# Patient Record
Sex: MALE | Race: WHITE | NOT HISPANIC OR LATINO | Employment: OTHER | URBAN - METROPOLITAN AREA
[De-identification: names, ages, dates, MRNs, and addresses within clinical notes are randomized per-mention and may not be internally consistent; named-entity substitution may affect disease eponyms.]

---

## 2018-02-01 ENCOUNTER — HOSPITAL ENCOUNTER (INPATIENT)
Facility: HOSPITAL | Age: 83
LOS: 6 days | Discharge: NURSING, REHAB OR LONG-TERM CARE | DRG: 603 | End: 2018-02-07
Attending: EMERGENCY MEDICINE | Admitting: INTERNAL MEDICINE
Payer: COMMERCIAL

## 2018-02-01 DIAGNOSIS — F31.9 BIPOLAR 1 DISORDER (HCC): ICD-10-CM

## 2018-02-01 DIAGNOSIS — S89.90XA: ICD-10-CM

## 2018-02-01 DIAGNOSIS — L03.90 WOUND CELLULITIS: Primary | ICD-10-CM

## 2018-02-01 DIAGNOSIS — L03.115 BILATERAL CELLULITIS OF LOWER LEG: ICD-10-CM

## 2018-02-01 DIAGNOSIS — R63.4 WEIGHT LOSS: ICD-10-CM

## 2018-02-01 DIAGNOSIS — L03.116 BILATERAL CELLULITIS OF LOWER LEG: ICD-10-CM

## 2018-02-01 PROBLEM — S81.802A MULTIPLE OPEN WOUNDS OF LEFT LOWER EXTREMITY: Status: ACTIVE | Noted: 2018-02-01

## 2018-02-01 PROBLEM — S81.801A: Status: ACTIVE | Noted: 2018-02-01

## 2018-02-01 LAB
ALBUMIN SERPL BCP-MCNC: 3.1 G/DL (ref 3.5–5)
ALP SERPL-CCNC: 63 U/L (ref 46–116)
ALT SERPL W P-5'-P-CCNC: 24 U/L (ref 12–78)
ANION GAP SERPL CALCULATED.3IONS-SCNC: 9 MMOL/L (ref 4–13)
AST SERPL W P-5'-P-CCNC: 23 U/L (ref 5–45)
BASOPHILS # BLD AUTO: 0.07 THOUSANDS/ΜL (ref 0–0.1)
BASOPHILS NFR BLD AUTO: 1 % (ref 0–1)
BILIRUB SERPL-MCNC: 0.5 MG/DL (ref 0.2–1)
BUN SERPL-MCNC: 18 MG/DL (ref 5–25)
CALCIUM SERPL-MCNC: 9.1 MG/DL (ref 8.3–10.1)
CHLORIDE SERPL-SCNC: 105 MMOL/L (ref 100–108)
CO2 SERPL-SCNC: 28 MMOL/L (ref 21–32)
CREAT SERPL-MCNC: 1.02 MG/DL (ref 0.6–1.3)
EOSINOPHIL # BLD AUTO: 0.1 THOUSAND/ΜL (ref 0–0.61)
EOSINOPHIL NFR BLD AUTO: 2 % (ref 0–6)
ERYTHROCYTE [DISTWIDTH] IN BLOOD BY AUTOMATED COUNT: 14 % (ref 11.6–15.1)
ERYTHROCYTE [SEDIMENTATION RATE] IN BLOOD: 5 MM/HOUR (ref 0–10)
GFR SERPL CREATININE-BSD FRML MDRD: 68 ML/MIN/1.73SQ M
GLUCOSE SERPL-MCNC: 84 MG/DL (ref 65–140)
HCT VFR BLD AUTO: 42.1 % (ref 36.5–49.3)
HGB BLD-MCNC: 14.5 G/DL (ref 12–17)
LACTATE SERPL-SCNC: 0.8 MMOL/L (ref 0.5–2)
LYMPHOCYTES # BLD AUTO: 1.57 THOUSANDS/ΜL (ref 0.6–4.47)
LYMPHOCYTES NFR BLD AUTO: 23 % (ref 14–44)
MCH RBC QN AUTO: 31.6 PG (ref 26.8–34.3)
MCHC RBC AUTO-ENTMCNC: 34.4 G/DL (ref 31.4–37.4)
MCV RBC AUTO: 92 FL (ref 82–98)
MONOCYTES # BLD AUTO: 0.68 THOUSAND/ΜL (ref 0.17–1.22)
MONOCYTES NFR BLD AUTO: 10 % (ref 4–12)
NEUTROPHILS # BLD AUTO: 4.25 THOUSANDS/ΜL (ref 1.85–7.62)
NEUTS SEG NFR BLD AUTO: 64 % (ref 43–75)
NRBC BLD AUTO-RTO: 0 /100 WBCS
PLATELET # BLD AUTO: 263 THOUSANDS/UL (ref 149–390)
PMV BLD AUTO: 8.9 FL (ref 8.9–12.7)
POTASSIUM SERPL-SCNC: 3.7 MMOL/L (ref 3.5–5.3)
PROT SERPL-MCNC: 6.5 G/DL (ref 6.4–8.2)
RBC # BLD AUTO: 4.59 MILLION/UL (ref 3.88–5.62)
SODIUM SERPL-SCNC: 142 MMOL/L (ref 136–145)
WBC # BLD AUTO: 6.7 THOUSAND/UL (ref 4.31–10.16)

## 2018-02-01 PROCEDURE — 99223 1ST HOSP IP/OBS HIGH 75: CPT | Performed by: INTERNAL MEDICINE

## 2018-02-01 PROCEDURE — 80053 COMPREHEN METABOLIC PANEL: CPT | Performed by: EMERGENCY MEDICINE

## 2018-02-01 PROCEDURE — 99284 EMERGENCY DEPT VISIT MOD MDM: CPT

## 2018-02-01 PROCEDURE — 85652 RBC SED RATE AUTOMATED: CPT | Performed by: EMERGENCY MEDICINE

## 2018-02-01 PROCEDURE — 96360 HYDRATION IV INFUSION INIT: CPT

## 2018-02-01 PROCEDURE — 83605 ASSAY OF LACTIC ACID: CPT | Performed by: EMERGENCY MEDICINE

## 2018-02-01 PROCEDURE — 36415 COLL VENOUS BLD VENIPUNCTURE: CPT | Performed by: EMERGENCY MEDICINE

## 2018-02-01 PROCEDURE — 85025 COMPLETE CBC W/AUTO DIFF WBC: CPT | Performed by: EMERGENCY MEDICINE

## 2018-02-01 PROCEDURE — 87040 BLOOD CULTURE FOR BACTERIA: CPT | Performed by: EMERGENCY MEDICINE

## 2018-02-01 PROCEDURE — 87081 CULTURE SCREEN ONLY: CPT | Performed by: INTERNAL MEDICINE

## 2018-02-01 RX ORDER — HEPARIN SODIUM 5000 [USP'U]/ML
5000 INJECTION, SOLUTION INTRAVENOUS; SUBCUTANEOUS EVERY 8 HOURS SCHEDULED
Status: DISCONTINUED | OUTPATIENT
Start: 2018-02-01 | End: 2018-02-07 | Stop reason: HOSPADM

## 2018-02-01 RX ORDER — ACETAMINOPHEN 325 MG/1
650 TABLET ORAL EVERY 6 HOURS PRN
Status: DISCONTINUED | OUTPATIENT
Start: 2018-02-01 | End: 2018-02-07 | Stop reason: HOSPADM

## 2018-02-01 RX ORDER — DOXYCYCLINE HYCLATE 100 MG/1
100 CAPSULE ORAL 2 TIMES DAILY
Qty: 14 CAPSULE | Refills: 0 | Status: SHIPPED | OUTPATIENT
Start: 2018-02-01 | End: 2018-02-01

## 2018-02-01 RX ORDER — METRONIDAZOLE 500 MG/1
500 TABLET ORAL EVERY 12 HOURS SCHEDULED
Qty: 14 TABLET | Refills: 0 | Status: SHIPPED | OUTPATIENT
Start: 2018-02-01 | End: 2018-02-01

## 2018-02-01 RX ORDER — SERTRALINE HYDROCHLORIDE 100 MG/1
50 TABLET, FILM COATED ORAL DAILY
COMMUNITY
End: 2020-03-25 | Stop reason: SDUPTHER

## 2018-02-01 RX ORDER — DIVALPROEX SODIUM 500 MG/1
250 TABLET, DELAYED RELEASE ORAL DAILY
COMMUNITY
End: 2018-02-07 | Stop reason: HOSPADM

## 2018-02-01 RX ADMIN — AMPICILLIN SODIUM AND SULBACTAM SODIUM 1.5 G: 1; .5 INJECTION, POWDER, FOR SOLUTION INTRAMUSCULAR; INTRAVENOUS at 21:48

## 2018-02-01 RX ADMIN — ACETAMINOPHEN 650 MG: 325 TABLET, FILM COATED ORAL at 20:58

## 2018-02-01 RX ADMIN — CEFTRIAXONE 1000 MG: 1 INJECTION, POWDER, FOR SOLUTION INTRAMUSCULAR; INTRAVENOUS at 19:20

## 2018-02-01 RX ADMIN — SODIUM CHLORIDE 1000 ML: 0.9 INJECTION, SOLUTION INTRAVENOUS at 17:54

## 2018-02-01 NOTE — ED PROVIDER NOTES
Pt Name: Anastasia Patel  MRN: [id number]  [place] 1934  Age/Sex: 80 y o  male  Date of evaluation: 2/1/2018  PCP: No primary care provider on file  CHIEF COMPLAINT    Chief Complaint   Patient presents with    Foot Ulcer     pt reports foot ulcer, noticed today, believes due to feet rubbing on new wheelchair         HPI    Dominique Sandoval presents to the Emergency Department complaining of wounds on his feet  He believes that he has been hitting them on the foot rests of his new wheelchair  He was recently hospitalized after a fall and had a spinal fracture  He then was in rehab  Now he is using a new wheelchair that he has not had before  He noticed today that he had draining wounds on bL feet with foul smell  No pain  No fever  Is not sure how long this has been there  HPI      Past Medical and Surgical History    Past Medical History:   Diagnosis Date    [facility]     Arthritis     Vascular insufficiency        Past Surgical History:   Procedure Laterality Date    HERNIA REPAIR      TONSILLECTOMY         History reviewed  No pertinent family history  Social History   Substance Use Topics    Smoking status: Never Smoker    Smokeless tobacco: Never Used    Alcohol use Yes      Comment: every day, 1 drink           Allergies    No Known Allergies    Home Medications    Prior to Admission medications    Not on File           Review of Systems    Review of Systems   Constitutional: Negative for activity change, appetite change, chills, fatigue and fever  HENT: Negative for congestion, rhinorrhea, sinus pressure, sneezing, sore throat and trouble swallowing  Eyes: Negative for photophobia and visual disturbance  Respiratory: Negative for chest tightness, shortness of breath and wheezing  Cardiovascular: Negative for chest pain and leg swelling  Gastrointestinal: Negative for abdominal distention, abdominal pain, constipation, diarrhea, nausea and vomiting     Endocrine: Negative for polydipsia, polyphagia and polyuria  Genitourinary: Negative for decreased urine volume, difficulty urinating, dysuria, flank pain, frequency and urgency  Musculoskeletal: Negative for back pain, gait problem, joint swelling and neck pain  Skin: Negative for color change, pallor and rash  Allergic/Immunologic: Negative for immunocompromised state  Neurological: Negative for seizures, syncope, speech difficulty, weakness, light-headedness and headaches  Psychiatric/Behavioral: Negative for confusion  All other systems reviewed and are negative  Physical Exam      ED Triage Vitals [02/01/18 1600]   Temperature Pulse Respirations Blood Pressure SpO2   97 9 °F (36 6 °C) 92 18 159/79 94 %      Temp Source Heart Rate Source Patient Position - Orthostatic VS BP Location FiO2 (%)   Oral Monitor Lying Right arm --      Pain Score       No Pain               Physical Exam   Constitutional: He is oriented to person, place, and time  He appears well-developed  He has a sickly appearance  No distress  Overall poor hygiene and foul smell   HENT:   Head: Normocephalic and atraumatic  Nose: Nose normal    Mouth/Throat: Oropharynx is clear and moist    Eyes: Conjunctivae and EOM are normal  Pupils are equal, round, and reactive to light  Neck: Normal range of motion  Neck supple  Cardiovascular: Normal rate, regular rhythm and normal heart sounds  Exam reveals no gallop and no friction rub  No murmur heard  Pulmonary/Chest: Effort normal and breath sounds normal  No respiratory distress  He has no wheezes  He has no rales  Abdominal: Soft  Bowel sounds are normal  There is no tenderness  There is no rebound and no guarding  Musculoskeletal: Normal range of motion  Feet:   Right Foot:   Skin Integrity: Positive for ulcer, skin breakdown, erythema, warmth and dry skin  Left Foot:   Skin Integrity: Positive for ulcer, skin breakdown, erythema, warmth and dry skin     Neurological: He is alert and oriented to person, place, and time  Skin: Skin is warm and dry  He is not diaphoretic  Psychiatric: He has a normal mood and affect  His behavior is normal    Nursing note and vitals reviewed  Assessment and Plan    Felix Clement is a 80 y o  male who presents with foot wounds of uncertain age  Physical examination remarkable for cellulitis in the setting of chronic skin changes as well  Differential diagnosis (not completely inclusive) includes acute infection superimposed on chronic vascular changes of LE  Plan will be to perform diagnostic testing and treat symptomatically        MDM    Diagnostic Results        Labs:    Results for orders placed or performed during the hospital encounter of 02/01/18   CBC and differential   Result Value Ref Range    WBC 6 70 4 31 - 10 16 Thousand/uL    RBC 4 59 3 88 - 5 62 Million/uL    Hemoglobin 14 5 12 0 - 17 0 g/dL    Hematocrit 42 1 36 5 - 49 3 %    MCV 92 82 - 98 fL    MCH 31 6 26 8 - 34 3 pg    MCHC 34 4 31 4 - 37 4 g/dL    RDW 14 0 11 6 - 15 1 %    MPV 8 9 8 9 - 12 7 fL    Platelets 440 474 - 029 Thousands/uL    nRBC 0 /100 WBCs    Neutrophils Relative 64 43 - 75 %    Lymphocytes Relative 23 14 - 44 %    Monocytes Relative 10 4 - 12 %    Eosinophils Relative 2 0 - 6 %    Basophils Relative 1 0 - 1 %    Neutrophils Absolute 4 25 1 85 - 7 62 Thousands/µL    Lymphocytes Absolute 1 57 0 60 - 4 47 Thousands/µL    Monocytes Absolute 0 68 0 17 - 1 22 Thousand/µL    Eosinophils Absolute 0 10 0 00 - 0 61 Thousand/µL    Basophils Absolute 0 07 0 00 - 0 10 Thousands/µL   Comprehensive metabolic panel   Result Value Ref Range    Sodium 142 136 - 145 mmol/L    Potassium 3 7 3 5 - 5 3 mmol/L    Chloride 105 100 - 108 mmol/L    CO2 28 21 - 32 mmol/L    Anion Gap 9 4 - 13 mmol/L    BUN 18 5 - 25 mg/dL    Creatinine 1 02 0 60 - 1 30 mg/dL    Glucose 84 65 - 140 mg/dL    Calcium 9 1 8 3 - 10 1 mg/dL    AST 23 5 - 45 U/L    ALT 24 12 - 78 U/L    Alkaline Phosphatase 63 46 - 116 U/L    Total Protein 6 5 6 4 - 8 2 g/dL    Albumin 3 1 (L) 3 5 - 5 0 g/dL    Total Bilirubin 0 50 0 20 - 1 00 mg/dL    eGFR 68 ml/min/1 73sq m   Lactic acid, plasma   Result Value Ref Range    LACTIC ACID 0 8 0 5 - 2 0 mmol/L   Sedimentation rate, automated   Result Value Ref Range    Sed Rate 5 0 - 10 mm/hour       All labs reviewed and utilized in the medical decision making process    Radiology:    No orders to display       All radiology studies independently viewed by me and interpreted by the radiologist     Procedure    Procedures    CritCare Time      ED Course of Care and Re-Assessments          Medications   acetaminophen (TYLENOL) tablet 650 mg (650 mg Oral Given 2/1/18 2058)   ampicillin-sulbactam (UNASYN) 1 5 g in sodium chloride 0 9 % 50 mL IVPB (not administered)   sodium chloride 0 9 % bolus 1,000 mL (0 mL Intravenous Stopped 2/1/18 1854)           FINAL IMPRESSION    Final diagnoses:   Wound cellulitis   Lower extremity injury         DISPOSITION/PLAN      Time reflects when diagnosis was documented in both MDM as applicable and the Disposition within this note     Time User Action Codes Description Comment    2/1/2018  6:23 PM Bossman Zabala Add [N76 0,  B96 89] Bacterial vaginosis     2/1/2018  6:56 PM Bossman MEREDITH Remove [N76 0,  B96 89] Bacterial vaginosis     2/1/2018  7:12 PM Bossman MEREDITH Add [K57 56] Wound cellulitis     2/1/2018  7:14 PM Bossman MEREDITH Add [S89 90XA] Lower extremity injury     2/1/2018  9:06 PM Juan MEREDITH Add [R63 4] Weight loss     2/1/2018  9:06 PM Stacie Cao Add [L03 116,  L03 115] Bilateral cellulitis of lower leg       ED Disposition     ED Disposition Condition Comment    Admit  Case was discussed with JULITA and the patient's admission status was agreed to be Admission Status: inpatient status to the service of Dr Swapna Ortiz           Follow-up Information     Follow up With Specialties Details Why Contact Info Additional [address] [facility] Emergency Department Emergency Medicine Go to As needed, If symptoms worsen [address] [facility] [id number] [phone] [place] ED, [address], [place], [address] [place] [address] Alcides Mao MD Obstetrics and Gynecology Schedule an appointment as soon as possible for a visit  [facility]  [address]  [phone]               PATIENT REFERRED TO:    [address] [facility] Emergency Department  [address] [facility] [id number] [phone]  Go to  As needed, If symptoms worsen    Lakhwinder Bullock MD  [address] [place] [address] Drive [address] [place] [address]  [phone]    Schedule an appointment as soon as possible for a visit        DISCHARGE MEDICATIONS:    Patient's Medications   Discharge Prescriptions    No medications on file       No discharge procedures on file           Kin May, [facility], DO  02/01/18 7761

## 2018-02-02 ENCOUNTER — APPOINTMENT (INPATIENT)
Dept: RADIOLOGY | Facility: HOSPITAL | Age: 83
DRG: 603 | End: 2018-02-02
Payer: COMMERCIAL

## 2018-02-02 LAB
ANION GAP SERPL CALCULATED.3IONS-SCNC: 9 MMOL/L (ref 4–13)
BUN SERPL-MCNC: 13 MG/DL (ref 5–25)
CALCIUM SERPL-MCNC: 9 MG/DL (ref 8.3–10.1)
CHLORIDE SERPL-SCNC: 106 MMOL/L (ref 100–108)
CO2 SERPL-SCNC: 25 MMOL/L (ref 21–32)
CREAT SERPL-MCNC: 0.76 MG/DL (ref 0.6–1.3)
ERYTHROCYTE [DISTWIDTH] IN BLOOD BY AUTOMATED COUNT: 13.9 % (ref 11.6–15.1)
GFR SERPL CREATININE-BSD FRML MDRD: 84 ML/MIN/1.73SQ M
GLUCOSE SERPL-MCNC: 92 MG/DL (ref 65–140)
HCT VFR BLD AUTO: 40.3 % (ref 36.5–49.3)
HGB BLD-MCNC: 13.7 G/DL (ref 12–17)
MCH RBC QN AUTO: 31.1 PG (ref 26.8–34.3)
MCHC RBC AUTO-ENTMCNC: 34 G/DL (ref 31.4–37.4)
MCV RBC AUTO: 91 FL (ref 82–98)
PLATELET # BLD AUTO: 237 THOUSANDS/UL (ref 149–390)
PMV BLD AUTO: 8.8 FL (ref 8.9–12.7)
POTASSIUM SERPL-SCNC: 3.7 MMOL/L (ref 3.5–5.3)
RBC # BLD AUTO: 4.41 MILLION/UL (ref 3.88–5.62)
SODIUM SERPL-SCNC: 140 MMOL/L (ref 136–145)
WBC # BLD AUTO: 6.94 THOUSAND/UL (ref 4.31–10.16)

## 2018-02-02 PROCEDURE — 73630 X-RAY EXAM OF FOOT: CPT

## 2018-02-02 PROCEDURE — 73600 X-RAY EXAM OF ANKLE: CPT

## 2018-02-02 PROCEDURE — 99232 SBSQ HOSP IP/OBS MODERATE 35: CPT | Performed by: INTERNAL MEDICINE

## 2018-02-02 PROCEDURE — 85027 COMPLETE CBC AUTOMATED: CPT | Performed by: INTERNAL MEDICINE

## 2018-02-02 PROCEDURE — 80048 BASIC METABOLIC PNL TOTAL CA: CPT | Performed by: INTERNAL MEDICINE

## 2018-02-02 RX ADMIN — SERTRALINE HYDROCHLORIDE 50 MG: 50 TABLET ORAL at 08:58

## 2018-02-02 RX ADMIN — AMPICILLIN SODIUM AND SULBACTAM SODIUM 1.5 G: 1; .5 INJECTION, POWDER, FOR SOLUTION INTRAMUSCULAR; INTRAVENOUS at 14:05

## 2018-02-02 RX ADMIN — HEPARIN SODIUM 5000 UNITS: 5000 INJECTION, SOLUTION INTRAVENOUS; SUBCUTANEOUS at 00:46

## 2018-02-02 RX ADMIN — AMPICILLIN SODIUM AND SULBACTAM SODIUM 1.5 G: 1; .5 INJECTION, POWDER, FOR SOLUTION INTRAMUSCULAR; INTRAVENOUS at 08:57

## 2018-02-02 RX ADMIN — ACETAMINOPHEN 650 MG: 325 TABLET, FILM COATED ORAL at 12:07

## 2018-02-02 RX ADMIN — AMPICILLIN SODIUM AND SULBACTAM SODIUM 1.5 G: 1; .5 INJECTION, POWDER, FOR SOLUTION INTRAMUSCULAR; INTRAVENOUS at 03:12

## 2018-02-02 RX ADMIN — AMPICILLIN SODIUM AND SULBACTAM SODIUM 1.5 G: 1; .5 INJECTION, POWDER, FOR SOLUTION INTRAMUSCULAR; INTRAVENOUS at 20:55

## 2018-02-02 RX ADMIN — ACETAMINOPHEN 650 MG: 325 TABLET, FILM COATED ORAL at 02:36

## 2018-02-02 RX ADMIN — ACETAMINOPHEN 650 MG: 325 TABLET, FILM COATED ORAL at 19:33

## 2018-02-02 RX ADMIN — HEPARIN SODIUM 5000 UNITS: 5000 INJECTION, SOLUTION INTRAVENOUS; SUBCUTANEOUS at 05:34

## 2018-02-02 RX ADMIN — HEPARIN SODIUM 5000 UNITS: 5000 INJECTION, SOLUTION INTRAVENOUS; SUBCUTANEOUS at 21:01

## 2018-02-02 RX ADMIN — HEPARIN SODIUM 5000 UNITS: 5000 INJECTION, SOLUTION INTRAVENOUS; SUBCUTANEOUS at 14:05

## 2018-02-02 NOTE — H&P
H&P- Cynthia Marinelli 1934, 80 y o  male MRN: [id number]    Unit/Bed#: -01 Encounter: [id number]    Primary Care Provider: No primary care provider on file  Date and time admitted to hospital: 2/1/2018  3:56 PM        * Bilateral cellulitis of lower leg   Assessment & Plan    Since patient with purlulent lesions use Unasyn 1  5 g 6  · Check mrsa swab        Wounds, multiple open, lower extremity, right, initial encounter   Assessment & Plan    Lo whit  Wound care until podiatry sees patient  Obtain xray of foot and ankle due to purulent lesion over malleolus, may need mri        Multiple open wounds of left lower extremity   Assessment & Plan    · Local wound care for tonight, just cleanse with soap and water  · Nonadherent dressing until podiatry sees him            Bipolar 1 disorder [facility])   Assessment & Plan    Need full med rec called [facility] they only have his depakote there  Have sister bring other prescriptions  · depakote 500 q 12              VTE Prophylaxis: Heparin  / reason for no mechanical VTE prophylaxis peripheral vascular disease   Code Status: full  POLST: POLST form is not discussed and not completed at this time  Discussion with family: no family present    Anticipated Length of Stay:  Patient will be admitted on an Inpatient basis with an anticipated length of stay of  greater than 2 midnights  Justification for Hospital Stay: needs iv antibiotics and str eval     Total Time for Visit, including Counseling / Coordination of Care: 1 hour  Greater than 50% of this total time spent on direct patient counseling and coordination of care  Chief Complaint:   Leg wounds    History of Present Illness:    Cynthia Marinelli is a 80 y o  male who presents with multiple long standing present on admission leg wounds that he states are secondary to banging his legs on the foot rests of his wheelchair    [place] states he was forced into a wheelchair this summer which had various exposed nuts and bolmaritza  He states that he did not realize that the edges of the connection were razor sharp cutting him through his stockings  He reports that he has feeling in his legs but has poor circulation so he dosen't heel  He reports a declining pattern of amublatory dysfuction which has been present since summer  He relates he went from living alone in old converted barn to now having to live with his nephew and take care from his sister who is also ailing       Review of Systems:    Review of Systems    Past Medical and Surgical History:     Past Medical History:   Diagnosis Date    A-fib ([facility] )     Arthritis     Vascular insufficiency        Past Surgical History:   Procedure Laterality Date    HERNIA REPAIR      TONSILLECTOMY         Meds/Allergies:    Prior to Admission medications    Medication Sig Start Date End Date Taking? Authorizing Provider   divalproex sodium (DEPAKOTE) 500 mg EC tablet Take 250 mg by mouth daily   Yes Historical Provider, MD   sertraline (ZOLOFT) 100 mg tablet Take 50 mg by mouth daily   Yes Historical Provider, MD     I have reviewed home medications with patient personally  His sister called with very poor information , she was not able to read the names and could not find some of his medications   Patient states he is on Depakote and sertraline  [facility] he had only filled Depakte 500 mg bid in October, no other prescriptions have been filled  The computer shows his last marie so sertraline  He reports he was taken off of crestor and cardura  Allergies: No Known Allergies    Social History:     Marital Status:     Occupation: teacher,   Patient Pre-hospital Living Situation: [facility]  [facility] [phone]  Patient Pre-hospital Level of Mobility: wheelchair, some stand and walk  Patient Pre-hospital Diet Restrictions: none  Substance Use History:   History   Alcohol Use    Yes     Comment: every day, 1 drink     History   Smoking Status    Never Smoker   Smokeless Tobacco    Never Used     History   Drug Use No       Family History:    Family History   Problem Relation Age of Onset    Heart disease Mother     Diabetes Father        Physical Exam:     Vitals:   Blood Pressure: 114/60 (02/01/18 2300)  Pulse: 85 (02/01/18 2300)  Temperature: 97 7 °F (36 5 °C) (02/01/18 2300)  Temp Source: Oral (02/01/18 2300)  Respirations: 17 (02/01/18 2250)  Height: 5' 10" (177 8 cm) (02/01/18 2300)  Weight - Scale: 72 1 kg (158 lb 14 4 oz) (02/01/18 2300)  SpO2: 96 % (02/01/18 2300)    Physical Exam  Disheveled male poorly nourished, with multiple actinic Care to take lesions all over his upper torso head and back  Pupils equal round and reactive to light extraocular muscles appear to be intact anicteric, mucous membranes moist no oral lesions very poor dentition neck is supple no JVD covered by unruly facial hair  Chest is clear to auscultation no rhonchi rales or wheezes  Cardiovascular regular rate rhythm positive S1 and S2 no S3-S4 murmur, rub or gallop  Abdomen scaphoid soft nontender nondistended with positive bowel sounds no hepatosplenomegaly is noted  Extremities show foul-smelling multiple wounds with purulent material specifically on the right malleolus and left foot  Leg show signs of peripheral vascular disease with hyperpigmentation and dry scaly skin  Neurologically patient is awake alert oriented cranial nerves appear to be intact although he does appear to have a slight propensity for drooling to the left side of his beard  Cranial nerves otherwise appear intact speech is clear and fluent   strength is equal lower extremities strength is decreased sensation grossly appears to be intact      Additional Data:     Lab Results: I have personally reviewed pertinent reports          Results from last 7 days  Lab Units 02/01/18  1718   WBC Thousand/uL 6 70   HEMOGLOBIN g/dL 14 5   HEMATOCRIT % 42 1   PLATELETS Thousands/uL 263   NEUTROS PCT % 64   LYMPHS PCT % 23   MONOS PCT % 10   EOS PCT % 2       Results from last 7 days  Lab Units 02/01/18  1718   SODIUM mmol/L 142   POTASSIUM mmol/L 3 7   CHLORIDE mmol/L 105   CO2 mmol/L 28   BUN mg/dL 18   CREATININE mg/dL 1 02   CALCIUM mg/dL 9 1   TOTAL PROTEIN g/dL 6 5   BILIRUBIN TOTAL mg/dL 0 50   ALK PHOS U/L 63   ALT U/L 24   AST U/L 23   GLUCOSE RANDOM mg/dL 84           Imaging: No imaging was completed on admission will order xray of foot and ankle to start , may need mri    XR ankle 2 vw right    (Results Pending)   XR foot 3+ vw left    (Results Pending)       EKG, Pathology, and Other Studies Reviewed on Admission:   · EKG: none completed , old records state afib but, appears in nsr, sounds regular  Allscripts / Epic Records Reviewed: Yes, non to review, no care everywhere documents  ** Please Note: This note has been constructed using a voice recognition system   **

## 2018-02-02 NOTE — PLAN OF CARE
Problem: DISCHARGE PLANNING - CARE MANAGEMENT  Goal: Discharge to post-acute care or home with appropriate resources  INTERVENTIONS:  - Conduct assessment to determine patient/family and health care team treatment goals, and need for post-acute services based on payer coverage, community resources, and patient preferences, and barriers to discharge  - Address psychosocial, clinical, and financial barriers to discharge as identified in assessment in conjunction with the patient/family and health care team  - Arrange appropriate level of post-acute services according to patient's   needs and preference and payer coverage in collaboration with the physician and health care team  - Communicate with and update the patient/family, physician, and health care team regarding progress on the discharge plan  - Arrange appropriate transportation to post-acute venues  Outcome: Progressing  Cm met with pt at bedside  Pt lives in an apt within his sister Roseanna's home  There are 12 steps w/railing to enter the residence  Pt does have difficulty navigating steps due to vascular insuff that causes leg weakness  He uses a walker, quad cane, and a w/c prn  He has sleep apnea and uses a C-pap machine, but states that he needs a new sleep study  He has been in [facility] for rehab  He has also used HH for Pt, but he does not remember the name of the agency  His PCP is Dr Garfield Negrete through the [address]  He has a hx of bipolar and it is treated with medication by a psychiatrist through [facility]  He was a  and states that he has permission from his PCP to have 1 drink a day, but he does not measure  He uses [employer] in [place] and has no problem with his co-pays  He is working on an Advanced Directive at the present time and is in the process of making his nephew [facility]  He is retired, but still drives  CM discussed discharge plan including STR vs HH-Pt    He is hoping to be accepted at [facility]-he was very happy there  If he cannot get into that facility, he would like to do HH-Pt  CM will place referral and keep pt informed of approvals  CM depart will continue to follow through hospitalization  CM reviewed discharge planning process including the following: identifying help at home, patient preference for discharge planning needs, pharmacy preference, and availability of treatment team to discuss questions or concerns patient and/or family may have regarding understanding medications and recognizing signs and symptoms once discharged  CM also encouraged patient to follow up with all recommended appointments after discharge  Patient advised of importance for patient and family to participate in managing patients medical well being

## 2018-02-02 NOTE — CASE MANAGEMENT
Initial Clinical Review    Admission: Date/Time/Statement: 2/1/18 @ 1914    Orders Placed This Encounter   Procedures    Inpatient Admission (expected length of stay for this patient is greater than two midnights)     Standing Status:   Standing     Number of Occurrences:   1     Order Specific Question:   Admitting Physician     Answer:   Mary Jo Ch     Order Specific Question:   Level of Care     Answer:   Med Surg [16]     Order Specific Question:   Estimated length of stay     Answer:   More than 2 Midnights     Order Specific Question:   Certification     Answer:   I certify that inpatient services are medically necessary for this patient for a duration of greater than two midnights  See H&P and MD Progress Notes for additional information about the patient's course of treatment  ED: Date/Time/Mode of Arrival:   ED Arrival Information     Expected Arrival Acuity Means of Arrival Escorted By Service Admission Type    - 2/1/2018 15:54 [facility] [address] Urgent    Arrival Complaint    FOOT SORES          Chief Complaint:   Chief Complaint   Patient presents with    Foot Ulcer     pt reports foot ulcer, noticed today, believes due to feet rubbing on new wheelchair       History of Illness: Christian Mayfield is a 80 y o  male who presents with multiple long standing present on admission leg wounds that he states are secondary to banging his legs on the foot rests of his wheelchair  Star Shalini states he was forced into a wheelchair this summer which had various exposed nuts and bolts  He states that he did not realize that the edges of the connection were razor sharp cutting him through his stockings  He reports that he has feeling in his legs but has poor circulation so he dosen't heal   He reports a declining pattern of amublatory dysfuction which has been present since summer   He relates he went from living alone in old converted barn to now having to live with his nephew and take care from his sister who is also ailing       ED Vital Signs:   ED Triage Vitals [02/01/18 1600]   Temperature Pulse Respirations Blood Pressure SpO2   97 9 °F (36 6 °C) 92 18 159/79 94 %      Temp Source Heart Rate Source Patient Position - Orthostatic VS BP Location FiO2 (%)   Oral Monitor Lying Right arm --      Pain Score       No Pain        Wt Readings from Last 1 Encounters:   02/01/18 72 1 kg (158 lb 14 4 oz)       Vital Signs (abnormal): WNL    Abnormal Labs/Diagnostic Test Results:     MRSA cx, Blood cx in process    Lab Units 02/01/18  1718   WBC Thousand/uL 6 70   HEMOGLOBIN g/dL 14 5   HEMATOCRIT % 42 1   PLATELETS Thousands/uL 263     Lab Units 02/01/18  1718   SODIUM mmol/L 142   POTASSIUM mmol/L 3 7   CHLORIDE mmol/L 105   CO2 mmol/L 28   BUN mg/dL 18   CREATININE mg/dL 1 02   CALCIUM mg/dL 9 1   TOTAL PROTEIN g/dL 6 5   BILIRUBIN TOTAL mg/dL 0 50   ALK PHOS U/L 63   ALT U/L 24   AST U/L 23   GLUCOSE RANDOM mg/dL 84        ED Treatment:   Medication Administration from 02/01/2018 1554 to 02/01/2018 2305       Date/Time Order Dose Route Action Comments     02/01/2018 1754 sodium chloride 0 9 % bolus 1,000 mL 1,000 mL Intravenous New Bag      02/01/2018 1920 ceftriaxone (ROCEPHIN) 1 g/50 mL in dextrose IVPB 1,000 mg Intravenous New Bag      02/01/2018 2058 acetaminophen (TYLENOL) tablet 650 mg 650 mg Oral Given      02/01/2018 2148 ampicillin-sulbactam (UNASYN) 1 5 g in sodium chloride 0 9 % 50 mL IVPB 1 5 g Intravenous New Bag           Past Medical/Surgical History:    Active Ambulatory Problems     Diagnosis Date Noted    No Active Ambulatory Problems     Resolved Ambulatory Problems     Diagnosis Date Noted    No Resolved Ambulatory Problems     Past Medical History:   Diagnosis Date    A-fib ([address] )     Arthritis     Vascular insufficiency        Admitting Diagnosis: Foot pain [M79 673]  Weight loss [R63 4]  Wound cellulitis [L03 90]  Bilateral cellulitis of lower leg [L03 116, Q35 032]  Lower extremity injury [S89 90XA]    Age/Sex: 80 y o  male    Assessment/Plan:     * Bilateral cellulitis of lower leg   Assessment & Plan     Since patient with purlulent lesions use Unasyn 1  5 g 6  · Check mrsa swab          Wounds, multiple open, lower extremity, right, initial encounter   Assessment & Plan     Lo whit  Wound care until podiatry sees patient  Obtain xray of foot and ankle due to purulent lesion over malleolus, may need mri          Multiple open wounds of left lower extremity   Assessment & Plan     · Local wound care for tonight, just cleanse with soap and water  · Nonadherent dressing until podiatry sees him                Bipolar 1 disorder ([address] )   Assessment & Plan     Need full med rec called [facility] they only have his depakote there  Have sister bring other prescriptions  · depakote 500 q 12                 VTE Prophylaxis: Heparin  / reason for no mechanical VTE prophylaxis peripheral vascular disease   Anticipated Length of Stay:  Patient will be admitted on an Inpatient basis with an anticipated length of stay of  greater than 2 midnights  Justification for Hospital Stay: needs iv antibiotics and str eval       Admission Orders:  Scheduled Meds:   Current Facility-Administered Medications:  acetaminophen 650 mg Oral Q6H PRN Michele Liang MD    ampicillin-sulbactam 1 5 g Intravenous Q6H Michele Liang MD Last Rate: 1 5 g (02/02/18 0857)   heparin (porcine) 5,000 Units Subcutaneous Q8H [address] Michele Liang MD    sertraline 50 mg Oral Daily Michele Liang MD      Continuous Infusions:    PRN Meds:   acetaminophen    Wound care adaptic with garrett until seen by podiatry  Podiatry consult  R ankle xray assess for osteomyelitis  L foot xray assess for osteomyelitis    Thank you,  [facility] in the [facility] by Randall Bass for 2017  Network Utilization Review Department  Phone: [phone];  Fax [phone]  ATTENTION: The Network Utilization Review Department is now centralized for our 7 Facilities  Make a note that we have a new phone and fax numbers for our Department  Please call with any questions or concerns to [phone] and carefully follow the prompts so that you are directed to the right person  All voicemails are confidential  Fax any determinations, approvals, denials, and requests for initial or continue stay review clinical to [phone]  Due to HIGH CALL volume, it would be easier if you could please send faxed requests to expedite your requests and in part, help us provide discharge notifications faster

## 2018-02-02 NOTE — ASSESSMENT & PLAN NOTE
Lo whit  Wound care until podiatry sees patient     Obtain xray of foot and ankle due to purulent lesion over malleolus, may need mri

## 2018-02-02 NOTE — ASSESSMENT & PLAN NOTE
· Local wound care for tonight, just cleanse with soap and water  · Nonadherent dressing until podiatry sees him

## 2018-02-02 NOTE — ASSESSMENT & PLAN NOTE
Need full med rec called [facility] they only have his depakote there  Have sister bring other prescriptions    · depakote 500 q 12

## 2018-02-02 NOTE — PLAN OF CARE
DISCHARGE PLANNING     Discharge to home or other facility with appropriate resources Progressing        INFECTION - ADULT     Absence or prevention of progression during hospitalization Progressing        Knowledge Deficit     Patient/family/caregiver demonstrates understanding of disease process, treatment plan, medications, and discharge instructions Progressing        MUSCULOSKELETAL - ADULT     Maintain or return mobility to safest level of function Progressing        PAIN - ADULT     Verbalizes/displays adequate comfort level or baseline comfort level Progressing        SAFETY ADULT     Patient will remain free of falls Progressing     Maintain or return to baseline ADL function Progressing     Maintain or return mobility status to optimal level Progressing        SKIN/TISSUE INTEGRITY - ADULT     Skin integrity remains intact Progressing     Incision(s), wounds(s) or drain site(s) healing without S/S of infection Progressing

## 2018-02-02 NOTE — PLAN OF CARE
Problem: DISCHARGE PLANNING - CARE MANAGEMENT  Goal: Discharge to post-acute care or home with appropriate resources  INTERVENTIONS:  - Conduct assessment to determine patient/family and health care team treatment goals, and need for post-acute services based on payer coverage, community resources, and patient preferences, and barriers to discharge  - Address psychosocial, clinical, and financial barriers to discharge as identified in assessment in conjunction with the patient/family and health care team  - Arrange appropriate level of post-acute services according to patient's   needs and preference and payer coverage in collaboration with the physician and health care team  - Communicate with and update the patient/family, physician, and health care team regarding progress on the discharge plan  - Arrange appropriate transportation to post-acute venues   Outcome: Progressing  Pt expresses his wish to be placed in [facility] (he was a resident there is the past) and was very happy with the facility  Jamaal left message with Georgiana Cook from the [facility] to see if there is a valid optioning on file or does the process have to be done again? CM also left message with Carter Nazario from [facility] and will await her call back  Pt has a hx of bipolar disease requiring medication

## 2018-02-02 NOTE — H&P
Consult - Shmuel Miller Seese 80 y o  male MRN: [id number]  Unit/Bed#: -01 Encounter: [id number]    Assessment/Plan     Assessment:  1  Ulcer right foot to level of dermis    2  Ulcer left foot to level of dermis 3  PVD  4  Mycotic nails    5  Pain in toes right and left  Plan:  Discussed all treatment options for the ulcerations recommended bedside debridement today patient was greeted ulcers were gently and carefully divided to level of healthy bleeding tissue to bilateral feet  Agree with the wound care orders will continue  Reduced all nails in length and thickness to viable tissue right 1 through 5 and left 1 through 5  Patient should follow up for wound care when discharged    History of Present Illness     HPI:  Krystina Fisher is a 80 y o  male who presents with bilateral foot and ankle wounds started many weeks ago as per the patient he believes they were secondary to trauma from his wheelchair have become painful and sore  Also concerned about painful thickened nails which he can no longer care for  Consults  Review of Systems   Constitutional: Negative  HENT: Negative  Eyes: Negative  Respiratory: Negative  Cardiovascular: Negative  Gastrointestinal: Negative  Musculoskeletal:  No complaints   Skin:  Ulcers bilateral feet   Neurological: Negative  Psych: negative         Historical Information   Past Medical History:   Diagnosis Date    A[facility]fib [address] [address] )     Arthritis     Vascular insufficiency      Past Surgical History:   Procedure Laterality Date    HERNIA REPAIR      TONSILLECTOMY       Social History   History   Alcohol Use    Yes     Comment: every day, 1 drink     History   Drug Use No     History   Smoking Status    Never Smoker   Smokeless Tobacco    Never Used     Family History:   Family History   Problem Relation Age of Onset    Heart disease Mother     Diabetes Father        Meds/Allergies   Prescriptions Prior to Admission   Medication    divalproex sodium (DEPAKOTE) 500 mg EC tablet    sertraline (ZOLOFT) 100 mg tablet     No Known Allergies    Objective   First Vitals:   Blood Pressure: 159/79 (02/01/18 1600)  Pulse: 92 (02/01/18 1600)  Temperature: 97 9 °F (36 6 °C) (02/01/18 1600)  Temp Source: Oral (02/01/18 1600)  Respirations: 18 (02/01/18 1600)  Height: 5' 10" (177 8 cm) (02/01/18 2138)  Weight - Scale: 74 9 kg (165 lb 2 oz) (02/01/18 2138)  SpO2: 94 % (02/01/18 1600)    Current Vitals:   Blood Pressure: 100/56 (02/02/18 0700)  Pulse: 69 (02/02/18 0700)  Temperature: 97 9 °F (36 6 °C) (02/02/18 0700)  Temp Source: Oral (02/02/18 0700)  Respirations: 18 (02/02/18 0700)  Height: 5' 10" (177 8 cm) (02/01/18 2300)  Weight - Scale: 72 1 kg (158 lb 14 4 oz) (02/01/18 2300)  SpO2: 97 % (02/02/18 0700)        /56 (BP Location: Right arm)   Pulse 69   Temp 97 9 °F (36 6 °C) (Oral)   Resp 18   Ht 5' 10" (1 778 m)   Wt 72 1 kg (158 lb 14 4 oz)   SpO2 97%   BMI 22 80 kg/m²      General Appearance:    Alert, cooperative, no distress   Head:    Normocephalic, without obvious abnormality, atraumatic   Eyes:    PERRL, conjunctiva/corneas clear, EOM's intact        Nose:   Moist mucous membranes   Neck:   Supple, symmetrical, trachea midline   Back:     Symmetric   Lungs:     Respirations unlabored   Heart:    Regular rate and rhythm, S1 and S2 normal, no murmur, rub   or gallop   Abdomen:     Soft, non-tender   Extremities:    There is mild ankle edema bilaterally   Pulses:   Pedal pulses are 1 out of bilaterally skin a capillary fill time between 4 seconds 1 through 5 bilaterally there is no distal cyanosis or gangrene noted   Skin:   Bilateral feet dorsum there is a 5th superficial ulcerations both approximately 4 cm x 3 cm x 0 2 cm there is a thin biofilm layer which to provide down to healthy granular tissue bilateral medial ankle that again there is superficial ulcerations approximately 2 x 3 cm which also divided down to healthy granular tissue there is no deep probe no purulence no malodor no crepitus noted   Neurologic:   Gross sensation is present  Protective sensation is intact             Lab Results:   Admission on 02/01/2018   Component Date Value    WBC 02/01/2018 6 70     RBC 02/01/2018 4 59     Hemoglobin 02/01/2018 14 5     Hematocrit 02/01/2018 42 1     MCV 02/01/2018 92     MCH 02/01/2018 31 6     MCHC 02/01/2018 34 4     RDW 02/01/2018 14 0     MPV 02/01/2018 8 9     Platelets 60/22/4178 263     nRBC 02/01/2018 0     Neutrophils Relative 02/01/2018 64     Lymphocytes Relative 02/01/2018 23     Monocytes Relative 02/01/2018 10     Eosinophils Relative 02/01/2018 2     Basophils Relative 02/01/2018 1     Neutrophils Absolute 02/01/2018 4 25     Lymphocytes Absolute 02/01/2018 1 57     Monocytes Absolute 02/01/2018 0 68     Eosinophils Absolute 02/01/2018 0 10     Basophils Absolute 02/01/2018 0 07     Sodium 02/01/2018 142     Potassium 02/01/2018 3 7     Chloride 02/01/2018 105     CO2 02/01/2018 28     Anion Gap 02/01/2018 9     BUN 02/01/2018 18     Creatinine 02/01/2018 1 02     Glucose 02/01/2018 84     Calcium 02/01/2018 9 1     AST 02/01/2018 23     ALT 02/01/2018 24     Alkaline Phosphatase 02/01/2018 63     Total Protein 02/01/2018 6 5     Albumin 02/01/2018 3 1*    Total Bilirubin 02/01/2018 0 50     eGFR 02/01/2018 68     LACTIC ACID 02/01/2018 0 8     Sed Rate 02/01/2018 5     Sodium 02/02/2018 140     Potassium 02/02/2018 3 7     Chloride 02/02/2018 106     CO2 02/02/2018 25     Anion Gap 02/02/2018 9     BUN 02/02/2018 13     Creatinine 02/02/2018 0 76     Glucose 02/02/2018 92     Calcium 02/02/2018 9 0     eGFR 02/02/2018 84     WBC 02/02/2018 6 94     RBC 02/02/2018 4 41     Hemoglobin 02/02/2018 13 7     Hematocrit 02/02/2018 40 3     MCV 02/02/2018 91     MCH 02/02/2018 31 1     MCHC 02/02/2018 34 0     RDW 02/02/2018 13 9     Platelets 96/87/4075 237     MPV 02/02/2018 8 8* Invalid input(s): LABAEARO            Imaging: I have personally reviewed pertinent films in PACS  EKG, Pathology, and Other Studies: I have personally reviewed pertinent reports        Code Status: Level 1 - Full Code  Advance Directive and Living Will:      Power of :    POLST:

## 2018-02-02 NOTE — SOCIAL WORK
Cm met with pt at bedside  Pt lives in an apt within his sister Roseanna's home  There are 12 steps w/railing to enter the residence  Pt does have difficulty navigating steps due to vascular insuff that causes leg weakness  He uses a walker, quad cane, and a w/c prn  He has sleep apnea and uses a C-pap machine, but states that he needs a new sleep study  He has been in [place] for rehab  He has also used HH for Pt, but he does not remember the name of the agency  His PCP is Dr Yan Rene through the [address]  He has a hx of bipolar and it is treated with medication by a psychiatrist through [facility]  He was a  and states that he has permission from his PCP to have 1 drink a day, but he does not measure  He uses [facility] in [place] and has no problem with his co-pays  He is working on an Advanced Directive at the present time and is in the process of making his nephew Shari Faden his [place]  He is retired, but still drives  CM discussed discharge plan including STR vs HH-Pt  He is hoping to be accepted at [facility] was very happy there  If he cannot get into that facility, he would like to do HH-Pt  CM will place referral and keep pt informed of approvals  CM depart will continue to follow through hospitalization  CM reviewed discharge planning process including the following: identifying help at home, patient preference for discharge planning needs, pharmacy preference, and availability of treatment team to discuss questions or concerns patient and/or family may have regarding understanding medications and recognizing signs and symptoms once discharged  CM also encouraged patient to follow up with all recommended appointments after discharge  Patient advised of importance for patient and family to participate in managing patients medical well being

## 2018-02-02 NOTE — PLAN OF CARE
Problem: DISCHARGE PLANNING - CARE MANAGEMENT  Goal: Discharge to post-acute care or home with appropriate resources  INTERVENTIONS:  - Conduct assessment to determine patient/family and health care team treatment goals, and need for post-acute services based on payer coverage, community resources, and patient preferences, and barriers to discharge  - Address psychosocial, clinical, and financial barriers to discharge as identified in assessment in conjunction with the patient/family and health care team  - Arrange appropriate level of post-acute services according to patient's   needs and preference and payer coverage in collaboration with the physician and health care team  - Communicate with and update the patient/family, physician, and health care team regarding progress on the discharge plan  - Arrange appropriate transportation to post-acute venues   Outcome: Progressing  CM received call back from both [facility] and [facility]  Both state that option process is older than 6 months and has to be done again  Cm completed PASSR and Ma51 form and this was faxed to [facility] along with records  CM contacted Dr Sylvia Parr to order psych eval and also contacted PT/[facility] to see pt    They will be able to eval pt tomorrow am

## 2018-02-02 NOTE — CONSULTS
Progress Note - Wound   Hailey Chill 80 y o  male MRN: [id number]  Unit/Bed#: -01 Encounter: [id number]      Assessment:  Patient is 80year old male who presents with lower extremity wounds  Admitted with cellulitis  Patient has a history of - vascular insufficiency, a-fib, bi-polar disorder, and arthritis  Wound care consulted for lower extremity wounds  Consult for podiatry is pending  Nutrition consult pending (thick lanugo like dark colored hairs noted to patients b/l back)  Poor hygiene and skin care noted  Patient lives at home alone, denies VNA or history of podiatry/wound care  Assist of one with turning  Continent of bowel and bladder  Patient agreeable and cooperative with assessment  Patient with multiple ulcerations noted to the b/l feet and ankles  Patient states that when he [facility]/c [facility] a hospital stay at [facility] these wounds began from him hitting his legs and feet on his wheelchair  States that these wounds started out as blisters  Hemosiderin staining and dry scaling noted to the lower extremities - noted venous stasis  These wounds are likely mixed etiology of trauma and venous insufficiency  Patient states he wears compression stocking but recently has not due to the wounds  Patient states he does not use any creams or dressing to his wounds, as he was hoping they would eventually heal  Patient denies pain to his wounds  Ulcerations are dry appearing, at various leaving of eschar/slough noted in the wound bed  Likely full thickness in nature under devitalized tissue  Recommend silvasorb gel and adaptic to the wounds to soften the eschars until seen by podiatry for possible debridement / recommends  X-ray pending per nursing  Cleansed the b/l lower extremities and feet with soap and water, and applied hydraguard to the skin  Animal hair noted in the wounds, this was gently cleansed  Patient tolerated well  R anterior foot with irregular shaped wound  Wound bed is dry with 100% thick adhered yellow colored slough/eschar  Edge is attached intact with no un-gus noted  Maddison-wound is intact with no induration, redness, fluctuance  No purulence noted  Foul smelling odor with scant serous drainage  R medial ankle with irregular shaped wound  Wound bed is dry with 100% thick adhered black and yellow colored slough/eschar  Edge is attached intact with no un-gus noted  Maddison-wound is intact with no induration, redness, fluctuance  No purulence noted  L medial foot anterior irregular shaped wound  Wound bed is dry with 75% thick adhered black and yellow colored slough/eschar, 25% granular red tissue - appears full thickness in nature  Edge is attached intact with no un-gus noted  Maddison-wound is intact with no induration, redness, fluctuance  No purulence noted  Foul smelling odor with scant serous drainage  L medial ankle irregular shape wound  Wound bed is dry with 90% thick adhered yellow colored slough/eschar, 10% pink tissue  Edge is attached intact with no un-gus noted  Maddison-wound is intact with no induration, redness, fluctuance  No purulence noted  Foul smelling odor with scant serous drainage  L lateral posterior ankle irregular shaped wound  Wound bed is dry with 100% thick adhered yellow colored slough  Edge is attached intact with no un-gus noted  Maddison-wound is intact with no induration, redness, fluctuance  No purulence noted  Foul smelling odor with scant serous drainage  L lower buttock with area of non-blanchable redness, stage 1 versus folliculitis as the redness appears around the hair follicles of the buttocks  Will note as pressure due to mobility issues and non-blanchable status  No open wounds noted  Recommend offloading of pressure and hydraguard cream       L ear with dry cancerous lesion  Patient states, "thats cancer I need to get it taken care off too"  No open areas, okay to jai WOO      Discussed case with MARIA ELENA regarding care deficits and extensive wounds - aware patient may need SNF on d/c from hospital  Dicussed with unit clerk to change consult from Dr Abigail Davey to Dr Simms as per clinician rotation  Discussed case with [facility] attending Dr John Koch  Wound care will follow along with patient during stay weekly  Primary nurse aware of plan of care in place  Please see flow sheets for more detailed assessment findings  Patient aware of plan of care  New dressings applied  Plan:   1  Apply hydraguard to b/l heels, buttocks and sacrum BID and PRN for prevention and protection  2  Apply skin nourishing cream to the skin daily for moisture   3  Soft care cushion to chair when OOB   4  Turn and reposition patient every 2 hours   5  Elevate heels off of bed with pillows to offload pressure  6  Cleanse b/l lower extremities with soap and water, pat dry, apply hydraguard to b/l lower extremities  Apply silvasorb gel to adaptic and apply to wound beds, cover with dry dressings daily and as needed for soilage/dislodgement until seen by podiatry  7  Podiatry consult is pending  8  Wound care will follow along with patient weekly, please call with questions and concerns     Objective:    Vitals: Blood pressure 100/56, pulse 69, temperature 97 9 °F (36 6 °C), temperature source Oral, resp  rate 18, height 5' 10" (1 778 m), weight 72 1 kg (158 lb 14 4 oz), SpO2 97 %  ,Body mass index is 22 8 kg/m²  Pressure Ulcer 02/01/18 Buttocks Left; Lower stage 1  (Active)   Staging Stage I 2/2/2018  9:00 AM   Wound Description Non-blanchable erythema; Intact 2/2/2018  9:00 AM   Maddison-wound Assessment Clean;Dry; Intact 2/2/2018  9:00 AM   Shape round 2/2/2018  9:00 AM   Wound Length (cm) 0 5 cm 2/2/2018  9:00 AM   Wound Width (cm) 1 cm 2/2/2018  9:00 AM   Wound Depth (cm) 0 2/2/2018  9:00 AM   Calculated Wound Area (cm^2) 0 5 cm^2 2/2/2018  9:00 AM   Calculated Wound Volume (cm^3) 0 cm^3 2/2/2018  9:00 AM   Drainage Amount None 2/2/2018  9:00 AM Treatment Cleansed;Elevated with pillow(s); Offload;Softcare cushion;Turn & reposition 2/2/2018  9:00 AM   Dressing Moisture barrier 2/2/2018  9:00 AM   Wound packed? No 2/2/2018  9:00 AM   Packing- # removed 0 2/2/2018  9:00 AM   Packing- # inserted 0 2/2/2018  9:00 AM   Patient Tolerance Tolerated well 2/2/2018  9:00 AM   Dressing Status Open to air 2/2/2018  9:00 AM       Other Ulcers 02/02/18 Foot Right; Anterior (Active)   Wound Description Yellow;Slough;Dry 2/2/2018  9:00 AM   Maddison-wound Assessment Clean;Dry; Intact;Fragile; Yellow-brown 2/2/2018  9:00 AM   Shape irregular  2/2/2018  9:00 AM   Wound Length (cm) 5 cm 2/2/2018  9:00 AM   Wound Width (cm) 9 cm 2/2/2018  9:00 AM   Wound Depth (cm) 0 1 2/2/2018  9:00 AM   Calculated Wound Volume (cm^3) 4 5 cm^3 2/2/2018  9:00 AM   Drainage Amount Scant 2/2/2018  9:00 AM   Drainage Description Serous; Foul smelling 2/2/2018  9:00 AM   Treatment Cleansed; Offload;Elevated with pillow(s) 2/2/2018  9:00 AM   Dressings Silver dressing;Gauze 2/2/2018  9:00 AM   Wound packed? No 2/2/2018  9:00 AM   Packing- # removed 0 2/2/2018  9:00 AM   Packing- # inserted 0 2/2/2018  9:00 AM   Dressing Changed New dressing applied 2/2/2018  9:00 AM   Patient Tolerance Tolerated well 2/2/2018  9:00 AM   Dressing Status Clean;Dry; Intact 2/2/2018  9:00 AM       Other Ulcers 02/02/18 Ankle Right medial (Active)   Wound Description Dry;Black; Yellow;Slough 2/2/2018  9:00 AM   Maddison-wound Assessment Clean; Intact;Dry;Fragile; Yellow-brown 2/2/2018  9:00 AM   Shape irregular  2/2/2018  9:00 AM   Size large  2/2/2018  9:00 AM   Wound Length (cm) 9 cm 2/2/2018  9:00 AM   Wound Width (cm) 9 cm 2/2/2018  9:00 AM   Wound Depth (cm) 0 2/2/2018  9:00 AM   Calculated Wound Volume (cm^3) 0 cm^3 2/2/2018  9:00 AM   Drainage Amount None 2/2/2018  9:00 AM   Treatment Cleansed;Elevated with pillow(s); Offload 2/2/2018  9:00 AM   Dressings Silver dressing;Gauze 2/2/2018  9:00 AM   Wound packed?  No 2/2/2018  9:00 AM Packing- # removed 0 2/2/2018  9:00 AM   Packing- # inserted 0 2/2/2018  9:00 AM   Dressing Changed New dressing applied 2/2/2018  9:00 AM   Patient Tolerance Tolerated well 2/2/2018  9:00 AM   Dressing Status Clean;Dry; Intact 2/2/2018  9:00 AM       Other Ulcers 02/02/18 Foot Anterior medial (Active)   Wound Description Beefy red;Dry;Slough;Brown;Yellow 2/2/2018  9:00 AM   Maddison-wound Assessment Clean;Dry; Intact;Fragile; Yellow-brown 2/2/2018  9:00 AM   Shape irregular  2/2/2018  9:00 AM   Wound Length (cm) 4 cm 2/2/2018  9:00 AM   Wound Width (cm) 5 cm 2/2/2018  9:00 AM   Wound Depth (cm) 0 2 2/2/2018  9:00 AM   Calculated Wound Volume (cm^3) 4 cm^3 2/2/2018  9:00 AM   Drainage Amount Scant 2/2/2018  9:00 AM   Drainage Description Serous; Foul smelling 2/2/2018  9:00 AM   Treatment Cleansed;Elevated with pillow(s); Offload 2/2/2018  9:00 AM   Dressings Silver dressing;Gauze 2/2/2018  9:00 AM   Wound packed? No 2/2/2018  9:00 AM   Packing- # removed 0 2/2/2018  9:00 AM   Packing- # inserted 0 2/2/2018  9:00 AM   Dressing Changed New dressing applied 2/2/2018  9:00 AM   Patient Tolerance Tolerated well 2/2/2018  9:00 AM   Dressing Status Clean;Dry; Intact 2/2/2018  9:00 AM       Other Ulcers 02/02/18 Ankle Left medial (Active)   Wound Description Dry;Pink;Slough; Yellow 2/2/2018  9:00 AM   Maddison-wound Assessment Clean;Dry; Intact;Fragile; Yellow-brown 2/2/2018  9:00 AM   Shape irregular  2/2/2018  9:00 AM   Wound Length (cm) 5 5 cm 2/2/2018  9:00 AM   Wound Width (cm) 4 cm 2/2/2018  9:00 AM   Wound Depth (cm) 0 1 2/2/2018  9:00 AM   Calculated Wound Volume (cm^3) 2 2 cm^3 2/2/2018  9:00 AM   Drainage Amount Scant 2/2/2018  9:00 AM   Drainage Description Serous; Foul smelling 2/2/2018  9:00 AM   Treatment Cleansed;Elevated with pillow(s); Offload 2/2/2018  9:00 AM   Dressings Silver dressing;Gauze 2/2/2018  9:00 AM   Wound packed?  No 2/2/2018  9:00 AM   Packing- # removed 0 2/2/2018  9:00 AM   Packing- # inserted 0 2/2/2018 9:00 AM   Dressing Changed New dressing applied 2/2/2018  9:00 AM   Patient Tolerance Tolerated well 2/2/2018  9:00 AM   Dressing Status Clean;Dry; Intact 2/2/2018  9:00 AM       Other Ulcers 02/02/18 Ankle Left;Posterior lateral (Active)   Wound Description Yellow;Slough 2/2/2018  9:00 AM   Maddison-wound Assessment Dry; Intact;Fragile; Yellow-brown 2/2/2018  9:00 AM   Shape irregular  2/2/2018  9:00 AM   Wound Length (cm) 3 cm 2/2/2018  9:00 AM   Wound Width (cm) 2 cm 2/2/2018  9:00 AM   Wound Depth (cm) 0 1 2/2/2018  9:00 AM   Calculated Wound Volume (cm^3) 0 6 cm^3 2/2/2018  9:00 AM   Drainage Amount Scant 2/2/2018  9:00 AM   Drainage Description Serous 2/2/2018  9:00 AM   Treatment Cleansed;Elevated with pillow(s); Offload 2/2/2018  9:00 AM   Dressings Silver dressing;Gauze 2/2/2018  9:00 AM   Wound packed? No 2/2/2018  9:00 AM   Packing- # removed 0 2/2/2018  9:00 AM   Packing- # inserted 0 2/2/2018  9:00 AM   Dressing Changed New dressing applied 2/2/2018  9:00 AM   Patient Tolerance Tolerated well 2/2/2018  9:00 AM   Dressing Status Clean; Intact;Dry 2/2/2018  9:00 AM       Recommendations written as orders    Dorina Neely RN BSN

## 2018-02-02 NOTE — PLAN OF CARE
DISCHARGE PLANNING     Discharge to home or other facility with appropriate resources Progressing        DISCHARGE PLANNING - CARE MANAGEMENT     Discharge to post-acute care or home with appropriate resources Progressing        INFECTION - ADULT     Absence or prevention of progression during hospitalization Progressing        Knowledge Deficit     Patient/family/caregiver demonstrates understanding of disease process, treatment plan, medications, and discharge instructions Progressing        MUSCULOSKELETAL - ADULT     Maintain or return mobility to safest level of function Progressing        PAIN - ADULT     Verbalizes/displays adequate comfort level or baseline comfort level Progressing        Potential for Falls     Patient will remain free of falls Progressing        Prexisting or High Potential for Compromised Skin Integrity     Skin integrity is maintained or improved Progressing        SAFETY ADULT     Patient will remain free of falls Progressing     Maintain or return to baseline ADL function Progressing     Maintain or return mobility status to optimal level Progressing        SKIN/TISSUE INTEGRITY - ADULT     Skin integrity remains intact Progressing     Incision(s), wounds(s) or drain site(s) healing without S/S of infection Progressing

## 2018-02-03 PROBLEM — L89.152 SACRAL DECUBITUS ULCER, STAGE II (HCC): Status: ACTIVE | Noted: 2018-02-03

## 2018-02-03 LAB — MRSA NOSE QL CULT: NORMAL

## 2018-02-03 PROCEDURE — 99232 SBSQ HOSP IP/OBS MODERATE 35: CPT | Performed by: INTERNAL MEDICINE

## 2018-02-03 RX ADMIN — AMPICILLIN SODIUM AND SULBACTAM SODIUM 1.5 G: 1; .5 INJECTION, POWDER, FOR SOLUTION INTRAMUSCULAR; INTRAVENOUS at 02:58

## 2018-02-03 RX ADMIN — AMPICILLIN SODIUM AND SULBACTAM SODIUM 1.5 G: 1; .5 INJECTION, POWDER, FOR SOLUTION INTRAMUSCULAR; INTRAVENOUS at 09:27

## 2018-02-03 RX ADMIN — ACETAMINOPHEN 650 MG: 325 TABLET, FILM COATED ORAL at 11:49

## 2018-02-03 RX ADMIN — HEPARIN SODIUM 5000 UNITS: 5000 INJECTION, SOLUTION INTRAVENOUS; SUBCUTANEOUS at 06:05

## 2018-02-03 RX ADMIN — HEPARIN SODIUM 5000 UNITS: 5000 INJECTION, SOLUTION INTRAVENOUS; SUBCUTANEOUS at 21:17

## 2018-02-03 RX ADMIN — AMPICILLIN SODIUM AND SULBACTAM SODIUM 1.5 G: 1; .5 INJECTION, POWDER, FOR SOLUTION INTRAMUSCULAR; INTRAVENOUS at 14:46

## 2018-02-03 RX ADMIN — ACETAMINOPHEN 650 MG: 325 TABLET, FILM COATED ORAL at 02:58

## 2018-02-03 RX ADMIN — SERTRALINE HYDROCHLORIDE 50 MG: 50 TABLET ORAL at 09:26

## 2018-02-03 RX ADMIN — AMPICILLIN SODIUM AND SULBACTAM SODIUM 1.5 G: 1; .5 INJECTION, POWDER, FOR SOLUTION INTRAMUSCULAR; INTRAVENOUS at 21:16

## 2018-02-03 RX ADMIN — HEPARIN SODIUM 5000 UNITS: 5000 INJECTION, SOLUTION INTRAVENOUS; SUBCUTANEOUS at 14:12

## 2018-02-03 NOTE — PROGRESS NOTES
Progress Note Batsheva Muhammad 1934, 80 y o  male MRN: [id number]    Unit/Bed#: -01 Encounter: [id number]    Primary Care Provider: No primary care provider on file  Date and time admitted to hospital: 2/1/2018  3:56 PM        Sacral decubitus ulcer, stage II   Assessment & Plan    Present on admission  I agree with recommendations from wound care  * Bilateral cellulitis of lower leg   Assessment & Plan    Since patient with purlulent lesions use Unasyn 1  5 g 6  · MRSA swab pending, add vanc if positive  · Continue Unasyn Day 2, cellulitis improving  · Await xrays to assess for underlying osteomyelitis  · Appreciate and agree with wound care recommendations  Podiatry to see        Wounds, multiple open, lower extremity, right, initial encounter   Assessment & Plan    Lo whit  Wound care until podiatry sees patient  Obtain xray of foot and ankle due to purulent lesion over malleolus, may need mri        Multiple open wounds of left lower extremity   Assessment & Plan    · Reviewed with wound care , await podiatry consult              Bipolar 1 disorder [facility])   Assessment & Plan    Patient desires rehab post hospital , will need to be optioned due to mental health history  Patient agrees to Telepsych eval  Continue Depakote and sertraline for now  Updated patient's sister at bedside with patient permission  Consent on chart for Tele Psych            VTE Pharmacologic Prophylaxis:   Pharmacologic: Heparin  Mechanical: Mechanical VTE prophylaxis in place  Patient Centered Rounds: I have performed bedside rounds with nursing staff today  Discussions with Specialists or Other Care Team Provider: wound care  Education and Discussions with Family / Patient: updated sister and brother in law  Time Spent for Care: 45 minutes  More than 50% of total time spent on counseling and coordination of care as described above      Current Length of Stay: 2 day(s)  Current Patient Status: Inpatient Certification Statement: The patient will continue to require additional inpatient hospital stay due to wound care and STR     Discharge Plan: TBD  Code Status: Level 1 - Full Code    Subjective:   Patient states did not sleep to well but did some resting  Reports a previous history of sleep apnea but does not use cpap machine  Denies N,V,D    Objective:   Vitals:   Temp (24hrs), Av °F (36 7 °C), Min:97 8 °F (36 6 °C), Max:98 4 °F (36 9 °C)    HR:  [69-82] 82  Resp:  [18] 18  BP: (100-112)/(55-60) 103/55  SpO2:  [97 %-98 %] 98 %  Body mass index is 22 8 kg/m²  Input and Output Summary (last 24 hours): Intake/Output Summary (Last 24 hours)    Gross per 24 hour   Intake              170 ml   Output              650 ml   Net             -480 ml       Physical Exam:     Physical Exam  General : Well developed poorly nourished male in no acute distress, Nc, At, PERRL, multiple skin leasion bilateal ear lesions right greater in size than right  MMM, very poor dentition, disheveled beard,   Chest : CTA, no RRW  CVS: RRR , S1, S2 no MRG   Abd: soft, Nt, nD positive BS  Ext: no CCE,   Neuro: awake alert oriented CN intact    Additional Data:   Labs:    Results from last 7 days  Lab Units 18  0432 18  1718   WBC Thousand/uL 6 94 6 70   HEMOGLOBIN g/dL 13 7 14 5   HEMATOCRIT % 40 3 42 1   PLATELETS Thousands/uL 237 263   NEUTROS PCT %  --  64   LYMPHS PCT %  --  23   MONOS PCT %  --  10   EOS PCT %  --  2       Results from last 7 days  Lab Units 18  0432 18  1718   SODIUM mmol/L 140 142   POTASSIUM mmol/L 3 7 3 7   CHLORIDE mmol/L 106 105   CO2 mmol/L 25 28   BUN mg/dL 13 18   CREATININE mg/dL 0 76 1 02   CALCIUM mg/dL 9 0 9 1   TOTAL PROTEIN g/dL  --  6 5   BILIRUBIN TOTAL mg/dL  --  0 50   ALK PHOS U/L  --  63   ALT U/L  --  24   AST U/L  --  23   GLUCOSE RANDOM mg/dL 92 84           * I Have Reviewed All Lab Data Listed Above  * Additional Pertinent Lab Tests Reviewed:  All Labs Within Last 24 Hours Reviewed    Imaging:    Imaging Reports Reviewed Today Include: None    Cultures:   Blood Culture:   Lab Results   Component Value Date    BLOODCX No Growth at 24 hrs  02/01/2018     Urine Culture: No results found for: URINECX  Sputum Culture: No components found for: SPUTUMCX  Wound Culture: No results found for: WOUNDCULT    Last 24 Hours Medication List:     Current Facility-Administered Medications:  acetaminophen 650 mg Oral Q6H PRN Eldridge Olszewski, MD    ampicillin-sulbactam 1 5 g Intravenous Q6H Eldridge Olszewski, MD Last Rate: 1 5 g (02/03/18 0258)   heparin (porcine) 5,000 Units Subcutaneous Q8H [address] Eldridge Olszewski, MD    sertraline 50 mg Oral Daily Eldridge Olszewski, MD         Today, Patient Was Seen By: Eldridge Olszewski, MD    ** Please Note: Dragon 360 Dictation voice to text software may have been used in the creation of this document   **

## 2018-02-03 NOTE — PLAN OF CARE
DISCHARGE PLANNING     Discharge to home or other facility with appropriate resources Progressing        DISCHARGE PLANNING - CARE MANAGEMENT     Discharge to post-acute care or home with appropriate resources Progressing        INFECTION - ADULT     Absence or prevention of progression during hospitalization Progressing        Knowledge Deficit     Patient/family/caregiver demonstrates understanding of disease process, treatment plan, medications, and discharge instructions Progressing        MUSCULOSKELETAL - ADULT     Maintain or return mobility to safest level of function Progressing        Nutrition/Hydration-ADULT     Nutrient/Hydration intake appropriate for improving, restoring or maintaining nutritional needs Progressing        PAIN - ADULT     Verbalizes/displays adequate comfort level or baseline comfort level Progressing        Potential for Falls     Patient will remain free of falls Progressing        Prexisting or High Potential for Compromised Skin Integrity     Skin integrity is maintained or improved Progressing        SAFETY ADULT     Patient will remain free of falls Progressing     Maintain or return to baseline ADL function Progressing     Maintain or return mobility status to optimal level Progressing        SKIN/TISSUE INTEGRITY - ADULT     Skin integrity remains intact Progressing     Incision(s), wounds(s) or drain site(s) healing without S/S of infection Progressing

## 2018-02-03 NOTE — ASSESSMENT & PLAN NOTE
Since patient with purlulent lesions use Unasyn 1  5 g 6  · MRSA swab pending, add vanc if positive  · Continue Unasyn Day 2, cellulitis improving  · Await xrays to assess for underlying osteomyelitis  · Appreciate and agree with wound care recommendations   Podiatry to see

## 2018-02-04 PROCEDURE — 99232 SBSQ HOSP IP/OBS MODERATE 35: CPT | Performed by: INTERNAL MEDICINE

## 2018-02-04 RX ORDER — DOXAZOSIN MESYLATE 1 MG/1
1 TABLET ORAL
COMMUNITY
End: 2018-02-07 | Stop reason: HOSPADM

## 2018-02-04 RX ORDER — DIVALPROEX SODIUM 250 MG/1
500 TABLET, EXTENDED RELEASE ORAL DAILY
Status: DISCONTINUED | OUTPATIENT
Start: 2018-02-04 | End: 2018-02-07 | Stop reason: HOSPADM

## 2018-02-04 RX ORDER — TRAMADOL HYDROCHLORIDE 50 MG/1
50 TABLET ORAL EVERY 6 HOURS PRN
Status: ON HOLD | COMMUNITY
End: 2018-02-07

## 2018-02-04 RX ORDER — ASPIRIN 81 MG/1
81 TABLET, CHEWABLE ORAL 2 TIMES DAILY
COMMUNITY
End: 2020-07-07

## 2018-02-04 RX ORDER — TRIAMCINOLONE ACETONIDE 55 UG/1
55 SPRAY, METERED NASAL DAILY
COMMUNITY
End: 2018-02-07 | Stop reason: HOSPADM

## 2018-02-04 RX ADMIN — DIVALPROEX SODIUM 500 MG: 250 TABLET, FILM COATED, EXTENDED RELEASE ORAL at 10:36

## 2018-02-04 RX ADMIN — HEPARIN SODIUM 5000 UNITS: 5000 INJECTION, SOLUTION INTRAVENOUS; SUBCUTANEOUS at 06:00

## 2018-02-04 RX ADMIN — ACETAMINOPHEN 650 MG: 325 TABLET, FILM COATED ORAL at 20:48

## 2018-02-04 RX ADMIN — ACETAMINOPHEN 650 MG: 325 TABLET, FILM COATED ORAL at 00:45

## 2018-02-04 RX ADMIN — HEPARIN SODIUM 5000 UNITS: 5000 INJECTION, SOLUTION INTRAVENOUS; SUBCUTANEOUS at 15:00

## 2018-02-04 RX ADMIN — AMPICILLIN SODIUM AND SULBACTAM SODIUM 1.5 G: 1; .5 INJECTION, POWDER, FOR SOLUTION INTRAMUSCULAR; INTRAVENOUS at 08:27

## 2018-02-04 RX ADMIN — SERTRALINE HYDROCHLORIDE 50 MG: 50 TABLET ORAL at 08:26

## 2018-02-04 RX ADMIN — AMPICILLIN SODIUM AND SULBACTAM SODIUM 1.5 G: 1; .5 INJECTION, POWDER, FOR SOLUTION INTRAMUSCULAR; INTRAVENOUS at 15:23

## 2018-02-04 RX ADMIN — AMPICILLIN SODIUM AND SULBACTAM SODIUM 1.5 G: 1; .5 INJECTION, POWDER, FOR SOLUTION INTRAMUSCULAR; INTRAVENOUS at 20:48

## 2018-02-04 RX ADMIN — HEPARIN SODIUM 5000 UNITS: 5000 INJECTION, SOLUTION INTRAVENOUS; SUBCUTANEOUS at 22:00

## 2018-02-04 RX ADMIN — AMPICILLIN SODIUM AND SULBACTAM SODIUM 1.5 G: 1; .5 INJECTION, POWDER, FOR SOLUTION INTRAMUSCULAR; INTRAVENOUS at 03:29

## 2018-02-04 NOTE — ASSESSMENT & PLAN NOTE
Patient desires rehab post hospital , will need to be optioned due to mental health history  Patient agrees to Telepsych eval  Continue Depakote once confirmed and sertraline for now  Await Psychiatry evaluation to aid in rehab placement

## 2018-02-04 NOTE — ASSESSMENT & PLAN NOTE
Loni sherman  Wound care until podiatry sees patient  Podiatry stated wounds appeared more superficial when debrided    X-rays negative for osteomyelitis

## 2018-02-04 NOTE — ASSESSMENT & PLAN NOTE
Patient desires rehab post hospital , will need to be optioned due to mental health history  Patient agrees to Telepsych eval  Continue Depakote at 500 q day and sertraline for now  Await Psychiatry evaluation to aid in rehab placement

## 2018-02-04 NOTE — CONSULTS
Video Consultation - Pt  was interviewed with the assistance of on-site staff  Patient Location:   [address] [place] [address] [address]  Psychiatrist Location:  [place]    Patient Name:   oJhn Mendoza  :     1934  Date & Time:   2018 @ 12 ET    Chief Complaint:   80year-old male with apparent hx bipolar disorder admitted to the hospital three days ago for ulcerated/purulent wounds on the right ankle and bilateral lower extremity edema  For SNF placement, telepsychiatry has been consulted for assessment of capacity for medical decision-making  History of Present Illness: At this time pt  is calm, cooperative, pleasant, and fully oriented  He states he delayed seeking medical attention for his wounds because he was concerned about what they would think of me  He denies/minimizes neurovegetative symptoms of depression and anxiety  He c/o frustration over his medical challenges but he denies SI/HI/AVH, demonstrates future orientation  No dougie delusions or thought disorder  Pt  demonstrates an understanding of his medical conditions including vascular insufficiency and associated wounds  He understands the risks of refusing/non-compliance with recommended treatments  He is agreeable to SNF referral as well as ongoing outpatient wound care as prescribed  Psychiatric History: Hx bipolar disorder; hx previous psychiatric hospitalizations but not recently; has no mental health providers; meds prescribed by his PCP Dr Gayathri Harris in [place], [place]; denies hx intentional self-harm;     Psychiatric Medications:   1  Zoloft 50mg PO daily  2  Depakote ER 500mg daily    Substance Abuse History:   N/A    Active Medical Problems:   A-fib, arthritis, vascular insufficiency    Family History:   Non-contributory    Social & Legal History:   Lives in his sisters house; has been living independently with support from his sister;      Appearance & Attire: gown  Attitude & Behavior: calm and cooperative  Speech: WNL  Mood / Affect: euthymic, jovial  Thought Processes: linear  Thought Content: No SI/HI/VI  Perception: No AVH or delusions  Orientation: grossly oriented  Intellectual Functioning: unknown  Insight & Judgment: fair    Impression & Risk:   80year-old male with bipolar disorder, euthymic; pt  is currently at his psychiatric baseline  He is low-risk imminent violence/self-harm  He retains capacity for medical decision-making  I am not able to make a determination as to his ability to continue living independently  Recommendations:   1  Cont  current mgmt  2  D/C to SNF when medically cleared  3  Refer to PT/OT for determinations on self-care abilities    Thanks for inviting us to participate in the care of this patient          Tia Rush MD  [id number]

## 2018-02-04 NOTE — ASSESSMENT & PLAN NOTE
Since patient with purlulent lesions use Unasyn 1  5 g 6 hrs day 2  · MRSA swab pending, add vanc if positive  · Continue Unasyn Day 2, cellulitis improving  · Await xrays to assess for underlying osteomyelitis  · Appreciate and agree with wound care recommendations  Podiatry saw patient and did bedside debridement, x-rays completed this a m  await report

## 2018-02-04 NOTE — ASSESSMENT & PLAN NOTE
Since patient with purlulent lesions use Unasyn 1  5 g 6 hrs day 2  · MRSA negative  · Continue Unasyn Day 3, cellulitis improving  · No osteomyelitis on x-ray  · Appreciate and agree with wound care recommendations  Podiatry saw patient and did bedside debridement, no osteo

## 2018-02-04 NOTE — PROGRESS NOTES
Progress Note Kari Martínez 1934, 80 y o  male MRN: [id number]    Unit/Bed#: -01 Encounter: [id number]    Primary Care Provider: No primary care provider on file  Date and time admitted to hospital: 2/1/2018  3:56 PM        Sacral decubitus ulcer, stage II   Assessment & Plan    Present on admission  I agree with recommendations from wound care  Turn Q to        * Bilateral cellulitis of lower leg   Assessment & Plan    Since patient with purlulent lesions use Unasyn 1  5 g 6 hrs day 2  · MRSA negative  · Continue Unasyn Day 3, cellulitis improving  · No osteomyelitis on x-ray  · Appreciate and agree with wound care recommendations  Podiatry saw patient and did bedside debridement, no osteo  Wounds, multiple open, lower extremity, right, initial encounter   Assessment & Plan    Lo whit  Wound care until podiatry sees patient  Podiatry stated wounds appeared more superficial when debrided  X-rays negative for osteomyelitis        Multiple open wounds of left lower extremity   Assessment & Plan    · Reviewed with wound care  Continue local care as outlined appreciate Podiatry involvement  Bipolar 1 disorder [facility])   Assessment & Plan    Patient desires rehab post hospital , will need to be optioned due to mental health history  Patient agrees to Telepsych eval  Continue Depakote at 500 q day and sertraline for now  Await Psychiatry evaluation to aid in rehab placement  VTE Pharmacologic Prophylaxis:   Pharmacologic: Heparin  Mechanical: Mechanical VTE prophylaxis in place  Patient Centered Rounds: I have evaluated patient without nursing staff present due to Nurse at lunch discussed via phone  Discussions with Specialists or Other Care Team Provider:   None  Education and Discussions with Family / Patient:  Updated patient  Time Spent for Care: 20 minutes  More than 50% of total time spent on counseling and coordination of care as described above      Current Length of Stay: 3 day(s)  Current Patient Status: Inpatient   Certification Statement: The patient will continue to require additional inpatient hospital stay due to Antibiotics and evaluation for rehab    Discharge Plan: To be determined, awaiting option process with Psychiatry  Code Status: Level 1 - Full Code    Subjective:   Patient goods  States he did have a little sick stomach this morning but is going away  He denies any diarrhea  He states the legs or loss painful  Objective:   Vitals:   Temp (24hrs), Av 9 °F (36 6 °C), Min:97 5 °F (36 4 °C), Max:98 3 °F (36 8 °C)    HR:  [74-86] 77  Resp:  [18] 18  BP: (109-129)/(56-70) 129/70  SpO2:  [96 %-97 %] 97 %  Body mass index is 22 8 kg/m²  Input and Output Summary (last 24 hours): Intake/Output Summary (Last 24 hours) at 18 1438  Last data filed at 18 1300   Gross per 24 hour   Intake              480 ml   Output             1475 ml   Net             -995 ml       Physical Exam: Physical Exam  General Emily disheveled male no acute distress normocephalic atraumatic pupils equal round and reactive to light extraocular muscles intact mucous membranes are moist neck is supple there is no JVD no lymph nodes no carotid bruits chest is decreased but clear to auscultation is no rhonchi rales or wheezes  Cardiovascular regular rate rhythm positive S1 and S2 no S3-S4 murmur or gallop  Extremities much less erythematous, wounds are starting to dry up    Capillary refill good, pulse is 1 +bilaterally  Neurologically awake alert oriented cranial nerves 2-12 appear to be intact affect is appropriate  Additional Data:   Labs:    Results from last 7 days  Lab Units 18  0432 18  1718   WBC Thousand/uL 6 94 6 70   HEMOGLOBIN g/dL 13 7 14 5   HEMATOCRIT % 40 3 42 1   PLATELETS Thousands/uL 237 263   NEUTROS PCT %  --  64   LYMPHS PCT %  --  23   MONOS PCT %  --  10   EOS PCT %  --  2       Results from last 7 days  Lab Units 182 02/01/18  1718   SODIUM mmol/L 140 142   POTASSIUM mmol/L 3 7 3 7   CHLORIDE mmol/L 106 105   CO2 mmol/L 25 28   BUN mg/dL 13 18   CREATININE mg/dL 0 76 1 02   CALCIUM mg/dL 9 0 9 1   TOTAL PROTEIN g/dL  --  6 5   BILIRUBIN TOTAL mg/dL  --  0 50   ALK PHOS U/L  --  63   ALT U/L  --  24   AST U/L  --  23   GLUCOSE RANDOM mg/dL 92 84           * I Have Reviewed All Lab Data Listed Above  * Additional Pertinent Lab Tests Reviewed: No New Labs Available For Today    Imaging:    Imaging Reports Reviewed Today Include:  None    Cultures:   Blood Culture:   Lab Results   Component Value Date    BLOODCX No Growth at 48 hrs  02/01/2018    BLOODCX No Growth at 48 hrs  02/01/2018     Urine Culture: No results found for: URINECX  Sputum Culture: No components found for: SPUTUMCX  Wound Culture: No results found for: WOUNDCULT    Last 24 Hours Medication List:     Current Facility-Administered Medications:  acetaminophen 650 mg Oral Q6H PRN Alexis Chaney MD    ampicillin-sulbactam 1 5 g Intravenous Q6H Alexis Chaney MD Last Rate: 1 5 g (02/04/18 0827)   divalproex sodium 500 mg Oral Daily Alexis Chaney MD    heparin (porcine) 5,000 Units Subcutaneous Q8H [address] Alexis Chaney MD    sertraline 50 mg Oral Daily Alexis Chaney MD         Today, Patient Was Seen By: Alexis Chaney MD    ** Please Note: Dragon 360 Dictation voice to text software may have been used in the creation of this document   **

## 2018-02-04 NOTE — ASSESSMENT & PLAN NOTE
Lo whit  Wound care until podiatry sees patient  Obtain xray of foot and ankle due to purulent lesion over malleolus await reports on x-ray  Podiatry stated wounds appeared more superficial when debrided

## 2018-02-04 NOTE — PROGRESS NOTES
Progress Note Ellis Dixon 1934, 80 y o  male MRN: [id number]    Unit/Bed#: -01 Encounter: [id number]    Primary Care Provider: No primary care provider on file  Date and time admitted to hospital: 2/1/2018  3:56 PM        Sacral decubitus ulcer, stage II   Assessment & Plan    Present on admission  I agree with recommendations from wound care  * Bilateral cellulitis of lower leg   Assessment & Plan    Since patient with purlulent lesions use Unasyn 1  5 g 6 hrs day 2  · MRSA swab pending, add vanc if positive  · Continue Unasyn Day 2, cellulitis improving  · Await xrays to assess for underlying osteomyelitis  · Appreciate and agree with wound care recommendations  Podiatry saw patient and did bedside debridement, x-rays completed this a m  await report  Wounds, multiple open, lower extremity, right, initial encounter   Assessment & Plan    Lo whit  Wound care until podiatry sees patient  Obtain xray of foot and ankle due to purulent lesion over malleolus await reports on x-ray  Podiatry stated wounds appeared more superficial when debrided  Multiple open wounds of left lower extremity   Assessment & Plan    · Reviewed with wound care  Continue local care as outlined appreciate Podiatry involvement  Bipolar 1 disorder [facility])   Assessment & Plan    Patient desires rehab post hospital , will need to be optioned due to mental health history  Patient agrees to Telepsych eval  Continue Depakote once confirmed and sertraline for now  Await Psychiatry evaluation to aid in rehab placement  VTE Pharmacologic Prophylaxis:   Pharmacologic: Heparin  Mechanical: Mechanical VTE prophylaxis in place  Patient Centered Rounds: I have evaluated patient without nursing staff present due to Nurse was in disposed with another patient    I spoke with her via phone  Discussions with Specialists or Other Care Team Provider:  None  Education and Discussions with Family / Patient:  None  Time Spent for Care: 20 minutes  More than 50% of total time spent on counseling and coordination of care as described above  Current Length of Stay: 2 day(s)  Current Patient Status: Inpatient   Certification Statement: The patient will continue to require additional inpatient hospital stay due to Need for IV antibiotics and short-term rehabilitation    Discharge Plan: To be determined  Code Status: Level 1 - Full Code    Subjective:   Patient states feeling well  States pain in limbs not present  Denies any nausea vomiting or diarrhea  Objective:   Vitals:   Temp (24hrs), Av 9 °F (36 6 °C), Min:97 5 °F (36 4 °C), Max:98 3 °F (36 8 °C)    HR:  [74-86] 77  Resp:  [18] 18  BP: (109-129)/(56-70) 129/70  SpO2:  [96 %-97 %] 97 %  Body mass index is 22 8 kg/m²  Input and Output Summary (last 24 hours): Intake/Output Summary (Last 24 hours)   Gross per 24 hour   Intake             488 ml   Output             1825 ml   Net            -1345 ml       Physical Exam:     Physical Exam  Generally disheveled white male in no acute distress normocephalic atraumatic pupils equal round and reactive to light extraocular muscles appear to be intact mucous membranes are moist, dentition is poor  neck is supple there is no JVD no lymph nodes no carotid bruits  chest is decreased but clear to auscultation  Abdomen thin soft nontender nondistended with positive bowel sounds no hepatosplenomegaly no guarding or rebound  Extremities improvement in the cellulitis of the lower feet  Wounds are now debrided and appear to start to be granulating in    Some drainage still present on right malleolar area  Neurologically awake alert oriented affect is a bubbly and appropriate  Cranial nerves appear to be intact  Additional Data:   Labs:    Results from last 7 days  Lab Units 18  0432 18  1718   WBC Thousand/uL 6 94 6 70   HEMOGLOBIN g/dL 13 7 14 5   HEMATOCRIT % 40 3 42 1   PLATELETS Thousands/uL 237 263   NEUTROS PCT %  --  64   LYMPHS PCT %  --  23   MONOS PCT %  --  10   EOS PCT %  --  2       Results from last 7 days  Lab Units 02/02/18  0432 02/01/18  1718   SODIUM mmol/L 140 142   POTASSIUM mmol/L 3 7 3 7   CHLORIDE mmol/L 106 105   CO2 mmol/L 25 28   BUN mg/dL 13 18   CREATININE mg/dL 0 76 1 02   CALCIUM mg/dL 9 0 9 1   TOTAL PROTEIN g/dL  --  6 5   BILIRUBIN TOTAL mg/dL  --  0 50   ALK PHOS U/L  --  63   ALT U/L  --  24   AST U/L  --  23   GLUCOSE RANDOM mg/dL 92 84           * I Have Reviewed All Lab Data Listed Above  * Additional Pertinent Lab Tests Reviewed: All Labs Within Last 24 Hours Reviewed    Imaging:    Imaging Reports Reviewed Today Include:   Awaiting reading on x-rays for fee  Cultures:   Blood Culture:   Lab Results   Component Value Date    BLOODCX No Growth at 48 hrs  02/01/2018    BLOODCX No Growth at 48 hrs  02/01/2018     Urine Culture: No results found for: URINECX  Sputum Culture: No components found for: SPUTUMCX  Wound Culture: No results found for: WOUNDCULT    Last 24 Hours Medication List:     Current Facility-Administered Medications:  acetaminophen 650 mg Oral Q6H PRN Fatemeh Landa MD    ampicillin-sulbactam 1 5 g Intravenous Q6H Fatemeh Landa MD Last Rate: 1 5 g (02/04/18 0827)   divalproex sodium 500 mg Oral Daily Fatemeh Landa MD    heparin (porcine) 5,000 Units Subcutaneous Q8H [address] Fatemeh Landa MD    sertraline 50 mg Oral Daily Fatemeh Landa MD         Today, Patient Was Seen By: Fatemeh Landa MD    ** Please Note: Dragon 360 Dictation voice to text software may have been used in the creation of this document   **

## 2018-02-05 PROCEDURE — 99232 SBSQ HOSP IP/OBS MODERATE 35: CPT | Performed by: INTERNAL MEDICINE

## 2018-02-05 RX ADMIN — AMPICILLIN SODIUM AND SULBACTAM SODIUM 1.5 G: 1; .5 INJECTION, POWDER, FOR SOLUTION INTRAMUSCULAR; INTRAVENOUS at 15:00

## 2018-02-05 RX ADMIN — HEPARIN SODIUM 5000 UNITS: 5000 INJECTION, SOLUTION INTRAVENOUS; SUBCUTANEOUS at 21:55

## 2018-02-05 RX ADMIN — HEPARIN SODIUM 5000 UNITS: 5000 INJECTION, SOLUTION INTRAVENOUS; SUBCUTANEOUS at 05:10

## 2018-02-05 RX ADMIN — SERTRALINE HYDROCHLORIDE 50 MG: 50 TABLET ORAL at 08:52

## 2018-02-05 RX ADMIN — AMPICILLIN SODIUM AND SULBACTAM SODIUM 1.5 G: 1; .5 INJECTION, POWDER, FOR SOLUTION INTRAMUSCULAR; INTRAVENOUS at 08:52

## 2018-02-05 RX ADMIN — AMPICILLIN SODIUM AND SULBACTAM SODIUM 1.5 G: 1; .5 INJECTION, POWDER, FOR SOLUTION INTRAMUSCULAR; INTRAVENOUS at 03:23

## 2018-02-05 RX ADMIN — ACETAMINOPHEN 650 MG: 325 TABLET, FILM COATED ORAL at 20:10

## 2018-02-05 RX ADMIN — HEPARIN SODIUM 5000 UNITS: 5000 INJECTION, SOLUTION INTRAVENOUS; SUBCUTANEOUS at 14:00

## 2018-02-05 RX ADMIN — DIVALPROEX SODIUM 500 MG: 250 TABLET, FILM COATED, EXTENDED RELEASE ORAL at 08:52

## 2018-02-05 RX ADMIN — ACETAMINOPHEN 650 MG: 325 TABLET, FILM COATED ORAL at 03:32

## 2018-02-05 RX ADMIN — AMPICILLIN SODIUM AND SULBACTAM SODIUM 1.5 G: 1; .5 INJECTION, POWDER, FOR SOLUTION INTRAMUSCULAR; INTRAVENOUS at 21:55

## 2018-02-05 NOTE — SOCIAL WORK
CM spoke to Rojelio Nevarez from [facility]  After reviewing Option Info, it was determined that pt is not a target  PASSR corrected and sent to [facility]

## 2018-02-05 NOTE — PLAN OF CARE
Problem: DISCHARGE PLANNING - CARE MANAGEMENT  Goal: Discharge to post-acute care or home with appropriate resources  INTERVENTIONS:  - Conduct assessment to determine patient/family and health care team treatment goals, and need for post-acute services based on payer coverage, community resources, and patient preferences, and barriers to discharge  - Address psychosocial, clinical, and financial barriers to discharge as identified in assessment in conjunction with the patient/family and health care team  - Arrange appropriate level of post-acute services according to patient's   needs and preference and payer coverage in collaboration with the physician and health care team  - Communicate with and update the patient/family, physician, and health care team regarding progress on the discharge plan  - Arrange appropriate transportation to post-acute venues   Outcome: Progressing  CM spoke to Arpan Sims from [facility]  After reviewing Option Info, it was determined that pt is not a target  PASSR corrected and sent to [facility]

## 2018-02-06 LAB — BACTERIA BLD CULT: NORMAL

## 2018-02-06 PROCEDURE — 97162 PT EVAL MOD COMPLEX 30 MIN: CPT

## 2018-02-06 PROCEDURE — G8978 MOBILITY CURRENT STATUS: HCPCS

## 2018-02-06 PROCEDURE — G8979 MOBILITY GOAL STATUS: HCPCS

## 2018-02-06 PROCEDURE — 99232 SBSQ HOSP IP/OBS MODERATE 35: CPT | Performed by: FAMILY MEDICINE

## 2018-02-06 RX ADMIN — AMPICILLIN SODIUM AND SULBACTAM SODIUM 1.5 G: 1; .5 INJECTION, POWDER, FOR SOLUTION INTRAMUSCULAR; INTRAVENOUS at 21:01

## 2018-02-06 RX ADMIN — DIVALPROEX SODIUM 500 MG: 250 TABLET, FILM COATED, EXTENDED RELEASE ORAL at 08:54

## 2018-02-06 RX ADMIN — AMPICILLIN SODIUM AND SULBACTAM SODIUM 1.5 G: 1; .5 INJECTION, POWDER, FOR SOLUTION INTRAMUSCULAR; INTRAVENOUS at 08:53

## 2018-02-06 RX ADMIN — HEPARIN SODIUM 5000 UNITS: 5000 INJECTION, SOLUTION INTRAVENOUS; SUBCUTANEOUS at 21:01

## 2018-02-06 RX ADMIN — ACETAMINOPHEN 650 MG: 325 TABLET, FILM COATED ORAL at 08:56

## 2018-02-06 RX ADMIN — AMPICILLIN SODIUM AND SULBACTAM SODIUM 1.5 G: 1; .5 INJECTION, POWDER, FOR SOLUTION INTRAMUSCULAR; INTRAVENOUS at 02:03

## 2018-02-06 RX ADMIN — ACETAMINOPHEN 650 MG: 325 TABLET, FILM COATED ORAL at 18:52

## 2018-02-06 RX ADMIN — AMPICILLIN SODIUM AND SULBACTAM SODIUM 1.5 G: 1; .5 INJECTION, POWDER, FOR SOLUTION INTRAMUSCULAR; INTRAVENOUS at 14:36

## 2018-02-06 RX ADMIN — SERTRALINE HYDROCHLORIDE 50 MG: 50 TABLET ORAL at 08:55

## 2018-02-06 RX ADMIN — HEPARIN SODIUM 5000 UNITS: 5000 INJECTION, SOLUTION INTRAVENOUS; SUBCUTANEOUS at 05:07

## 2018-02-06 RX ADMIN — HEPARIN SODIUM 5000 UNITS: 5000 INJECTION, SOLUTION INTRAVENOUS; SUBCUTANEOUS at 14:14

## 2018-02-06 NOTE — ASSESSMENT & PLAN NOTE
· Reviewed with wound care  Continue local care as outlined appreciate Podiatry involvement  Improving cellulitis is nearly resolved  Open areas will need attentive wound management

## 2018-02-06 NOTE — ASSESSMENT & PLAN NOTE
Since patient with purlulent lesions use Unasyn 1  5 g 6 hrs day 2  · MRSA negative  · Continue Unasyn Day 4, cellulitis improving  · No osteomyelitis on x-ray  · Appreciate and agree with wound care recommendations  Podiatry saw patient and did bedside debridement, no osteo    · Patient pending placement can switch to oral antibiotics at discharge

## 2018-02-06 NOTE — PHYSICAL THERAPY NOTE
PHYSICAL THERAPY EVALUATION NOTE          Patient Name: Krystina Fisher  NTYPA'G Date: 2/6/2018  AGE:   80 y o  Mrn:   [id number]  ADMIT DX:  Foot pain [M79 673]  Weight loss [R63 4]  Wound cellulitis [L03 90]  Bilateral cellulitis of lower leg [L03 116, L03 115]  Lower extremity injury [S89 90XA]    Past Medical History:   Diagnosis Date    A-fib ([facility] [address] )     Arthritis     Vascular insufficiency      Length Of Stay: 5  PHYSICAL THERAPY EVALUATION :    02/06/18 1300   Note Type   Note type Eval only   Pain Assessment   Pain Assessment 0-10   Pain Score 7   Pain Type Acute pain   Pain Location Ankle   Pain Orientation Right;Left  (R more than L)   Pain Descriptors Aching; Sharp   Pain Frequency Constant/continuous   Clinical Progression Gradually improving   Patient's Stated Pain Goal No pain   Hospital Pain Intervention(s) Repositioned;Relaxation technique; Rest;Emotional support   Response to Interventions good relief with repositioning of LE in bed   Home Living   Type of [address] [place] [address] One level  (12 RUBY with rail)   Bathroom Shower/Tub Walk-in shower   Bathroom Toilet Standard   Bathroom Equipment (none)   [address] [facility]; Wheelchair-manual;Quad cane  (CPAP)   Additional Comments able to propel WC independently   Prior Function   Level of [place] Needs assistance with IADLs;Modified independent with wheelchair;Needs assistance with ADLs and functional mobility   Lives With (sister)   Jovany Toro Help From Family  (as needed)   ADL Assistance Needs assistance   IADLs Needs assistance   Falls in the last 6 months (none recent, a few in the Spring)   Comments patient reports increased difficulty bathing with decreased ability to do adequately    Dressing without assist   SBA on steps with QC and rail, RW used short distances in home, mostly WC use  (family takes to MD) Restrictions/Precautions   Weight Bearing Precautions Per Order No   Braces or Orthoses (none)   Other Precautions Bed Alarm; Fall Risk;Pain  (decreased skin integrity)   General   Additional Pertinent History Patient admitted for pain, erythema and LE edema due to open areas on feet due to hitting them on WC parts  Xray negative for osteomyelitis  DX:  LE cellulitis  (cleared for PT by RNGENESIS with BSC use, eval/treat)   Family/Caregiver Present No   Cognition   Overall Cognitive Status WFL   Arousal/Participation Alert   Orientation Level Oriented X4   Memory Within functional limits   Following Commands Follows multistep commands with increased time or repetition   Comments agreeable to PT session   RUE Assessment   RUE Assessment (See OT Eval)   LUE Assessment   LUE Assessment (See OT eval)   RLE Assessment   RLE Assessment (ROM WFL, tight HS and HC, strength 4-)   LLE Assessment   LLE Assessment (ROM WFL, tight HS and HC, strength 4-)   Coordination   Movements are Fluid and Coordinated 1   Sensation X   Light Touch   RLE Light Touch Impaired   RLE Light Touch Comments decr in foot, occasional numbness per patient, none this session   LLE Light Touch Impaired   LLE Light Touch Comments decr in foot, occasional numbness per patient, none this session   Sharp/Dull   RLE Sharp/Dull Not tested   LLE Sharp/Dull Not tested   Proprioception   RLE Proprioception Not tested   LLE Proprioception Not tested   Bed Mobility   Supine to Sit 4  Minimal assistance   Additional items Assist x 1;HOB elevated; Bedrails; Increased time required;Verbal cues;LE management   Sit to Supine 4  Minimal assistance   Additional items Assist x 1;Bedrails;HOB elevated; Increased time required;LE management;Verbal cues   Additional Comments verbal cues for sequencing and UE placement   Transfers   Sit to Stand 4  Minimal assistance  (slight mod assist)   Additional items Assist x 1;Bedrails; Increased time required;Verbal cues  (RW used, elevated bed)   Stand to Sit 4  Minimal assistance   Additional items Assist x 1;Bedrails; Increased time required;Verbal cues; Bed elevated  (RW used, eccentric control needed)   Additional Comments verbal cues for LE placement and sequencing with anterior sequencing over base of support for increased ease and TKE, tendency for tibial fixation on bed and posterior LOB  Ambulation/Elevation   Gait pattern Decreased foot clearance;Decreased R stance;Decreased L stance; Inconsistent sisi; Step to;Shuffling; Improper Weight shift  (unsteady)   Gait Assistance 4  Minimal assist  (to mod assist)   Additional items Assist x 1;Verbal cues; Tactile cues   Assistive Device Rolling walker   Distance 3 side steps toward HOB, assist to weight shift  Balance   Static Sitting Fair +   Dynamic Sitting Fair   Static Standing Fair  (to F- with RW)   Dynamic Standing Fair -  (to P+ with RW)   Ambulatory Fair -  (to P+ with RW)   Endurance Deficit   Endurance Deficit Yes   Endurance Deficit Description fatigued with mobility, low tolerance, needed to sit at EOB before transition to stand due to slight lightheadedness which did subside  Occurred in standing also, but subsided  Needed to sit after standing for 30 sec  Activity Tolerance   Activity Tolerance Patient limited by fatigue;Patient limited by pain   Nurse Made Aware RN aware of session   Assessment   Prognosis Good   Problem List Decreased strength;Decreased endurance; Impaired balance;Decreased mobility; Impaired sensation;Decreased skin integrity;Pain   Assessment Patient admitted for pain, erythema and LE edema due to open areas on feet due to hitting them on WC parts  Xray negative for osteomyelitis  DX:  LE cellulitis  He presents with the above deficits creating impaired gait quality putting him at risk for falls    He is below his baseline level of function requiring min to mod assist with mobility and will benefit from skilled PT interventions to increase his level of functional mobility and level of independence while decreasing his fall risk as well as caregiver burden  His history presents with more than 3 elements, a high level of complexity  Topics considered with medical/social history included:  Co morbid conditions impacting function and progress, impaired PLF, context of current functional limitations compared to PLF, lack of physical emotional and/or social support, cultural preferences, employment status, recent hospital admissions/readmissions, response to prior treatment approaches, medication management, personal factors impacting POC  (Number of elements:  0=low, 1-2 =moderate, 3 or more=high)  His examination presents with more than 3 elements, a high level of complexity  Examination of body systems included musculoskeletal, sensory, cardiovascular, pulmonary, neuromuscular, integumentary, and communication  Functional limitations include ROM deficits, strength deficits, irregular vital sign response to interventions, difficulty initiating task/action/activity, edema, pain, self care deficits, community and social reintegration deficits, impaired ability to make needs known, impaired level of consciousness and/or orientation, learning barrier, emotional response behavioral response, tone, balance, sensory, coordination and endurance deficits (Number of elements:  1-2 =low, 3=moderate, >3=high)  Objective testing utilizing the Barthel indicates impaired functional mobility  His clinical presentation is presently evolving  Clinicial decision making reveals a moderate level of evaluation complexity  He is not safe alone with above deficits affecting ability to enter his home  He is deconditioned due to his hospital stay with pain in feet affecting mobility  Good candidate for STR  Motivated     Barriers to Discharge Inaccessible home environment;Decreased caregiver support   Goals   Patient Goals go to STR to get home again   STG Expiration Date (n/a to be d/c to STR)   Treatment Day 0   Plan   Treatment/Interventions Spoke to case management;Spoke to nursing  (n/a, to be d/c to STR)   PT Frequency One time visit  (to be d/c today to STR)   Recommendation   Recommendation Short-term skilled PT   Equipment Recommended (TBD at STR)   PT - OK to Discharge Yes  (if to STR)   Barthel Index   Feeding 10   Bathing 0   Grooming Score 5   Dressing Score 5   Bladder Score 5   Bowels Score 10   Toilet Use Score 5   Transfers (Bed/Chair) Score 5   Mobility (Level Surface) Score 0   Stairs Score 0   Barthel Index Score 45           Shae Anthony, PT

## 2018-02-06 NOTE — SOCIAL WORK
This note is incorrect  Wrong patient   Spoke to patient because I wanted to make an appointment with a  Medical Dr for him  When I called the [facility] number I was told that since he has no insurance the closest area that they could make him an appointment was a clinic in [place]  I then asked the patient if I could make him his appointment and could he have someone to take him to [place]  He did not want me to make the appointment he would rather talk to his brother in law and find out when he is available and then make the appointment himself  I gave him the [address] and explained to him how important it was for him to make the appointment since he needed some medical attention due to his BP

## 2018-02-06 NOTE — PROGRESS NOTES
Progress Note Villa Sauer 1934, 80 y o  male MRN: [id number]    Unit/Bed#: -01 Encounter: [id number]    Primary Care Provider: No primary care provider on file  Date and time admitted to hospital: 2/1/2018  3:56 PM        Sacral decubitus ulcer, stage II   Assessment & Plan    Present on admission  I agree with recommendations from wound care  Turn Q to 2        * Bilateral cellulitis of lower leg   Assessment & Plan    Since patient with purlulent lesions use Unasyn 1  5 g 6 hrs day 2  · MRSA negative  · Continue Unasyn Day 4, cellulitis improving  · No osteomyelitis on x-ray  · Appreciate and agree with wound care recommendations  Podiatry saw patient and did bedside debridement, no osteo  · Patient pending placement can switch to oral antibiotics at discharge        Wounds, multiple open, lower extremity, right, initial encounter   Assessment & Plan    Lo whit  Wound care until podiatry sees patient  Podiatry stated wounds appeared more superficial when debrided  X-rays negative for osteomyelitis  Improving cellulitis looks resolving        Multiple open wounds of left lower extremity   Assessment & Plan    · Reviewed with wound care  Continue local care as outlined appreciate Podiatry involvement  Improving cellulitis is nearly resolved  Open areas will need attentive wound management  Bipolar 1 disorder [facility])   Assessment & Plan    Patient desires rehab post hospital , will need to be optioned due to mental health history  Patient agrees to Telepsych eval  Continue Depakote at 500 q day and sertraline for now  Appreciate psychiatric consultation  Family brought in other medications that are not psychiatric in origin the patient reports he had stopped taking those medications so will continue with his Depakote and sertraline as reported  VTE Pharmacologic Prophylaxis:   Pharmacologic: Heparin  Mechanical: Mechanical VTE prophylaxis in place      Patient Centered Rounds: I have evaluated patient without nursing staff present due to Nursing working with distressed patient, updated in person and via phone  Discussions with Specialists or Other Care Team Provider: none, updated care management  Education and Discussions with Family / Patient: None at bedside but they did bring his other medications in  Most of the meds listed he was told to stop like cardura and statin  Time Spent for Care: 20 minutes  More than 50% of total time spent on counseling and coordination of care as described above  Current Length of Stay: 5 day(s)  Current Patient Status: Inpatient   Certification Statement: The patient will continue to require additional inpatient hospital stay due to Patient needs rehab    Discharge Plan: hopeful for next 24-48 hours  Code Status: Level 1 - Full Code    Subjective:   Patient relates improving pain  But the right ankle still sensitive  Cellulitis looks much better  Objective:   Vitals:   Temp (24hrs), Av 4 °F (36 9 °C), Min:97 9 °F (36 6 °C), Max:98 8 °F (37 1 °C)    HR:  [66-69] 66  Resp:  [18] 18  BP: (111-114)/(60-69) 114/60  SpO2:  [96 %-97 %] 97 %  Body mass index is 22 8 kg/m²  Input and Output Summary (last 24 hours):        Intake/Output Summary (Last 24 hours) at 18 1352  Last data filed at 18 0853   Gross per 24 hour   Intake               10 ml   Output              650 ml   Net             -640 ml       Physical Exam:     Physical Exam  Remains disheveled elderly male no acute distress normocephalic atraumatic pupils equal round and reactive to light extraocular muscles appear to be intact mucous membranes are moist with no oral lesions he has very poor dentition neck is supple there is no JVD no lymph nodes no carotid bruits chest is decreased but clear to auscultation is no rhonchi rales or wheezes  Cardiovascular regular rate rhythm positive S1 and S2 heard appreciate an S3-S4 murmur or gallop  Abdomen soft nontender nondistended with positive bowel sounds there is no hepatosplenomegaly no guarding or rebound  Extremities show improved edema now with wrinkles, improved cellulitis skin color back to normal pale white  Multiple lesions improving on both lower extremities  Neurologically patient awake alert oriented cranial nerves 2-12 appear to be intact  Psychiatrically affect is appropriate  Additional Data:   Labs:    Results from last 7 days  Lab Units 02/02/18  0432 02/01/18  1718   WBC Thousand/uL 6 94 6 70   HEMOGLOBIN g/dL 13 7 14 5   HEMATOCRIT % 40 3 42 1   PLATELETS Thousands/uL 237 263   NEUTROS PCT %  --  64   LYMPHS PCT %  --  23   MONOS PCT %  --  10   EOS PCT %  --  2       Results from last 7 days  Lab Units 02/02/18  0432 02/01/18  1718   SODIUM mmol/L 140 142   POTASSIUM mmol/L 3 7 3 7   CHLORIDE mmol/L 106 105   CO2 mmol/L 25 28   BUN mg/dL 13 18   CREATININE mg/dL 0 76 1 02   CALCIUM mg/dL 9 0 9 1   TOTAL PROTEIN g/dL  --  6 5   BILIRUBIN TOTAL mg/dL  --  0 50   ALK PHOS U/L  --  63   ALT U/L  --  24   AST U/L  --  23   GLUCOSE RANDOM mg/dL 92 84           * I Have Reviewed All Lab Data Listed Above  * Additional Pertinent Lab Tests Reviewed: All Labs Within Last 24 Hours Reviewed    Imaging:    Imaging Reports Reviewed Today Include: none    Cultures:   Blood Culture:   Lab Results   Component Value Date    BLOODCX No Growth After 4 Days  02/01/2018    BLOODCX No Growth After 4 Days   02/01/2018     Urine Culture: No results found for: URINECX  Sputum Culture: No components found for: SPUTUMCX  Wound Culture: No results found for: WOUNDCULT    Last 24 Hours Medication List:     Current Facility-Administered Medications:  acetaminophen 650 mg Oral Q6H PRN Sukh Nuñez MD    ampicillin-sulbactam 1 5 g Intravenous Q6H Sukh Nuñez MD Last Rate: 1 5 g (02/06/18 0853)   divalproex sodium 500 mg Oral Daily Sukh Nuñez MD    heparin (porcine) 5,000 Units Subcutaneous Q8H [facility] [place] MEREDITH John Koch MD    sertraline 50 mg Oral Daily Vanessa Torres MD         Today, Patient Was Seen By: Vanessa Torres MD    ** Please Note: Dragon 360 Dictation voice to text software may have been used in the creation of this document   **

## 2018-02-06 NOTE — ASSESSMENT & PLAN NOTE
Patient desires rehab post hospital , will need to be optioned due to mental health history  Patient agrees to Telepsych eval  Continue Depakote at 500 q day and sertraline for now  Appreciate psychiatric consultation  Family brought in other medications that are not psychiatric in origin the patient reports he had stopped taking those medications so will continue with his Depakote and sertraline as reported

## 2018-02-06 NOTE — PLAN OF CARE
Problem: DISCHARGE PLANNING - CARE MANAGEMENT  Goal: Discharge to post-acute care or home with appropriate resources  INTERVENTIONS:  - Conduct assessment to determine patient/family and health care team treatment goals, and need for post-acute services based on payer coverage, community resources, and patient preferences, and barriers to discharge  - Address psychosocial, clinical, and financial barriers to discharge as identified in assessment in conjunction with the patient/family and health care team  - Arrange appropriate level of post-acute services according to patient's   needs and preference and payer coverage in collaboration with the physician and health care team  - Communicate with and update the patient/family, physician, and health care team regarding progress on the discharge plan  - Arrange appropriate transportation to post-acute venues   INTERVENTIONS:  - Conduct assessment to determine patient/family and health care team treatment goals, and need for post-acute services based on payer coverage, community resources, and patient preferences, and barriers to discharge  - Address psychosocial, clinical, and financial barriers to discharge as identified in assessment in conjunction with the patient/family and health care team  - Arrange appropriate level of post-acute services according to patients   needs and preference and payer coverage in collaboration with the physician and health care team  - Communicate with and update the patient/family, physician, and health care team regarding progress on the discharge plan  - Arrange appropriate transportation to post-acute venues  Outcome: Completed Date Met: 02/06/18  Patient is being [address] today to go to [facility] after we receive [employer]  He will be transported at 7:30 PM [facility] BLS  Facility aware  Nurse aware  Will notify with [facility] approval

## 2018-02-06 NOTE — PLAN OF CARE
Problem: PAIN - ADULT  Goal: Verbalizes/displays adequate comfort level or baseline comfort level  Interventions:  - Encourage patient to monitor pain and request assistance  - Assess pain using appropriate pain scale  - Administer analgesics based on type and severity of pain and evaluate response  - Implement non-pharmacological measures as appropriate and evaluate response  - Consider cultural and social influences on pain and pain management  - Notify physician/advanced practitioner if interventions unsuccessful or patient reports new pain   Outcome: Progressing      Problem: INFECTION - ADULT  Goal: Absence or prevention of progression during hospitalization  INTERVENTIONS:  - Assess and monitor for signs and symptoms of infection  - Monitor lab/diagnostic results  - Monitor all insertion sites, i e  indwelling lines, tubes, and drains  - [place] appropriate cooling/warming therapies per order  - Administer medications as ordered  - Instruct and encourage patient and family to use good hand hygiene technique  - Identify and instruct in appropriate isolation precautions for identified infection/condition   Outcome: Progressing      Problem: SAFETY ADULT  Goal: Patient will remain free of falls  INTERVENTIONS:  - Assess patient frequently for physical needs  -  Identify cognitive and physical deficits and behaviors that affect risk of falls    -  [place] fall precautions as indicated by assessment   - Educate patient/family on patient safety including physical limitations  - Instruct patient to call for assistance with activity based on assessment  - Modify environment to reduce risk of injury  - Consider OT/PT consult to assist with strengthening/mobility    Outcome: Progressing    Goal: Maintain or return to baseline ADL function  INTERVENTIONS:  -  Assess patient's ability to carry out ADLs; assess patient's baseline for ADL function and identify physical deficits which impact ability to perform ADLs (bathing, care of mouth/teeth, toileting, grooming, dressing, etc )  - Assess/evaluate cause of self-care deficits   - Assess range of motion  - Assess patient's mobility; develop plan if impaired  - Assess patient's need for assistive devices and provide as appropriate  - Encourage maximum independence but intervene and supervise when necessary  ¯ Involve family in performance of ADLs  ¯ Assess for home care needs following discharge   ¯ Request OT consult to assist with ADL evaluation and planning for discharge  ¯ Provide patient education as appropriate   Outcome: Progressing    Goal: Maintain or return mobility status to optimal level  INTERVENTIONS:  - Assess patient's baseline mobility status (ambulation, transfers, stairs, etc )    - Identify cognitive and physical deficits and behaviors that affect mobility  - Identify mobility aids required to assist with transfers and/or ambulation (gait belt, sit-to-stand, lift, walker, cane, etc )  - [place] fall precautions as indicated by assessment  - Record patient progress and toleration of activity level on Mobility SBAR; progress patient to next Phase/Stage  - Instruct patient to call for assistance with activity based on assessment  - Request Rehabilitation consult to assist with strengthening/weightbearing, etc    Outcome: Progressing      Problem: DISCHARGE PLANNING  Goal: Discharge to home or other facility with appropriate resources  INTERVENTIONS:  - Identify barriers to discharge w/patient and caregiver  - Arrange for needed discharge resources and transportation as appropriate  - Identify discharge learning needs (meds, wound care, etc )  - Refer to Case Management Department for coordinating discharge planning if the patient needs post-hospital services based on physician/advanced practitioner order or complex needs related to functional status, cognitive ability, or social support system   Outcome: Progressing      Problem: Knowledge Deficit  Goal: Patient/family/caregiver demonstrates understanding of disease process, treatment plan, medications, and discharge instructions  Complete learning assessment and assess knowledge base    Interventions:  - Provide teaching at level of understanding  - Provide teaching via preferred learning methods   Outcome: Progressing      Problem: SKIN/TISSUE INTEGRITY - ADULT  Goal: Skin integrity remains intact  INTERVENTIONS  - Identify patients at risk for skin breakdown  - Assess and monitor skin integrity  - Assess and monitor nutrition and hydration status  - Monitor labs (i e  albumin)  - Assess for incontinence   - Turn and reposition patient  - Assist with mobility/ambulation  - Relieve pressure over bony prominences  - Avoid friction and shearing  - Provide appropriate hygiene as needed including keeping skin clean and dry  - Evaluate need for skin moisturizer/barrier cream  - Collaborate with interdisciplinary team (i e  Nutrition, Rehabilitation, etc )   - Patient/family teaching   Outcome: Progressing    Goal: Incision(s), wounds(s) or drain site(s) healing without S/S of infection  INTERVENTIONS  - Assess and document risk factors for skin impairment   - Assess and document dressing, incision, wound bed, drain sites and surrounding tissue  - Initiate Nutrition services consult and/or wound management as needed   Outcome: Progressing      Problem: MUSCULOSKELETAL - ADULT  Goal: Maintain or return mobility to safest level of function  INTERVENTIONS:  - Assess patient's ability to carry out ADLs; assess patient's baseline for ADL function and identify physical deficits which impact ability to perform ADLs (bathing, care of mouth/teeth, toileting, grooming, dressing, etc )  - Assess/evaluate cause of self-care deficits   - Assess range of motion  - Assess patient's mobility; develop plan if impaired  - Assess patient's need for assistive devices and provide as appropriate  - Encourage maximum independence but intervene and supervise when necessary  - Involve family in performance of ADLs  - Assess for home care needs following discharge   - Request OT consult to assist with ADL evaluation and planning for discharge  - Provide patient education as appropriate   Outcome: Progressing      Problem: Prexisting or High Potential for Compromised Skin Integrity  Goal: Skin integrity is maintained or improved  INTERVENTIONS:  - Identify patients at risk for skin breakdown  - Assess and monitor skin integrity  - Assess and monitor nutrition and hydration status  - Monitor labs (i e  albumin)  - Assess for incontinence   - Turn and reposition patient  - Assist with mobility/ambulation  - Relieve pressure over bony prominences  - Avoid friction and shearing  - Provide appropriate hygiene as needed including keeping skin clean and dry  - Evaluate need for skin moisturizer/barrier cream  - Collaborate with interdisciplinary team (i e  Nutrition, Rehabilitation, etc )   - Patient/family teaching   Outcome: Progressing      Problem: Potential for Falls  Goal: Patient will remain free of falls  INTERVENTIONS:  - Assess patient frequently for physical needs  -  Identify cognitive and physical deficits and behaviors that affect risk of falls    -  [place] fall precautions as indicated by assessment   - Educate patient/family on patient safety including physical limitations  - Instruct patient to call for assistance with activity based on assessment  - Modify environment to reduce risk of injury  - Consider OT/PT consult to assist with strengthening/mobility    Outcome: Progressing      Problem: DISCHARGE PLANNING - CARE MANAGEMENT  Goal: Discharge to post-acute care or home with appropriate resources  INTERVENTIONS:  - Conduct assessment to determine patient/family and health care team treatment goals, and need for post-acute services based on payer coverage, community resources, and patient preferences, and barriers to discharge  - Address psychosocial, clinical, and financial barriers to discharge as identified in assessment in conjunction with the patient/family and health care team  - Arrange appropriate level of post-acute services according to patient's   needs and preference and payer coverage in collaboration with the physician and health care team  - Communicate with and update the patient/family, physician, and health care team regarding progress on the discharge plan  - Arrange appropriate transportation to post-acute venues   Outcome: Progressing      Problem: Nutrition/Hydration-ADULT  Goal: Nutrient/Hydration intake appropriate for improving, restoring or maintaining nutritional needs  Monitor and assess patient's nutrition/hydration status for malnutrition (ex- brittle hair, bruises, dry skin, pale skin and conjunctiva, muscle wasting, smooth red tongue, and disorientation)  Collaborate with interdisciplinary team and initiate plan and interventions as ordered  Monitor patient's weight and dietary intake as ordered or per policy  Utilize nutrition screening tool and intervene per policy  Determine patient's food preferences and provide high-protein, high-caloric foods as appropriate       INTERVENTIONS:  - Monitor oral intake, urinary output, labs, and treatment plans  - Assess nutrition and hydration status and recommend course of action  - Evaluate amount of meals eaten  - Assist patient with eating if necessary   - Allow adequate time for meals  - Recommend/ encourage appropriate diets, oral nutritional supplements, and vitamin/mineral supplements  - Order, calculate, and assess calorie counts as needed  - Assess need for intravenous fluids  - Provide specific nutrition/hydration education as appropriate  - Include patient/family/caregiver in decisions related to nutrition   Outcome: Progressing

## 2018-02-06 NOTE — CASE MANAGEMENT
Continued Stay Review    Date: 2/6    Vital Signs: /60 (BP Location: Left arm)   Pulse 66   Temp 97 9 °F (36 6 °C) (Oral)   Resp 18   Ht 5' 10" (1 778 m)   Wt 72 1 kg (158 lb 14 4 oz)   SpO2 97%   BMI 22 80 kg/m²     Medications:   Scheduled Meds:   Current Facility-Administered Medications:  acetaminophen 650 mg Oral Q6H PRN Roro Tee MD    ampicillin-sulbactam 1 5 g Intravenous Q6H Roro Tee MD Last Rate: 1 5 g (02/06/18 1436)   divalproex sodium 500 mg Oral Daily Roro Tee MD    heparin (porcine) 5,000 Units Subcutaneous Q8H [address] Roro Tee MD    sertraline 50 mg Oral Daily Roro Tee MD      Continuous Infusions:    PRN Meds:   acetaminophen    Abnormal Labs/Diagnostic Results: N/A    Age/Sex: 80 y o  male     Assessment/Plan:   Principal Problem:    Bilateral cellulitis of lower leg  Active Problems:    Multiple open wounds of left lower extremity    Wounds, multiple open, lower extremity, right, initial encounter    Bipolar 1 disorder ([facility] [address] )    Sacral decubitus ulcer, stage II        Plan:     · Bilateral cellulitis:  Patient is on IV antibiotics  Switch to Augmentin on discharge  Patient is on unasyn day 3 here  · Leg wounds:  Continue with wound care as an outpatient  · Stage II sacral decubitus ulcer: This was present on admission  Continue with wound care recommendations  · History of bipolar disorder: This is stable  Continue Depakote 500 mg daily as well as Zoloft  ·          VTE Pharmacologic Prophylaxis:   Pharmacologic: Enoxaparin (Lovenox)  Mechanical VTE Prophylaxis in Place:  Yes     Current Length of Stay: 5 day(s)     Current Patient Status: Inpatient        Discharge Plan: TBD -  STR

## 2018-02-06 NOTE — PROGRESS NOTES
[facility] Progress Note  Patient: Roman Portillo 80 y o  male   MRN: [id number]  PCP: No primary care provider on file  Unit/Bed#: [facility] Encounter: [id number]  Date Of Visit: 18    Assessment:    Principal Problem:    Bilateral cellulitis of lower leg  Active Problems:    Multiple open wounds of left lower extremity    Wounds, multiple open, lower extremity, right, initial encounter    Bipolar 1 disorder (HCC)    Sacral decubitus ulcer, stage II      Plan:    · Bilateral cellulitis:  Patient is on IV antibiotics  Switch to Augmentin on discharge  Patient is on unasyn day 3 here  · Leg wounds:  Continue with wound care as an outpatient  · Stage II sacral decubitus ulcer: This was present on admission  Continue with wound care recommendations  · History of bipolar disorder: This is stable  Continue Depakote 500 mg daily as well as Zoloft  ·       VTE Pharmacologic Prophylaxis:   Pharmacologic: Enoxaparin (Lovenox)  Mechanical VTE Prophylaxis in Place: Yes    Patient Centered Rounds: I have performed bedside rounds with nursing staff today  Time Spent for Care: 20 minutes  More than 50% of total time spent on counseling and coordination of care as described above  Current Length of Stay: 5 day(s)    Current Patient Status: Inpatient   Await placement    Discharge Plan:  Awaiting placement  Patient is medically stable for discharge  Code Status: Level 1 - Full Code      Subjective:   Patient seen examined  He has no complaints today and states he feels fine  Objective:     Vitals:   Temp (24hrs), Av 4 °F (36 9 °C), Min:97 9 °F (36 6 °C), Max:98 8 °F (37 1 °C)    HR:  [66-69] 66  Resp:  [18] 18  BP: (111-114)/(60-69) 114/60  SpO2:  [96 %-97 %] 97 %  Body mass index is 22 8 kg/m²  Input and Output Summary (last 24 hours):        Intake/Output Summary (Last 24 hours) at 18 1110  Last data filed at 18 0853   Gross per 24 hour   Intake 10 ml   Output             1050 ml   Net            -1040 ml       Physical Exam:     General Appearance:    Alert, cooperative, no distress, appears stated age                               Lungs:     Clear to auscultation bilaterally, respirations unlabored       Heart:    Regular rate and rhythm, S1 and S2 normal, no murmur, rub    or gallop   Abdomen:     Soft, non-tender, bowel sounds active all four quadrants,     no masses, no organomegaly           Extremities:   Ulcers and wounds in the legs                       Additional Data:     Labs:      Results from last 7 days  Lab Units 02/02/18  0432 02/01/18  1718   WBC Thousand/uL 6 94 6 70   HEMOGLOBIN g/dL 13 7 14 5   HEMATOCRIT % 40 3 42 1   PLATELETS Thousands/uL 237 263   NEUTROS PCT %  --  64   LYMPHS PCT %  --  23   MONOS PCT %  --  10   EOS PCT %  --  2       Results from last 7 days  Lab Units 02/02/18  0432 02/01/18  1718   SODIUM mmol/L 140 142   POTASSIUM mmol/L 3 7 3 7   CHLORIDE mmol/L 106 105   CO2 mmol/L 25 28   BUN mg/dL 13 18   CREATININE mg/dL 0 76 1 02   CALCIUM mg/dL 9 0 9 1   TOTAL PROTEIN g/dL  --  6 5   BILIRUBIN TOTAL mg/dL  --  0 50   ALK PHOS U/L  --  63   ALT U/L  --  24   AST U/L  --  23   GLUCOSE RANDOM mg/dL 92 84           * I Have Reviewed All Lab Data Listed Above  * Additional Pertinent Lab Tests Reviewed: [address] Admission Reviewed        Recent Cultures (last 7 days):       Results from last 7 days  Lab Units 02/01/18  1754 02/01/18  1718   BLOOD CULTURE  No Growth After 4 Days  No Growth After 4 Days         Last 24 Hours Medication List:     Current Facility-Administered Medications:  acetaminophen 650 mg Oral Q6H PRN Ramona Daley MD    ampicillin-sulbactam 1 5 g Intravenous Q6H Ramona Daley MD Last Rate: 1 5 g (02/06/18 0853)   divalproex sodium 500 mg Oral Daily Ramona Daley MD    heparin (porcine) 5,000 Units Subcutaneous Q8H [address] Ramona Daley MD    sertraline 50 mg Oral Daily Fatemeh Landa MD         Today, Patient Was Seen By: Carlos Leiva MD    ** Please Note: Dragon 360 Dictation voice to text software may have been used in the creation of this document   **

## 2018-02-06 NOTE — SOCIAL WORK
Dr Kristen Maier has stated that patient is medically cleared to go to [facility] at [facility] called and we need a PT note for Heladio Gurrola so Dr Kristen Maier wrote a PT order and Byrce Salter was called and will see this patient stat and write the note  An ambulance with [facility] is set up for 7:30 PM if luis gets the Auth for STR

## 2018-02-07 VITALS
HEIGHT: 70 IN | SYSTOLIC BLOOD PRESSURE: 121 MMHG | RESPIRATION RATE: 18 BRPM | OXYGEN SATURATION: 97 % | WEIGHT: 158.9 LBS | DIASTOLIC BLOOD PRESSURE: 67 MMHG | HEART RATE: 87 BPM | TEMPERATURE: 97.6 F | BODY MASS INDEX: 22.75 KG/M2

## 2018-02-07 PROBLEM — L03.116 BILATERAL CELLULITIS OF LOWER LEG: Status: RESOLVED | Noted: 2018-02-01 | Resolved: 2018-02-07

## 2018-02-07 PROBLEM — L03.115 BILATERAL CELLULITIS OF LOWER LEG: Status: RESOLVED | Noted: 2018-02-01 | Resolved: 2018-02-07

## 2018-02-07 LAB — BACTERIA BLD CULT: NORMAL

## 2018-02-07 PROCEDURE — 97167 OT EVAL HIGH COMPLEX 60 MIN: CPT

## 2018-02-07 PROCEDURE — G8988 SELF CARE GOAL STATUS: HCPCS

## 2018-02-07 PROCEDURE — G8987 SELF CARE CURRENT STATUS: HCPCS

## 2018-02-07 PROCEDURE — 99238 HOSP IP/OBS DSCHRG MGMT 30/<: CPT | Performed by: FAMILY MEDICINE

## 2018-02-07 RX ORDER — DIVALPROEX SODIUM 500 MG/1
500 TABLET, EXTENDED RELEASE ORAL DAILY
Refills: 0
Start: 2018-02-08 | End: 2020-03-25 | Stop reason: SDUPTHER

## 2018-02-07 RX ORDER — AMOXICILLIN AND CLAVULANATE POTASSIUM 875; 125 MG/1; MG/1
1 TABLET, FILM COATED ORAL EVERY 12 HOURS SCHEDULED
Refills: 0
Start: 2018-02-07 | End: 2018-02-12

## 2018-02-07 RX ORDER — TRAMADOL HYDROCHLORIDE 50 MG/1
50 TABLET ORAL EVERY 6 HOURS PRN
Qty: 10 TABLET | Refills: 0 | Status: SHIPPED | OUTPATIENT
Start: 2018-02-07 | End: 2018-02-17

## 2018-02-07 RX ADMIN — DIVALPROEX SODIUM 500 MG: 250 TABLET, FILM COATED, EXTENDED RELEASE ORAL at 08:39

## 2018-02-07 RX ADMIN — AMPICILLIN SODIUM AND SULBACTAM SODIUM 1.5 G: 1; .5 INJECTION, POWDER, FOR SOLUTION INTRAMUSCULAR; INTRAVENOUS at 08:54

## 2018-02-07 RX ADMIN — AMPICILLIN SODIUM AND SULBACTAM SODIUM 1.5 G: 1; .5 INJECTION, POWDER, FOR SOLUTION INTRAMUSCULAR; INTRAVENOUS at 02:50

## 2018-02-07 RX ADMIN — SERTRALINE HYDROCHLORIDE 50 MG: 50 TABLET ORAL at 08:39

## 2018-02-07 RX ADMIN — ACETAMINOPHEN 650 MG: 325 TABLET, FILM COATED ORAL at 05:27

## 2018-02-07 RX ADMIN — HEPARIN SODIUM 5000 UNITS: 5000 INJECTION, SOLUTION INTRAVENOUS; SUBCUTANEOUS at 05:04

## 2018-02-07 NOTE — OCCUPATIONAL THERAPY NOTE
Occupational Therapy Evaluation      Ramiro Traore    2/7/2018    Patient Active Problem List   Diagnosis    Multiple open wounds of left lower extremity    Wounds, multiple open, lower extremity, right, initial encounter    Bipolar 1 disorder ([facility] [address] )    Sacral decubitus ulcer, stage II       Past Medical History:   Diagnosis Date    A-fib ([facility] [address] )     Arthritis     Vascular insufficiency        Past Surgical History:   Procedure Laterality Date    HERNIA REPAIR      TONSILLECTOMY        02/07/18 1010   Note Type   Note type Eval/Treat   Restrictions/Precautions   Weight Bearing Precautions Per Order No   Braces or Orthoses (none)   Other Precautions Fall Risk   Pain Assessment   Pain Assessment 0-10   Pain Score 8   Pain Type Acute pain   Pain Location Ankle   Pain Orientation Right   Pain Radiating Towards knee   Pain Frequency Constant/continuous   Diversional Activities Television   Home Living   Type of [address] One level;Stairs to enter with rails; Able to live on main level with bedroom/bathroom  (12 RUBY with rail)   Bathroom Shower/Tub Walk-in shower   Bathroom Toilet Standard   Bathroom Equipment Other (Comment)  (none at baseline)   [facility]; Wheelchair-manual;Quad cane  (CPAP)   Prior Function   Level of [place] Needs assistance with IADLs;Modified independent with wheelchair;Needs assistance with ADLs and functional mobility   Lives With Family  (sister)   Receives Help From Family  (as needed)   ADL Assistance Needs assistance   IADLs Needs assistance   Falls in the last 6 months (none recent, a few in the Spring)   Vocational Retired  ( at [employer])   Comments patient reports increased difficulty bathing with decreased ability to do adequately   Dressing without assist  SBA on steps with QC and rail, RW used short distances in home, mostly WC use (family takes to MD)   Lifestyle   Autonomy Pt lives with sister on the 1st floor of her home in his own area  Pt reports PLOF was A with ADLs, A with IADLs, and pt is a   Reciprocal Relationships family   Psychosocial   Psychosocial (WDL) WDL   Patient Behaviors/Mood Calm   ADL   Eating Assistance 4  Minimal Assistance   Eating Deficit Setup;Supervision/safety; Increased time to complete   Grooming Assistance 4  Minimal Assistance   Grooming Deficit Setup;Supervision/safety; Increased time to complete   UB Bathing Assistance 4  Minimal Assistance   UB Bathing Deficit Setup;Supervision/safety; Increased time to complete   LB Bathing Assistance 3  Moderate Assistance   LB Bathing Deficit Setup;Supervision/safety; Increased time to complete   UB Dressing Assistance 4  Minimal Assistance   UB Dressing Deficit Setup;Supervision/safety; Increased time to complete   LB Dressing Assistance 3  Moderate Assistance   LB Dressing Deficit Setup;Supervision/safety; Increased time to complete   Toileting Assistance  4  Minimal Assistance   Toileting Deficit Setup;Supervison/safety; Increased time to complete   Functional Assistance 3  Moderate Assistance   Functional Deficit Setup;Supervision/safety; Increased time to complete   Bed Mobility   Rolling R 5  Supervision   Additional items Assist x 1;Bedrails; Increased time required   Rolling L 5  Supervision   Additional items Assist x 1;Bedrails; Increased time required   Supine to Sit 5  Supervision   Additional items Assist x 1;Bedrails; Increased time required   Sit to Supine 5  Supervision   Additional items Assist x 1;Bedrails; Increased time required   Transfers   Sit to Stand Unable to assess  (pt continues with bedrest orders and pt also declined)   Balance   Static Sitting Good   Dynamic Sitting Fair +   Static Standing Fair  (to P+ with RW)   Dynamic Standing Fair -  (to P+ with RW)   Ambulatory Fair -  (to P+ with RW)   Activity Tolerance   Activity Tolerance Patient limited by fatigue;Patient limited by pain   Nurse Made Aware Yes, spoke with pt's RN, Merlinda Blizzard, who stated pt was appropriate for therapy and made aware of outcomes   RUE Assessment   RUE Assessment WFL  (See OT Eval)   LUE Assessment   LUE Assessment WFL  (See OT eval)   Hand Function   Gross Motor Coordination Functional   Fine Motor Coordination Functional   Sensation   Light Touch No apparent deficits   Proprioception   Proprioception No apparent deficits   Vision-Basic Assessment   Current Vision Wears glasses all the time   Vision - Complex Assessment   Ocular Range of Motion [facility]   Perception   Inattention/Neglect Appears intact   Cognition   Overall Cognitive Status WFL   Arousal/Participation Alert; Responsive; Cooperative   Attention Within functional limits   Orientation Level Oriented X4   Memory Within functional limits   Following Commands Follows multistep commands with increased time or repetition  (requires repetition due to hearing and distractability)   Assessment   Limitation Decreased ADL status; Decreased UE strength;Decreased Safe judgement during ADL;Decreased cognition;Decreased endurance;Decreased high-level ADLs; Decreased self-care trans;Mood limitation  (pt reports bipolar dx sometimes "causes a problem")   Prognosis Fair   Assessment Pt is a 80 y o  male seen for OT evaluation s/p admit to [facility] on 2018 w/ Bilateral cellulitis of lower leg  Comorbidities affecting pt's functional performance at time of assessment include:multiple open wounds isaura LEs, bipolar D/O, sacral ulcer stage 2 and leg pain   Personal factors affecting pt at time of IE include:steps to enter environment, difficulty performing ADLS, difficulty performing IADLS , limited insight into deficits, compliance, health management  and environment  Prior to admission, pt was A with ADLs, IADLs, and pt reports he is a  but license   Upon evaluation: Pt requires min A with UB ADLs and mod A  with LB ADLs   Xfers and functional mobility NT 2* bed rest order and pt declined OT attempting to request updated orders for mobility  Deficits are due to the following deficits impacting occupational performance: weakness, decreased strength, decreased balance, decreased tolerance, impaired problem solving, impulsivity, decreased safety awareness, increased pain, decreased coping skills, decreased mobilty and environmental deficits  Pt to benefit from continued skilled OT tx while in the hospital to address deficits as defined above and maximize level of functional independence w ADL's and functional mobility  Occupational Performance areas to address include: bathing/shower, toilet hygiene, dressing, medication management, socialization, health maintenance, functional mobility, community mobility, clothing management, cleaning, meal prep, money management, household maintenance and social participation  From OT standpoint, recommendation at time of d/c would be STR  Goals   Patient Goals to go to rehab   Plan   Treatment Interventions ADL retraining;Functional transfer training;UE strengthening/ROM; Endurance training;Cognitive reorientation;Patient/family training;Equipment evaluation/education; Activityengagement; Energy conservation;Continued evaluation   Goal Expiration Date 02/20/18   OT Frequency 3-5x/wk   Recommendation   OT Discharge Recommendation Short Term Rehab   Equipment Recommended (shower with back restt (purchase himself vs faciltiy order))   OT - OK to Discharge Yes  (when medically cleared and agreeable to DC recommendations)   [place] Index   Grooming Score 5   Dressing Score 5   Toilet Use Score 5   Transfers (Bed/Chair) Score 5   Mobility (Level Surface) Score 0   Occupational Therapy goals: In 7-14 days:    1 - Patient will verbalize and demonstrate use of energy conservation/ deep breathing technique and work simplification skills during functional activity with no verbal cues      2 - Patient will verbalize and demonstrate good body mechanics and joint protection techniques during  ADLs/ IADLs with no verbal cues  3 - Patient will increase OOB/ sitting tolerance to 2-4 hours per day for increased participation in self care and leisure tasks with no s/s of exertion  4 - Patient will increase standing tolerance time to 5  minutes with unilateral UE support to complete sink level ADLs@ mod I level  5 - Patient will increase sitting tolerance at edge of bed to 20 minutes to complete UB ADLs @ set up assist level  6 - Patient will transfer bed to Chair / toilet at Set up assist level with AD as indicated  7 - Patient will complete UB ADLs with set up assist     8 - Patient will complete LB ADLs with min assist with the use of adaptive equipment  9 - Patient will complete toileting hygiene with set up assist/ supervision for thoroughness      10 - Patient/ Family  will demonstrate competency [facility]

## 2018-02-07 NOTE — NURSING NOTE
Report called to [facility] at [phone]  Papers prepared and given to transport team   Patient cleaned and sheets changed prior to transfer as patient was incontinent of stool and urine in bed  IV removed and secured with 2x2s and tape  Belongings returned to patient

## 2018-02-07 NOTE — PLAN OF CARE
Problem: PAIN - ADULT  Goal: Verbalizes/displays adequate comfort level or baseline comfort level  Interventions:  - Encourage patient to monitor pain and request assistance  - Assess pain using appropriate pain scale  - Administer analgesics based on type and severity of pain and evaluate response  - Implement non-pharmacological measures as appropriate and evaluate response  - Consider cultural and social influences on pain and pain management  - Notify physician/advanced practitioner if interventions unsuccessful or patient reports new pain   Outcome: Progressing      Problem: INFECTION - ADULT  Goal: Absence or prevention of progression during hospitalization  INTERVENTIONS:  - Assess and monitor for signs and symptoms of infection  - Monitor lab/diagnostic results  - Monitor all insertion sites, i e  indwelling lines, tubes, and drains  - [place] appropriate cooling/warming therapies per order  - Administer medications as ordered  - Instruct and encourage patient and family to use good hand hygiene technique  - Identify and instruct in appropriate isolation precautions for identified infection/condition   Outcome: Progressing      Problem: SAFETY ADULT  Goal: Patient will remain free of falls  INTERVENTIONS:  - Assess patient frequently for physical needs  -  Identify cognitive and physical deficits and behaviors that affect risk of falls    -  [place] fall precautions as indicated by assessment   - Educate patient/family on patient safety including physical limitations  - Instruct patient to call for assistance with activity based on assessment  - Modify environment to reduce risk of injury  - Consider OT/PT consult to assist with strengthening/mobility    Outcome: Progressing    Goal: Maintain or return to baseline ADL function  INTERVENTIONS:  -  Assess patient's ability to carry out ADLs; assess patient's baseline for ADL function and identify physical deficits which impact ability to perform ADLs (bathing, care of mouth/teeth, toileting, grooming, dressing, etc )  - Assess/evaluate cause of self-care deficits   - Assess range of motion  - Assess patient's mobility; develop plan if impaired  - Assess patient's need for assistive devices and provide as appropriate  - Encourage maximum independence but intervene and supervise when necessary  ¯ Involve family in performance of ADLs  ¯ Assess for home care needs following discharge   ¯ Request OT consult to assist with ADL evaluation and planning for discharge  ¯ Provide patient education as appropriate   Outcome: Progressing    Goal: Maintain or return mobility status to optimal level  INTERVENTIONS:  - Assess patient's baseline mobility status (ambulation, transfers, stairs, etc )    - Identify cognitive and physical deficits and behaviors that affect mobility  - Identify mobility aids required to assist with transfers and/or ambulation (gait belt, sit-to-stand, lift, walker, cane, etc )  - [place] fall precautions as indicated by assessment  - Record patient progress and toleration of activity level on Mobility SBAR; progress patient to next Phase/Stage  - Instruct patient to call for assistance with activity based on assessment  - Request Rehabilitation consult to assist with strengthening/weightbearing, etc    Outcome: Progressing      Problem: DISCHARGE PLANNING  Goal: Discharge to home or other facility with appropriate resources  INTERVENTIONS:  - Identify barriers to discharge w/patient and caregiver  - Arrange for needed discharge resources and transportation as appropriate  - Identify discharge learning needs (meds, wound care, etc )  - Refer to Case Management Department for coordinating discharge planning if the patient needs post-hospital services based on physician/advanced practitioner order or complex needs related to functional status, cognitive ability, or social support system   Outcome: Progressing      Problem: Knowledge Deficit  Goal: Patient/family/caregiver demonstrates understanding of disease process, treatment plan, medications, and discharge instructions  Complete learning assessment and assess knowledge base    Interventions:  - Provide teaching at level of understanding  - Provide teaching via preferred learning methods   Outcome: Progressing      Problem: SKIN/TISSUE INTEGRITY - ADULT  Goal: Skin integrity remains intact  INTERVENTIONS  - Identify patients at risk for skin breakdown  - Assess and monitor skin integrity  - Assess and monitor nutrition and hydration status  - Monitor labs (i e  albumin)  - Assess for incontinence   - Turn and reposition patient  - Assist with mobility/ambulation  - Relieve pressure over bony prominences  - Avoid friction and shearing  - Provide appropriate hygiene as needed including keeping skin clean and dry  - Evaluate need for skin moisturizer/barrier cream  - Collaborate with interdisciplinary team (i e  Nutrition, Rehabilitation, etc )   - Patient/family teaching   Outcome: Progressing    Goal: Incision(s), wounds(s) or drain site(s) healing without S/S of infection  INTERVENTIONS  - Assess and document risk factors for skin impairment   - Assess and document dressing, incision, wound bed, drain sites and surrounding tissue  - Initiate Nutrition services consult and/or wound management as needed   Outcome: Progressing      Problem: MUSCULOSKELETAL - ADULT  Goal: Maintain or return mobility to safest level of function  INTERVENTIONS:  - Assess patient's ability to carry out ADLs; assess patient's baseline for ADL function and identify physical deficits which impact ability to perform ADLs (bathing, care of mouth/teeth, toileting, grooming, dressing, etc )  - Assess/evaluate cause of self-care deficits   - Assess range of motion  - Assess patient's mobility; develop plan if impaired  - Assess patient's need for assistive devices and provide as appropriate  - Encourage maximum independence but intervene and supervise when necessary  - Involve family in performance of ADLs  - Assess for home care needs following discharge   - Request OT consult to assist with ADL evaluation and planning for discharge  - Provide patient education as appropriate   Outcome: Progressing      Problem: Prexisting or High Potential for Compromised Skin Integrity  Goal: Skin integrity is maintained or improved  INTERVENTIONS:  - Identify patients at risk for skin breakdown  - Assess and monitor skin integrity  - Assess and monitor nutrition and hydration status  - Monitor labs (i e  albumin)  - Assess for incontinence   - Turn and reposition patient  - Assist with mobility/ambulation  - Relieve pressure over bony prominences  - Avoid friction and shearing  - Provide appropriate hygiene as needed including keeping skin clean and dry  - Evaluate need for skin moisturizer/barrier cream  - Collaborate with interdisciplinary team (i e  Nutrition, Rehabilitation, etc )   - Patient/family teaching   Outcome: Progressing      Problem: Potential for Falls  Goal: Patient will remain free of falls  INTERVENTIONS:  - Assess patient frequently for physical needs  -  Identify cognitive and physical deficits and behaviors that affect risk of falls    -  [place] fall precautions as indicated by assessment   - Educate patient/family on patient safety including physical limitations  - Instruct patient to call for assistance with activity based on assessment  - Modify environment to reduce risk of injury  - Consider OT/PT consult to assist with strengthening/mobility    Outcome: Progressing      Problem: DISCHARGE PLANNING - CARE MANAGEMENT  Goal: Discharge to post-acute care or home with appropriate resources  INTERVENTIONS:  - Conduct assessment to determine patient/family and health care team treatment goals, and need for post-acute services based on payer coverage, community resources, and patient preferences, and barriers to discharge  - Address psychosocial, clinical, and financial barriers to discharge as identified in assessment in conjunction with the patient/family and health care team  - Arrange appropriate level of post-acute services according to patient's   needs and preference and payer coverage in collaboration with the physician and health care team  - Communicate with and update the patient/family, physician, and health care team regarding progress on the discharge plan  - Arrange appropriate transportation to post-acute venues   INTERVENTIONS:  - Conduct assessment to determine patient/family and health care team treatment goals, and need for post-acute services based on payer coverage, community resources, and patient preferences, and barriers to discharge  - Address psychosocial, clinical, and financial barriers to discharge as identified in assessment in conjunction with the patient/family and health care team  - Arrange appropriate level of post-acute services according to patient's   needs and preference and payer coverage in collaboration with the physician and health care team  - Communicate with and update the patient/family, physician, and health care team regarding progress on the discharge plan  - Arrange appropriate transportation to post-acute venues   Outcome: Progressing      Problem: Nutrition/Hydration-ADULT  Goal: Nutrient/Hydration intake appropriate for improving, restoring or maintaining nutritional needs  Monitor and assess patient's nutrition/hydration status for malnutrition (ex- brittle hair, bruises, dry skin, pale skin and conjunctiva, muscle wasting, smooth red tongue, and disorientation)  Collaborate with interdisciplinary team and initiate plan and interventions as ordered  Monitor patient's weight and dietary intake as ordered or per policy  Utilize nutrition screening tool and intervene per policy  Determine patient's food preferences and provide high-protein, high-caloric foods as appropriate  INTERVENTIONS:  - Monitor oral intake, urinary output, labs, and treatment plans  - Assess nutrition and hydration status and recommend course of action  - Evaluate amount of meals eaten  - Assist patient with eating if necessary   - Allow adequate time for meals  - Recommend/ encourage appropriate diets, oral nutritional supplements, and vitamin/mineral supplements  - Order, calculate, and assess calorie counts as needed  - Assess need for intravenous fluids  - Provide specific nutrition/hydration education as appropriate  - Include patient/family/caregiver in decisions related to nutrition   Outcome: Progressing

## 2018-02-07 NOTE — PLAN OF CARE
Problem: OCCUPATIONAL THERAPY ADULT  Goal: Performs self-care activities at highest level of function for planned discharge setting  See evaluation for individualized goals  Treatment Interventions: ADL retraining, Functional transfer training, UE strengthening/ROM, Endurance training, Cognitive reorientation, Patient/family training, Equipment evaluation/education, Activityengagement, Energy conservation, Continued evaluation  Equipment Recommended:  (shower with back restt (purchase himself vs faciltiy order))       See flowsheet documentation for full assessment, interventions and recommendations  Limitation: Decreased ADL status, Decreased UE strength, Decreased Safe judgement during ADL, Decreased cognition, Decreased endurance, Decreased high-level ADLs, Decreased self-care trans, Mood limitation (pt reports bipolar dx sometimes "causes a problem")  Prognosis: Fair  Assessment: Pt is a 80 y o  male seen for OT evaluation s/p admit to [facility] on 2018 w/ Bilateral cellulitis of lower leg  Comorbidities affecting pt's functional performance at time of assessment include:multiple open wounds isaura LEs, bipolar D/O, sacral ulcer stage 2 and leg pain   Personal factors affecting pt at time of IE include:steps to enter environment, difficulty performing ADLS, difficulty performing IADLS , limited insight into deficits, compliance, health management  and environment  Prior to admission, pt was A with ADLs, IADLs, and pt reports he is a  but license   Upon evaluation: Pt requires min A with UB ADLs and mod A  with LB ADLs  Xfers and functional mobility NT 2* bed rest order and pt declined OT attempting to request updated orders for mobility    Deficits are due to the following deficits impacting occupational performance: weakness, decreased strength, decreased balance, decreased tolerance, impaired problem solving, impulsivity, decreased safety awareness, increased pain, decreased coping skills, decreased mobilty and environmental deficits  Pt to benefit from continued skilled OT tx while in the hospital to address deficits as defined above and maximize level of functional independence w ADL's and functional mobility  Occupational Performance areas to address include: bathing/shower, toilet hygiene, dressing, medication management, socialization, health maintenance, functional mobility, community mobility, clothing management, cleaning, meal prep, money management, household maintenance and social participation  From OT standpoint, recommendation at time of d/c would be STR         OT Discharge Recommendation: Short Term Rehab  OT - OK to Discharge: Yes (when medically cleared and agreeable to DC recommendations)

## 2018-02-07 NOTE — OCCUPATIONAL THERAPY NOTE
Occupational Therapy Evaluation      Migel Yeung    2/7/2018    Patient Active Problem List   Diagnosis    Multiple open wounds of left lower extremity    Wounds, multiple open, lower extremity, right, initial encounter    Bipolar 1 disorder ([facility] [address] )    Sacral decubitus ulcer, stage II       Past Medical History:   Diagnosis Date    A-fib ([facility] [address] )     Arthritis     Vascular insufficiency        Past Surgical History:   Procedure Laterality Date    HERNIA REPAIR      TONSILLECTOMY        02/07/18 1010   Note Type   Note type Eval/Treat   Restrictions/Precautions   Weight Bearing Precautions Per Order No   Braces or Orthoses (none)   Other Precautions Fall Risk   Pain Assessment   Pain Assessment 0-10   Pain Score 8   Pain Type Acute pain   Pain Location Ankle   Pain Orientation Right   Pain Radiating Towards knee   Pain Frequency Constant/continuous   Diversional Activities Television   Home Living   Type of [address] One level;Stairs to enter with rails; Able to live on main level with bedroom/bathroom  (12 RU[address] with rail)   Bathroom Shower/Tub Walk-in shower   Bathroom Toilet Standard   Bathroom Equipment Other (Comment)  (none at baseline)   [address]; Wheelchair-manual;Quad cane  (CPAP)   Prior Function   Level of [place] Needs assistance with IADLs;Modified independent with wheelchair;Needs assistance with ADLs and functional mobility   Lives With Family  (sister)   Receives Help From Family  (as needed)   ADL Assistance Needs assistance   IADLs Needs assistance   Falls in the last 6 months (none recent, a few in the Spring)   Vocational Retired  ( at [employer])   Comments patient reports increased difficulty bathing with decreased ability to do adequately   Dressing without assist  SBA on steps with QC and rail, RW used short distances in home, mostly WC use (family takes to MD)   Lifestyle   Autonomy Pt lives with sister on the 1st floor of her home in his own area  Pt reports PLOF was A with ADLs, A with IADLs, and pt is a   Reciprocal Relationships family   Psychosocial   Psychosocial (WDL) WDL   Patient Behaviors/Mood Calm   ADL   Eating Assistance 4  Minimal Assistance   Eating Deficit Setup;Supervision/safety; Increased time to complete   Grooming Assistance 4  Minimal Assistance   Grooming Deficit Setup;Supervision/safety; Increased time to complete   UB Bathing Assistance 4  Minimal Assistance   UB Bathing Deficit Setup;Supervision/safety; Increased time to complete   LB Bathing Assistance 3  Moderate Assistance   LB Bathing Deficit Setup;Supervision/safety; Increased time to complete   UB Dressing Assistance 4  Minimal Assistance   UB Dressing Deficit Setup;Supervision/safety; Increased time to complete   LB Dressing Assistance 3  Moderate Assistance   LB Dressing Deficit Setup;Supervision/safety; Increased time to complete   Toileting Assistance  4  Minimal Assistance   Toileting Deficit Setup;Supervison/safety; Increased time to complete   Functional Assistance 3  Moderate Assistance   Functional Deficit Setup;Supervision/safety; Increased time to complete   Bed Mobility   Rolling R 5  Supervision   Additional items Assist x 1;Bedrails; Increased time required   Rolling L 5  Supervision   Additional items Assist x 1;Bedrails; Increased time required   Supine to Sit 5  Supervision   Additional items Assist x 1;Bedrails; Increased time required   Sit to Supine 5  Supervision   Additional items Assist x 1;Bedrails; Increased time required   Transfers   Sit to Stand Unable to assess  (pt continues with bedrest orders and pt also declined)   Balance   Static Sitting Good   Dynamic Sitting Fair +   Static Standing Fair  (to P+ with RW)   Dynamic Standing Fair -  (to P+ with RW)   Ambulatory Fair -  (to P+ with RW)   Activity Tolerance   Activity Tolerance Patient limited by fatigue;Patient limited by pain   Nurse Made Aware Yes, spoke with pt's RN, Felicita Sykes, who stated pt was appropriate for therapy and made aware of outcomes   RUE Assessment   RUE Assessment WFL  (See OT Eval)   LUE Assessment   LUE Assessment WFL  (See OT eval)   Hand Function   Gross Motor Coordination Functional   Fine Motor Coordination Functional   Sensation   Light Touch No apparent deficits   Proprioception   Proprioception No apparent deficits   Vision-Basic Assessment   Current Vision Wears glasses all the time   Vision - Complex Assessment   Ocular Range of Motion [facility]   Perception   Inattention/Neglect Appears intact   Cognition   Overall Cognitive Status WFL   Arousal/Participation Alert; Responsive; Cooperative   Attention Within functional limits   Orientation Level Oriented X4   Memory Within functional limits   Following Commands Follows multistep commands with increased time or repetition  (requires repetition due to hearing and distractability)   Assessment   Limitation Decreased ADL status; Decreased UE strength;Decreased Safe judgement during ADL;Decreased cognition;Decreased endurance;Decreased high-level ADLs; Decreased self-care trans;Mood limitation  (pt reports bipolar dx sometimes "causes a problem")   Prognosis Fair   Assessment Pt is a 80 y o  male seen for OT evaluation s/p admit to [facility] on 2018 w/ Bilateral cellulitis of lower leg  Comorbidities affecting pt's functional performance at time of assessment include:multiple open wounds isaura LEs, bipolar D/O, sacral ulcer stage 2 and leg pain   Personal factors affecting pt at time of IE include:steps to enter environment, difficulty performing ADLS, difficulty performing IADLS , limited insight into deficits, compliance, health management  and environment  Prior to admission, pt was A with ADLs, IADLs, and pt reports he is a  but license   Upon evaluation: Pt requires min A with UB ADLs and mod A  with LB ADLs   Xfers and functional mobility NT 2* bed rest order and pt declined OT attempting to request updated orders for mobility  Deficits are due to the following deficits impacting occupational performance: weakness, decreased strength, decreased balance, decreased tolerance, impaired problem solving, impulsivity, decreased safety awareness, increased pain, decreased coping skills, decreased mobilty and environmental deficits  Pt to benefit from continued skilled OT tx while in the hospital to address deficits as defined above and maximize level of functional independence w ADL's and functional mobility  Occupational Performance areas to address include: bathing/shower, toilet hygiene, dressing, medication management, socialization, health maintenance, functional mobility, community mobility, clothing management, cleaning, meal prep, money management, household maintenance and social participation  From OT standpoint, recommendation at time of d/c would be STR  Goals   Patient Goals to go to rehab   Plan   Treatment Interventions ADL retraining;Functional transfer training;UE strengthening/ROM; Endurance training;Cognitive reorientation;Patient/family training;Equipment evaluation/education; Activityengagement; Energy conservation;Continued evaluation   Goal Expiration Date 02/20/18   OT Frequency 3-5x/wk   Recommendation   OT Discharge Recommendation Short Term Rehab   Equipment Recommended (shower with back restt (purchase himself vs faciltiy order))   OT - OK to Discharge Yes  (when medically cleared and agreeable to DC recommendations)   Barthel Index   Feeding 10   Bathing 0   Grooming Score 5   Dressing Score 5   Bladder Score 5   Bowels Score 10   Toilet Use Score 5   Transfers (Bed/Chair) Score 5   Mobility (Level Surface) Score 0   Stairs Score 0   Barthel Index Score 45   Occupational Therapy goals:  In 7-14 days:    1 - Patient will verbalize and demonstrate use of energy conservation/ deep breathing technique and work simplification skills during functional activity with no verbal cues  2 - Patient will verbalize and demonstrate good body mechanics and joint protection techniques during  ADLs/ IADLs with no verbal cues  3 - Patient will increase OOB/ sitting tolerance to 2-4 hours per day for increased participation in self care and leisure tasks with no s/s of exertion  4 - Patient will increase standing tolerance time to 5  minutes with unilateral UE support to complete sink level ADLs@ mod I level  5 - Patient will increase sitting tolerance at edge of bed to 20 minutes to complete UB ADLs @ set up assist level      6 - Patient will transfer bed to Chair / toilet at Set up assist level with AD as indicated      7 - Patient will complete UB ADLs with set up assist      8 - Patient will complete LB ADLs with min assist with the use of adaptive equipment      9 - Patient will complete toileting hygiene with set up assist/ supervision for thoroughness      10 - Patient/ Family  will demonstrate competency with UE Home Exercise Program     Ruby Spatz, MS OTR/L

## 2018-02-07 NOTE — PLAN OF CARE
Problem: DISCHARGE PLANNING - CARE MANAGEMENT  Goal: Discharge to post-acute care or home with appropriate resources  INTERVENTIONS:  - Conduct assessment to determine patient/family and health care team treatment goals, and need for post-acute services based on payer coverage, community resources, and patient preferences, and barriers to discharge  - Address psychosocial, clinical, and financial barriers to discharge as identified in assessment in conjunction with the patient/family and health care team  - Arrange appropriate level of post-acute services according to patient's   needs and preference and payer coverage in collaboration with the physician and health care team  - Communicate with and update the patient/family, physician, and health care team regarding progress on the discharge plan  - Arrange appropriate transportation to post-acute venues   INTERVENTIONS:  - Conduct assessment to determine patient/family and health care team treatment goals, and need for post-acute services based on payer coverage, community resources, and patient preferences, and barriers to discharge  - Address psychosocial, clinical, and financial barriers to discharge as identified in assessment in conjunction with the patient/family and health care team  - Arrange appropriate level of post-acute services according to patient's   needs and preference and payer coverage in collaboration with the physician and health care team  - Communicate with and update the patient/family, physician, and health care team regarding progress on the discharge plan  - Arrange appropriate transportation to post-acute venues   Outcome: Completed Date Met: 02/07/18  Flora krishnan [place] has obtained auth for admission  Auth # is [id number]  [facility] BLS set up for 12:00  Nurse and doctor notified  Med Nec form completed and placed in pt's chart    IMM already completed

## 2018-02-07 NOTE — DISCHARGE INSTRUCTIONS
1  Apply hydraguard to b/l heels, buttocks and sacrum BID and PRN for prevention and protection  2  Apply skin nourishing cream to the skin daily for moisture   3  Soft care cushion to chair when OOB   4  Turn and reposition patient every 2 hours   5  Elevate heels off of bed with pillows to offload pressure  6  Cleanse b/l lower extremities with soap and water, pat dry, apply hydraguard to b/l lower extremities   Apply silvasorb gel to adaptic and apply to wound beds, cover with dry dressings daily and as needed for soilage/dislodgement until seen by podiatry
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
## 2018-02-07 NOTE — PLAN OF CARE
Problem: DISCHARGE PLANNING - CARE MANAGEMENT  Goal: Discharge to post-acute care or home with appropriate resources  INTERVENTIONS:  - Conduct assessment to determine patient/family and health care team treatment goals, and need for post-acute services based on payer coverage, community resources, and patient preferences, and barriers to discharge  - Address psychosocial, clinical, and financial barriers to discharge as identified in assessment in conjunction with the patient/family and health care team  - Arrange appropriate level of post-acute services according to patient's   needs and preference and payer coverage in collaboration with the physician and health care team  - Communicate with and update the patient/family, physician, and health care team regarding progress on the discharge plan  - Arrange appropriate transportation to post-acute venues   INTERVENTIONS:  - Conduct assessment to determine patient/family and health care team treatment goals, and need for post-acute services based on payer coverage, community resources, and patient preferences, and barriers to discharge  - Address psychosocial, clinical, and financial barriers to discharge as identified in assessment in conjunction with the patient/family and health care team  - Arrange appropriate level of post-acute services according to patient's   needs and preference and payer coverage in collaboration with the physician and health care team  - Communicate with and update the patient/family, physician, and health care team regarding progress on the discharge plan  - Arrange appropriate transportation to post-acute venues   Outcome: Completed Date Met: 02/07/18  [employer] authorization # for [employer] is [id number]

## 2018-02-07 NOTE — SOCIAL WORK
Jason from [place] has obtained auth for admission  Auth # is [id number]  [facility] BLS set up for 12:00  Nurse and doctor notified  Med Nec form completed and placed in pt's chart    IMM already completed

## 2018-03-08 ENCOUNTER — TELEPHONE (OUTPATIENT)
Dept: INTERNAL MEDICINE CLINIC | Facility: CLINIC | Age: 83
End: 2018-03-08

## 2018-03-08 NOTE — TELEPHONE ENCOUNTER
Lay Tirado from [address] called to let dr davalos know that she was unable to provide services for the pt today due to the weather  Lay Tirado will visit him tomorrow   Any questions call Lay Tirado

## 2018-03-23 ENCOUNTER — OFFICE VISIT (OUTPATIENT)
Dept: INTERNAL MEDICINE CLINIC | Facility: CLINIC | Age: 83
End: 2018-03-23
Payer: COMMERCIAL

## 2018-03-23 VITALS
SYSTOLIC BLOOD PRESSURE: 140 MMHG | OXYGEN SATURATION: 99 % | WEIGHT: 163 LBS | DIASTOLIC BLOOD PRESSURE: 88 MMHG | HEART RATE: 71 BPM | BODY MASS INDEX: 24.71 KG/M2 | HEIGHT: 68 IN

## 2018-03-23 DIAGNOSIS — I73.9 PERIPHERAL VASCULAR DISEASE (HCC): Primary | ICD-10-CM

## 2018-03-23 DIAGNOSIS — R26.9 GAIT DISTURBANCE: ICD-10-CM

## 2018-03-23 DIAGNOSIS — F31.9 BIPOLAR 1 DISORDER (HCC): ICD-10-CM

## 2018-03-23 PROBLEM — L89.152 SACRAL DECUBITUS ULCER, STAGE II (HCC): Status: RESOLVED | Noted: 2018-02-03 | Resolved: 2018-03-23

## 2018-03-23 PROBLEM — S81.802A MULTIPLE OPEN WOUNDS OF LEFT LOWER EXTREMITY: Status: RESOLVED | Noted: 2018-02-01 | Resolved: 2018-03-23

## 2018-03-23 PROBLEM — S81.801A: Status: RESOLVED | Noted: 2018-02-01 | Resolved: 2018-03-23

## 2018-03-23 PROCEDURE — 4004F PT TOBACCO SCREEN RCVD TLK: CPT | Performed by: INTERNAL MEDICINE

## 2018-03-23 PROCEDURE — 1101F PT FALLS ASSESS-DOCD LE1/YR: CPT | Performed by: INTERNAL MEDICINE

## 2018-03-23 PROCEDURE — 3725F SCREEN DEPRESSION PERFORMED: CPT | Performed by: INTERNAL MEDICINE

## 2018-03-23 PROCEDURE — 1160F RVW MEDS BY RX/DR IN RCRD: CPT | Performed by: INTERNAL MEDICINE

## 2018-03-23 PROCEDURE — 3008F BODY MASS INDEX DOCD: CPT | Performed by: INTERNAL MEDICINE

## 2018-03-23 PROCEDURE — 99204 OFFICE O/P NEW MOD 45 MIN: CPT | Performed by: INTERNAL MEDICINE

## 2018-03-23 RX ORDER — ACETAMINOPHEN 325 MG/1
650 TABLET ORAL EVERY 6 HOURS PRN
COMMUNITY
End: 2020-07-07

## 2018-03-23 RX ORDER — CEPHALEXIN 500 MG/1
500 CAPSULE ORAL EVERY 8 HOURS SCHEDULED
Qty: 30 CAPSULE | Refills: 0 | Status: SHIPPED | OUTPATIENT
Start: 2018-03-23 | End: 2018-04-02

## 2018-03-23 NOTE — PROGRESS NOTES
INTERNAL MEDICINE INITIAL OFFICE VISIT  [facility] - [facility]    NAME: Tosin Acevedo  AGE: 80 y o  SEX: male  : 1934     DATE: 3/23/2018     Assessment:     1  Peripheral vascular disease ([facility] )    Discussed elevation of legs and use of compression stockings  He needs to get established with podiatry  Consult provided  - Ambulatory referral to Podiatry; Future    2  Bipolar 1 disorder ([facility] )    Continue psych medications as prescribed  Patient's mood appears stable  3  Gait disturbance    Discussed ways to avoid falls  Continue use of cane for ambulation     - Ambulatory referral to Podiatry; Future     Chief Complaint:     Chief Complaint   Patient presents with   [employer] Establish Care     new pt  and check (both) legs and feet      History of Present Illness:     Patient presents to establish care  Patient was previously living by himself  Has a history of bipolar disorder and chronic vascular insufficiency  Was admitted to [facility] in 2018 which multiple bilateral lower extremity wounds and cellulitis  He was seen by podiatry and psychiatry  Euthymic and stable from psych's standpoint  His lower extremity wounds and cellulitis was treated by podiatry  Ultimately, he was discharged to a SNF  He is now out and living with family  He has had no follow-up with podiatry  Patient wears compression stockings  Has thick elongated nails  Skin has been getting a little more red past week  No increased pain in his legs  Does have require use of a cane for ambulation  No chest pain, shortness of breath, palpitations  He denies a history of Afib  He smokes cigars from time to time        The following portions of the patient's history were reviewed and updated as appropriate: allergies, current medications, past family history, past medical history, past social history, past surgical history and problem list      Review of Systems:     Review of Systems Constitutional: Negative for activity change, appetite change and fatigue  HENT: Negative  Eyes: Negative  Respiratory: Negative for apnea, cough, chest tightness, shortness of breath and wheezing  Cardiovascular: Negative for chest pain, palpitations and leg swelling  Gastrointestinal: Negative for abdominal distention, abdominal pain, blood in stool, constipation, diarrhea, nausea and vomiting  Endocrine: Negative  Genitourinary: Negative  Musculoskeletal: Positive for arthralgias and gait problem  Negative for back pain, joint swelling and myalgias  Skin: Positive for color change (chronic venous stasis changes bilateral lower extremities)  Negative for rash and wound  Neurological: Negative for dizziness, tremors, seizures, syncope, facial asymmetry, speech difficulty, weakness, light-headedness, numbness and headaches  Hematological: Negative  Psychiatric/Behavioral: Positive for behavioral problems  Negative for confusion, hallucinations, sleep disturbance and suicidal ideas  The patient is not nervous/anxious  Past Medical History:     Past Medical History:   Diagnosis Date    Arthritis     Chronic venous insufficiency     [facility]       Past Surgical History:     Past Surgical History:   Procedure Laterality Date    HERNIA REPAIR      TONSILLECTOMY        Social History:   He reports that he has been smoking Cigars  He has never used smokeless tobacco  He reports that he drinks alcohol  He reports that he does not use drugs       Family History:     Family History   Problem Relation Age of Onset    Heart disease Mother     Diabetes Father     Diabetes Sister       Current Medications:     Current Outpatient Prescriptions:     acetaminophen (TYLENOL) 325 mg tablet, Take 650 mg by mouth every 6 (six) hours as needed for mild pain, Disp: , Rfl:     aspirin 81 mg chewable tablet, Chew 81 mg daily, Disp: , Rfl:     divalproex sodium (DEPAKOTE ER) 500 mg 24 hr tablet, Take 1 tablet (500 mg total) by mouth daily, Disp: , Rfl: 0    magnesium oxide (MAG-OX) 400 mg, Take 400 mg by mouth daily, Disp: , Rfl:     sertraline (ZOLOFT) 100 mg tablet, Take 50 mg by mouth daily, Disp: , Rfl:      Allergies:   No Known Allergies     Physical Exam:     /88 (BP Location: Left arm, Patient Position: Sitting, Cuff Size: Standard)   Pulse 71   Ht 5' 7 75" (1 721 m)   Wt 73 9 kg (163 lb)   SpO2 99%   BMI 24 97 kg/m²     Physical Exam   Constitutional: He is oriented to person, place, and time  He appears well-developed and well-nourished  No distress  Neck: Neck supple  No JVD present  No thyromegaly present  Cardiovascular: Normal rate, regular rhythm, normal heart sounds and intact distal pulses  Exam reveals no gallop and no friction rub  No murmur heard  Pulmonary/Chest: Effort normal and breath sounds normal  No respiratory distress  He has no wheezes  He has no rales  He exhibits no tenderness  Abdominal: Soft  Bowel sounds are normal  He exhibits no distension and no mass  There is no tenderness  There is no rebound and no guarding  Musculoskeletal: He exhibits edema (Bilateral LE trace edema with chronic venous stasis changes  )  Mycotic nails   Lymphadenopathy:     He has no cervical adenopathy  Neurological: He is alert and oriented to person, place, and time  He displays normal reflexes  Skin: Skin is warm and dry  He is not diaphoretic  Seborrheic keratoses (multiple)   Psychiatric: He has a normal mood and affect  His behavior is normal    Vitals reviewed      Labs:  Basic metabolic panel   Result Value Ref Range    Sodium 140 136 - 145 mmol/L    Potassium 3 7 3 5 - 5 3 mmol/L    Chloride 106 100 - 108 mmol/L    CO2 25 21 - 32 mmol/L    Anion Gap 9 4 - 13 mmol/L    BUN 13 5 - 25 mg/dL    Creatinine 0 76 0 60 - 1 30 mg/dL    Glucose 92 65 - 140 mg/dL    Calcium 9 0 8 3 - 10 1 mg/dL    eGFR 84 ml/min/1 73sq m   CBC (With Platelets)   Result Value Ref Range    WBC 6 94 4 31 - 10 16 Thousand/uL    RBC 4 41 3 88 - 5 62 Million/uL    Hemoglobin 13 7 12 0 - 17 0 g/dL    Hematocrit 40 3 36 5 - 49 3 %    MCV 91 82 - 98 fL    MCH 31 1 26 8 - 34 3 pg    MCHC 34 0 31 4 - 37 4 g/dL    RDW 13 9 11 6 - 15 1 %    Platelets 234 959 - 403 Thousands/uL    MPV 8 8 (L) 8 9 - 12 7 fL       Fay Scheuermann, DO  [facility]

## 2018-03-23 NOTE — PATIENT INSTRUCTIONS
Peripheral Vascular Disease   AMBULATORY CARE:   Peripheral vascular disease (PVD)  is a condition that causes decreased blood flow to your limbs because of blocked blood vessels  The blockage is usually caused by atherosclerosis  This is when material, such as cholesterol, sticks to the inside of your blood vessels and makes them narrow  Common symptoms include the following:   · Painful cramps in your hip, thigh, or calf muscles, especially after you walk or climb stairs    · Burning pain in your hands, fingers, feet, or toes    · Shiny, tight, cold skin, and uneven hair growth on your skin    · A change in your skin color    · Sores on your skin that do not heal    · Weakness or numbness of your hands or feet  Call 911 for any of the following:   · You have any of the following signs of a heart attack:      ¨ Squeezing, pressure, or pain in your chest that lasts longer than 5 minutes or returns    ¨ Discomfort or pain in your back, neck, jaw, stomach, or arm     ¨ Trouble breathing    ¨ Nausea or vomiting    ¨ Lightheadedness or a sudden cold sweat, especially with chest pain or trouble breathing    · You have any of the following signs of a stroke:      ¨ Numbness or drooping on one side of your face     ¨ Weakness in an arm or leg    ¨ Confusion or difficulty speaking    ¨ Dizziness, a severe headache, or vision loss  Seek care immediately if:   · Your arm or leg feels warm, tender, and painful  It may look swollen and red  · You have pain in your legs that does not go away with rest     · You have dark areas on the skin of your legs  · You cannot see out of one or both of your eyes  Contact your healthcare provider if:   · Your signs and symptoms get worse or do not get better, even after treatment  · You have a sore or ulcer that is not healing or gets worse  · You have questions or concerns about your condition or care    Treatment for PVD  may include any of the following:  · Medicines  may be given to help decrease your cholesterol level, open blood vessels, or prevent blood clots  · Procedures  may be used to open blocked blood vessels  Metal or plastic stents (tubes) may be put in where the artery was blocked to keep it open  Surgery may be used to place a new blood vessel near the one that is blocked, or replace the area of the blood vessel  Manage PVD:   · Do not smoke  Nicotine and other chemicals in cigarettes and cigars can damage your blood vessels  Ask your healthcare provider for information if you currently smoke and need help to quit  E-cigarettes or smokeless tobacco still contain nicotine  Talk to your healthcare provider before you use these products  · Get regular exercise  Ask about the best exercise plan for you  Walking is a low-impact way to exercise and increase your blood flow  Stop and rest if you have pain in your legs  · Care for your feet  Look closely at your feet every day  Check for cracks or sores  Wash your feet daily with mild soap and dry them well  Do not walk barefoot in case you step on a hard or sharp object  · Change your sleep position  You may have pain in your legs or feet when you sleep  Raise the head of your bed 4 inches, or use pillows to prop your upper body higher than your legs  This may help more blood go to your feet, decreasing pain  · Protect and cushion your feet and hands  If you have ulcers on your feet, you may need to wear bandages with heel pads  You may also wear foam rubber booties  Hand or foot warmers may decrease pain in your hands or feet  Prevent PVD:   · Eat a variety of healthy foods  Healthy foods include fruits, vegetables, whole-grain breads, low-fat dairy products, beans, lean meats, and fish  Ask if you need to be on a special diet  · Maintain a healthy weight  Ask your healthcare provider how much you should weigh  Ask him to help you create a weight loss plan if you are overweight       · Manage diabetes  Keep your blood sugar level in the correct range  Check your blood sugar level often  Ask your healthcare provider if you should make changes to your diet, exercise, or medications  Follow up with your healthcare provider as directed:  Write down your questions so you remember to ask them during your visits  © 2017 [address] Information is for End User's use only and may not be sold, redistributed or otherwise used for commercial purposes  All illustrations and images included in CareNotes® are the copyrighted property of [employer]  or [address]  The above information is an  only  It is not intended as medical advice for individual conditions or treatments  Talk to your doctor, nurse or pharmacist before following any medical regimen to see if it is safe and effective for you

## 2018-05-07 ENCOUNTER — TELEPHONE (OUTPATIENT)
Dept: INTERNAL MEDICINE CLINIC | Facility: CLINIC | Age: 83
End: 2018-05-07

## 2018-05-21 ENCOUNTER — TELEPHONE (OUTPATIENT)
Dept: INTERNAL MEDICINE CLINIC | Facility: CLINIC | Age: 83
End: 2018-05-21

## 2018-05-21 DIAGNOSIS — I73.9 PERIPHERAL VASCULAR DISEASE (HCC): Primary | ICD-10-CM

## 2018-05-21 RX ORDER — ROSUVASTATIN CALCIUM 5 MG/1
5 TABLET, COATED ORAL DAILY
Qty: 90 TABLET | Refills: 1 | Status: SHIPPED | OUTPATIENT
Start: 2018-05-21 | End: 2020-03-25 | Stop reason: SDUPTHER

## 2018-05-21 NOTE — TELEPHONE ENCOUNTER
NEEDS  ROSUVASTATIN  DO NOT SEE THAT ON THE MEDICATION LIST SO AM NOT SURE IF PATIENT IS TAKING OR NOT    PATIENT #  [phone]  [facility]  [phone]

## 2018-07-24 ENCOUNTER — TELEPHONE (OUTPATIENT)
Dept: INTERNAL MEDICINE CLINIC | Facility: CLINIC | Age: 83
End: 2018-07-24

## 2018-07-24 NOTE — TELEPHONE ENCOUNTER
Patient said he missed a few calls from here and he is returning the call  Samantha Redding He didn't know who, or why he was being called

## 2018-07-24 NOTE — TELEPHONE ENCOUNTER
Patient called back  Hong Ramirez He missed an appointment yesterday    which he didn't realize, so maybe had to do with that    And said he will schedule an appt to see

## 2018-07-31 ENCOUNTER — TELEPHONE (OUTPATIENT)
Dept: INTERNAL MEDICINE CLINIC | Facility: CLINIC | Age: 83
End: 2018-07-31

## 2019-01-02 NOTE — SOCIAL WORK
Patient is being [address] today to go to [facility] after we receive [employer]  He will be transported at 7:30 PM [facility] BLS  Facility aware  Nurse aware  Will notify with [facility] approval  Spoke to pt regarding results, pt states understanding. No further questions/concerns at this time.

## 2019-03-05 ENCOUNTER — OFFICE VISIT (OUTPATIENT)
Dept: INTERNAL MEDICINE CLINIC | Facility: CLINIC | Age: 84
End: 2019-03-05
Payer: COMMERCIAL

## 2019-03-05 VITALS — SYSTOLIC BLOOD PRESSURE: 164 MMHG | HEART RATE: 79 BPM | DIASTOLIC BLOOD PRESSURE: 78 MMHG | OXYGEN SATURATION: 95 %

## 2019-03-05 DIAGNOSIS — F31.9 BIPOLAR 1 DISORDER (HCC): ICD-10-CM

## 2019-03-05 DIAGNOSIS — R53.1 GENERALIZED WEAKNESS: ICD-10-CM

## 2019-03-05 DIAGNOSIS — R26.9 GAIT DISTURBANCE: ICD-10-CM

## 2019-03-05 DIAGNOSIS — Z85.46 HISTORY OF PROSTATE CANCER: Chronic | ICD-10-CM

## 2019-03-05 DIAGNOSIS — I73.9 PERIPHERAL VASCULAR DISEASE (HCC): ICD-10-CM

## 2019-03-05 DIAGNOSIS — Z23 ENCOUNTER FOR IMMUNIZATION: ICD-10-CM

## 2019-03-05 DIAGNOSIS — I10 ESSENTIAL HYPERTENSION: Primary | ICD-10-CM

## 2019-03-05 DIAGNOSIS — Z00.00 MEDICARE ANNUAL WELLNESS VISIT, INITIAL: ICD-10-CM

## 2019-03-05 PROCEDURE — 1160F RVW MEDS BY RX/DR IN RCRD: CPT | Performed by: INTERNAL MEDICINE

## 2019-03-05 PROCEDURE — 1101F PT FALLS ASSESS-DOCD LE1/YR: CPT | Performed by: INTERNAL MEDICINE

## 2019-03-05 PROCEDURE — 4040F PNEUMOC VAC/ADMIN/RCVD: CPT | Performed by: INTERNAL MEDICINE

## 2019-03-05 PROCEDURE — G0439 PPPS, SUBSEQ VISIT: HCPCS | Performed by: INTERNAL MEDICINE

## 2019-03-05 PROCEDURE — 3725F SCREEN DEPRESSION PERFORMED: CPT | Performed by: INTERNAL MEDICINE

## 2019-03-05 PROCEDURE — 90670 PCV13 VACCINE IM: CPT | Performed by: INTERNAL MEDICINE

## 2019-03-05 PROCEDURE — 4004F PT TOBACCO SCREEN RCVD TLK: CPT | Performed by: INTERNAL MEDICINE

## 2019-03-05 PROCEDURE — 1170F FXNL STATUS ASSESSED: CPT | Performed by: INTERNAL MEDICINE

## 2019-03-05 PROCEDURE — 99214 OFFICE O/P EST MOD 30 MIN: CPT | Performed by: INTERNAL MEDICINE

## 2019-03-05 PROCEDURE — G0009 ADMIN PNEUMOCOCCAL VACCINE: HCPCS | Performed by: INTERNAL MEDICINE

## 2019-03-05 PROCEDURE — 1125F AMNT PAIN NOTED PAIN PRSNT: CPT | Performed by: INTERNAL MEDICINE

## 2019-03-05 RX ORDER — AMLODIPINE BESYLATE 5 MG/1
5 TABLET ORAL DAILY
Qty: 30 TABLET | Refills: 5 | Status: SHIPPED | OUTPATIENT
Start: 2019-03-05 | End: 2020-03-25

## 2019-03-05 NOTE — PROGRESS NOTES
Medicare Annual Wellness Visit  [facility] - [facility] [address] [place] [address]  NAME: Jae Dixon SEX: male : 1934      ASSESSMENT/PLAN:  1  Medicare annual wellness visit, initial    2  Encounter for immunization  - PNEUMOCOCCAL CONJUGATE VACCINE 13-VALENT GREATER THAN 6 MONTHS    HPI:  Jae Dixon is a 80 y o  male who presents to clinic today for initial AWV  PATIENT CARE TEAM:  Patient Care Team:  Rosa Narvaez DO as PCP - General (Internal Medicine)    PAST MEDICAL HISTORY:  Past Medical History:   Diagnosis Date    Arthritis     Chronic venous insufficiency     Pro[facility] cancer ([facility] [address] [facility]     [facility]        PAST SURGICAL HISTORY:  Past Surgical History:   Procedure Laterality Date    HERNIA REPAIR      TONSILLECTOMY         PROBLEM LIST:  Patient Active Problem List   Diagnosis    Bipolar 1 disorder ([facility] )    Peripheral vascular disease ([facility] )    History of prostate cancer       FAMILY HISTORY:  Family History   Problem Relation Age of Onset    Heart disease Mother     Diabetes Father     Diabetes Sister        SOCIAL HISTORY:  Jae Dixon  reports that he has been smoking cigars  He has smoked for the past 10 00 years  He has never used smokeless tobacco  He reports that he drinks about 0 6 oz of alcohol per week  He reports that he does not use drugs      ALLERGIES:  No Known Allergies    CURRENT MEDICATIONS:  Current Outpatient Medications   Medication Sig Dispense Refill    acetaminophen (TYLENOL) 325 mg tablet Take 650 mg by mouth every 6 (six) hours as needed for mild pain      aspirin 81 mg chewable tablet Chew 81 mg 2 (two) times a day       divalproex sodium (DEPAKOTE ER) 500 mg 24 hr tablet Take 1 tablet (500 mg total) by mouth daily  0    magnesium oxide (MAG-OX) 400 mg Take 400 mg by mouth daily      rosuvastatin (CRESTOR) 5 mg tablet Take 1 tablet (5 mg total) by mouth daily 90 tablet 1    sertraline (ZOLOFT) 100 mg tablet Take 50 mg by mouth daily       No current facility-administered medications for this visit  IMMUNIZATIONS:  Immunization History   Administered Date(s) Administered    INFLUENZA 12/14/2004, 12/14/2005, 12/04/2006, 10/05/2017    Td (adult), adsorbed 04/07/1998       HEALTH MAINTENANCE:  Health Maintenance   Topic Date Due    Medicare Annual Wellness Visit (AWV)  1934    DTaP,Tdap,and Td Vaccines (1 - Tdap) 04/08/1998    Pneumococcal PPSV23/PCV13 65+ Years / High and Highest Risk (1 of 2 - PCV13) 10/01/1999    INFLUENZA VACCINE  07/01/2018    Fall Risk  03/23/2019    Depression Screening PHQ  03/23/2019    BMI: Adult  03/23/2019    HEPATITIS B VACCINES  Aged Out       VITALS:  /78 (BP Location: Left arm, Patient Position: Sitting, Cuff Size: Standard)   Pulse 79   SpO2 95%     MEDICARE HEALTH RISK ASSESSMENT:  Ted Kincaid is here for his Initial Wellness visit  Health Risk Assessment:  Patient rates overall health as fair  Patient feels that their physical health rating is Slightly worse  Eyesight was rated as Slightly worse  Hearing was rated as Slightly worse  Patient feels that their emotional and mental health rating is Same  Pain experienced by patient in the last 7 days has been Some  Patient's pain rating has been 4/10  Patient states that he has experienced no weight loss or gain in last 6 months  Emotional/Mental Health:  Patient has been feeling nervous/anxious  PHQ-9 Depression Screening:    Frequency of the following problems over the past two weeks:      1  Little interest or pleasure in doing things: 0 - not at all      2  Feeling down, depressed, or hopeless: 0 - not at all  PHQ-2 Score: 0          Broken Bones/Falls: Fall Risk Assessment:    In the past year, patient has experienced: No history of falling in past year          Bladder/Bowel:  Patient has not leaked urine accidently in the last six months    Patient reports no loss of bowel control  Immunizations:  Patient has not had a flu vaccination within the last year  Patient has not received a pneumonia shot  Patient has not received a shingles shot  Patient has not received tetanus/diphtheria shot  Home Safety:  Patient has trouble with stairs inside or outside of their home  Patient currently reports that there are no safety hazards present in home, working smoke alarms, working carbon monoxide detectors  Preventative Screenings:   no prostate cancer screen performed, no colon cancer screen completed, no cholesterol screen completed, no glaucoma eye exam completed    Nutrition:  Current diet: Regular with servings of the following:    Medications:  Patient is not currently taking any over-the-counter supplements  Patient is able to manage medications  (Additional Comments: Admits that he doesn't always feel the need to take medications)Lifestyle Choices:  Patient reports current tobacco use  Patient reports alcohol use  Alcohol use per week: Daily (gin/Algerian whiskey/rum)  Patient does not drive a vehicle  Patient wears seat belt  Current level of exercise of physical activity described by patient as: No formal exercise  Activities of Daily Living:  Can get out of bed by his or her self, able to dress self, able to make own meals, unable to do own shopping, able to bathe self, unable to do laundry/housekeeping, can manage own money, pay bills and track expenses    Previous Hospitalizations:  Hospitalization or ED visit in past 12 months  Number of hospitalizations within the last year: 1-2        Advanced Directives:  Patient has not decided on power of   Patient has not completed advanced directive          Preventative Screening/Counseling:      Cardiovascular:      General: Risks and Benefits Discussed and Screening Current      Counseling: Healthy Diet and Healthy Weight          Diabetes:      General: Risks and Benefits Discussed Counseling: Healthy Diet and Healthy Weight          Colorectal Cancer:      General: Screening Not Indicated          Prostate Cancer:      General: Screening Not Indicated          Osteoporosis:      General: Screening Not Indicated          AAA:      General: Screening Not Indicated          Glaucoma:      General: Screening Not Indicated          HIV:      General: Screening Not Indicated          Hepatitis C:      General: Screening Not Indicated        Advanced Directives:   Patient has no living will for healthcare, does not have durable POA for healthcare, patient does not have an advanced directive  5 wishes given  Immunizations:      Influenza: Risks & Benefits Discussed and Patient Declines      Pneumococcal: Risks & Benefits Discussed and Pneumococcal Due Today      Shingrix: Risks & Benefits Discussed      Other Preventative Counseling (Non-Medicare):   Fall Prevention and Sunscreen use      Moisés Cassidy, DO

## 2019-03-05 NOTE — PROGRESS NOTES
INTERNAL MEDICINE FOLLOW-UP OFFICE VISIT  [facility] - [facility]    NAME: Marquez Souza  AGE: 80 y o  SEX: male  : 1934     DATE: 3/5/2019     Assessment and Plan:     Assessment  1  Essential hypertension    2  Bipolar 1 disorder ([facility] )    3  Peripheral vascular disease ([facility] )    4  Gait disturbance    5  Generalized weakness    6  History of prostate cancer      Plan    · Patient's blood pressure is not well controlled  Decrease salt in diet  Patient does eat a lot of high salt foods  Will start amlodipine 5 mg daily  · Patient should check all up-to-date blood work  · Discussed the importance of taking his medications as prescribed  · Patient would benefit from home health including PT/OT/skilled nursing  Patient meets homebound criteria  · Discussed ways to reduce risk of falls  Patient would benefit from physical therapy to improve his gait/balance/strength  Orders Placed This Encounter   Procedures    PNEUMOCOCCAL CONJUGATE VACCINE 13-VALENT GREATER THAN 6 MONTHS    PSA Total, Diagnostic    CBC    Comprehensive metabolic panel    TSH, 3rd generation    Lipid panel     New Medications Ordered This Visit   Medications    amLODIPine (NORVASC) 5 mg tablet     Sig: Take 1 tablet (5 mg total) by mouth daily     Dispense:  30 tablet     Refill:  5     Return in about 6 weeks (around 2019) for Follow-up  Chief Complaint:     Chief Complaint   Patient presents with    Follow-up     discuss hospice        History of Present Illness:     Patient presents for routine follow-up  Patient has not been seen in several months  Patient denies any recent hospitalizations  Patient denies any significant changes to his health other than continuing to struggle with his gait and becoming a little weaker  Patient is worried about falling at times, but he has not fallen  Patient admits to being noncompliant with his medications    Patient does not forget to take his medications but states that they are just some days that he does not feel he needs to take them so he does not take them  Patient states his bipolar disorder has been stable and in remission  No recent thoughts of hurting himself or hurting others  No difficulty with sleeping  He lives with his elderly sister and requires others for transportation  Blood pressure is currently elevated  Denies issues with high blood pressure in past     The following portions of the patient's history were reviewed and updated as appropriate: allergies, current medications, past family history, past medical history, past social history, past surgical history and problem list      Review of Systems:     Review of Systems   Constitutional: Negative for activity change, appetite change and fatigue  Respiratory: Negative for apnea, cough, chest tightness, shortness of breath and wheezing  Cardiovascular: Negative for chest pain, palpitations and leg swelling  Gastrointestinal: Negative for abdominal distention, abdominal pain, blood in stool, constipation, diarrhea, nausea and vomiting  Musculoskeletal: Positive for arthralgias, back pain and gait problem  Negative for joint swelling and myalgias  Skin: Negative for rash and wound  Neurological: Negative for dizziness, weakness, light-headedness, numbness and headaches  Psychiatric/Behavioral: Positive for behavioral problems  Negative for confusion, hallucinations, sleep disturbance and suicidal ideas  The patient is not nervous/anxious  Problem List:     Patient Active Problem List   Diagnosis    [facility]    [facility]    History of prostate cancer      Objective:     /78 (BP Location: Left arm, Patient Position: Sitting, Cuff Size: Standard)   Pulse 79   SpO2 95%     Physical Exam   Constitutional: He appears well-developed and well-nourished  No distress     Poor hygiene   HENT:   Mouth/Throat: Oropharynx is clear and moist  No oropharyngeal exudate  Eyes: Right eye exhibits no discharge  Left eye exhibits no discharge  Neck: Neck supple  No JVD present  Cardiovascular: Normal rate and regular rhythm  No murmur heard  Pulmonary/Chest: Effort normal and breath sounds normal  No stridor  No respiratory distress  Abdominal: Soft  Bowel sounds are normal    Musculoskeletal: He exhibits edema (chronic venous stasis)  Lymphadenopathy:     He has no cervical adenopathy  Neurological: He is alert  Skin: Skin is warm and dry  He is not diaphoretic  Psychiatric: He has a normal mood and affect  His behavior is normal    Vitals reviewed      Jazmin Gaston DO  [facility]

## 2019-03-05 NOTE — PATIENT INSTRUCTIONS
Obesity   AMBULATORY CARE:   Obesity  is when your body mass index (BMI) is greater than 30  Your healthcare provider will use your height and weight to measure your BMI  The risks of obesity include  many health problems, such as injuries or physical disability  You may need tests to check for the following:  · Diabetes     · High blood pressure or high cholesterol     · Heart disease     · Gallbladder or liver disease     · Cancer of the colon, breast, prostate, liver, or kidney     · Sleep apnea     · Arthritis or gout  Seek care immediately if:   · You have a severe headache, confusion, or difficulty speaking  · You have weakness on one side of your body  · You have chest pain, sweating, or shortness of breath  Contact your healthcare provider if:   · You have symptoms of gallbladder or liver disease, such as pain in your upper abdomen  · You have knee or hip pain and discomfort while walking  · You have symptoms of diabetes, such as intense hunger and thirst, and frequent urination  · You have symptoms of sleep apnea, such as snoring or daytime sleepiness  · You have questions or concerns about your condition or care  Treatment for obesity  focuses on helping you lose weight to improve your health  Even a small decrease in BMI can reduce the risk for many health problems  Your healthcare provider will help you set a weight-loss goal   · Lifestyle changes  are the first step in treating obesity  These include making healthy food choices and getting regular physical activity  Your healthcare provider may suggest a weight-loss program that involves coaching, education, and therapy  · Medicine  may help you lose weight when it is used with a healthy diet and physical activity  · Surgery  can help you lose weight if you are very obese and have other health problems  There are several types of weight-loss surgery  Ask your healthcare provider for more information    Be successful losing weight:   · Set small, realistic goals  An example of a small goal is to walk for 20 minutes 5 days a week  Anther goal is to lose 5% of your body weight  · Tell friends, family members, and coworkers about your goals  and ask for their support  Ask a friend to lose weight with you, or join a weight-loss support group  · Identify foods or triggers that may cause you to overeat , and find ways to avoid them  Remove tempting high-calorie foods from your home and workplace  Place a bowl of fresh fruit on your kitchen counter  If stress causes you to eat, then find other ways to cope with stress  · Keep a diary to track what you eat and drink  Also write down how many minutes of physical activity you do each day  Weigh yourself once a week and record it in your diary  Eating changes: You will need to eat 500 to 1,000 fewer calories each day than you currently eat to lose 1 to 2 pounds a week  The following changes will help you cut calories:  · Eat smaller portions  Use small plates, no larger than 9 inches in diameter  Fill your plate half full of fruits and vegetables  Measure your food using measuring cups until you know what a serving size looks like  · Eat 3 meals and 1 or 2 snacks each day  Plan your meals in advance  Bo [place] and eat at home most of the time  Eat slowly  · Eat fruits and vegetables at every meal   They are low in calories and high in fiber, which makes you feel full  Do not add butter, margarine, or cream sauce to vegetables  Use herbs to season steamed vegetables  · Eat less fat and fewer fried foods  Eat more baked or grilled chicken and fish  These protein sources are lower in calories and fat than red meat  Limit fast food  Dress your salads with olive oil and vinegar instead of bottled dressing  · Limit the amount of sugar you eat  Do not drink sugary beverages  Limit alcohol  Activity changes:  Physical activity is good for your body in many ways   It helps you burn calories and build strong muscles  It decreases stress and depression, and improves your mood  It can also help you sleep better  Talk to your healthcare provider before you begin an exercise program   · Exercise for at least 30 minutes 5 days a week  Start slowly  Set aside time each day for physical activity that you enjoy and that is convenient for you  It is best to do both weight training and an activity that increases your heart rate, such as walking, bicycling, or swimming  · Find ways to be more active  Do yard work and housecleaning  Walk up the stairs instead of using elevators  Spend your leisure time going to events that require walking, such as outdoor festivals or fairs  This extra physical activity can help you lose weight and keep it off  Follow up with your healthcare provider as directed: You may need to meet with a dietitian  Write down your questions so you remember to ask them during your visits  © 2017 [address] [employer] Information is for End User's use only and may not be sold, redistributed or otherwise used for commercial purposes  All illustrations and images included in CareNotes® are the copyrighted property of [employer]  or Randall Bass  The above information is an  only  It is not intended as medical advice for individual conditions or treatments  Talk to your doctor, nurse or pharmacist before following any medical regimen to see if it is safe and effective for you  Urinary Incontinence   WHAT YOU NEED TO KNOW:   What is urinary incontinence? Urinary incontinence (UI) is when you lose control of your bladder  What causes UI? UI occurs because your bladder cannot store or empty urine properly  The following are the most common types of UI:  · Stress incontinence  is when you leak urine due to increased bladder pressure  This may happen when you cough, sneeze, or exercise       · Urge incontinence  is when you feel the need to urinate right away and leak urine accidentally  · Mixed incontinence  is when you have both stress and urge UI  What are the signs and symptoms of UI?   · You feel like your bladder does not empty completely when you urinate  · You urinate often and need to urinate immediately  · You leak urine when you sleep, or you wake up with the urge to urinate  · You leak urine when you cough, sneeze, exercise, or laugh  How is UI diagnosed? Your healthcare provider will ask how often you leak urine and whether you have stress or urge symptoms  Tell him which medicines you take, how often you urinate, and how much liquid you drink each day  You may need any of the following tests:  · Urine tests  may show infection or kidney function  · A pelvic exam  may be done to check for blockages  A pelvic exam will also show if your bladder, uterus, or other organs have moved out of place  · An x-ray, ultrasound, or CT  may show problems with parts of your urinary system  You may be given contrast liquid to help your organs show up better in the pictures  Tell the healthcare provider if you have ever had an allergic reaction to contrast liquid  Do not enter the MRI room with anything metal  Metal can cause serious injury  Tell the healthcare provider if you have any metal in or on your body  · A bladder scan  will show how much urine is left in your bladder after you urinate  You will be asked to urinate and then healthcare providers will use a small ultrasound machine to check the urine left in your bladder  · Cystometry  is used to check the function of your urinary system  Your healthcare provider checks the pressure in your bladder while filling it with fluid  Your bladder pressure may also be tested when your bladder is full and while you urinate  How is UI treated? · Medicines  can help strengthen your bladder control      · Electrical stimulation  is used to send a small amount of electrical energy to your pelvic floor muscles  This helps control your bladder function  Electrodes may be placed outside your body or in your rectum  For women, the electrodes may be placed in the vagina  · A bulking agent  may be injected into the wall of your urethra to make it thicker  This helps keep your urethra closed and decreases urine leakage  · Devices  such as a clamp, pessary, or tampon may help stop urine leaks  Ask your healthcare provider for more information about these and other devices  · Surgery  may be needed if other treatments do not work  Several types of surgery can help improve your bladder control  Ask your healthcare provider for more information about the surgery you may need  How can I manage my symptoms? · Do pelvic muscle exercises often  Your pelvic muscles help you stop urinating  Squeeze these muscles tight for 5 seconds, then relax for 5 seconds  Gradually work up to squeezing for 10 seconds  Do 3 sets of 15 repetitions a day, or as directed  This will help strengthen your pelvic muscles and improve bladder control  · A catheter  may be used to help empty your bladder  A catheter is a tiny, plastic tube that is put into your bladder to drain your urine  Your healthcare provider may tell you to use a catheter to prevent your bladder from getting too full and leaking urine  · Keep a UI record  Write down how often you leak urine and how much you leak  Make a note of what you were doing when you leaked urine  · Train your bladder  Go to the bathroom at set times, such as every 2 hours, even if you do not feel the urge to go  You can also try to hold your urine when you feel the urge to go  For example, hold your urine for 5 minutes when you feel the urge to go  As that becomes easier, hold your urine for 10 minutes  · Drink liquids as directed  Ask your healthcare provider how much liquid to drink each day and which liquids are best for you   You may need to limit the amount of liquid you drink to help control your urine leakage  Limit or do not have drinks that contain caffeine or alcohol  Do not drink any liquid right before you go to bed  · Prevent constipation  Eat a variety of high-fiber foods  Good examples are high-fiber cereals, beans, vegetables, and whole-grain breads  Prune juice may help make your bowel movement softer  Walking is the best way to trigger your intestines to have a bowel movement  · Exercise regularly and maintain a healthy weight  Ask your healthcare provider how much you should weigh and about the best exercise plan for you  Weight loss and exercise will decrease pressure on your bladder and help you control your leakage  Ask him to help you create a weight loss plan if you are overweight  When should I seek immediate care? · You have severe pain  · You are confused or cannot think clearly  When should I contact my healthcare provider? · You have a fever  · You see blood in your urine  · You have pain when you urinate  · You have new or worse pain, even after treatment  · Your mouth feels dry or you have vision changes  · Your urine is cloudy or smells bad  · You have questions or concerns about your condition or care  CARE AGREEMENT:   You have the right to help plan your care  Learn about your health condition and how it may be treated  Discuss treatment options with your caregivers to decide what care you want to receive  You always have the right to refuse treatment  The above information is an  only  It is not intended as medical advice for individual conditions or treatments  Talk to your doctor, nurse or pharmacist before following any medical regimen to see if it is safe and effective for you  © 2017 [address] [employer] Information is for End User's use only and may not be sold, redistributed or otherwise used for commercial purposes   All illustrations and images included in CareNotes® are the copyrighted property of [employer] Inc  or Randall Bass  Cigarette Smoking and Your Health   AMBULATORY CARE:   Risks to your health if you smoke:  Nicotine and other chemicals found in tobacco damage every cell in your body  Even if you are a light smoker, you have an increased risk for cancer, heart disease, and lung disease  If you are pregnant or have diabetes, smoking increases your risk for complications  Benefits to your health if you stop smoking:   · You decrease respiratory symptoms such as coughing, wheezing, and shortness of breath  · You reduce your risk for cancers of the lung, mouth, throat, kidney, bladder, pancreas, stomach, and cervix  If you already have cancer, you increase the benefits of chemotherapy  You also reduce your risk for cancer returning or a second cancer from developing  · You reduce your risk for heart disease, blood clots, heart attack, and stroke  · You reduce your risk for lung infections, and diseases such as pneumonia, asthma, chronic bronchitis, and emphysema  · Your circulation improves  More oxygen can be delivered to your body  If you have diabetes, you lower your risk for complications, such as kidney, artery, and eye diseases  You also lower your risk for nerve damage  Nerve damage can lead to amputations, poor vision, and blindness  · You improve your body's ability to heal and to fight infections  Benefits to the health of others if you stop smoking:  Tobacco is harmful to nonsmokers who breathe in your secondhand smoke  The following are ways the health of others around you may improve when you stop smoking:  · You lower the risks for lung cancer and heart disease in nonsmoking adults  · If you are pregnant, you lower the risk for miscarriage, early delivery, low birth weight, and stillbirth  You also lower your baby's risk for SIDS, obesity, developmental delay, and neurobehavioral problems, such as ADHD  · If you have children, you lower their risk for ear infections, colds, pneumonia, bronchitis, and asthma  For more information and support to stop smoking:   · Smokefree  gov  Phone: 9- 654 - 091-7086  Web Address: www smokefrBlueTalon  Follow up with your healthcare provider as directed:  Write down your questions so you remember to ask them during your visits  © 2017 [address] [employer] Information is for End User's use only and may not be sold, redistributed or otherwise used for commercial purposes  All illustrations and images included in CareNotes® are the copyrighted property of [employer]  or Randall Bass  The above information is an  only  It is not intended as medical advice for individual conditions or treatments  Talk to your doctor, nurse or pharmacist before following any medical regimen to see if it is safe and effective for you  Fall Prevention   WHAT YOU NEED TO KNOW:   What is fall prevention? Fall prevention includes ways to make your home and other areas safer  It also includes ways you can move more carefully to prevent a fall  What increases my risk for falls? · Lack of vitamin D    · Not getting enough sleep each night    · Trouble walking or keeping your balance, or foot problems    · Health conditions that cause changes in your blood pressure, vision, or muscle strength and coordination    · Medicines that make you dizzy, weak, or sleepy    · Problems seeing clearly    · Shoes that have high heels or are not supportive    · Tripping hazards, such as items left on the floor, no handrails on the stairs, or broken steps  How can I help protect myself from falls? · Stand or sit up slowly  This may help you keep your balance and prevent falls  If you need to get up during the night, sit up first  Be sure you are fully awake before you stand  Turn on the light before you start walking  Go slowly in case you are still sleepy   Make sure you will not trip over any pets sleeping in the bedroom  · Use assistive devices as directed  Your healthcare provider may suggest that you use a cane or walker to help you keep your balance  You may need to have grab bars put in your bathroom near the toilet or in the shower  · Wear shoes that fit well and have soles that   Wear shoes both inside and outside  Use slippers with good   Do not wear shoes with high heels  · Wear a personal alarm  This is a device that allows you to call 911 if you fall and need help  Ask your healthcare provider for more information  · Stay active  Exercise can help strengthen your muscles and improve your balance  Your healthcare provider may recommend water aerobics or walking  He or she may also recommend physical therapy to improve your coordination  Never start an exercise program without talking to your healthcare provider first      · Manage medical conditions  Keep all appointments with your healthcare providers  Visit your eye doctor as directed  How can I make my home safer? · Add items to prevent falls in the bathroom  Put nonslip strips on your bath or shower floor to prevent you from slipping  Use a bath mat if you do not have carpet in the bathroom  This will prevent you from falling when you step out of the bath or shower  Use a shower seat so you do not need to stand while you shower  Sit on the toilet or a chair in your bathroom to dry yourself and put on clothing  This will prevent you from losing your balance from drying or dressing yourself while you are standing  · Keep paths clear  Remove books, shoes, and other objects from walkways and stairs  Place cords for telephones and lamps out of the way so that you do not need to walk over them  Tape them down if you cannot move them  Remove small rugs  If you cannot remove a rug, secure it with double-sided tape  This will prevent you from tripping  · Install bright lights in your home  Use night lights to help light paths to the bathroom or kitchen  Always turn on the light before you start walking  · Keep items you use often on shelves within reach  Do not use a step stool to help you reach an item  · Paint or place reflective tape on the edges of your stairs  This will help you see the stairs better  Call 911 or have someone else call if:   · You have fallen and are unconscious  · You have fallen and cannot move part of your body  Contact your healthcare provider if:   · You have fallen and have pain or a headache  · You have questions or concerns about your condition or care  CARE AGREEMENT:   You have the right to help plan your care  Learn about your health condition and how it may be treated  Discuss treatment options with your caregivers to decide what care you want to receive  You always have the right to refuse treatment  The above information is an  only  It is not intended as medical advice for individual conditions or treatments  Talk to your doctor, nurse or pharmacist before following any medical regimen to see if it is safe and effective for you  © 2017 [address] [facility] Information is for End User's use only and may not be sold, redistributed or otherwise used for commercial purposes  All illustrations and images included in CareNotes® are the copyrighted property of [employer]  or Randall Bass  Advance Directives   WHAT YOU NEED TO KNOW:   What are advance directives? Advance directives are legal documents that state your wishes and plans for medical care  These plans are made ahead of time in case you lose your ability to make decisions for yourself  Advance directives can apply to any medical decision, such as the treatments you want, and if you want to donate organs  What are the types of advance directives? There are many types of advance directives, and each state has rules about how to use them   You may choose a combination of any of the following:  · Living will: This is a written record of the treatment you want  You can also choose which treatments you do not want, which to limit, and which to stop at a certain time  This includes surgery, medicine, IV fluid, and tube feedings  · Durable power of  for healthcare [facility]): This is a written record that states who you want to make healthcare choices for you when you are unable to make them for yourself  This person, called a proxy, is usually a family member or a friend  You may choose more than 1 proxy  · Do not resuscitate (DNR) order:  A DNR order is used in case your heart stops beating or you stop breathing  It is a request not to have certain forms of treatment, such as CPR  A DNR order may be included in other types of advance directives  · Medical directive: This covers the care that you want if you are in a coma, near death, or unable to make decisions for yourself  You can list the treatments you want for each condition  Treatment may include pain medicine, surgery, blood transfusions, dialysis, IV or tube feedings, and a ventilator (breathing machine)  · Values history: This document has questions about your views, beliefs, and how you feel and think about life  This information can help others choose the care that you would choose  Why are advance directives important? An advance directive helps you control your care  Although spoken wishes may be used, it is better to have your wishes written down  Spoken wishes can be misunderstood, or not followed  Treatments may be given even if you do not want them  An advance directive may make it easier for your family to make difficult choices about your care  How do I decide what to put in my advance directives? · Make informed decisions:  Make sure you fully understand treatments or care you may receive   Think about the benefits and problems your decisions could cause for you or your family  Talk to healthcare providers if you have concerns or questions before you write down your wishes  You may also want to talk with your [employer] or , or a   Check your state laws to make sure that what you put in your advance directive is legal      · Sign all forms:  Sign and date your advance directive when you have finished  You may also need 2 witnesses to sign the forms  Witnesses cannot be your doctor or his staff, your spouse, heirs or beneficiaries, people you owe money to, or your chosen proxy  Talk to your family, proxy, and healthcare providers about your advance directive  Give each person a copy, and keep one for yourself in a place you can get to easily  Do not keep it hidden or locked away  · Review and revise your plans: You can revise your advance directive at any time, as long as you are able to make decisions  Review your plan every year, and when there are changes in your life, or your health  When you make changes, let your family, proxy, and healthcare providers know  Give each a new copy  Where can I find more information? · [employer]  [address] , [address]  Phone: 1- 288 - [phone]  Phone: 9- 821 - 220-3236  Web Address: http://www  aafp org  · [address] [facility]  [address] [facility], [address] [facility] , [address] [place] [address]  Phone: 2- [phone] - [phone]  Phone: [phone]  Web Address: Tiago wagner  CARE AGREEMENT:   You have the right to help plan your care  To help with this plan, you must learn about your health condition and treatment options  You must also learn about advance directives and how they are used  Work with your healthcare providers to decide what care will be used to treat you  You always have the right to refuse treatment  The above information is an  only   It is not intended as medical advice for individual conditions or treatments  Talk to your doctor, nurse or pharmacist before following any medical regimen to see if it is safe and effective for you  © 2017 [address] [employer] Information is for End User's use only and may not be sold, redistributed or otherwise used for commercial purposes  All illustrations and images included in CareNotes® are the copyrighted property of A [employer] A M [employer]  or Randall Bass

## 2019-03-06 ENCOUNTER — TELEPHONE (OUTPATIENT)
Dept: INTERNAL MEDICINE CLINIC | Facility: CLINIC | Age: 84
End: 2019-03-06

## 2019-03-06 NOTE — TELEPHONE ENCOUNTER
[facility]ae Dixon is now going to be under their hospice care and they are requesting office notes from 3/5/19   Please fax to  QXR#[phone]

## 2019-03-07 ENCOUNTER — TELEPHONE (OUTPATIENT)
Dept: INTERNAL MEDICINE CLINIC | Facility: CLINIC | Age: 84
End: 2019-03-07

## 2019-03-07 NOTE — TELEPHONE ENCOUNTER
Taylor Records the nurse from [facility] called, has questions on patients medications    Her call back # [phone]

## 2019-04-12 ENCOUNTER — TELEPHONE (OUTPATIENT)
Dept: INTERNAL MEDICINE CLINIC | Facility: CLINIC | Age: 84
End: 2019-04-12

## 2020-03-05 ENCOUNTER — APPOINTMENT (EMERGENCY)
Dept: ULTRASOUND IMAGING | Facility: HOSPITAL | Age: 85
End: 2020-03-05
Payer: COMMERCIAL

## 2020-03-05 ENCOUNTER — HOSPITAL ENCOUNTER (EMERGENCY)
Facility: HOSPITAL | Age: 85
Discharge: HOME | End: 2020-03-05
Attending: EMERGENCY MEDICINE | Admitting: EMERGENCY MEDICINE
Payer: COMMERCIAL

## 2020-03-05 VITALS
RESPIRATION RATE: 16 BRPM | TEMPERATURE: 97.6 F | DIASTOLIC BLOOD PRESSURE: 70 MMHG | SYSTOLIC BLOOD PRESSURE: 158 MMHG | WEIGHT: 183.64 LBS | BODY MASS INDEX: 28.13 KG/M2 | HEART RATE: 59 BPM | OXYGEN SATURATION: 99 %

## 2020-03-05 DIAGNOSIS — M79.604 BILATERAL LEG PAIN: Primary | ICD-10-CM

## 2020-03-05 DIAGNOSIS — M79.605 BILATERAL LEG PAIN: Primary | ICD-10-CM

## 2020-03-05 LAB
ALBUMIN SERPL BCP-MCNC: 3.5 G/DL (ref 3.5–5)
ALP SERPL-CCNC: 53 U/L (ref 46–116)
ALT SERPL W P-5'-P-CCNC: 15 U/L (ref 12–78)
ANION GAP SERPL CALCULATED.3IONS-SCNC: 8 MMOL/L (ref 4–13)
APTT PPP: 33 SECONDS (ref 23–37)
AST SERPL W P-5'-P-CCNC: 17 U/L (ref 5–45)
BASOPHILS # BLD AUTO: 0.07 THOUSANDS/ΜL (ref 0–0.1)
BASOPHILS NFR BLD AUTO: 1 % (ref 0–1)
BILIRUB SERPL-MCNC: 0.6 MG/DL (ref 0.2–1)
BUN SERPL-MCNC: 14 MG/DL (ref 5–25)
CALCIUM SERPL-MCNC: 9.1 MG/DL (ref 8.3–10.1)
CHLORIDE SERPL-SCNC: 104 MMOL/L (ref 100–108)
CO2 SERPL-SCNC: 28 MMOL/L (ref 21–32)
CREAT SERPL-MCNC: 0.87 MG/DL (ref 0.6–1.3)
EOSINOPHIL # BLD AUTO: 0.13 THOUSAND/ΜL (ref 0–0.61)
EOSINOPHIL NFR BLD AUTO: 2 % (ref 0–6)
ERYTHROCYTE [DISTWIDTH] IN BLOOD BY AUTOMATED COUNT: 13.8 % (ref 11.6–15.1)
GFR SERPL CREATININE-BSD FRML MDRD: 79 ML/MIN/1.73SQ M
GLUCOSE SERPL-MCNC: 89 MG/DL (ref 65–140)
HCT VFR BLD AUTO: 45.6 % (ref 36.5–49.3)
HGB BLD-MCNC: 15 G/DL (ref 12–17)
IMM GRANULOCYTES # BLD AUTO: 0.01 THOUSAND/UL (ref 0–0.2)
IMM GRANULOCYTES NFR BLD AUTO: 0 % (ref 0–2)
INR PPP: 1.01 (ref 0.84–1.19)
LACTATE SERPL-SCNC: 1 MMOL/L (ref 0.5–2)
LYMPHOCYTES # BLD AUTO: 1.81 THOUSANDS/ΜL (ref 0.6–4.47)
LYMPHOCYTES NFR BLD AUTO: 28 % (ref 14–44)
MCH RBC QN AUTO: 30.7 PG (ref 26.8–34.3)
MCHC RBC AUTO-ENTMCNC: 32.9 G/DL (ref 31.4–37.4)
MCV RBC AUTO: 93 FL (ref 82–98)
MONOCYTES # BLD AUTO: 0.88 THOUSAND/ΜL (ref 0.17–1.22)
MONOCYTES NFR BLD AUTO: 14 % (ref 4–12)
NEUTROPHILS # BLD AUTO: 3.48 THOUSANDS/ΜL (ref 1.85–7.62)
NEUTS SEG NFR BLD AUTO: 55 % (ref 43–75)
NRBC BLD AUTO-RTO: 0 /100 WBCS
NT-PROBNP SERPL-MCNC: 144 PG/ML
PLATELET # BLD AUTO: 234 THOUSANDS/UL (ref 149–390)
PMV BLD AUTO: 10.2 FL (ref 8.9–12.7)
POTASSIUM SERPL-SCNC: 4.2 MMOL/L (ref 3.5–5.3)
PROT SERPL-MCNC: 7.1 G/DL (ref 6.4–8.2)
PROTHROMBIN TIME: 13.4 SECONDS (ref 11.6–14.5)
RBC # BLD AUTO: 4.89 MILLION/UL (ref 3.88–5.62)
SODIUM SERPL-SCNC: 140 MMOL/L (ref 136–145)
TROPONIN I SERPL-MCNC: <0.02 NG/ML
WBC # BLD AUTO: 6.38 THOUSAND/UL (ref 4.31–10.16)

## 2020-03-05 PROCEDURE — 85610 PROTHROMBIN TIME: CPT | Performed by: PHYSICIAN ASSISTANT

## 2020-03-05 PROCEDURE — 83605 ASSAY OF LACTIC ACID: CPT | Performed by: PHYSICIAN ASSISTANT

## 2020-03-05 PROCEDURE — 85730 THROMBOPLASTIN TIME PARTIAL: CPT | Performed by: PHYSICIAN ASSISTANT

## 2020-03-05 PROCEDURE — 83880 ASSAY OF NATRIURETIC PEPTIDE: CPT | Performed by: PHYSICIAN ASSISTANT

## 2020-03-05 PROCEDURE — 99285 EMERGENCY DEPT VISIT HI MDM: CPT | Performed by: PHYSICIAN ASSISTANT

## 2020-03-05 PROCEDURE — 99284 EMERGENCY DEPT VISIT MOD MDM: CPT

## 2020-03-05 PROCEDURE — 36415 COLL VENOUS BLD VENIPUNCTURE: CPT | Performed by: PHYSICIAN ASSISTANT

## 2020-03-05 PROCEDURE — 85025 COMPLETE CBC W/AUTO DIFF WBC: CPT | Performed by: PHYSICIAN ASSISTANT

## 2020-03-05 PROCEDURE — 80053 COMPREHEN METABOLIC PANEL: CPT | Performed by: PHYSICIAN ASSISTANT

## 2020-03-05 PROCEDURE — 84484 ASSAY OF TROPONIN QUANT: CPT | Performed by: PHYSICIAN ASSISTANT

## 2020-03-05 PROCEDURE — 87040 BLOOD CULTURE FOR BACTERIA: CPT | Performed by: PHYSICIAN ASSISTANT

## 2020-03-05 PROCEDURE — 93971 EXTREMITY STUDY: CPT

## 2020-03-05 PROCEDURE — 93005 ELECTROCARDIOGRAM TRACING: CPT

## 2020-03-05 RX ORDER — CEPHALEXIN 500 MG/1
500 CAPSULE ORAL 4 TIMES DAILY
Qty: 28 CAPSULE | Refills: 0 | Status: SHIPPED | OUTPATIENT
Start: 2020-03-05 | End: 2020-03-12

## 2020-03-05 NOTE — ED PROVIDER NOTES
History  Chief Complaint   Patient presents with    Leg Pain     81 yo with bilateral leg pain and swelling  Left worse than right  H/o venous insufficiency  States he thinks that is the cause of his current symptoms but wanted to be safe as today upon waking up they appeared "more red than usual"  No fever, chills, n/v  No numbness, tingling, weakness  No chest pain or sob  Pain is chronic  No h/o CHF  History provided by:  Patient   used: No    Leg Pain   Location:  Leg  Time since incident:  3 weeks  Injury: no    Leg location:  L lower leg and R lower leg  Pain details:     Quality:  Aching    Radiates to:  Does not radiate    Severity:  Moderate    Onset quality:  Gradual    Duration:  3 months    Timing:  Constant    Progression:  Unchanged  Chronicity:  Chronic  Dislocation: no    Foreign body present:  No foreign bodies  Prior injury to area:  No  Relieved by:  Nothing  Worsened by:  Nothing  Ineffective treatments:  None tried  Associated symptoms: swelling    Associated symptoms: no fatigue, no fever and no neck pain        Prior to Admission Medications   Prescriptions Last Dose Informant Patient Reported?  Taking?   acetaminophen (TYLENOL) 325 mg tablet  Self Yes No   Sig: Take 650 mg by mouth every 6 (six) hours as needed for mild pain   amLODIPine (NORVASC) 5 mg tablet   No No   Sig: Take 1 tablet (5 mg total) by mouth daily   aspirin 81 mg chewable tablet  Self Yes No   Sig: Chew 81 mg 2 (two) times a day    divalproex sodium (DEPAKOTE ER) 500 mg 24 hr tablet  Self No No   Sig: Take 1 tablet (500 mg total) by mouth daily   magnesium oxide (MAG-OX) 400 mg  Self Yes No   Sig: Take 400 mg by mouth daily   rosuvastatin (CRESTOR) 5 mg tablet  Self No No   Sig: Take 1 tablet (5 mg total) by mouth daily   sertraline (ZOLOFT) 100 mg tablet  Self Yes No   Sig: Take 50 mg by mouth daily      Facility-Administered Medications: None       Past Medical History:   Diagnosis Date    Arthritis     Chronic venous insufficiency     [facility] [address] [facility]     [facility]VD [facility]peripheral vascular disease) ([facility])        Past Surgical History:   Procedure Laterality Date    HERNIA REPAIR      TONSILLECTOMY         Family History   Problem Relation Age of Onset    Heart disease Mother     Diabetes Father     Diabetes Sister      I have reviewed and agree with the history as documented  E-Cigarette/Vaping    E-Cigarette Use Never User      E-Cigarette/Vaping Substances     Social History     Tobacco Use    Smoking status: Current Some Day Smoker     Years: 10 00     Types: Cigars    Smokeless tobacco: Never Used    Tobacco comment: occasionaly cigar, rarely   Substance Use Topics    Alcohol use: Not Currently     Alcohol/week: 1 0 standard drinks     Types: 1 Standard drinks or equivalent per week     Frequency: 4 or more times a week     Drinks per session: 1 or 2     Binge frequency: Never     Comment: every day, 1 drink    Drug use: No       Review of Systems   Constitutional: Negative for activity change, appetite change, chills, diaphoresis, fatigue, fever and unexpected weight change  HENT: Negative for congestion, rhinorrhea, sinus pressure, sore throat and trouble swallowing  Eyes: Negative for photophobia and visual disturbance  Respiratory: Negative for apnea, cough, choking, chest tightness, shortness of breath, wheezing and stridor  Cardiovascular: Positive for leg swelling  Negative for chest pain and palpitations  Gastrointestinal: Negative for abdominal distention, abdominal pain, blood in stool, constipation, diarrhea, nausea and vomiting  Genitourinary: Negative for decreased urine volume, difficulty urinating, dysuria, enuresis, flank pain, frequency, hematuria and urgency  Musculoskeletal: Negative for arthralgias, myalgias, neck pain and neck stiffness  Skin: Negative for color change, pallor, rash and wound  Allergic/Immunologic: Negative      Neurological: Negative for dizziness, tremors, syncope, weakness, light-headedness, numbness and headaches  Hematological: Negative  Psychiatric/Behavioral: Negative  All other systems reviewed and are negative  Physical Exam  Physical Exam   Constitutional: He is oriented to person, place, and time  He appears well-developed and well-nourished  Non-toxic appearance  He does not have a sickly appearance  He does not appear ill  No distress  HENT:   Head: Normocephalic and atraumatic  Eyes: Pupils are equal, round, and reactive to light  EOM and lids are normal    Neck: Normal range of motion  Neck supple  Cardiovascular: Normal rate, regular rhythm, S1 normal, S2 normal, normal heart sounds, intact distal pulses and normal pulses  Exam reveals no gallop, no distant heart sounds, no friction rub and no decreased pulses  No murmur heard  Pulses:       Radial pulses are 2+ on the right side, and 2+ on the left side  Dorsalis pedis pulses are 2+ on the right side, and 2+ on the left side  Pulmonary/Chest: Effort normal and breath sounds normal  No accessory muscle usage  No apnea, no tachypnea and no bradypnea  No respiratory distress  He has no decreased breath sounds  He has no wheezes  He has no rhonchi  He has no rales  Abdominal: Soft  Normal appearance  He exhibits no distension  There is no tenderness  There is no rigidity, no rebound and no guarding  Musculoskeletal: Normal range of motion  He exhibits no edema, tenderness or deformity  Neurological: He is alert and oriented to person, place, and time  No cranial nerve deficit  GCS eye subscore is 4  GCS verbal subscore is 5  GCS motor subscore is 6  Skin: Skin is warm, dry and intact  No rash noted  He is not diaphoretic  No erythema  No pallor  Venous stasis dermatitis of bilateral legs  Area is minimally tender  No warmth  No abscess  Psychiatric: His speech is normal    Nursing note and vitals reviewed        Vital Signs  ED Triage Vitals [03/05/20 1821]   Temperature Pulse Respirations Blood Pressure SpO2   97 6 °F (36 4 °C) 59 16 158/70 99 %      Temp Source Heart Rate Source Patient Position - Orthostatic VS BP Location FiO2 (%)   Oral -- -- -- --      Pain Score       8           Vitals:    03/05/20 1821   BP: 158/70   Pulse: 59         Visual Acuity      ED Medications  Medications - No data to display    Diagnostic Studies  Results Reviewed     Procedure Component Value Units Date/Time    Blood culture #1 [[id number]] Collected:  03/1934    Lab Status:  Final result Specimen:  Blood from Arm, Right Updated:  03/11/20 0001     Blood Culture No Growth After 5 Days  Blood culture #2 [[id number]] Collected:  03/1934    Lab Status:  Final result Specimen:  Blood from Arm, Left Updated:  03/11/20 0001     Blood Culture No Growth After 5 Days  NT-BNP PRO [[id number]]  (Normal) Collected:  03/1934    Lab Status:  Final result Specimen:  Blood from Arm, Right Updated:  03/05/20 2006     NT-proBNP 144 pg/mL     Lactic acid, plasma x2 [[id number]]  (Normal) Collected:  03/1934    Lab Status:  Final result Specimen:  Blood from Arm, Right Updated:  03/05/20 2004     LACTIC ACID 1 0 mmol/L     Narrative:       Result may be elevated if tourniquet was used during collection      Comprehensive metabolic panel [[id number]] Collected:  03/1934    Lab Status:  Final result Specimen:  Blood from Arm, Right Updated:  03/05/20 2002     Sodium 140 mmol/L      Potassium 4 2 mmol/L      Chloride 104 mmol/L      CO2 28 mmol/L      ANION GAP 8 mmol/L      BUN 14 mg/dL      Creatinine 0 87 mg/dL      Glucose 89 mg/dL      Calcium 9 1 mg/dL      AST 17 U/L      ALT 15 U/L      Alkaline Phosphatase 53 U/L      Total Protein 7 1 g/dL      Albumin 3 5 g/dL      Total Bilirubin 0 60 mg/dL      eGFR 79 ml/min/1 73sq m     Narrative:       [place] guidelines for Chronic Kidney Disease (CKD):     Stage 1 with normal or high GFR (GFR > 90 mL/min/1 73 square meters)    Stage 2 Mild CKD (GFR = 60-89 mL/min/1 73 square meters)    Stage 3A Moderate CKD (GFR = 45-59 mL/min/1 73 square meters)    Stage 3B Moderate CKD (GFR = 30-44 mL/min/1 73 square meters)    Stage 4 Severe CKD (GFR = 15-29 mL/min/1 73 square meters)    Stage 5 End Stage CKD (GFR <15 mL/min/1 73 square meters)  Note: GFR calculation is accurate only with a steady state creatinine    Troponin I [[id number]]  (Normal) Collected:  03/1934    Lab Status:  Final result Specimen:  Blood from Arm, Right Updated:  03/05/20 2001     Troponin I <0 02 ng/mL     Protime-INR [[id number]]  (Normal) Collected:  03/1934    Lab Status:  Final result Specimen:  Blood from Arm, Right Updated:  03/05/20 1953     Protime 13 4 seconds      INR 1 01    APTT [[id number]]  (Normal) Collected:  03/1934    Lab Status:  Final result Specimen:  Blood from Arm, Right Updated:  03/05/20 1953     PTT 33 seconds     CBC and differential [[id number]]  (Abnormal) Collected:  03/1934    Lab Status:  Final result Specimen:  Blood from Arm, Right Updated:  03/05/20 1943     WBC 6 38 Thousand/uL      RBC 4 89 Million/uL      Hemoglobin 15 0 g/dL      Hematocrit 45 6 %      MCV 93 fL      MCH 30 7 pg      MCHC 32 9 g/dL      RDW 13 8 %      MPV 10 2 fL      Platelets 876 Thousands/uL      nRBC 0 /100 WBCs      Neutrophils Relative 55 %      Immat GRANS % 0 %      Lymphocytes Relative 28 %      Monocytes Relative 14 %      Eosinophils Relative 2 %      Basophils Relative 1 %      Neutrophils Absolute 3 48 Thousands/µL      Immature Grans Absolute 0 01 Thousand/uL      Lymphocytes Absolute 1 81 Thousands/µL      Monocytes Absolute 0 88 Thousand/µL      Eosinophils Absolute 0 13 Thousand/µL      Basophils Absolute 0 07 Thousands/µL                  VAS lower limb venous duplex study, unilateral/limited   Final Result by Reny Leigh MD (03/06 0510) Procedures  ECG 12 Lead Documentation Only  Date/Time: 3/5/2020 4:51 PM  Performed by: Amelia Vivar PA-C  Authorized by: Amelia Vivar PA-C     Indications / Diagnosis:  Leg swelling  ECG reviewed by me, the ED Provider: yes    Patient location:  ED  Previous ECG:     Previous ECG:  Unavailable  Interpretation:     Interpretation: normal    Quality:     Tracing quality:  Limited by artifact  Rate:     ECG rate:  58    ECG rate assessment: bradycardic    Rhythm:     Rhythm: sinus bradycardia    Ectopy:     Ectopy: none    QRS:     QRS axis:  Normal    QRS intervals:  Normal  Conduction:     Conduction: normal    ST segments:     ST segments:  Normal  T waves:     T waves: normal               ED Course         HEART Risk Score      Most Recent Value   Heart Score Risk Calculator   History  0 Filed at: 03/05/2020 1651   ECG  0 Filed at: 03/05/2020 1651   Age  2 Filed at: 03/05/2020 1651   Risk Factors  1 Filed at: 03/05/2020 1651   Troponin  0 Filed at: 03/05/2020 1651   HEART Score  3 Filed at: 03/05/2020 1651                            MDM  Number of Diagnoses or Management Options  Bilateral leg pain: new and requires workup  Diagnosis management comments: DDX including but not limited to: DVT, Baker's cyst, cellulitis, strain, rhabdomyolysis, metabolic abnormality, fracture; doubt arterial occlusion  Plan: duplex LLE  Cardiac workup  Dispo pending  Amount and/or Complexity of Data Reviewed  Clinical lab tests: ordered and reviewed  Tests in the radiology section of CPT®: ordered and reviewed    Risk of Complications, Morbidity, and/or Mortality  Presenting problems: moderate  Management options: low  General comments: 81 yo with acute on chronic leg pain and swelling  Labs normal  Duplex negative  Vitals reassuring  Likely his venous insufficiency  Recommended close outpatient follow up  Discussed that it could early cellulitis but low suspicion  He would prefer to start abx   Return parameters provided  Pt understands and agrees with plan  Patient Progress  Patient progress: stable        Disposition  Final diagnoses:   Bilateral leg pain     Time reflects when diagnosis was documented in both MDM as applicable and the Disposition within this note     Time User Action Codes Description Comment    3/5/2020  8:28 PM Robbin Bedoya [K48 009,  M79 605] Bilateral leg pain       ED Disposition     ED Disposition Condition Date/Time Comment    Discharge Stable u Mar 5, 2020  8:28 PM Isaak Valdovinos discharge to home/self care  Follow-up Information     Follow up With Specialties Details Why [address], DO Internal Medicine Call  As needed [address] [place] [address]  [phone]            Discharge Medication List as of 3/5/2020  8:29 PM      START taking these medications    Details   cephalexin (Keflex) 500 mg capsule Take 1 capsule (500 mg total) by mouth 4 (four) times a day for 7 days, Starting u 3/5/2020, Until u 3/12/2020, Print         CONTINUE these medications which have NOT CHANGED    Details   acetaminophen (TYLENOL) 325 mg tablet Take 650 mg by mouth every 6 (six) hours as needed for mild pain, Historical Med      amLODIPine (NORVASC) 5 mg tablet Take 1 tablet (5 mg total) by mouth daily, Starting Tue 3/5/2019, Normal      aspirin 81 mg chewable tablet Chew 81 mg 2 (two) times a day , Historical Med      divalproex sodium (DEPAKOTE ER) 500 mg 24 hr tablet Take 1 tablet (500 mg total) by mouth daily, Starting Thu 2/8/2018, No Print      magnesium oxide (MAG-OX) 400 mg Take 400 mg by mouth daily, Historical Med      rosuvastatin (CRESTOR) 5 mg tablet Take 1 tablet (5 mg total) by mouth daily, Starting Mon 5/21/2018, Normal      sertraline (ZOLOFT) 100 mg tablet Take 50 mg by mouth daily, Historical Med           No discharge procedures on file      PDMP Review     None          ED Provider  Electronically Signed by           Robert Butcher Donald Pinto PA-C  03/14/20 4763

## 2020-03-06 LAB
ATRIAL RATE: 58 BPM
P AXIS: 10 DEGREES
PR INTERVAL: 150 MS
QRS AXIS: -18 DEGREES
QRSD INTERVAL: 80 MS
QT INTERVAL: 424 MS
QTC INTERVAL: 416 MS
T WAVE AXIS: 29 DEGREES
VENTRICULAR RATE: 58 BPM

## 2020-03-06 PROCEDURE — 93010 ELECTROCARDIOGRAM REPORT: CPT | Performed by: INTERNAL MEDICINE

## 2020-03-06 PROCEDURE — 93971 EXTREMITY STUDY: CPT | Performed by: SURGERY

## 2020-03-06 NOTE — DISCHARGE INSTRUCTIONS
Begin antibiotics as prescribed for possible early cellulitis  Continue tylenol/motrin for pain  Return to the Emergency Department sooner if increased rash, fever, vomiting, difficulty breathing, lethargy, pain

## 2020-03-09 ENCOUNTER — TELEPHONE (OUTPATIENT)
Dept: INTERNAL MEDICINE CLINIC | Facility: CLINIC | Age: 85
End: 2020-03-09

## 2020-03-11 LAB
BACTERIA BLD CULT: NORMAL
BACTERIA BLD CULT: NORMAL

## 2020-03-17 ENCOUNTER — TELEPHONE (OUTPATIENT)
Dept: INTERNAL MEDICINE CLINIC | Facility: CLINIC | Age: 85
End: 2020-03-17

## 2020-03-24 ENCOUNTER — TELEPHONE (OUTPATIENT)
Dept: INTERNAL MEDICINE CLINIC | Facility: CLINIC | Age: 85
End: 2020-03-24

## 2020-03-24 NOTE — TELEPHONE ENCOUNTER
PATIENT HAS NOT BEEN SEEN HERE SINCE 3/5/2019 WITH DR Bruno Ling  PATIENT IS PRESENTLY STAYING AT [facility]    IS IN NEED OF MEDICATION REFILLS  AND AT THIS POINT UNABLE TO COME IN  PLEASE CALL FLORECITA ROBBINS THE NURSE AT [address] OUT IF HE CAN GET MEDS REFILLED  [facility] Sinks #   [phone]

## 2020-03-25 ENCOUNTER — TELEPHONE (OUTPATIENT)
Dept: INTERNAL MEDICINE CLINIC | Facility: CLINIC | Age: 85
End: 2020-03-25

## 2020-03-25 ENCOUNTER — TELEMEDICINE (OUTPATIENT)
Dept: INTERNAL MEDICINE CLINIC | Facility: CLINIC | Age: 85
End: 2020-03-25
Payer: COMMERCIAL

## 2020-03-25 DIAGNOSIS — Z85.46 HISTORY OF PROSTATE CANCER: Chronic | ICD-10-CM

## 2020-03-25 DIAGNOSIS — E78.2 MIXED HYPERLIPIDEMIA: ICD-10-CM

## 2020-03-25 DIAGNOSIS — F31.9 BIPOLAR 1 DISORDER (HCC): Primary | ICD-10-CM

## 2020-03-25 PROBLEM — I10 HTN (HYPERTENSION): Status: RESOLVED | Noted: 2019-10-26 | Resolved: 2020-03-25

## 2020-03-25 PROBLEM — I10 HTN (HYPERTENSION): Status: ACTIVE | Noted: 2019-10-26

## 2020-03-25 PROBLEM — E78.5 HYPERLIPIDEMIA: Chronic | Status: ACTIVE | Noted: 2018-03-23

## 2020-03-25 PROCEDURE — 99422 OL DIG E/M SVC 11-20 MIN: CPT | Performed by: INTERNAL MEDICINE

## 2020-03-25 RX ORDER — DIVALPROEX SODIUM 500 MG/1
500 TABLET, EXTENDED RELEASE ORAL DAILY
Qty: 90 TABLET | Refills: 3 | Status: SHIPPED | OUTPATIENT
Start: 2020-03-25 | End: 2020-04-17 | Stop reason: SDUPTHER

## 2020-03-25 RX ORDER — ROSUVASTATIN CALCIUM 5 MG/1
5 TABLET, COATED ORAL DAILY
Qty: 90 TABLET | Refills: 3 | Status: SHIPPED | OUTPATIENT
Start: 2020-03-25 | End: 2020-09-14 | Stop reason: CLARIF

## 2020-03-25 NOTE — PROGRESS NOTES
Virtual Regular Visit    Assessment/Plan     1  Bipolar 1 disorder ([facility] [address] )    Appears stable and in remission  Continue depakote and sertraline  Will check lab work  - divalproex sodium (DEPAKOTE ER) 500 mg 24 hr tablet; Take 1 tablet (500 mg total) by mouth daily  Dispense: 90 tablet; Refill: 3  - sertraline (ZOLOFT) 50 mg tablet; Take 1 tablet (50 mg total) by mouth daily  Dispense: 90 tablet; Refill: 3  - Valproic acid level, total; Future  - Comprehensive metabolic panel; Future  - CBC; Future  - TSH, 3rd generation with Free T4 reflex; Future    2  Mixed hyperlipidemia    Avoid excess saturated fats  Will check lipid panel  Continue crestor     - rosuvastatin (CRESTOR) 5 mg tablet; Take 1 tablet (5 mg total) by mouth daily  Dispense: 90 tablet; Refill: 3  - Comprehensive metabolic panel; Future  - CBC; Future  - TSH, 3rd generation with Free T4 reflex; Future  - Lipid panel; Future    3  History of prostate cancer    Check diagnostic PSA     - PSA Total, Diagnostic; Future     Reason for visit is refill meds; hasn't been seen since 2019    Encounter provider Ben Michaels DO    Provider located at [address] [place] [address] [address]      Recent Visits  Date Type Provider Dept   03/24/20 Telephone [facility] recent visits within past 7 days and meeting all other requirements     Future Appointments  No visits were found meeting these conditions  Showing future appointments within next 150 days and meeting all other requirements        After connecting through [employer], the patient was identified by name and date of birth  Sandra Garcia was informed that this is a telemedicine visit and that the visit is being conducted through [address] [place] [address] which may not be secure and therefore, might not be HIPAA-compliant  My office door was closed  No one else was in the room    He acknowledged consent and understanding of privacy and security of the video platform  The patient has agreed to participate and understands they can discontinue the visit at any time  Subjective  Mikael Al is a 80 y o  male who was last seen in March 2019  He was in [facility] for several months and just was transferred to [facility] in the beginning of March  All his medications are going to run out  Has stable bipolar disorder  No recent psych admissions  No outbursts that staff has noted  Staff notes that he is pleasant and cooperative  Prior history of prostate cancer  No recent PSA on file  Was on BP medications in the past (amlodipine), but now he is off    Past Medical History:   Diagnosis Date    Arthritis     Chronic venous insufficiency     HTN (hypertension) 10/26/2019    [facility] [address] [facility]     PVD (peripheral vascular disease) ([facility] [address] )      Past Surgical History:   Procedure Laterality Date    HERNIA REPAIR      TONSILLECTOMY       Current Outpatient Medications   Medication Sig Dispense Refill    acetaminophen (TYLENOL) 325 mg tablet Take 650 mg by mouth every 6 (six) hours as needed for mild pain      aspirin 81 mg chewable tablet Chew 81 mg 2 (two) times a day       magnesium oxide (MAG-OX) 400 mg Take 400 mg by mouth daily      divalproex sodium (DEPAKOTE ER) 500 mg 24 hr tablet Take 1 tablet (500 mg total) by mouth daily 90 tablet 3    rosuvastatin (CRESTOR) 5 mg tablet Take 1 tablet (5 mg total) by mouth daily 90 tablet 3    sertraline (ZOLOFT) 50 mg tablet Take 1 tablet (50 mg total) by mouth daily 90 tablet 3     No current facility-administered medications for this visit  No Known Allergies    Objective     Patient appears alert and awake; he is cooperative and pleasant during conversation  No respiratory distress or increased work of breathing  He has poor dentition    I spent 15 minutes with the patient during this visit

## 2020-03-30 ENCOUNTER — TELEPHONE (OUTPATIENT)
Dept: INTERNAL MEDICINE CLINIC | Facility: CLINIC | Age: 85
End: 2020-03-30

## 2020-04-02 ENCOUNTER — TELEPHONE (OUTPATIENT)
Dept: INTERNAL MEDICINE CLINIC | Facility: CLINIC | Age: 85
End: 2020-04-02

## 2020-04-03 ENCOUNTER — TELEPHONE (OUTPATIENT)
Dept: OTHER | Facility: OTHER | Age: 85
End: 2020-04-03

## 2020-04-07 ENCOUNTER — TELEPHONE (OUTPATIENT)
Dept: INTERNAL MEDICINE CLINIC | Facility: CLINIC | Age: 85
End: 2020-04-07

## 2020-04-13 ENCOUNTER — TELEPHONE (OUTPATIENT)
Dept: INTERNAL MEDICINE CLINIC | Facility: CLINIC | Age: 85
End: 2020-04-13

## 2020-04-17 DIAGNOSIS — F31.9 BIPOLAR 1 DISORDER (HCC): ICD-10-CM

## 2020-04-17 RX ORDER — SERTRALINE HYDROCHLORIDE 100 MG/1
100 TABLET, FILM COATED ORAL DAILY
Qty: 30 TABLET | Refills: 5 | Status: SHIPPED | OUTPATIENT
Start: 2020-04-17 | End: 2020-09-28

## 2020-04-17 RX ORDER — DIVALPROEX SODIUM 250 MG/1
750 TABLET, EXTENDED RELEASE ORAL DAILY
Qty: 90 TABLET | Refills: 5 | Status: SHIPPED | OUTPATIENT
Start: 2020-04-17 | End: 2020-08-31

## 2020-07-07 ENCOUNTER — APPOINTMENT (EMERGENCY)
Dept: RADIOLOGY | Facility: HOSPITAL | Age: 85
End: 2020-07-07
Payer: COMMERCIAL

## 2020-07-07 ENCOUNTER — HOSPITAL ENCOUNTER (OUTPATIENT)
Facility: HOSPITAL | Age: 85
Setting detail: OBSERVATION
Discharge: NURSING, REHAB OR LONG-TERM CARE | End: 2020-07-09
Attending: EMERGENCY MEDICINE | Admitting: STUDENT IN AN ORGANIZED HEALTH CARE EDUCATION/TRAINING PROGRAM
Payer: COMMERCIAL

## 2020-07-07 ENCOUNTER — APPOINTMENT (EMERGENCY)
Dept: CT IMAGING | Facility: HOSPITAL | Age: 85
End: 2020-07-07
Payer: COMMERCIAL
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
## 2020-07-07 DIAGNOSIS — R62.7 FAILURE TO THRIVE IN ADULT: ICD-10-CM

## 2020-07-07 DIAGNOSIS — I73.9 PVD (PERIPHERAL VASCULAR DISEASE) (HCC): ICD-10-CM

## 2020-07-07 DIAGNOSIS — S22.030A COMPRESSION FRACTURE OF T3 VERTEBRA (HCC): ICD-10-CM

## 2020-07-07 LAB
ALBUMIN SERPL BCP-MCNC: 3.3 G/DL (ref 3.5–5)
ALP SERPL-CCNC: 76 U/L (ref 46–116)
ALT SERPL W P-5'-P-CCNC: 19 U/L (ref 12–78)
ANION GAP SERPL CALCULATED.3IONS-SCNC: 6 MMOL/L (ref 4–13)
AST SERPL W P-5'-P-CCNC: 16 U/L (ref 5–45)
BACTERIA UR QL AUTO: ABNORMAL /HPF
BASOPHILS # BLD AUTO: 0.07 THOUSANDS/ΜL (ref 0–0.1)
BASOPHILS NFR BLD AUTO: 1 % (ref 0–1)
BILIRUB SERPL-MCNC: 0.8 MG/DL (ref 0.2–1)
BILIRUB UR QL STRIP: NEGATIVE
BUN SERPL-MCNC: 10 MG/DL (ref 5–25)
CALCIUM SERPL-MCNC: 9.5 MG/DL (ref 8.3–10.1)
CHLORIDE SERPL-SCNC: 105 MMOL/L (ref 100–108)
CK SERPL-CCNC: 65 U/L (ref 39–308)
CLARITY UR: CLEAR
CO2 SERPL-SCNC: 29 MMOL/L (ref 21–32)
COLOR UR: YELLOW
CREAT SERPL-MCNC: 0.87 MG/DL (ref 0.6–1.3)
EOSINOPHIL # BLD AUTO: 0.06 THOUSAND/ΜL (ref 0–0.61)
EOSINOPHIL NFR BLD AUTO: 1 % (ref 0–6)
ERYTHROCYTE [DISTWIDTH] IN BLOOD BY AUTOMATED COUNT: 13.8 % (ref 11.6–15.1)
GFR SERPL CREATININE-BSD FRML MDRD: 79 ML/MIN/1.73SQ M
GLUCOSE SERPL-MCNC: 90 MG/DL (ref 65–140)
GLUCOSE UR STRIP-MCNC: NEGATIVE MG/DL
HCT VFR BLD AUTO: 48.9 % (ref 36.5–49.3)
HGB BLD-MCNC: 16 G/DL (ref 12–17)
HGB UR QL STRIP.AUTO: ABNORMAL
IMM GRANULOCYTES # BLD AUTO: 0.04 THOUSAND/UL (ref 0–0.2)
IMM GRANULOCYTES NFR BLD AUTO: 0 % (ref 0–2)
KETONES UR STRIP-MCNC: ABNORMAL MG/DL
LEUKOCYTE ESTERASE UR QL STRIP: NEGATIVE
LYMPHOCYTES # BLD AUTO: 1.88 THOUSANDS/ΜL (ref 0.6–4.47)
LYMPHOCYTES NFR BLD AUTO: 20 % (ref 14–44)
MCH RBC QN AUTO: 29.5 PG (ref 26.8–34.3)
MCHC RBC AUTO-ENTMCNC: 32.7 G/DL (ref 31.4–37.4)
MCV RBC AUTO: 90 FL (ref 82–98)
MONOCYTES # BLD AUTO: 0.84 THOUSAND/ΜL (ref 0.17–1.22)
MONOCYTES NFR BLD AUTO: 9 % (ref 4–12)
NEUTROPHILS # BLD AUTO: 6.6 THOUSANDS/ΜL (ref 1.85–7.62)
NEUTS SEG NFR BLD AUTO: 69 % (ref 43–75)
NITRITE UR QL STRIP: NEGATIVE
NON-SQ EPI CELLS URNS QL MICRO: ABNORMAL /HPF
NRBC BLD AUTO-RTO: 0 /100 WBCS
PH UR STRIP.AUTO: 6.5 [PH]
PLATELET # BLD AUTO: 374 THOUSANDS/UL (ref 149–390)
PMV BLD AUTO: 9.9 FL (ref 8.9–12.7)
POTASSIUM SERPL-SCNC: 4.2 MMOL/L (ref 3.5–5.3)
PROT SERPL-MCNC: 7.3 G/DL (ref 6.4–8.2)
PROT UR STRIP-MCNC: NEGATIVE MG/DL
RBC # BLD AUTO: 5.43 MILLION/UL (ref 3.88–5.62)
RBC #/AREA URNS AUTO: ABNORMAL /HPF
SODIUM SERPL-SCNC: 140 MMOL/L (ref 136–145)
SP GR UR STRIP.AUTO: <=1.005 (ref 1–1.03)
TROPONIN I SERPL-MCNC: <0.02 NG/ML
UROBILINOGEN UR QL STRIP.AUTO: 1 E.U./DL
WBC # BLD AUTO: 9.49 THOUSAND/UL (ref 4.31–10.16)
WBC #/AREA URNS AUTO: ABNORMAL /HPF

## 2020-07-07 PROCEDURE — 99285 EMERGENCY DEPT VISIT HI MDM: CPT

## 2020-07-07 PROCEDURE — 93005 ELECTROCARDIOGRAM TRACING: CPT

## 2020-07-07 PROCEDURE — 72125 CT NECK SPINE W/O DYE: CPT

## 2020-07-07 PROCEDURE — 73030 X-RAY EXAM OF SHOULDER: CPT

## 2020-07-07 PROCEDURE — 71045 X-RAY EXAM CHEST 1 VIEW: CPT

## 2020-07-07 PROCEDURE — 84484 ASSAY OF TROPONIN QUANT: CPT | Performed by: EMERGENCY MEDICINE

## 2020-07-07 PROCEDURE — 80053 COMPREHEN METABOLIC PANEL: CPT | Performed by: EMERGENCY MEDICINE

## 2020-07-07 PROCEDURE — 70450 CT HEAD/BRAIN W/O DYE: CPT

## 2020-07-07 PROCEDURE — 85025 COMPLETE CBC W/AUTO DIFF WBC: CPT | Performed by: EMERGENCY MEDICINE

## 2020-07-07 PROCEDURE — 74177 CT ABD & PELVIS W/CONTRAST: CPT

## 2020-07-07 PROCEDURE — 36415 COLL VENOUS BLD VENIPUNCTURE: CPT | Performed by: EMERGENCY MEDICINE

## 2020-07-07 PROCEDURE — 71260 CT THORAX DX C+: CPT

## 2020-07-07 PROCEDURE — 81001 URINALYSIS AUTO W/SCOPE: CPT | Performed by: EMERGENCY MEDICINE

## 2020-07-07 PROCEDURE — 99285 EMERGENCY DEPT VISIT HI MDM: CPT | Performed by: EMERGENCY MEDICINE

## 2020-07-07 PROCEDURE — 82550 ASSAY OF CK (CPK): CPT | Performed by: EMERGENCY MEDICINE

## 2020-07-07 RX ORDER — SODIUM CHLORIDE 9 MG/ML
3 INJECTION INTRAVENOUS
Status: DISCONTINUED | OUTPATIENT
Start: 2020-07-07 | End: 2020-07-09 | Stop reason: HOSPADM

## 2020-07-07 RX ADMIN — IOHEXOL 100 ML: 350 INJECTION, SOLUTION INTRAVENOUS at 20:23
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
## 2020-07-08 PROBLEM — I73.9 PVD (PERIPHERAL VASCULAR DISEASE) (HCC): Status: ACTIVE | Noted: 2020-07-08

## 2020-07-08 PROBLEM — R26.2 AMBULATORY DYSFUNCTION: Status: ACTIVE | Noted: 2020-07-08

## 2020-07-08 LAB
ATRIAL RATE: 74 BPM
P AXIS: 31 DEGREES
PR INTERVAL: 144 MS
QRS AXIS: -44 DEGREES
QRSD INTERVAL: 120 MS
QT INTERVAL: 410 MS
QTC INTERVAL: 455 MS
SARS-COV-2 RNA RESP QL NAA+PROBE: NEGATIVE
T WAVE AXIS: 12 DEGREES
VENTRICULAR RATE: 74 BPM

## 2020-07-08 PROCEDURE — 87635 SARS-COV-2 COVID-19 AMP PRB: CPT | Performed by: STUDENT IN AN ORGANIZED HEALTH CARE EDUCATION/TRAINING PROGRAM

## 2020-07-08 PROCEDURE — 99220 PR INITIAL OBSERVATION CARE/DAY 70 MINUTES: CPT | Performed by: STUDENT IN AN ORGANIZED HEALTH CARE EDUCATION/TRAINING PROGRAM

## 2020-07-08 PROCEDURE — 97167 OT EVAL HIGH COMPLEX 60 MIN: CPT

## 2020-07-08 PROCEDURE — 97163 PT EVAL HIGH COMPLEX 45 MIN: CPT

## 2020-07-08 PROCEDURE — 93010 ELECTROCARDIOGRAM REPORT: CPT | Performed by: INTERNAL MEDICINE

## 2020-07-08 RX ORDER — DIVALPROEX SODIUM 250 MG/1
750 TABLET, EXTENDED RELEASE ORAL DAILY
Status: DISCONTINUED | OUTPATIENT
Start: 2020-07-08 | End: 2020-07-09 | Stop reason: HOSPADM

## 2020-07-08 RX ORDER — LIDOCAINE 50 MG/G
1 PATCH TOPICAL DAILY
Status: DISCONTINUED | OUTPATIENT
Start: 2020-07-09 | End: 2020-07-09 | Stop reason: HOSPADM

## 2020-07-08 RX ORDER — ACETAMINOPHEN 325 MG/1
650 TABLET ORAL EVERY 6 HOURS PRN
Status: DISCONTINUED | OUTPATIENT
Start: 2020-07-08 | End: 2020-07-09 | Stop reason: HOSPADM

## 2020-07-08 RX ORDER — SERTRALINE HYDROCHLORIDE 100 MG/1
100 TABLET, FILM COATED ORAL DAILY
Status: DISCONTINUED | OUTPATIENT
Start: 2020-07-08 | End: 2020-07-09 | Stop reason: HOSPADM

## 2020-07-08 RX ORDER — PRAVASTATIN SODIUM 40 MG
40 TABLET ORAL
Status: DISCONTINUED | OUTPATIENT
Start: 2020-07-08 | End: 2020-07-09 | Stop reason: HOSPADM

## 2020-07-08 RX ADMIN — SERTRALINE HYDROCHLORIDE 100 MG: 100 TABLET, FILM COATED ORAL at 11:44

## 2020-07-08 RX ADMIN — DIVALPROEX SODIUM 750 MG: 500 TABLET, FILM COATED, EXTENDED RELEASE ORAL at 11:44

## 2020-07-08 RX ADMIN — PRAVASTATIN SODIUM 40 MG: 40 TABLET ORAL at 17:16

## 2020-07-08 NOTE — PLAN OF CARE
Problem: OCCUPATIONAL THERAPY ADULT  Goal: Performs self-care activities at highest level of function for planned discharge setting  See evaluation for individualized goals  Description  Treatment Interventions: ADL retraining, Functional transfer training, UE strengthening/ROM, Endurance training, Patient/family training, Cognitive reorientation, Compensatory technique education, Energy conservation, Activityengagement, Continued evaluation, Equipment evaluation/education          See flowsheet documentation for full assessment, interventions and recommendations  Note:   Limitation: Decreased ADL status, Decreased UE strength, Decreased Safe judgement during ADL, Decreased endurance, Decreased self-care trans, Decreased high-level ADLs  Prognosis: Fair  Assessment: Patient is a 80 y o  male seen for OT evaluation s/p admit to [id number] [place] [address] on 7/7/2020 w/Fall  Commorbidities affecting patient's functional performance at time of assessment include: peripheral vascular disease, ambulatory dysfunction, hyperlipidemia, and bipolar 1 disorder  Orders placed for OT evaluation and treatment  Performed at least two patient identifiers during session including name and wristband  Prior to admission, patient was living with his sister in a one-story home with 3-4 RUBY  Patient reports that at baseline, he is independent in ADLs and IADLs  Patient is a questionable historian, please continue to refer to CM notes for accuracy and further detail  Personal factors affecting patient at time of initial evaluation include: steps to enter, limited insight into deficits, difficulty performing ADLs and difficulty performing IADLs  Upon evaluation, patient requires minimal  assist for UB ADLs, moderate and maximal assist for LB ADLs, transfers and functional ambulation in room and bathroom with minimal  assist, with the use of Rolling Walker   Occupational performance is affected by the following deficits: orientation, decreased muscle strength, degenerative arthritic joint changes, impaired gross motor coordination, dynamic sit/ stand balance deficit with poor standing tolerance time for self care and functional mobility, decreased activity tolerance, impaired memory, impaired problem solving, increased pain and postural control and postural alignment deficit, requiring external assistance to complete transitional movements  Therapist completed  extensive additional review of medical records and additional review of physical, cognitive or psychosocial history, clinical examination identifying 5 or more performance deficits, clinical decision making of a high complexity , consistent with a high complexity level evaluation  Patient to benefit from continued Occupational Therapy treatment while in the hospital to address deficits as defined above and maximize level of functional independence with ADLs and functional mobility  Occupational Performance areas to address include: grooming , bathing/ shower, dressing, toilet hygiene, transfer to all surfaces, functional mobility, community mobility, health maintenance, medication routine/ management, IADLs: safety procedures, IADLs: meal prep/ clean up, Leisure Participation and Social participation   From OT standpoint, recommendation at time of d/c would be post-acute rehabilitation se     OT Discharge Recommendation: [address]

## 2020-07-08 NOTE — H&P
H&P- Peyton Res 1934, 80 y o  male MRN: [id number]    Unit/Bed#: ED 18 Encounter: [id number]    Primary Care Provider: Izaiah Rincon DO   Date and time admitted to hospital: 7/7/2020  6:55 PM        * [address]  80year-old male past medical history of ambulate dysfunction presented with having a fall few days ago at personal care home  Labs unremarkable  Trauma series images report reviewed and negative for any acute finding except age indeterminate T3 compression fracture, cervical spondylosis, degenerative changes  Currently afebrile, hemodynamically stable  Goal is pain management with lidocaine patch, Tylenol, muscle relaxant as needed, warm compression  Fall precaution  PT OT eval   Patient is agreeable with above  PVD (peripheral vascular disease) [facility])  Assessment & Plan  Present admission with history of peripheral vascular disease  Continue aspirin, statin  Ambulatory dysfunction  Assessment & Plan  Plan as stated under # 1  HTN (hypertension)  Assessment & Plan  Present on admission with history of hypertension  Controlled on amlodipine 5 mg daily  Hyperlipidemia  Assessment & Plan  Present admission history of hyperlipidemia  Continue statin  Bipolar 1 disorder [facility])  Assessment & Plan  Present admission with history of bipolar disorder  Stable  Denies suicidal, homicidal ideation  Continue home dose of Depakote, Zoloft  VTE Prophylaxis: Enoxaparin (Lovenox)  / sequential compression device   Code Status:  Level 1 full code  POLST: POLST form is not discussed and not completed at this time  Discussion with family:  Not present at bedside    Anticipated Length of Stay:  Patient will be admitted on an Observation basis with an anticipated length of stay of   2 midnights  Justification for Hospital Stay: Fall, ambulatory dysfunction, T3 compression fracture    Total Time for Visit, including Counseling / Coordination of Care: 1 hour  Greater than 50% of this total time spent on direct patient counseling and coordination of care  Chief Complaint:   Fall, ambulatory dysfunction, back pain    History of Present Illness:    Roman Portillo is a 80 y o  male patient past medical history of prostate cancer, bipolar disorder, hyperlipidemia, hypertension, peripheral vascular disease, history of ambulate dysfunction who presents with complaining of having a fall few days ago at personal care home, presented with complaining of back pain, ambulatory dysfunction  Currently my encounter patient appears comfortable not in distress  Resting comfortably and wakes up with verbal stimuli  Oriented to person, place, time likely due to being woken up  Denies chest pain, dyspnea, fever, chills, nausea, vomiting, abdominal pain, dysuria, diarrhea, any other new complaints  No other events reported  Level 1 full code  Review of Systems:    Review of Systems   Constitutional: Negative for chills, diaphoresis and fatigue  HENT: Negative for congestion  Eyes: Negative for photophobia and visual disturbance  Respiratory: Negative for choking, chest tightness and shortness of breath  Cardiovascular: Negative for chest pain  Gastrointestinal: Negative for abdominal distention, abdominal pain, diarrhea and nausea  Endocrine: Negative for polyuria  Genitourinary: Negative for dysuria  Skin: Negative for rash  Neurological: Negative for numbness  Psychiatric/Behavioral: Negative for agitation  Past Medical and Surgical History:     Past Medical History:   Diagnosis Date    Arthritis     Chronic venous insufficiency     HTN (hypertension) 10/26/2019    Prostate cancer ([facility] )     PVD (peripheral vascular disease) ([facility] )        Past Surgical History:   Procedure Laterality Date    HERNIA REPAIR      TONSILLECTOMY         Meds/Allergies:    Prior to Admission medications    Medication Sig Start Date End Date Taking?  Authorizing Provider divalproex sodium (DEPAKOTE ER) 250 mg 24 hr tablet Take 3 tablets (750 mg total) by mouth daily 4/17/20  Yes Moisés Cassidy,    rosuvastatin (CRESTOR) 5 mg tablet Take 1 tablet (5 mg total) by mouth daily 3/25/20  Yes Moisés Cassidy,    sertraline (ZOLOFT) 100 mg tablet Take 1 tablet (100 mg total) by mouth daily 4/17/20  Yes Moisés Cassidy DO     I have reviewed home medications with a medical source (PCP, Pharmacy, other)  Allergies: No Known Allergies    Social History:     Marital Status:      Substance Use History:   Social History     Substance and Sexual Activity   Alcohol Use Not Currently    Alcohol/week: 1 0 standard drinks    Types: 1 Standard drinks or equivalent per week    Frequency: 4 or more times a week    Drinks per session: 1 or 2    Binge frequency: Never    Comment: every day, 1 drink     Social History     Tobacco Use   Smoking Status Current Some Day Smoker    Years: 10 00    Types: Cigars   Smokeless Tobacco Never Used   Tobacco Comment    occasionaly cigar, rarely     Social History     Substance and Sexual Activity   Drug Use No       Family History:    non-contributory    Physical Exam:     Vitals:   Blood Pressure: 126/58 (07/08/20 0800)  Pulse: 72 (07/08/20 0800)  Temperature: 97 8 °F (36 6 °C) (07/08/20 0800)  Temp Source: Oral (07/08/20 0800)  Respirations: 16 (07/08/20 0800)  Weight - Scale: 74 kg (163 lb 2 3 oz) (07/07/20 1900)  SpO2: 96 % (07/08/20 0800)    Physical Exam   Constitutional: No distress  HENT:   Head: Normocephalic and atraumatic  Eyes: Pupils are equal, round, and reactive to light  EOM are normal    Neck: Normal range of motion  Neck supple  No JVD present  Cardiovascular: Normal rate and regular rhythm  Pulmonary/Chest: Effort normal and breath sounds normal  No stridor  No respiratory distress  Abdominal: Soft  Bowel sounds are normal  He exhibits no distension  There is no tenderness  Musculoskeletal: Normal range of motion  Bilateral lower extremity noted with changes consistent with refer vascular disease  Neurological:   alert, oriented to self, person, place, not to time at this time  (likely because he was just woken up)   Skin: He is not diaphoretic  Psychiatric: His behavior is normal          Additional Data:     Lab Results: I have personally reviewed pertinent reports  Results from last 7 days   Lab Units 07/07/20  1932   WBC Thousand/uL 9 49   HEMOGLOBIN g/dL 16 0   HEMATOCRIT % 48 9   PLATELETS Thousands/uL 374   NEUTROS PCT % 69   LYMPHS PCT % 20   MONOS PCT % 9   EOS PCT % 1     Results from last 7 days   Lab Units 07/07/20  1932   SODIUM mmol/L 140   POTASSIUM mmol/L 4 2   CHLORIDE mmol/L 105   CO2 mmol/L 29   BUN mg/dL 10   CREATININE mg/dL 0 87   ANION GAP mmol/L 6   CALCIUM mg/dL 9 5   ALBUMIN g/dL 3 3*   TOTAL BILIRUBIN mg/dL 0 80   ALK PHOS U/L 76   ALT U/L 19   AST U/L 16   GLUCOSE RANDOM mg/dL 90                       Imaging: I have personally reviewed pertinent reports  CT head without contrast   ED Interpretation by Franchesca Zhao DO (07/07 2117)   No acute intracranial abnormality  Final Result by Michael Baugh DO (07/07 5527)      No acute intracranial abnormality  Workstation performed: [id number]         CT spine cervical without contrast   ED Interpretation by Franchesca Zhao DO (07/07 2117)   No cervical spine fracture or traumatic malalignment        Advanced multilevel cervical spondylosis  Final Result by Michael Baugh DO (07/07 2105)      No cervical spine fracture or traumatic malalignment  Advanced multilevel cervical spondylosis  Workstation performed: [id number]         CT chest abdomen pelvis w contrast   ED Interpretation by Franchesca Zhao DO (07/07 2133)   No acute traumatic injury identified within the chest, abdomen or pelvis        Final Result by Michael Baugh DO (07/07 2117)      No acute traumatic injury identified within the chest, abdomen or pelvis  Workstation performed: [id number]         CT recon only thoracolumbar   ED Interpretation by Gail Jones DO (07/07 2133)   Age-indeterminate, moderate T3 compression fracture deformity without bony retropulsion  No significant paraspinal hematoma        No other thoracolumbar fractures seen  Final Result by Christi Mcfarland DO (07/07 2127)      Age-indeterminate, moderate T3 compression fracture deformity without bony retropulsion  No significant paraspinal hematoma  No other thoracolumbar fractures seen  Degenerative changes as above  Workstation performed: [id number]         XR shoulder 2+ views RIGHT   ED Interpretation by Gail Jones DO (07/07 2138)   NAP       Final Result by Blanca Nath MD (07/08 3430)      No acute osseous abnormality  Workstation performed: [id number]         XR chest 1 view   Final Result by Blanca Nath MD (07/08 0448)      No acute cardiopulmonary disease  Workstation performed: [id number]             EKG, Pathology, and Other Studies Reviewed on Admission:   · EKG:  Not available if done    Allscripts / Epic Records Reviewed: Yes     ** Please Note: This note has been constructed using a voice recognition system   **

## 2020-07-08 NOTE — PLAN OF CARE
Problem: Potential for Falls  Goal: Patient will remain free of falls  Description  INTERVENTIONS:  - Assess patient frequently for physical needs  -  Identify cognitive and physical deficits and behaviors that affect risk of falls    -  [place] fall precautions as indicated by assessment   - Educate patient/family on patient safety including physical limitations  - Instruct patient to call for assistance with activity based on assessment  - Modify environment to reduce risk of injury  - Consider OT/PT consult to assist with strengthening/mobility  Outcome: Progressing     Problem: Prexisting or High Potential for Compromised Skin Integrity  Goal: Skin integrity is maintained or improved  Description  INTERVENTIONS:  - Identify patients at risk for skin breakdown  - Assess and monitor skin integrity  - Assess and monitor nutrition and hydration status  - Monitor labs   - Assess for incontinence   - Turn and reposition patient  - Assist with mobility/ambulation  - Relieve pressure over bony prominences  - Avoid friction and shearing  - Provide appropriate hygiene as needed including keeping skin clean and dry  - Evaluate need for skin moisturizer/barrier cream  - Collaborate with interdisciplinary team   - Patient/family teaching  - Consider wound care consult   Outcome: Progressing     Problem: PAIN - ADULT  Goal: Verbalizes/displays adequate comfort level or baseline comfort level  Description  Interventions:  - Encourage patient to monitor pain and request assistance  - Assess pain using appropriate pain scale  - Administer analgesics based on type and severity of pain and evaluate response  - Implement non-pharmacological measures as appropriate and evaluate response  - Consider cultural and social influences on pain and pain management  - Notify physician/advanced practitioner if interventions unsuccessful or patient reports new pain  Outcome: Progressing     Problem: INFECTION - ADULT  Goal: Absence or prevention of progression during hospitalization  Description  INTERVENTIONS:  - Assess and monitor for signs and symptoms of infection  - Monitor lab/diagnostic results  - Monitor all insertion sites, i e  indwelling lines, tubes, and drains  - Monitor endotracheal if appropriate and nasal secretions for changes in amount and color  - [place] appropriate cooling/warming therapies per order  - Administer medications as ordered  - Instruct and encourage patient and family to use good hand hygiene technique  - Identify and instruct in appropriate isolation precautions for identified infection/condition  Outcome: Progressing  Goal: Absence of fever/infection during neutropenic period  Description  INTERVENTIONS:  - Monitor WBC    Outcome: Progressing     Problem: SAFETY ADULT  Goal: Maintain or return to baseline ADL function  Description  INTERVENTIONS:  -  Assess patient's ability to carry out ADLs; assess patient's baseline for ADL function and identify physical deficits which impact ability to perform ADLs (bathing, care of mouth/teeth, toileting, grooming, dressing, etc )  - Assess/evaluate cause of self-care deficits   - Assess range of motion  - Assess patient's mobility; develop plan if impaired  - Assess patient's need for assistive devices and provide as appropriate  - Encourage maximum independence but intervene and supervise when necessary  - Involve family in performance of ADLs  - Assess for home care needs following discharge   - Consider OT consult to assist with ADL evaluation and planning for discharge  - Provide patient education as appropriate  Outcome: Progressing  Goal: Maintain or return mobility status to optimal level  Description  INTERVENTIONS:  - Assess patient's baseline mobility status (ambulation, transfers, stairs, etc )    - Identify cognitive and physical deficits and behaviors that affect mobility  - Identify mobility aids required to assist with transfers and/or ambulation (gait belt, sit-to-stand, lift, walker, cane, etc )  - [place] fall precautions as indicated by assessment  - Record patient progress and toleration of activity level on Mobility SBAR; progress patient to next Phase/Stage  - Instruct patient to call for assistance with activity based on assessment  - Consider rehabilitation consult to assist with strengthening/weightbearing, etc   Outcome: Progressing     Problem: DISCHARGE PLANNING  Goal: Discharge to home or other facility with appropriate resources  Description  INTERVENTIONS:  - Identify barriers to discharge w/patient and caregiver  - Arrange for needed discharge resources and transportation as appropriate  - Identify discharge learning needs (meds, wound care, etc )  - Arrange for interpretive services to assist at discharge as needed  - Refer to Case Management Department for coordinating discharge planning if the patient needs post-hospital services based on physician/advanced practitioner order or complex needs related to functional status, cognitive ability, or social support system  Outcome: Progressing     Problem: Knowledge Deficit  Goal: Patient/family/caregiver demonstrates understanding of disease process, treatment plan, medications, and discharge instructions  Description  Complete learning assessment and assess knowledge base    Interventions:  - Provide teaching at level of understanding  - Provide teaching via preferred learning methods  Outcome: Progressing

## 2020-07-08 NOTE — OCCUPATIONAL THERAPY NOTE
Occupational Therapy Evaluation Note        Patient Name: Arthur Cheng  TLWDN'P Date: 7/8/2020 07/08/20 3610   Note Type   Note type Eval only   Restrictions/Precautions   Weight Bearing Precautions Per Order No   Braces or Orthoses Other (Comment)  (none reported)   Other Precautions Cognitive; Chair Alarm; Bed Alarm; Fall Risk;Pain;Hard of hearing   Pain Assessment   Pain Assessment Tool 0-10   Pain Score 5   Pain Location/Orientation Location: Abdomen   Hospital Pain Intervention(s) Repositioned; Ambulation/increased activity   Multiple Pain Sites No   Home Living   Type of [address] One level;Stairs to enter with rails; Other (Comment); Performs ADLs on one level; Able to live on main level with bedroom/bathroom  (3-4 RUBY)   Bathroom Shower/Tub Walk-in shower   Bathroom Toilet Standard   Bathroom Equipment Grab bars in shower   [address]  (Patient ambulates with RW at baseline)   Additional Comments Patient is a questionable historian, please continue to refer to CM notes   Prior Function   Level of [place] Independent with ADLs and functional mobility   Lives With Family; Other (Comment)  (Sister on extension of house)   Receives Help From Family   ADL Assistance Independent  (per pt)   IADLs Independent  (per pt)   Falls in the last 6 months 1 to 4  (3-4 falls per pt)   Vocational Retired   Comments pt reports that he drives although he has been considering not driving recently    Lifestyle   Autonomy Patient reports that at baseline, he lives in an extension of his sister's house in a [place] home  Patient is independent at baseline in ADLs and IADLs and reports that he drives  Patient is a questionable historian, please continue to refer to CM notes to ensure accuracy     Reciprocal Relationships Supportive family per pt   Service to Others Retired    Intrinsic Gratification Patient reports that he enjoys reading, doing puzzles, and watching TV   ADL   Eating Assistance 5  Supervision/Setup   Grooming Assistance 5  Supervision/Setup   UB Bathing Assistance 4  Minimal Assistance   LB Bathing Assistance 3  Moderate Assistance   UB Dressing Assistance 4  Minimal Assistance   LB Dressing Assistance 2  Maximal Assistance   Toileting Assistance  2  Maximal Assistance   Functional Assistance 3  Moderate Assistance   Additional Comments ADL assist levels based on pt's functional performance during OT evaluation   Bed Mobility   Rolling R 4  Minimal assistance  (Log roll technique for back safety)   Additional items Assist x 1;Bedrails; Increased time required;Verbal cues   Supine to Sit 3  Moderate assistance   Additional items Assist x 2;HOB elevated; Bedrails; Increased time required;Verbal cues;LE management   Additional Comments Pt received lying supine in bed upon OT arrival; at end of session: pt seated OOB to recliner chair w/ all needs within reach and chair alarm activated   Transfers   Sit to Stand 4  Minimal assistance   Additional items Assist x 2; Increased time required;Verbal cues   Stand to Sit 4  Minimal assistance   Additional items Assist x 2;Armrests; Increased time required;Verbal cues   Stand pivot 4  Minimal assistance   Additional items Assist x 2; Increased time required;Verbal cues   Additional Comments Performed w/ RW   Functional Mobility   Functional Mobility 4  Minimal assistance   Additional items Rolling walker   Balance   Static Sitting Fair -   Dynamic Sitting Fair -   Static Standing Poor +   Dynamic Standing Poor   Ambulatory Poor -   Activity Tolerance   Activity Tolerance Patient limited by fatigue   Medical Staff Made Aware PT Micheal Terry; Dr Praveena Kerr confirmed pt appropriate for therapy via [place] Text without back support or bracing   Nurse Made Aware RN DE [facility] confirmed pt appropriate for therapy and made aware of session outcomes   RUE Assessment   RUE Assessment X  (AROM WFL; MMT deferred due to pt report of pain)   LUE Assessment   LUE Assessment X  (AROM WFL, decreased strengt)   LUE Strength   L Shoulder Flexion 4/5   L Elbow Extension 3/5   Hand Function   Gross Motor Coordination Impaired   Fine Motor Coordination Functional   Sensation   Light Touch No apparent deficits   Cognition   Overall Cognitive Status WFL   Arousal/Participation Alert; Responsive; Cooperative   Attention Within functional limits   Orientation Level Oriented to person;Disoriented to place; Disoriented to time;Oriented to situation   Memory Decreased recall of recent events;Decreased recall of precautions   Following Commands Follows one step commands with increased time or repetition   Comments Pt agreeable to OT evaluation   Assessment   Limitation Decreased ADL status; Decreased UE strength;Decreased Safe judgement during ADL;Decreased endurance;Decreased self-care trans;Decreased high-level ADLs   Prognosis Fair   Assessment Patient is a 80 y o  male seen for OT evaluation s/p admit to [id number] [place] [address] on 7/7/2020 w/Fall  Commorbidities affecting patient's functional performance at time of assessment include: peripheral vascular disease, ambulatory dysfunction, hyperlipidemia, and bipolar 1 disorder  Orders placed for OT evaluation and treatment  Performed at least two patient identifiers during session including name and wristband  Prior to admission, patient was living with his sister in a one-story home with 3-4 RUBY  Patient reports that at baseline, he is independent in ADLs and IADLs  Patient is a questionable historian, please continue to refer to CM notes for accuracy and further detail  Personal factors affecting patient at time of initial evaluation include: steps to enter, limited insight into deficits, difficulty performing ADLs and difficulty performing IADLs   Upon evaluation, patient requires minimal  assist for UB ADLs, moderate and maximal assist for LB ADLs, transfers and functional ambulation in room and bathroom with minimal  assist, with the use of Rolling Walker  Occupational performance is affected by the following deficits: orientation, decreased muscle strength, degenerative arthritic joint changes, impaired gross motor coordination, dynamic sit/ stand balance deficit with poor standing tolerance time for self care and functional mobility, decreased activity tolerance, impaired memory, impaired problem solving, increased pain and postural control and postural alignment deficit, requiring external assistance to complete transitional movements  Therapist completed  extensive additional review of medical records and additional review of physical, cognitive or psychosocial history, clinical examination identifying 5 or more performance deficits, clinical decision making of a high complexity , consistent with a high complexity level evaluation  Patient to benefit from continued Occupational Therapy treatment while in the hospital to address deficits as defined above and maximize level of functional independence with ADLs and functional mobility  Occupational Performance areas to address include: grooming , bathing/ shower, dressing, toilet hygiene, transfer to all surfaces, functional mobility, community mobility, health maintenance, medication routine/ management, IADLs: safety procedures, IADLs: meal prep/ clean up, Leisure Participation and Social participation  From OT standpoint, recommendation at time of d/c would be post-acute rehabilitation services  Goals   Patient Goals to return home   Plan   Treatment Interventions ADL retraining;Functional transfer training;UE strengthening/ROM; Endurance training;Patient/family training;Cognitive reorientation; Compensatory technique education; Energy conservation; Activityengagement;Continued evaluation;Equipment evaluation/education   Goal Expiration Date 07/22/20   OT Frequency 3-5x/wk   Recommendation   OT Discharge Recommendation Post-Acute Rehabilitation Services   Barthel Index   Feeding 10   Bathing 0   Grooming Score 5   Dressing Score 5   Bladder Score 10   Bowels Score 10   Toilet Use Score 5   Transfers (Bed/Chair) Score 5   Mobility (Level Surface) Score 0   Stairs Score 0   Barthel Index Score 50   Modified Hart Scale   Modified Ward Scale 4     Occupational Therapy Goals to be completed in 7-14 Days:    1- Patient will increase OOB/ sitting tolerance to 2-4 hours per day for increased participation in self care and leisure tasks with no s/s of exertion  2- Patient will increase standing tolerance time to 5 minutes with unilateral UE support to complete sink level ADLs@ min assist level  3- Patient will follow 100% simple one step directives without verbal cues  4- Patient will increase sitting tolerance at edge of bed to 20 minutes to complete UB ADLs @ min assist level  5- Patient will transfer bed to Chair / toilet with supervision assist with AD as indicated  6- Patient will complete UB ADLs with set up assist     7- Patient will complete LB ADLs with min assist with the use of adaptive equipment  8- Patient will complete toileting hygiene with min assist assist/ supervision for thoroughness  5- Patient/ Family will demonstrate competency with UE Home Exercise Program      10- Pt will attend to continued cognitive assessment 100% of the time in order to provide most appropriate recommendations for d/c plans  11- Patient will verbalize and demonstrate good body mechanics and joint protection techniques during ADLs/ IADLs with no verbal cues  12-  Pt will improve functional activity tolerance while seated EOB to 20+ minutes in order to increase safety and independence during functional transfers and ADL tasks      Dmitriy Lowery OTR/L

## 2020-07-08 NOTE — UTILIZATION REVIEW
Initial Clinical Review    Admission: Date/Time/Statement: Admission Orders (From admission, onward)     Ordered        07/07/20 2206  Place in Observation  Once                   Orders Placed This Encounter   Procedures    Place in Observation     Standing Status:   Standing     Number of Occurrences:   1     Order Specific Question:   Admitting Physician     Answer:   Samantha Norton [[id number]]     Order Specific Question:   Level of Care     Answer:   Med Surg [16]     ED Arrival Information     Expected Arrival Acuity Means of Arrival Escorted By Service Admission Type    - 7/7/2020 18:55 Urgent Ambulance SLETS [facility]) General Medicine Urgent    Arrival Complaint    body pain         Chief Complaint   Patient presents with    Failure To Thrive     Assisted living states patient has not been out of bed in 3 days  Pt states that he fell 3-4 days ago "and probably should have come"  Assessment/Plan: [de-identified] yo male to ED from assisted living  Via EMS for failure to thrive  Justine Harman a few days ago , unable to recall events of fall   Has been lying in bed for the last 3 days   Admitted OBS status w/ plan for pain control w/ lidocaine patch , tylenol , muscle relaxant and warm compression , OT PT eval , fall precautions  Cont meds for bipolar , HTN , hyperlipidemia andPVD      ED Triage Vitals [07/07/20 1900]   Temperature Pulse Respirations Blood Pressure SpO2   98 °F (36 7 °C) 71 15 143/65 97 %      Temp Source Heart Rate Source Patient Position - Orthostatic VS BP Location FiO2 (%)   Oral Monitor Lying Right arm --      Pain Score       4        Wt Readings from Last 1 Encounters:   07/07/20 74 kg (163 lb 2 3 oz)     Additional Vital Signs:   07/08/20 0800  97 8 °F (36 6 °C)  72  16  126/58    96 %  None (Room air)  Lying   07/08/20 0700    80  20  107/59  78  96 %       07/08/20 0547    82  18  97/55    95 %  None (Room air)  Lying   07/08/20 0230    102  17  118/59  77  97 %  None (Room air)  Lying 07/08/20 0030    103  18  110/58  76  96 %  None (Room air)  Lying   07/07/20 2300    108Abnormal   20  123/60  84  95 %  None (Room air)  Lying   07/07/20 2200    111Abnormal   20  115/70    97 %    Lying   07/07/20 2100    93  21  118/76    97 %    Lying   07/07/20 2000    88  16  125/82    95 %           Pertinent Labs/Diagnostic Test Results:   7/7 CT head - wnl   7/7 CT cervical spine -wnl  7/7 CT chest -wnl  7/7 Thoracolumbar spine -, moderate T3 compression fracture deformity without bony retropulsion    No significant paraspinal hematoma  7/7 EKG - NSR  Results from last 7 days   Lab Units 07/07/20  1932   WBC Thousand/uL 9 49   HEMOGLOBIN g/dL 16 0   HEMATOCRIT % 48 9   PLATELETS Thousands/uL 374   NEUTROS ABS Thousands/µL 6 60     Results from last 7 days   Lab Units 07/07/20  1932   SODIUM mmol/L 140   POTASSIUM mmol/L 4 2   CHLORIDE mmol/L 105   CO2 mmol/L 29   ANION GAP mmol/L 6   BUN mg/dL 10   CREATININE mg/dL 0 87   EGFR ml/min/1 73sq m 79   CALCIUM mg/dL 9 5     Results from last 7 days   Lab Units 07/07/20  1932   AST U/L 16   ALT U/L 19   ALK PHOS U/L 76   TOTAL PROTEIN g/dL 7 3   ALBUMIN g/dL 3 3*   TOTAL BILIRUBIN mg/dL 0 80     Results from last 7 days   Lab Units 07/07/20  1932   GLUCOSE RANDOM mg/dL 90     Results from last 7 days   Lab Units 07/07/20  1932   CK TOTAL U/L 65     Results from last 7 days   Lab Units 07/07/20  1932   TROPONIN I ng/mL <0 02     Results from last 7 days   Lab Units 07/07/20  2127   CLARITY UA  Clear   COLOR UA  Yellow   SPEC GRAV UA  <=1 005   PH UA  6 5   GLUCOSE UA mg/dl Negative   KETONES UA mg/dl 15 (1+)*   BLOOD UA  Trace-lysed*   PROTEIN UA mg/dl Negative   NITRITE UA  Negative   BILIRUBIN UA  Negative   UROBILINOGEN UA E U /dl 1 0   LEUKOCYTES UA  Negative   WBC UA /hpf None Seen   RBC UA /hpf 0-1*   BACTERIA UA /hpf None Seen   EPITHELIAL CELLS WET PREP /hpf Occasional       ED Treatment:   Medication Administration from 07/07/2020 1855 to 07/08/2020 9330       Date/Time Order Dose Route Action Action by Comments        Past Medical History:   Diagnosis Date    Arthritis     Chronic venous insufficiency     HTN (hypertension) 10/26/2019    Prostate cancer ([facility] )     PVD (peripheral vascular disease) ([facility] )      Present on Admission:  **None**      Admitting Diagnosis: Complaints of total body pain [R52]  Age/Sex: 80 y o  male  Admission Orders:  Scheduled Medications:   po depakote qd  Po pravachol qd  Po zoloft qd    Continuous IV Infusions:     PRN Meds:    sodium chloride (PF) 3 mL Intravenous Q1H PRN     Tele   Fall precautions   Reg diet     Network Utilization Review Department  [email or web address]  ATTENTION: Please call with any questions or concerns to [phone] and carefully listen to the prompts so that you are directed to the right person  All voicemails are confidential   [facility] all requests for admission clinical reviews, approved or denied determinations and any other requests to dedicated fax number below belonging to the campus where the patient is receiving treatment   List of dedicated fax numbers for the Facilities:  [address] DENIALS (Administrative/Medical Necessity) [phone]   [address] (Maternity/NICU/Pediatrics) [phone]   Scotty Hall [phone]   Particia Notice [phone]   Sharan Rain [phone]   Brooke Roberto [phone]   [address] [phone]   [facility]  [phone]   [address] [facility]  [address] [facility] [address] [facility] [phone]

## 2020-07-08 NOTE — PHYSICIAN ADVISOR
Current patient class: Observation  The patient is currently on Hospital Day: 2 at [address]        The patient was admitted to the hospital  on N/A at N/A for the following diagnosis:  Fall [W19  XXXA]  Failure to thrive in adult [R62 7]  Complaints of total body pain [R52]  Compression fracture of T3 vertebra ([facility] ) [S22 030A]     After review of the relevant documentation, labs, vital signs and test results, the patient is most appropriate for OBSERVATION STATUS  Rationale is as follows: The patient is a 80 yrs   Male who presented to the ED at 7/7/2020  6:55 PM with a chief complaint of Failure To Thrive (Assisted living states patient has not been out of bed in 3 days  Pt states that he fell 3-4 days ago "and probably should have come"  )  Patient came in with ambulatory dysfunction  On further imaging patient was not found to have an age-indeterminate moderate T3 compression fracture  Patient was admitted for pain management but is currently not receiving any IV medications  Given that this is more of an ambulatory dysfunction and possible placement issue I would recommend observation status at this time  The patient needs continued hospitalization tomorrow we can review again      The patients vitals on arrival were ED Triage Vitals [07/07/20 1900]   Temperature Pulse Respirations Blood Pressure SpO2   98 °F (36 7 °C) 71 15 143/65 97 %      Temp Source Heart Rate Source Patient Position - Orthostatic VS BP Location FiO2 (%)   Oral Monitor Lying Right arm --      Pain Score       4           Past Medical History:   Diagnosis Date    Arthritis     Chronic venous insufficiency     HTN (hypertension) 10/26/2019    Prostate cancer ([facility] )     [facility]      Past Surgical History:   Procedure Laterality Date    HERNIA REPAIR      TONSILLECTOMY             Consults have been placed to:   None    Vitals:    07/08/20 1030 07/08/20 1200 07/08/20 1230 07/08/20 1234   BP: 115/55 132/71  116/66   BP Location:       Pulse: 60 95  85   Resp: 19 20  16   Temp:    98 1 °F (36 7 °C)   TempSrc:       SpO2: 95%   96%   Weight:   74 kg (163 lb 2 3 oz)    Height:   5' 7 75" (1 721 m)        Most recent labs:    Recent Labs     07/07/20  1932   WBC 9 49   HGB 16 0   HCT 48 9      K 4 2   CALCIUM 9 5   BUN 10   CREATININE 0 87   TROPONINI <0 02   CKTOTAL 65   AST 16   ALT 19   ALKPHOS 76       Scheduled Meds:  Current Facility-Administered Medications:  divalproex sodium 750 mg Oral Daily Neftaly RIOS MD   pravastatin 40 mg Oral Daily With [employer] MD BLANCA   sertraline 100 mg Oral Daily Neftaly RIOS MD   sodium chloride (PF) 3 mL Intravenous Q1H PRN Samantha Washington DO     Continuous Infusions:   PRN Meds:  Insert peripheral IV **AND** sodium chloride (PF)

## 2020-07-08 NOTE — ASSESSMENT & PLAN NOTE
Present admission with history of bipolar disorder  Stable  Denies suicidal, homicidal ideation  Continue home dose of Depakote, Zoloft

## 2020-07-08 NOTE — SOCIAL WORK
CM met with pt at bedside  OBS was already reviewed  Pt lives in the wing of his sister's home ([address])  There are 6 RUBY w/railing  Pt is able to navigate steps with the use of a walker  He is assisted with ADL's by a private aide/friend-Fuentes  He has been in [facility] and [facility] at [place] in the past for [address]  He has also used [facility] services, but does not remember the name of the agency  Denies substance abuse, tobacco abuse, or mental health issues  He does admit to drinking one drink daily-he was a  in the past and always treats himself to one drink  Dr Kelvin Peacock is his PCP  He uses [employer] in [facility] and has no problem with co-pays  His POA is sister Wander  He does not work or drive  He states that he gets to appointments and shopping by a car service provided by [facility] on [address]  They will provide transport home  CM discussed d/c needs including PT's recommendation of STR  Pt is hoping he can be discharged home with [facility] services, but will allow a referral to both STR and HH  Understands that he has freedom of choice  CM will continue to follow and will assess needs as hospitalization progresses  CM reviewed discharge planning process including the following: identifying help at home, patient preference for discharge planning needs, pharmacy preference, and availability of treatment team to discuss questions or concerns patient and/or family may have regarding understanding medications and recognizing signs and symptoms once discharged  CM also encouraged patient to follow up with all recommended appointments after discharge  Patient advised of importance for patient and family to participate in managing patients medical well being

## 2020-07-08 NOTE — PHYSICAL THERAPY NOTE
PT Initial Evaluation     Physical Therapy Evaluation     Patient's Name: Cynthia Marinelli    Admitting Diagnosis  Fall [W19  XXXA]  Failure to thrive in adult [R62 7]  Complaints of total body pain [R52]  Compression fracture of T3 vertebra (HCC) [S22 030A]    Problem List  Patient Active Problem List   Diagnosis    Bipolar 1 disorder ([facility] )    Hyperlipidemia    History of prostate cancer    HTN (hypertension)    Fall    Ambulatory dysfunction    PVD (peripheral vascular disease) ([facility] )       Past Medical History  Past Medical History:   Diagnosis Date    Arthritis     Chronic venous insufficiency     HTN (hypertension) 10/26/2019    Prostate cancer ([place] [address] )     PVD (peripheral vascular disease) ([place] [address] )        Past Surgical History  Past Surgical History:   Procedure Laterality Date    HERNIA REPAIR      TONSILLECTOMY          07/08/20 1335   Note Type   Note type Eval only   Pain Assessment   Pain Assessment Tool Pain Assessment not indicated - pt denies pain   Pain Score 5   Pain Location/Orientation Location: Abdomen   Home Living   Type of [address] [place] [address] One level;Stairs to enter with rails  (3-4 URBY)   Bathroom Shower/Tub Walk-in shower   Bathroom Toilet Standard   Bathroom Equipment Grab bars in shower   [address] [facility]   Additional Comments questionable historian due to potential discrepency w home set up   Prior Function   Level of [place] Independent with ADLs and functional mobility  (reports independence)   Lives With   (Sister on extension of  house)   Receives Help From Family   ADL Assistance Independent  (per pt)   IADLs Independent  (per pt)   Falls in the last 6 months 1 to 4  (3-4 steps)   Vocational Retired   Comments retired , enjoys TV reading puzzles   Restrictions/Precautions   [place] Bearing Precautions Per Order No   Braces or Orthoses   (none reported)   Other Precautions Cognitive; Chair Alarm; Bed Alarm; Fall Risk;Pain;Hard of hearing   General   Family/Caregiver Present No   Cognition   Overall Cognitive Status WFL   Attention Within functional limits   Orientation Level Oriented to person;Oriented to situation;Disoriented to place; Disoriented to time   Memory Decreased recall of recent events   Following Commands Follows one step commands with increased time or repetition   Comments Pt  agreeable to PT evaluation   RLE Assessment   RLE Assessment   (4-/5 hip flexion, knee flexion 4/5 DF)   LLE Assessment   LLE Assessment   (4-/5 hip flexion, knee flexion 4/5 DF)   Coordination   Sensation   (light touch grossly intact B/L LE)   Bed Mobility   Rolling R 4  Minimal assistance   Additional items Assist x 1; Increased time required;Verbal cues   Supine to Sit 3  Moderate assistance   Additional items Assist x 2; Increased time required;Verbal cues;LE management   Additional Comments Pt  recieved lying supine in bed upon PT arrival  At end of session pt  in recliner with alarm intact, all needs in reach  Transfers   Sit to Stand 4  Minimal assistance   Additional items Assist x 2; Increased time required;Verbal cues   Stand to Sit 4  Minimal assistance   Additional items Assist x 2; Increased time required;Verbal cues   Stand pivot 4  Minimal assistance   Additional items Assist x 2; Increased time required;Verbal cues   Additional Comments Performed with RW   Ambulation/Elevation   Gait pattern Decreased foot clearance;Narrow SYLWIA;Shuffling; Short stride; Step to;Excessively slow; Inconsistent sisi   Gait Assistance 4  Minimal assist   Additional items Assist x 2   Assistive Device Rolling walker   Distance 3'   Balance   Static Sitting Fair -   Dynamic Sitting Fair -   Static Standing Poor +   Dynamic Standing Poor   Ambulatory Poor -   Endurance Deficit   Endurance Deficit Yes   Endurance Deficit Description fatigue/SOB   Activity Tolerance   Activity Tolerance Patient limited by fatigue   Medical Staff Made Aware OT Gunnar Luis; Dr Huang January confirmed ok to see, no bracing recommended per OT Armin 37 NR ok to see, recommend A x2 for ambulation   Assessment   Prognosis Good   Problem List Decreased range of motion;Decreased endurance; Impaired balance;Decreased mobility; Decreased strength;Decreased skin integrity  (? cognition)   Assessment Pt is 80 y o  male seen for PT evaluation s/p admit to [facility] on 7/7/2020 w/ Fall  PT consulted to assess pt's functional mobility and d/c needs  Order placed for PT eval and tx, w/ activity order  Comorbidities affecting pt's physical performance at time of assessment include: multiple falls recently, HTN, PVD, Ambulatory dysfunction, chronic compression fx T3    PTA, pt was independent w/ all functional mobility w/ RW  Personal factors affecting pt at time of IE include: ambulating w/ assistive device, stairs to enter home, inability to ambulate household distances, positive fall history, inability to perform IADLs, inability to perform ADLs and inability to live alone  Please find objective findings from PT assessment regarding body systems outlined above with impairments and limitations including weakness, decreased ROM, impaired balance, decreased endurance, gait deviations, pain, decreased activity tolerance, decreased functional mobility tolerance, fall risk and decreased skin integrity  The following objective measures performed on IE also reveal limitations: Barthel Index: 50/100  Pt's clinical presentation is currently unstable/unpredictable seen in pt's presentation  Pt to benefit from continued PT tx to address deficits as defined above and maximize level of functional independent mobility and consistency  From PT/mobility standpoint, recommendation at time of d/c would be post acute rehabilitation pending progress in order to facilitate return to PLOF     Barriers to Discharge Inaccessible home environment;Decreased caregiver support   Goals   Patient Goals to get better   STG Expiration Date 07/22/20   Short Term Goal #1 1  Pt will complete bed mobility with Mod I to increase functional mobility  2  Pt will complete sit to stand transfers with Mod I to increase functional mobility  3  Pt will ambulate 100 ft with RW with Mod I without LOB 4  Pt will increase B/L LE strength by 1 grade to facilitate improved functional mobility with decreased risk of falls  5  Pt will increase standing balance to fair in order to decrease risk of falls  6  PT to see for elevation assessment  PT Treatment Day 0   Plan   Treatment/Interventions Functional transfer training;LE strengthening/ROM; Therapeutic exercise; Endurance training;Bed mobility;Gait training;Patient/family training;Equipment eval/education   PT Frequency   (3-5x/week)   Recommendation   PT Discharge Recommendation Post-Acute Rehabilitation Services   Equipment Recommended Walker   PT - OK to Discharge Yes   Additional Comments to post acute rehab   Barthel Index   Feeding 10   Bathing 0   Grooming Score 5   Dressing Score 5   Bladder Score 10   Bowels Score 10   Toilet Use Score 5   Transfers (Bed/Chair) Score 5   Mobility (Level Surface) Score 0   Stairs Score 0   Barthel Index Score 50       Torsten Brianar, PT

## 2020-07-08 NOTE — ED NOTES
1  CC failure to thrive  2  Medications/drip  3  Abnormal labs/vitals/assessment A+OX3  4  Medications/drips  5  Last time narcotics given  6  IV/drains/ etc  20G RAC  7  Isolation status   8  Skin   9   Ambulation status ASSIST1 CANE  10  Phone number [phone]  11 trauma y/n Karen Thomas, RN  07/08/20 3338

## 2020-07-08 NOTE — ED PROVIDER NOTES
History  Chief Complaint   Patient presents with    Failure To Thrive     Assisted living states patient has not been out of bed in 3 days  Pt states that he fell 3-4 days ago "and probably should have come"  25-year-old male, history of hypertension, presents to the ED for failure to thrive  Patient comes from a personal care home  States that a few days ago he fell  He states that he is unable to recall the events of the fall  He reports that over the last few days he has been laying in bed all day  He c/o neck and back pain  Patient states that he is concerned that he needs a higher level of care and cannot take care of himself  He denies any fever, cp, sob, n/t/w of his extremities, or abd pain  Denies any blood thinner use  No other complaints  History provided by:  Patient and relative      Prior to Admission Medications   Prescriptions Last Dose Informant Patient Reported? Taking?   divalproex sodium (DEPAKOTE ER) 250 mg 24 hr tablet  Care Giver No Yes   Sig: Take 3 tablets (750 mg total) by mouth daily   rosuvastatin (CRESTOR) 5 mg tablet  Care Giver No Yes   Sig: Take 1 tablet (5 mg total) by mouth daily   sertraline (ZOLOFT) 100 mg tablet  Care Giver No Yes   Sig: Take 1 tablet (100 mg total) by mouth daily      Facility-Administered Medications: None       Past Medical History:   Diagnosis Date    Arthritis     Chronic venous insufficiency     HTN (hypertension) 10/26/2019    Prostate cancer ([facility] )     PVD (peripheral vascular disease) ([facility] )        Past Surgical History:   Procedure Laterality Date    HERNIA REPAIR      TONSILLECTOMY         Family History   Problem Relation Age of Onset    Heart disease Mother     Diabetes Father     Diabetes Sister      I have reviewed and agree with the history as documented      E-Cigarette/Vaping    E-Cigarette Use Never User      E-Cigarette/Vaping Substances     Social History     Tobacco Use    Smoking status: Current Some Day Smoker Years: 10 00     Types: Cigars    Smokeless tobacco: Never Used    Tobacco comment: occasionaly cigar, rarely   Substance Use Topics    Alcohol use: Not Currently     Alcohol/week: 1 0 standard drinks     Types: 1 Standard drinks or equivalent per week     Frequency: 4 or more times a week     Drinks per session: 1 or 2     Binge frequency: Never     Comment: every day, 1 drink    Drug use: No       Review of Systems   Constitutional: Negative for chills and fever  HENT: Negative for congestion, ear pain and sore throat  Eyes: Negative for pain and visual disturbance  Respiratory: Negative for cough, shortness of breath and wheezing  Cardiovascular: Negative for chest pain and leg swelling  Gastrointestinal: Negative for abdominal pain, diarrhea, nausea and vomiting  Genitourinary: Negative for dysuria, frequency, hematuria and urgency  Musculoskeletal: Negative for neck pain and neck stiffness  Skin: Negative for rash and wound  Neurological: Negative for weakness, numbness and headaches  Psychiatric/Behavioral: Negative for agitation and confusion  All other systems reviewed and are negative  Physical Exam  Physical Exam   Constitutional: He is oriented to person, place, and time  He appears well-developed and well-nourished  HENT:   Head: Normocephalic and atraumatic  Eyes: Pupils are equal, round, and reactive to light  EOM are normal    Neck:   Midline C spine tenderness  No midline T or L spine tenderness  No bony stepoffs  C-collar placed on exam   Cardiovascular: Normal rate and regular rhythm  Pulmonary/Chest: Effort normal and breath sounds normal  No stridor  No respiratory distress  He has no wheezes  He has no rales  He exhibits no tenderness  Abdominal: Soft  Bowel sounds are normal  He exhibits no distension  There is no tenderness  Musculoskeletal: Normal range of motion  Neurological: He is alert and oriented to person, place, and time     No focal deficits   Skin: Skin is warm and dry  Nursing note and vitals reviewed        Vital Signs  ED Triage Vitals [07/07/20 1900]   Temperature Pulse Respirations Blood Pressure SpO2   98 °F (36 7 °C) 71 15 143/65 97 %      Temp Source Heart Rate Source Patient Position - Orthostatic VS BP Location FiO2 (%)   Oral Monitor Lying Right arm --      Pain Score       4           Vitals:    07/07/20 1900 07/07/20 2000 07/07/20 2100 07/07/20 2200   BP: 143/65 125/82 118/76 115/70   Pulse: 71 88 93 (!) 111   Patient Position - Orthostatic VS: Lying Lying Lying Lying         Visual Acuity  Visual Acuity      Most Recent Value   L Pupil Size (mm)  3   R Pupil Size (mm)  3          ED Medications  Medications   sodium chloride (PF) 0 9 % injection 3 mL (has no administration in time range)   iohexol (OMNIPAQUE) 350 MG/ML injection (MULTI-DOSE) 100 mL (100 mL Intravenous Given 7/7/20 2023)       Diagnostic Studies  Results Reviewed     Procedure Component Value Units Date/Time    Urine Microscopic [[id number]]  (Abnormal) Collected:  07/07/20 2127    Lab Status:  Final result Specimen:  Urine, Other Updated:  07/07/20 2207     RBC, UA 0-1 /hpf      WBC, UA None Seen /hpf      Epithelial Cells Occasional /hpf      Bacteria, UA None Seen /hpf     UA w Reflex to Microscopic w Reflex to Culture [[id number]]  (Abnormal) Collected:  07/07/20 2127    Lab Status:  Final result Specimen:  Urine, Other Updated:  07/07/20 2151     Color, UA Yellow     Clarity, UA Clear     Specific Gravity, UA <=1 005     pH, UA 6 5     Leukocytes, UA Negative     Nitrite, UA Negative     Protein, UA Negative mg/dl      Glucose, UA Negative mg/dl      Ketones, UA 15 (1+) mg/dl      Urobilinogen, UA 1 0 E U /dl      Bilirubin, UA Negative     Blood, UA Trace-lysed    CK (with reflex to MB) [[id number]]  (Normal) Collected:  07/07/20 1932    Lab Status:  Final result Specimen:  Blood from Arm, Right Updated:  07/07/20 2012     Total CK 65 U/L     Troponin I [[id number]]  (Normal) Collected:  07/07/20 1932    Lab Status:  Final result Specimen:  Blood from Arm, Right Updated:  07/07/20 2009     Troponin I <0 02 ng/mL     Comprehensive metabolic panel [[id number]]  (Abnormal) Collected:  07/07/20 1932    Lab Status:  Final result Specimen:  Blood from Arm, Right Updated:  07/07/20 2008     Sodium 140 mmol/L      Potassium 4 2 mmol/L      Chloride 105 mmol/L      CO2 29 mmol/L      ANION GAP 6 mmol/L      BUN 10 mg/dL      Creatinine 0 87 mg/dL      Glucose 90 mg/dL      Calcium 9 5 mg/dL      AST 16 U/L      ALT 19 U/L      Alkaline Phosphatase 76 U/L      Total Protein 7 3 g/dL      Albumin 3 3 g/dL      Total Bilirubin 0 80 mg/dL      eGFR 79 ml/min/1 73sq m     Narrative:       [facility] guidelines for Chronic Kidney Disease (CKD):     Stage 1 with normal or high GFR (GFR > 90 mL/min/1 73 square meters)    Stage 2 Mild CKD (GFR = 60-89 mL/min/1 73 square meters)    Stage 3A Moderate CKD (GFR = 45-59 mL/min/1 73 square meters)    Stage 3B Moderate CKD (GFR = 30-44 mL/min/1 73 square meters)    Stage 4 Severe CKD (GFR = 15-29 mL/min/1 73 square meters)    Stage 5 End Stage CKD (GFR <15 mL/min/1 73 square meters)  Note: GFR calculation is accurate only with a steady state creatinine    CBC and differential [[id number]] Collected:  07/07/20 1932    Lab Status:  Final result Specimen:  Blood from Arm, Right Updated:  07/07/20 1951     WBC 9 49 Thousand/uL      RBC 5 43 Million/uL      Hemoglobin 16 0 g/dL      Hematocrit 48 9 %      MCV 90 fL      MCH 29 5 pg      MCHC 32 7 g/dL      RDW 13 8 %      MPV 9 9 fL      Platelets 751 Thousands/uL      nRBC 0 /100 WBCs      Neutrophils Relative 69 %      Immat GRANS % 0 %      Lymphocytes Relative 20 %      Monocytes Relative 9 %      Eosinophils Relative 1 %      Basophils Relative 1 %      Neutrophils Absolute 6 60 Thousands/µL      Immature Grans Absolute 0 04 Thousand/uL      Lymphocytes Absolute 1 88 Thousands/µL      Monocytes Absolute 0 84 Thousand/µL      Eosinophils Absolute 0 06 Thousand/µL      Basophils Absolute 0 07 Thousands/µL                  CT head without contrast   ED Interpretation by Radha Mcfarland DO (07/07 2117)   No acute intracranial abnormality  Final Result by Mark Underwood DO (07/07 3743)      No acute intracranial abnormality  Workstation performed: [id number]         CT spine cervical without contrast   ED Interpretation by Radha Mcfarland DO (07/07 2117)   No cervical spine fracture or traumatic malalignment        Advanced multilevel cervical spondylosis  Final Result by Mark Underwood DO (07/07 2105)      No cervical spine fracture or traumatic malalignment  Advanced multilevel cervical spondylosis  Workstation performed: [id number]         CT chest abdomen pelvis w contrast   ED Interpretation by Radha Mcfarland DO (07/07 2133)   No acute traumatic injury identified within the chest, abdomen or pelvis  Final Result by Mark Underwood DO (07/07 2117)      No acute traumatic injury identified within the chest, abdomen or pelvis  Workstation performed: [id number]         CT recon only thoracolumbar   ED Interpretation by Radha Mcfarland DO (07/07 2133)   Age-indeterminate, moderate T3 compression fracture deformity without bony retropulsion  No significant paraspinal hematoma        No other thoracolumbar fractures seen  Final Result by Mark Underwood DO (07/07 2127)      Age-indeterminate, moderate T3 compression fracture deformity without bony retropulsion  No significant paraspinal hematoma  No other thoracolumbar fractures seen  Degenerative changes as above                 Workstation performed: [id number]         XR shoulder 2+ views RIGHT   ED Interpretation by Radha Mcfarland DO (07/07 2138)   NAP       XR chest 1 view    (Results Pending)              Procedures  ECG 12 Lead Documentation Only  Date/Time: 7/7/2020 8:00 PM  Performed by: Elton Miller DO  Authorized by: Elton Miller DO     Indications / Diagnosis:  Failure to thrive   Patient location:  ED  Previous ECG:     Previous ECG:  Compared to current    Comparison ECG info:  3/5/20    Similarity:  Changes noted  Rate:     ECG rate:  74    ECG rate assessment: normal    Rhythm:     Rhythm: sinus rhythm    Ectopy:     Ectopy: none    QRS:     QRS axis:  Left    QRS intervals: Wide  Conduction:     Conduction: abnormal      Abnormal conduction: complete RBBB    ST segments:     ST segments:  Normal  T waves:     T waves: normal               ED Course  ED Course as of Jul 07 2238   Tue Jul 07, 2020 2210 Patient states that he had a prior fracture to his T3 vertebrae- this is likely chronic   CT recon only thoracolumbar                                             MDM  Number of Diagnoses or Management Options  Compression fracture of T3 vertebra ([facility] [address] ): new and requires workup  Failure to thrive in adult: new and requires workup  Fall: new and requires workup  Diagnosis management comments: Patient with failure to thrive and fall- will get labs, UA, and ct scans to r/o injury  Patient reevaluated and feels improved  Patient updated on results of tests and plan of care including admission to hospital for further evaluation of presenting complaint  Patient demonstrates verbal understanding and agrees with plan  Report to [facility] with [facility] for continuation of patient care          Amount and/or Complexity of Data Reviewed  Clinical lab tests: ordered and reviewed  Tests in the radiology section of CPT®: ordered and reviewed  Tests in the medicine section of CPT®: ordered and reviewed  Discussion of test results with the performing providers: yes  Decide to obtain previous medical records or to obtain history from someone other than the patient: yes  Obtain history from someone other than the patient: yes  Review and summarize past medical records: yes  Discuss the patient with other providers: yes  Independent visualization of images, tracings, or specimens: yes    Patient Progress  Patient progress: improved        Disposition  Final diagnoses:   Fall   Failure to thrive in adult   Compression fracture of T3 vertebra ([address] )     Time reflects when diagnosis was documented in both MDM as applicable and the Disposition within this note     Time User Action Codes Description Comment    7/7/2020 10:09 PM Mile Medina Add [R62 51] Failure to thrive (0-17)     7/7/2020 10:09 PM Mile Medina Add Magui Johnathon  XXXA] Fall     7/7/2020 10:09 PM Viry Washington Modify [L12  XXXA] Fall     7/7/2020 10:09 PM Mile Medina Remove [R62 51] Failure to thrive (0-17)     7/7/2020 10:09 PM Macarena Washington A Add [R62 7] Failure to thrive in adult     7/7/2020 10:09 PM Macarena Washington Add [S22 030A] Compression fracture of T3 vertebra [facility])       ED Disposition     ED Disposition Condition Date/Time Comment    Admit Stable Tue Jul 7, 2020 10:09 PM Case was discussed with JULITA and the patient's admission status was agreed to be Admission Status: observation status to the service of Dr Wang Colon   Follow-up Information    None         Patient's Medications   Discharge Prescriptions    No medications on file     No discharge procedures on file      PDMP Review     None          ED Provider  Electronically Signed by           Radha Mcfarland DO  07/07/20 3421

## 2020-07-08 NOTE — PLAN OF CARE
Problem: PHYSICAL THERAPY ADULT  Goal: Performs mobility at highest level of function for planned discharge setting  See evaluation for individualized goals  Description  Treatment/Interventions: Functional transfer training, LE strengthening/ROM, Therapeutic exercise, Endurance training, Bed mobility, Gait training, Patient/family training, Equipment eval/education  Equipment Recommended: Yamilet Huang       See flowsheet documentation for full assessment, interventions and recommendations  Note:   Prognosis: Good  Problem List: Decreased range of motion, Decreased endurance, Impaired balance, Decreased mobility, Decreased strength, Decreased skin integrity(? cognition)  Assessment: Pt is 80 y o  male seen for PT evaluation s/p admit to [facility] on 7/7/2020 w/ Fall  PT consulted to assess pt's functional mobility and d/c needs  Order placed for PT eval and tx, w/ activity order  Comorbidities affecting pt's physical performance at time of assessment include: multiple falls recently, HTN, PVD, Ambulatory dysfunction, chronic compression fx T3    PTA, pt was independent w/ all functional mobility w/ RW  Personal factors affecting pt at time of IE include: ambulating w/ assistive device, stairs to enter home, inability to ambulate household distances, positive fall history, inability to perform IADLs, inability to perform ADLs and inability to live alone  Please find objective findings from PT assessment regarding body systems outlined above with impairments and limitations including weakness, decreased ROM, impaired balance, decreased endurance, gait deviations, pain, decreased activity tolerance, decreased functional mobility tolerance, fall risk and decreased skin integrity  The following objective measures performed on IE also reveal limitations: Barthel Index: 50/100  Pt's clinical presentation is currently unstable/unpredictable seen in pt's presentation   Pt to benefit from continued PT tx to address deficits as defined above and maximize level of functional independent mobility and consistency  From PT/mobility standpoint, recommendation at time of d/c would be post acute rehabilitation pending progress in order to facilitate return to PLOF  Barriers to Discharge: Inaccessible home environment, Decreased caregiver support     PT Discharge Recommendation: [address]     PT - OK to Discharge: Yes    See flowsheet documentation for full assessment

## 2020-07-09 VITALS
RESPIRATION RATE: 18 BRPM | WEIGHT: 163.14 LBS | DIASTOLIC BLOOD PRESSURE: 63 MMHG | TEMPERATURE: 98.4 F | OXYGEN SATURATION: 97 % | HEIGHT: 68 IN | HEART RATE: 98 BPM | SYSTOLIC BLOOD PRESSURE: 122 MMHG | BODY MASS INDEX: 24.73 KG/M2

## 2020-07-09 LAB
ANION GAP SERPL CALCULATED.3IONS-SCNC: 9 MMOL/L (ref 4–13)
BASOPHILS # BLD AUTO: 0.05 THOUSANDS/ΜL (ref 0–0.1)
BASOPHILS NFR BLD AUTO: 1 % (ref 0–1)
BUN SERPL-MCNC: 19 MG/DL (ref 5–25)
CALCIUM SERPL-MCNC: 8.9 MG/DL (ref 8.3–10.1)
CHLORIDE SERPL-SCNC: 105 MMOL/L (ref 100–108)
CO2 SERPL-SCNC: 27 MMOL/L (ref 21–32)
CREAT SERPL-MCNC: 0.86 MG/DL (ref 0.6–1.3)
EOSINOPHIL # BLD AUTO: 0.09 THOUSAND/ΜL (ref 0–0.61)
EOSINOPHIL NFR BLD AUTO: 1 % (ref 0–6)
ERYTHROCYTE [DISTWIDTH] IN BLOOD BY AUTOMATED COUNT: 14 % (ref 11.6–15.1)
GFR SERPL CREATININE-BSD FRML MDRD: 79 ML/MIN/1.73SQ M
GLUCOSE P FAST SERPL-MCNC: 82 MG/DL (ref 65–99)
GLUCOSE SERPL-MCNC: 82 MG/DL (ref 65–140)
HCT VFR BLD AUTO: 44.3 % (ref 36.5–49.3)
HGB BLD-MCNC: 14.8 G/DL (ref 12–17)
IMM GRANULOCYTES # BLD AUTO: 0.02 THOUSAND/UL (ref 0–0.2)
IMM GRANULOCYTES NFR BLD AUTO: 0 % (ref 0–2)
LYMPHOCYTES # BLD AUTO: 1.64 THOUSANDS/ΜL (ref 0.6–4.47)
LYMPHOCYTES NFR BLD AUTO: 26 % (ref 14–44)
MCH RBC QN AUTO: 30.1 PG (ref 26.8–34.3)
MCHC RBC AUTO-ENTMCNC: 33.4 G/DL (ref 31.4–37.4)
MCV RBC AUTO: 90 FL (ref 82–98)
MONOCYTES # BLD AUTO: 0.63 THOUSAND/ΜL (ref 0.17–1.22)
MONOCYTES NFR BLD AUTO: 10 % (ref 4–12)
NEUTROPHILS # BLD AUTO: 4.01 THOUSANDS/ΜL (ref 1.85–7.62)
NEUTS SEG NFR BLD AUTO: 62 % (ref 43–75)
NRBC BLD AUTO-RTO: 0 /100 WBCS
PLATELET # BLD AUTO: 354 THOUSANDS/UL (ref 149–390)
PMV BLD AUTO: 9.8 FL (ref 8.9–12.7)
POTASSIUM SERPL-SCNC: 4 MMOL/L (ref 3.5–5.3)
RBC # BLD AUTO: 4.91 MILLION/UL (ref 3.88–5.62)
SODIUM SERPL-SCNC: 141 MMOL/L (ref 136–145)
WBC # BLD AUTO: 6.44 THOUSAND/UL (ref 4.31–10.16)

## 2020-07-09 PROCEDURE — 85025 COMPLETE CBC W/AUTO DIFF WBC: CPT | Performed by: INTERNAL MEDICINE

## 2020-07-09 PROCEDURE — 80048 BASIC METABOLIC PNL TOTAL CA: CPT | Performed by: INTERNAL MEDICINE

## 2020-07-09 PROCEDURE — 99217 PR OBSERVATION CARE DISCHARGE MANAGEMENT: CPT | Performed by: INTERNAL MEDICINE

## 2020-07-09 RX ORDER — ACETAMINOPHEN 325 MG/1
650 TABLET ORAL EVERY 6 HOURS PRN
Qty: 30 TABLET | Refills: 0
Start: 2020-07-09

## 2020-07-09 RX ORDER — LIDOCAINE 50 MG/G
1 PATCH TOPICAL DAILY
Refills: 0
Start: 2020-07-10 | End: 2021-08-06

## 2020-07-09 RX ADMIN — DIVALPROEX SODIUM 750 MG: 500 TABLET, FILM COATED, EXTENDED RELEASE ORAL at 09:45

## 2020-07-09 RX ADMIN — ENOXAPARIN SODIUM 40 MG: 40 INJECTION SUBCUTANEOUS at 09:45

## 2020-07-09 RX ADMIN — LIDOCAINE 1 PATCH: 50 PATCH TOPICAL at 09:46

## 2020-07-09 RX ADMIN — SERTRALINE HYDROCHLORIDE 100 MG: 100 TABLET, FILM COATED ORAL at 09:48

## 2020-07-09 NOTE — SOCIAL WORK
Cm reviewed all scripts and the pt has been accepted at the [facility] but they are questioning the reason for him using depakote  CM will f/u with the pt

## 2020-07-09 NOTE — SOCIAL WORK
Maria Elena called the pt sister at [phone] to inform her that the pt will be transferring to [facility] [place]  She was not available     MARIA ELENA called the pt nephew, Radu Gibbons [phone] and left a VM informing him that the pt is transferring to the [facility] [place]

## 2020-07-09 NOTE — DISCHARGE SUMMARY
Discharge- Lucille Hoff 1934, 80 y o  male MRN: [id number]    Unit/Bed#: -01 Encounter: [id number]    Primary Care Provider: Elkin Burdick DO   Date and time admitted to hospital: 7/7/2020  6:55 PM        PVD (peripheral vascular disease) [facility])  Assessment & Plan  Present admission with history of peripheral vascular disease  Continue aspirin, statin  Ambulatory dysfunction  Assessment & Plan  Plan as stated under # 1  Physical therapy and occupational therapy    HTN (hypertension)  Assessment & Plan  Present on admission with history of hypertension  Controlled on amlodipine 5 mg daily  Hyperlipidemia  Assessment & Plan  Present admission history of hyperlipidemia  Continue statin  Bipolar 1 disorder [facility])  Assessment & Plan  Present admission with history of bipolar disorder  Stable  Denies suicidal, homicidal ideation  Continue home dose of Depakote, Zoloft  * Fall  Assessment & Plan  Evaluated by PT/OT  Recommended short-term rehab  Evaluated by case management  Patient will go to [facility] [address]        Discharging Physician / Practitioner: Antony Benitez MD  PCP: Elkin Burdick DO  Admission Date:   Admission Orders (From admission, onward)     Ordered        07/07/20 2206  Place in Observation  Once                   Discharge Date: 07/09/20    Resolved Problems  Date Reviewed: 7/9/2020    None          Consultations During Hospital Stay:  ·     Procedures Performed:   ·     Significant Findings / Test Results:   · Ct lumbar  IMPRESSION:     Age-indeterminate, moderate T3 compression fracture deformity without bony retropulsion  No significant paraspinal hematoma      No other thoracolumbar fractures seen      Degenerative changes as above      CT chest abdomen pelvis  IMPRESSION:     No acute traumatic injury identified within the chest, abdomen or pelvis        CT cervical spine, CT head- no acute abnormality    Incidental Findings:   ·      Test Results Pending at Discharge (will require follow up):   ·      Outpatient Tests Requested:  ·     Complications:      Reason for Admission:  [facility] Course:     HPI on admission  Ramiro Traore is a 80 y o  male patient past medical history of prostate cancer, bipolar disorder, hyperlipidemia, hypertension, peripheral vascular disease, history of ambulate dysfunction who presents with complaining of having a fall few days ago at personal care home, presented with complaining of back pain, ambulatory dysfunction  Currently my encounter patient appears comfortable not in distress  Resting comfortably and wakes up with verbal stimuli  Oriented to person, place, time likely due to being woken up  Denies chest pain, dyspnea, fever, chills, nausea, vomiting, abdominal pain, dysuria, diarrhea, any other new complaints  No other events reported  Level 1 full code  Hospital course  Patient was evaluated by physical therapy and occupational therapy  Recommended rehab placement  His pain is under control  No other complaints  Clinically stable to be discharged to SNF  Please see above list of diagnoses and related plan for additional information       Condition at Discharge: good     Discharge Day Visit / Exam:     Subjective:    Vitals: Blood Pressure: 122/63 (07/09/20 0737)  Pulse: 98 (07/09/20 1100)  Temperature: 98 4 °F (36 9 °C) (07/09/20 0737)  Temp Source: Oral (07/08/20 2300)  Respirations: 18 (07/09/20 0737)  Height: 5' 7 75" (172 1 cm) (07/08/20 1230)  Weight - Scale: 74 kg (163 lb 2 3 oz) (07/08/20 1230)  SpO2: 97 % (07/09/20 0900)  Exam:   Physical Exam  General- Awake, alert and oriented x 3, looks comfortable  HEENT- Normocephalic, atraumatic  Neck- Supple  CVS- Normal S1/ S2, Regular rate and rhythm  Respiratory system- B/L clear breath sounds, no wheezing  Abdomen- Soft, Non distended, no tenderness, Bowel sound- present  CNS- No acute focal neurologic deficit noted  Discussion with Family: Discharge instructions/Information to patient and family:   See after visit summary for information provided to patient and family  Provisions for Follow-Up Care:  See after visit summary for information related to follow-up care and any pertinent home health orders  Disposition:     Other Matthew Roby at [employer] at [address] to [facility] SNF:   · Not Applicable to this Patient - Not Applicable to this Patient    Planned Readmission:      Discharge Statement:  I spent 25 minutes discharging the patient  This time was spent on the day of discharge  I had direct contact with the patient on the day of discharge  Greater than 50% of the total time was spent examining patient, answering all patient questions, arranging and discussing plan of care with patient as well as directly providing post-discharge instructions  Additional time then spent on discharge activities  Discharge Medications:  See after visit summary for reconciled discharge medications provided to patient and family        ** Please Note: This note has been constructed using a voice recognition system **

## 2020-07-09 NOTE — UTILIZATION REVIEW
NOTIFICATION OF  OBS  ADMISSION       Continued Stay Review    Date:  7/9/2020                         Current Patient Class: OBS Current Level of Care: [facility]     HPI:85 y o  male initially admitted on 7/7  @  2206    Assessment/Plan:  Ambulatory dysfunction w/mod T3 compression fx and pain (rx w/lidoderm patch)   PT/OT seeing pt  PT recommending post acute rehabilitation        Pertinent Labs/Diagnostic Results:   Results from last 7 days   Lab Units 07/08/20  1013   SARS-COV-2  Negative     Results from last 7 days   Lab Units 07/07/20  1932   WBC Thousand/uL 9 49   HEMOGLOBIN g/dL 16 0   HEMATOCRIT % 48 9   PLATELETS Thousands/uL 374   NEUTROS ABS Thousands/µL 6 60         Results from last 7 days   Lab Units 07/07/20  1932   SODIUM mmol/L 140   POTASSIUM mmol/L 4 2   CHLORIDE mmol/L 105   CO2 mmol/L 29   ANION GAP mmol/L 6   BUN mg/dL 10   CREATININE mg/dL 0 87   EGFR ml/min/1 73sq m 79   CALCIUM mg/dL 9 5     Results from last 7 days   Lab Units 07/07/20  1932   AST U/L 16   ALT U/L 19   ALK PHOS U/L 76   TOTAL PROTEIN g/dL 7 3   ALBUMIN g/dL 3 3*   TOTAL BILIRUBIN mg/dL 0 80         Results from last 7 days   Lab Units 07/07/20  1932   GLUCOSE RANDOM mg/dL 90     Results from last 7 days   Lab Units 07/07/20  1932   CK TOTAL U/L 65     Results from last 7 days   Lab Units 07/07/20  1932   TROPONIN I ng/mL <0 02     Results from last 7 days   Lab Units 07/07/20  2127   CLARITY UA  Clear   COLOR UA  Yellow   SPEC GRAV UA  <=1 005   PH UA  6 5   GLUCOSE UA mg/dl Negative   KETONES UA mg/dl 15 (1+)*   BLOOD UA  Trace-lysed*   PROTEIN UA mg/dl Negative   NITRITE UA  Negative   BILIRUBIN UA  Negative   UROBILINOGEN UA E U /dl 1 0   LEUKOCYTES UA  Negative   WBC UA /hpf None Seen   RBC UA /hpf 0-1*   BACTERIA UA /hpf None Seen   EPITHELIAL CELLS WET PREP /hpf Occasional     Vital Signs:   07/09/20 07:37:39  98 4 °F (36 9 °C)  59  18  122/63  83  98 %       07/08/20 23:00:21  98 4 °F (36 9 °C)  85  18  109/61  77 96 %           Medications:   Scheduled Medications:    Medications:  divalproex sodium 750 mg Oral Daily   enoxaparin 40 mg Subcutaneous Daily   lidocaine 1 patch Topical Daily   pravastatin 40 mg Oral Daily With Dinner   sertraline 100 mg Oral Daily     Continuous IV Infusions:     PRN Meds:    acetaminophen 650 mg Oral Q6H PRN   sodium chloride (PF) 3 mL Intravenous Q1H PRN       Discharge Plan: tbd    Network Utilization Review Department  [email or web address]  ATTENTION: Please call with any questions or concerns to [phone] and carefully listen to the prompts so that you are directed to the right person  All voicemails are confidential   Odilon Burrows all requests for admission clinical reviews, approved or denied determinations and any other requests to dedicated fax number below belonging to the campus where the patient is receiving treatment   List of dedicated fax numbers for the Facilities:  [address] DENIALS (Administrative/Medical Necessity) [phone]   1000  [facility] (Maternity/NICU/Pediatrics) [phone] 5400 [facility] [phone]   Hannah Jimenes [phone]   Collin Thornton [phone]   [facility] [phone]   [address] [phone]   [facility]  [phone]   2205 [facility]  2401 [facility] 1000 [address] [facility] [phone]

## 2020-07-09 NOTE — SOCIAL WORK
9:40am CM spoke with the pt and he states that he is on the Depakote to address his Dx of Bipolar  The pt states that he was hospitalized 3 years ago at [facility] because of his depression  He states that the Depakote has been successful in treating his depression and he has not had any additional hospitalizations  CM informed the pt that he was accepted at the [facility] and pt is agreeable to [employer]  9:50am MARIA ELENA called and spoke with Flora at the [facility] and she states that pending the auth the pt can be accepted today  Kamlesh Cabral has to call the CM back because she was on another line

## 2020-07-09 NOTE — ASSESSMENT & PLAN NOTE
Evaluated by PT/OT  Recommended short-term rehab  Evaluated by case management    Patient will go to [facility]

## 2020-07-09 NOTE — TRANSPORTATION MEDICAL NECESSITY
Section I - General Information    Name of Patient: Migel Yeung                 : 1934    Medicare #: [id number]  Transport Date: 20 (PCS is valid for round trips on this date and for all repetitive trips in the 60-day range as noted below )  Origin: Susan Diaz [facility]: [facility]  Is the pt's stay covered under Medicare Part A (PPS/DRG)   []     Closest appropriate facility? If no, why is transport to more distant facility required? Yes  If hospice pt, is this transport related to pt's terminal illness? No       Section II - Medical Necessity Questionnaire  Ambulance transportation is medically necessary only if other means of transport are contraindicated or would be potentially harmful to the patient  To meet this requirement, the patient must either be "bed confined" or suffer from a condition such that transport by means other than ambulance is contraindicated by the patient's condition  The following questions must be answered by the medical professional signing below for this form to be valid:    1)  Describe the MEDICAL CONDITION (physical and/or mental) of this patient AT [address] [place] [address] that requires the patient to be transported in an ambulance and why transport by other means is contraindicated by the patient's condition: Decreased Endurance, impaired balance  Decreased mobility, Decreased strength, Decreased skin integrity    2) Is the patient "bed confined" as defined below? Yes  To be "be confined" the patient must satisfy all three of the following conditions: (1) unable to get up from bed without Assistance; AND (2) unable to ambulate; AND (3) unable to sit in a chair or wheelchair  3) Can this patient safely be transported by car or wheelchair van (i e , seated during transport without a medical attendant or monitoring)?    No    4) In addition to completing questions 1-3 above, please check any of the following conditions that apply*:   *Note: supporting documentation for any boxes checked must be maintained in the patient's medical records  If hosp-hosp transfer, describe services needed at 2nd facility not available at 1st facility? Patient is confused  Moderate/severe pain on movement   Medical attendant required   Unable to tolerate seated position for time needed to transport       Section III - Signature of Physician or Healthcare Professional  I certify that the above information is true and correct based on my evaluation of this patient, and represent that the patient requires transport by ambulance and that other forms of transport are contraindicated  I understand that this information will be used by the Centers for Medicare and Medicaid Services (CMS) to support the determination of medical necessity for ambulance services, and I represent that I have personal knowledge of the patient's condition at time of transport  []  If this box is checked, I also certify that the patient is physically or mentally incapable of signing the ambulance service's claim and that the institution with which I am affiliated has furnished care, services, or assistance to the patient  My signature below is made on behalf of the patient pursuant to 42 CFR §424 36(b)(4)   In accordance with 42 CFR §424 37, the specific reason(s) that the patient is physically or mentally incapable of signing the claim form is as follows:N/A       Signature of Physician* or Healthcare Professional______________________________________________________________  Signature Date 07/09/20 (For scheduled repetitive transports, this form is not valid for transports performed more than 60 days after this date)    Printed Name & Credentials of Physician or Healthcare Professional (MD, DO, RN, etc )_Marisol Velarde  *Form must be signed by patient's attending physician for scheduled, repetitive transports   For non-repetitive, unscheduled ambulance transports, if unable to obtain the signature of the attending physician, any of the following may sign (choose appropriate option below)  [] Physician Assistant []  Clinical Nurse Specialist []  Registered Nurse  []  Nurse Practitioner  [x] Discharge Planner

## 2020-07-09 NOTE — SOCIAL WORK
Jamaal spoke with Oleta Apgar at [facility] [phone] and informed him that the pt was discharged  to the [facility]

## 2020-07-09 NOTE — SOCIAL WORK
MARIA ELENA spoke with Savita at [facility] at [address]  She states that they have submitted for auth and she anticipates auth within the next couple hours  MARIA ELENA discussed an afternoon admission and will set up BLS transport    MARIA ELENA contacted Meme Ventura and transport Rachel Escobar 906 transport has been requested for 3pm     The transport was confirmed for 3pm today

## 2020-07-09 NOTE — DISCHARGE INSTRUCTIONS
Fall Prevention   WHAT YOU NEED TO KNOW:   Fall prevention includes ways to make your home and other areas safer  It also includes ways you can move more carefully to prevent a fall  Health conditions that cause changes in your blood pressure, vision, or muscle strength and coordination may increase your risk for falls  Medicines may also increase your risk for falls if they make you dizzy, weak, or sleepy  DISCHARGE INSTRUCTIONS:   Call 911 or have someone else call if:   · You have fallen and are unconscious  · You have fallen and cannot move part of your body  Contact your healthcare provider if:   · You have fallen and have pain or a headache  · You have questions or concerns about your condition or care  Fall prevention tips:   · Stand or sit up slowly  This may help you keep your balance and prevent falls  · Use assistive devices as directed  Your healthcare provider may suggest that you use a cane or walker to help you keep your balance  You may need to have grab bars put in your bathroom near the toilet or in the shower  · Wear shoes that fit well and have soles that   Wear shoes both inside and outside  Use slippers with good   Do not wear shoes with high heels  · Wear a personal alarm  This is a device that allows you to call 911 if you fall and need help  Ask your healthcare provider for more information  · Stay active  Exercise can help strengthen your muscles and improve your balance  Your healthcare provider may recommend water aerobics or walking  He or she may also recommend physical therapy to improve your coordination  Never start an exercise program without talking to your healthcare provider first      · Manage your medical conditions  Keep all appointments with your healthcare providers  Visit your eye doctor as directed  Home safety tips:   · Add items to prevent falls in the bathroom    Put nonslip strips on your bath or shower floor to prevent you from slipping  Use a bath mat if you do not have carpet in the bathroom  This will prevent you from falling when you step out of the bath or shower  Use a shower seat so you do not need to stand while you shower  Sit on the toilet or a chair in your bathroom to dry yourself and put on clothing  This will prevent you from losing your balance from drying or dressing yourself while you are standing  · Keep paths clear  Remove books, shoes, and other objects from walkways and stairs  Place cords for telephones and lamps out of the way so that you do not need to walk over them  Tape them down if you cannot move them  Remove small rugs  If you cannot remove a rug, secure it with double-sided tape  This will prevent you from tripping  · Install bright lights in your home  Use night lights to help light paths to the bathroom or kitchen  Always turn on the light before you start walking  · Keep items you use often on shelves within reach  Do not use a step stool to help you reach an item  · Paint or place reflective tape on the edges of your stairs  This will help you see the stairs better  Follow up with your healthcare provider as directed:  Write down your questions so you remember to ask them during your visits  © 2017 [address] Information is for End User's use only and may not be sold, redistributed or otherwise used for commercial purposes  All illustrations and images included in CareNotes® are the copyrighted property of A D A M , Inc  or Randall Bass  The above information is an  only  It is not intended as medical advice for individual conditions or treatments  Talk to your doctor, nurse or pharmacist before following any medical regimen to see if it is safe and effective for you

## 2020-07-10 ENCOUNTER — TRANSITIONAL CARE MANAGEMENT (OUTPATIENT)
Dept: INTERNAL MEDICINE CLINIC | Facility: CLINIC | Age: 85
End: 2020-07-10

## 2020-07-24 ENCOUNTER — TELEPHONE (OUTPATIENT)
Dept: INTERNAL MEDICINE CLINIC | Facility: CLINIC | Age: 85
End: 2020-07-24

## 2020-07-24 DIAGNOSIS — F31.9 BIPOLAR 1 DISORDER (HCC): Primary | ICD-10-CM

## 2020-07-24 DIAGNOSIS — I73.9 PERIPHERAL VASCULAR DISEASE (HCC): ICD-10-CM

## 2020-07-24 DIAGNOSIS — R53.1 GENERALIZED WEAKNESS: ICD-10-CM

## 2020-07-24 DIAGNOSIS — Z85.46 HISTORY OF PROSTATE CANCER: ICD-10-CM

## 2020-07-24 DIAGNOSIS — R26.9 GAIT DISTURBANCE: ICD-10-CM

## 2020-07-24 NOTE — TELEPHONE ENCOUNTER
Patient is back at [facility]  They need orders for PT/OT Evaluate and Treat  Also will need an order for [address]   Please fax these orders to [phone]

## 2020-07-27 NOTE — TELEPHONE ENCOUNTER
Susan Husain called from [facility] to let Dr Guera Ovalle know that the patient justin's name is Georgina from [address] [place] [address] she will be seeing him next Wednesday  The patient will be getting PT, OT and visiting nurse and psych nurse  She also wanted to see if Dr Guera Ovalle can sign a plan of care for this patient?     For any questions call Susan Husain [phone]

## 2020-08-26 DIAGNOSIS — E78.2 MIXED HYPERLIPIDEMIA: Primary | Chronic | ICD-10-CM

## 2020-08-26 RX ORDER — ATORVASTATIN CALCIUM 10 MG/1
TABLET, FILM COATED ORAL
Qty: 30 TABLET | Refills: 5 | Status: SHIPPED | OUTPATIENT
Start: 2020-08-26

## 2020-08-31 ENCOUNTER — TELEPHONE (OUTPATIENT)
Dept: INTERNAL MEDICINE CLINIC | Facility: CLINIC | Age: 85
End: 2020-08-31

## 2020-08-31 DIAGNOSIS — F31.9 BIPOLAR 1 DISORDER (HCC): ICD-10-CM

## 2020-08-31 DIAGNOSIS — F31.9 BIPOLAR 1 DISORDER (HCC): Primary | ICD-10-CM

## 2020-08-31 RX ORDER — DIVALPROEX SODIUM 250 MG/1
TABLET, EXTENDED RELEASE ORAL
Qty: 90 TABLET | Refills: 5 | Status: SHIPPED | OUTPATIENT
Start: 2020-08-31 | End: 2020-09-14 | Stop reason: SDUPTHER

## 2020-08-31 NOTE — TELEPHONE ENCOUNTER
Carlos Harris from [facility] scheduled pt's 6 month follow up for 9/14/20  Wants to know if he needs labs prior to his appt       Fax orders to [phone]

## 2020-09-03 ENCOUNTER — TELEPHONE (OUTPATIENT)
Dept: INTERNAL MEDICINE CLINIC | Facility: CLINIC | Age: 85
End: 2020-09-03

## 2020-09-11 ENCOUNTER — TELEPHONE (OUTPATIENT)
Dept: INTERNAL MEDICINE CLINIC | Facility: CLINIC | Age: 85
End: 2020-09-11

## 2020-09-14 ENCOUNTER — OFFICE VISIT (OUTPATIENT)
Dept: INTERNAL MEDICINE CLINIC | Facility: CLINIC | Age: 85
End: 2020-09-14
Payer: COMMERCIAL

## 2020-09-14 ENCOUNTER — TELEPHONE (OUTPATIENT)
Dept: INTERNAL MEDICINE CLINIC | Facility: CLINIC | Age: 85
End: 2020-09-14

## 2020-09-14 VITALS
DIASTOLIC BLOOD PRESSURE: 78 MMHG | SYSTOLIC BLOOD PRESSURE: 126 MMHG | HEART RATE: 84 BPM | BODY MASS INDEX: 23.62 KG/M2 | OXYGEN SATURATION: 95 % | WEIGHT: 154.2 LBS | TEMPERATURE: 97.3 F

## 2020-09-14 DIAGNOSIS — Z00.00 MEDICARE ANNUAL WELLNESS VISIT, SUBSEQUENT: ICD-10-CM

## 2020-09-14 DIAGNOSIS — Z23 ENCOUNTER FOR IMMUNIZATION: ICD-10-CM

## 2020-09-14 DIAGNOSIS — I10 ESSENTIAL HYPERTENSION: ICD-10-CM

## 2020-09-14 DIAGNOSIS — Z85.46 HISTORY OF PROSTATE CANCER: Chronic | ICD-10-CM

## 2020-09-14 DIAGNOSIS — F31.9 BIPOLAR 1 DISORDER (HCC): Primary | ICD-10-CM

## 2020-09-14 DIAGNOSIS — E78.2 MIXED HYPERLIPIDEMIA: Chronic | ICD-10-CM

## 2020-09-14 PROCEDURE — G0439 PPPS, SUBSEQ VISIT: HCPCS | Performed by: INTERNAL MEDICINE

## 2020-09-14 PROCEDURE — 1170F FXNL STATUS ASSESSED: CPT | Performed by: INTERNAL MEDICINE

## 2020-09-14 PROCEDURE — 99214 OFFICE O/P EST MOD 30 MIN: CPT | Performed by: INTERNAL MEDICINE

## 2020-09-14 PROCEDURE — 1160F RVW MEDS BY RX/DR IN RCRD: CPT | Performed by: INTERNAL MEDICINE

## 2020-09-14 PROCEDURE — 4004F PT TOBACCO SCREEN RCVD TLK: CPT | Performed by: INTERNAL MEDICINE

## 2020-09-14 PROCEDURE — 1125F AMNT PAIN NOTED PAIN PRSNT: CPT | Performed by: INTERNAL MEDICINE

## 2020-09-14 PROCEDURE — 4040F PNEUMOC VAC/ADMIN/RCVD: CPT | Performed by: INTERNAL MEDICINE

## 2020-09-14 PROCEDURE — G0009 ADMIN PNEUMOCOCCAL VACCINE: HCPCS | Performed by: INTERNAL MEDICINE

## 2020-09-14 PROCEDURE — 3078F DIAST BP <80 MM HG: CPT | Performed by: INTERNAL MEDICINE

## 2020-09-14 PROCEDURE — 90732 PPSV23 VACC 2 YRS+ SUBQ/IM: CPT | Performed by: INTERNAL MEDICINE

## 2020-09-14 RX ORDER — DIVALPROEX SODIUM 500 MG/1
1000 TABLET, EXTENDED RELEASE ORAL DAILY
Qty: 60 TABLET | Refills: 5 | Status: SHIPPED | OUTPATIENT
Start: 2020-09-14

## 2020-09-14 NOTE — PATIENT INSTRUCTIONS
Medicare Preventive Visit Patient Instructions  Thank you for completing your Welcome to Medicare Visit or Medicare Annual Wellness Visit today  Your next wellness visit will be due in one year (9/14/2021)  The screening/preventive services that you may require over the next 5-10 years are detailed below  Some tests may not apply to you based off risk factors and/or age  Screening tests ordered at today's visit but not completed yet may show as past due  Also, please note that scanned in results may not display below  Preventive Screenings:  Service Recommendations Previous Testing/Comments   Colorectal Cancer Screening  · Colonoscopy    · Fecal Occult Blood Test (FOBT)/Fecal Immunochemical Test (FIT)  · Fecal DNA/Cologuard Test  · Flexible Sigmoidoscopy Age: 54-65 years old   Colonoscopy: every 10 years (May be performed more frequently if at higher risk)  OR  FOBT/FIT: every 1 year  OR  Cologuard: every 3 years  OR  Sigmoidoscopy: every 5 years  Screening may be recommended earlier than age 48 if at higher risk for colorectal cancer  Also, an individualized decision between you and your healthcare provider will decide whether screening between the ages of 74-80 would be appropriate   Colonoscopy: Not on file  FOBT/FIT: Not on file  Cologuard: Not on file  Sigmoidoscopy: Not on file    Screening Not Indicated     Prostate Cancer Screening Individualized decision between patient and health care provider in men between ages of 53-78   Medicare will cover every 12 months beginning on the day after your 50th birthday PSA: No results in last 5 years     History Prostate Cancer  Screening Not Indicated     Hepatitis C Screening Once for adults born between [birth year] and 1965  More frequently in patients at high risk for Hepatitis C Hep C Antibody: Not on file    Screening Not Indicated   Diabetes Screening 1-2 times per year if you're at risk for diabetes or have pre-diabetes Fasting glucose: 82 mg/dL   A1C: No results in last 5 years    Screening Current   Cholesterol Screening Once every 5 years if you don't have a lipid disorder  May order more often based on risk factors  Lipid panel: 04/01/2020    Screening Not Indicated  History Lipid Disorder      Other Preventive Screenings Covered by Medicare:  1  Abdominal Aortic Aneurysm (AAA) Screening: covered once if your at risk  You're considered to be at risk if you have a family history of AAA or a male between the age of 73-68 who smoking at least 100 cigarettes in your lifetime  2  Lung Cancer Screening: covers low dose CT scan once per year if you meet all of the following conditions: (1) Age 50-69; (2) No signs or symptoms of lung cancer; (3) Current smoker or have quit smoking within the last 15 years; (4) You have a tobacco smoking history of at least 30 pack years (packs per day x number of years you smoked); (5) You get a written order from a healthcare provider  3  Glaucoma Screening: covered annually if you're considered high risk: (1) You have diabetes OR (2) Family history of glaucoma OR (3)  aged 48 and older OR (3)  American aged 72 and older  3  Osteoporosis Screening: covered every 2 years if you meet one of the following conditions: (1) Have a vertebral abnormality; (2) On glucocorticoid therapy for more than 3 months; (3) Have primary hyperparathyroidism; (4) On osteoporosis medications and need to assess response to drug therapy  5  HIV Screening: covered annually if you're between the age of 12-76  Also covered annually if you are younger than 13 and older than 72 with risk factors for HIV infection  For pregnant patients, it is covered up to 3 times per pregnancy      Immunizations:  Immunization Recommendations   Influenza Vaccine Annual influenza vaccination during flu season is recommended for all persons aged >= 6 months who do not have contraindications   Pneumococcal Vaccine (Prevnar and Pneumovax)  * Prevnar = PCV13  * Pneumovax = PPSV23 Adults 25-60 years old: 1-3 doses may be recommended based on certain risk factors  Adults 72 years old: Prevnar (PCV13) vaccine recommended followed by Pneumovax (PPSV23) vaccine  If already received PPSV23 since turning 65, then PCV13 recommended at least one year after PPSV23 dose  Hepatitis B Vaccine 3 dose series if at intermediate or high risk (ex: diabetes, end stage renal disease, liver disease)   Tetanus (Td) Vaccine - COST NOT COVERED BY MEDICARE PART B Following completion of primary series, a booster dose should be given every 10 years to maintain immunity against tetanus  Td may also be given as tetanus wound prophylaxis  Tdap Vaccine - COST NOT COVERED BY MEDICARE PART B Recommended at least once for all adults  For pregnant patients, recommended with each pregnancy  Shingles Vaccine (Shingrix) - COST NOT COVERED BY MEDICARE PART B  2 shot series recommended in those aged 48 and above     Health Maintenance Due:  There are no preventive care reminders to display for this patient  Immunizations Due:      Topic Date Due    DTaP,Tdap,and Td Vaccines (1 - Tdap) 10/01/1955    Pneumococcal Vaccine: 65+ Years (2 of 2 - PPSV23) 03/05/2020    Influenza Vaccine  07/01/2020     Advance Directives   What are advance directives? Advance directives are legal documents that state your wishes and plans for medical care  These plans are made ahead of time in case you lose your ability to make decisions for yourself  Advance directives can apply to any medical decision, such as the treatments you want, and if you want to donate organs  What are the types of advance directives? There are many types of advance directives, and each state has rules about how to use them  You may choose a combination of any of the following:  · Living will: This is a written record of the treatment you want  You can also choose which treatments you do not want, which to limit, and which to stop at a certain time  This includes surgery, medicine, IV fluid, and tube feedings  · Durable power of  for healthcare [facility]): This is a written record that states who you want to make healthcare choices for you when you are unable to make them for yourself  This person, called a proxy, is usually a family member or a friend  You may choose more than 1 proxy  · Do not resuscitate (DNR) order:  A DNR order is used in case your heart stops beating or you stop breathing  It is a request not to have certain forms of treatment, such as CPR  A DNR order may be included in other types of advance directives  · Medical directive: This covers the care that you want if you are in a coma, near death, or unable to make decisions for yourself  You can list the treatments you want for each condition  Treatment may include pain medicine, surgery, blood transfusions, dialysis, IV or tube feedings, and a ventilator (breathing machine)  · Values history: This document has questions about your views, beliefs, and how you feel and think about life  This information can help others choose the care that you would choose  Why are advance directives important? An advance directive helps you control your care  Although spoken wishes may be used, it is better to have your wishes written down  Spoken wishes can be misunderstood, or not followed  Treatments may be given even if you do not want them  An advance directive may make it easier for your family to make difficult choices about your care  Cigarette Smoking and Your Health   Risks to your health if you smoke:  Nicotine and other chemicals found in tobacco damage every cell in your body  Even if you are a light smoker, you have an increased risk for cancer, heart disease, and lung disease  If you are pregnant or have diabetes, smoking increases your risk for complications     Benefits to your health if you stop smoking:   · You decrease respiratory symptoms such as coughing, wheezing, and shortness of breath  · You reduce your risk for cancers of the lung, mouth, throat, kidney, bladder, pancreas, stomach, and cervix  If you already have cancer, you increase the benefits of chemotherapy  You also reduce your risk for cancer returning or a second cancer from developing  · You reduce your risk for heart disease, blood clots, heart attack, and stroke  · You reduce your risk for lung infections, and diseases such as pneumonia, asthma, chronic bronchitis, and emphysema  · Your circulation improves  More oxygen can be delivered to your body  If you have diabetes, you lower your risk for complications, such as kidney, artery, and eye diseases  You also lower your risk for nerve damage  Nerve damage can lead to amputations, poor vision, and blindness  · You improve your body's ability to heal and to fight infections  For more information and support to stop smoking:   · Smokefree  gov  Phone: 3- 950 - 827-0546  Web Address: www "[employer]"  [facility] 2018 Information is for End User's use only and may not be sold, redistributed or otherwise used for commercial purposes   All illustrations and images included in CareNotes® are the copyrighted property of A D A M , Inc  or [address] [place] [address]

## 2020-09-14 NOTE — PROGRESS NOTES
Assessment and Plan:     1  Medicare annual wellness visit, subsequent    2  Encounter for immunization  - PNEUMOCOCCAL POLYSACCHARIDE VACCINE 23-VALENT =>1YO SQ IM        Preventive health issues were discussed with patient, and age appropriate screening tests were ordered as noted in patient's After Visit Summary  Personalized health advice and appropriate referrals for health education or preventive services given if needed, as noted in patient's After Visit Summary  History of Present Illness:     Patient presents for Medicare Annual Wellness visit    Patient Care Team:  Robinson Hsu DO as PCP - General (Internal Medicine)  Robinson Hsu DO as PCP - [address] [place] [address] (RTE)     Problem List:     Patient Active Problem List   Diagnosis    Bipolar 1 disorder ([facility] )    Hyperlipidemia    History of prostate cancer    HTN (hypertension)    Fall    Ambulatory dysfunction    [facility]      Past Medical and Surgical History:     Past Medical History:   Diagnosis Date    Arthritis     Chronic venous insufficiency     HTN (hypertension) 10/26/2019    Prostate cancer ([facility] )     PVD (peripheral vascular disease) ([facility] )      Past Surgical History:   Procedure Laterality Date    HERNIA REPAIR      TONSILLECTOMY        Family History:     Family History   Problem Relation Age of Onset    Heart disease Mother     Diabetes Father     Diabetes Sister       Social History:     E-Cigarette/Vaping    E-Cigarette Use Never User      E-Cigarette/Vaping Substances    Nicotine No     THC No     CBD No     Flavoring No     Other No     Unknown No      Social History     Socioeconomic History    Marital status:       Spouse name: None    Number of children: None    Years of education: None    Highest education level: None   Occupational History    None   Social Needs    Financial resource strain: None    Food insecurity     Worry: None     Inability: None  Transportation needs     Medical: None     Non-medical: None   Tobacco Use    Smoking status: Current Some Day Smoker     Years: 10 00     Types: Cigars    Smokeless tobacco: Never Used    Tobacco comment: occasionaly cigar, rarely   Substance and Sexual Activity    Alcohol use: Not Currently     Alcohol/week: 1 0 standard drinks     Types: 1 Standard drinks or equivalent per week     Frequency: 4 or more times a week     Drinks per session: 1 or 2     Binge frequency: Never     Comment: every day, 1 drink    Drug use: No    Sexual activity: Not Currently   Lifestyle    Physical activity     Days per week: 0 days     Minutes per session: 0 min    Stress:  Only a little   Relationships    Social connections     Talks on phone: None     Gets together: None     Attends Sikhism service: None     Active member of club or organization: None     Attends meetings of clubs or organizations: None     Relationship status: None    Intimate partner violence     Fear of current or ex partner: None     Emotionally abused: None     Physically abused: None     Forced sexual activity: None   Other Topics Concern    None   Social History Narrative    None      Medications and Allergies:     Current Outpatient Medications   Medication Sig Dispense Refill    acetaminophen (TYLENOL) 325 mg tablet Take 2 tablets (650 mg total) by mouth every 6 (six) hours as needed for mild pain 30 tablet 0    atorvastatin (LIPITOR) 10 mg tablet 1 TABLET BY MOUTH DAILY IN THE EVENING FOR HYPERLIPIDEMIA *PLEASE REORDER* 30 tablet 5    divalproex sodium (DEPAKOTE ER) 500 mg 24 hr tablet Take 2 tablets (1,000 mg total) by mouth daily 60 tablet 5    lidocaine (LIDODERM) 5 % Apply 1 patch topically daily Remove & Discard patch within 12 hours or as directed by MD  0    rosuvastatin (CRESTOR) 5 mg tablet Take 1 tablet (5 mg total) by mouth daily 90 tablet 3    sertraline (ZOLOFT) 100 mg tablet Take 1 tablet (100 mg total) by mouth daily 30 tablet 5     No current facility-administered medications for this visit  No Known Allergies   Immunizations:     Immunization History   Administered Date(s) Administered    INFLUENZA 12/14/2004, 12/14/2005, 12/04/2006, 10/05/2017    Pneumococcal Conjugate 13-Valent 03/05/2019    Td (adult), adsorbed 04/07/1998      Health Maintenance: There are no preventive care reminders to display for this patient  Topic Date Due    DTaP,Tdap,and Td Vaccines (1 - Tdap) 10/01/1955    Pneumococcal Vaccine: 65+ Years (2 of 2 - PPSV23) 03/05/2020    Influenza Vaccine  07/01/2020      Medicare Health Risk Assessment:     /78 (BP Location: Left arm, Patient Position: Sitting, Cuff Size: Standard)   Pulse 84   Temp (!) 97 3 °F (36 3 °C) (Temporal) Comment: w/ tylenol  Wt 69 9 kg (154 lb 3 2 oz) Comment: w/ shoes denied off  SpO2 95%   BMI 23 62 kg/m²      Carole Arguelles is here for his Subsequent Wellness visit  Last Medicare Wellness visit information reviewed, patient interviewed and updates made to the record today  Health Risk Assessment:   Patient rates overall health as good  Patient feels that their physical health rating is same  Eyesight was rated as slightly worse  Hearing was rated as slightly worse  Patient feels that their emotional and mental health rating is same  Pain experienced in the last 7 days has been none  Patient states that he has experienced no weight loss or gain in last 6 months  Fall Risk Screening: In the past year, patient has experienced: no history of falling in past year      Home Safety:  Patient has trouble with stairs inside or outside of their home  Patient has working smoke alarms and has working carbon monoxide detector  Home safety hazards include: none  Nutrition:   Current diet is Regular  Medications:   Patient is not currently taking any over-the-counter supplements  Patient is not able to manage medications       Activities of Daily Living (ADLs)/Instrumental Activities of Daily Living (IADLs):   Walk and transfer into and out of bed and chair?: Yes  Dress and groom yourself?: Yes    Bathe or shower yourself?: Yes    Feed yourself? Yes  Do your laundry/housekeeping?: No  Manage your money, pay your bills and track your expenses?: No  Make your own meals?: No    Do your own shopping?: No    Durable Medical Equipment Suppliers  None    Previous Hospitalizations:   Any hospitalizations or ED visits within the last 12 months?: Yes    How many hospitalizations have you had in the last year?: 3-4    Advance Care Planning:   Living will: Yes    Durable POA for healthcare: Yes    Advanced directive: Yes    Five wishes given: No      Cognitive Screening:   Provider or family/friend/caregiver concerned regarding cognition?: Yes  Mini-Cog Score: 4  Interpretation: Mini-Cog Score 3-5: Negative screen for dementia     PREVENTIVE SCREENINGS      Cardiovascular Screening:    General: Screening Not Indicated and History Lipid Disorder      Diabetes Screening:     General: Screening Current      Colorectal Cancer Screening:     General: Screening Not Indicated      Prostate Cancer Screening:    General: History Prostate Cancer and Screening Not Indicated      Osteoporosis Screening:    General: Screening Not Indicated      Abdominal Aortic Aneurysm (AAA) Screening:    Risk factors include: tobacco use        Lung Cancer Screening:     General: Screening Not Indicated      Hepatitis C Screening:    General: Screening Not Indicated    Other Counseling Topics:   Car/seat belt/driving safety, skin self-exam, sunscreen and regular weightbearing exercise         Anu Farias DO

## 2020-09-14 NOTE — PROGRESS NOTES
[facility]    NAME: Hailey Cabrera  AGE: 80 y o  SEX: male  : 1934     DATE: 2020     Assessment and Plan:     1  Bipolar 1 disorder (HCC)    Increase depakote to 1000mg daily  Will repeat level before next visit  - divalproex sodium (DEPAKOTE ER) 500 mg 24 hr tablet; Take 2 tablets (1,000 mg total) by mouth daily  Dispense: 60 tablet; Refill: 5  - Valproic acid level, total; Future    2  Essential hypertension    Well controlled in the office today  Will continue to monitor off antihypertensives  3  Mixed hyperlipidemia    Continue statin  Check lipid panel before next appointment  - Lipid panel; Future  - Comprehensive metabolic panel; Future    4  History of prostate cancer    Check diagnostic PSA before next visit  - PSA Total, Diagnostic; Future    Return in about 4 months (around 2021) for Follow-up  Chief Complaint:     Chief Complaint   Patient presents with    Follow-up     6 month        History of Present Illness:     PATIENT PRESENTS FOR ROUTINE FOLLOW-UP  CONTINUES LIVING AT [facility]  CHRONIC ISSUES WITH AMBULATORY DYSFUNCTION AND BIPOLAR DISORDER  HAS BEEN STABLE ON PSYCHIATRIC MEDICATIONS  RECENT VALPROIC ACID LEVEL WAS ON THE LOW SIDE  HE HAS BEEN TAKING 750 MG OF DEPAKOTE  NO WORSENING OF COGNITION  TAKING ATORVASTATIN FOR CHOLESTEROL  DENIES CHEST PAIN, SHORTNESS OF BREATH, PALPITATIONS  Review of Systems:     Review of Systems   Constitutional: Negative for activity change, appetite change and fatigue  HENT: Positive for postnasal drip  Respiratory: Negative for apnea, cough, chest tightness, shortness of breath and wheezing  Cardiovascular: Negative for chest pain, palpitations and leg swelling  Gastrointestinal: Negative for abdominal distention, abdominal pain, blood in stool, constipation, diarrhea, nausea and vomiting     Neurological: Negative for dizziness, weakness, light-headedness, numbness and headaches  Psychiatric/Behavioral: Positive for behavioral problems  Negative for confusion, hallucinations, sleep disturbance and suicidal ideas  The patient is not nervous/anxious  Objective:     /78 (BP Location: Left arm, Patient Position: Sitting, Cuff Size: Standard)   Pulse 84   Temp (!) 97 3 °F (36 3 °C) (Temporal) Comment: w/ tylenol  Wt 69 9 kg (154 lb 3 2 oz) Comment: w/ shoes denied off  SpO2 95%   BMI 23 62 kg/m²     Physical Exam  Vitals signs reviewed  Constitutional:       General: He is not in acute distress  Appearance: He is well-developed  He is not diaphoretic  Eyes:      General: No scleral icterus  Right eye: No discharge  Left eye: No discharge  Conjunctiva/sclera: Conjunctivae normal    Neck:      Musculoskeletal: Neck supple  Thyroid: No thyromegaly  Vascular: No JVD  Cardiovascular:      Rate and Rhythm: Normal rate and regular rhythm  Heart sounds: Normal heart sounds  No murmur  Pulmonary:      Effort: Pulmonary effort is normal  No respiratory distress  Breath sounds: Normal breath sounds  No wheezing or rales  Abdominal:      General: Bowel sounds are normal  There is no distension  Palpations: Abdomen is soft  Tenderness: There is no abdominal tenderness  Musculoskeletal:      Right lower leg: No edema  Left lower leg: No edema  Lymphadenopathy:      Cervical: No cervical adenopathy  Skin:     General: Skin is warm and dry  Neurological:      Mental Status: He is alert     Psychiatric:         Mood and Affect: Mood normal          Behavior: Behavior normal        Ana Maria Ching DO  MEDICAL [id number]

## 2020-09-15 ENCOUNTER — TELEPHONE (OUTPATIENT)
Dept: INTERNAL MEDICINE CLINIC | Facility: CLINIC | Age: 85
End: 2020-09-15

## 2020-09-15 DIAGNOSIS — F31.9 BIPOLAR 1 DISORDER (HCC): Primary | ICD-10-CM

## 2020-09-15 NOTE — TELEPHONE ENCOUNTER
I would agree  Dr Anita Mcneill is with [facility]  I would contact their office [phone]  I did increase his depakote dose yesterday as his level was low      Ok to give verbal order to check x-ray

## 2020-09-18 ENCOUNTER — TELEPHONE (OUTPATIENT)
Dept: INTERNAL MEDICINE CLINIC | Facility: CLINIC | Age: 85
End: 2020-09-18

## 2020-09-22 ENCOUNTER — TELEPHONE (OUTPATIENT)
Dept: INTERNAL MEDICINE CLINIC | Facility: CLINIC | Age: 85
End: 2020-09-22

## 2020-09-22 NOTE — TELEPHONE ENCOUNTER
[facility] will be Faxing over a note to Dr Amaris Saini that Dominique Sandoval has been discharged from their home care services

## 2020-09-24 ENCOUNTER — TELEPHONE (OUTPATIENT)
Dept: INTERNAL MEDICINE CLINIC | Facility: CLINIC | Age: 85
End: 2020-09-24

## 2020-09-28 DIAGNOSIS — F31.9 BIPOLAR 1 DISORDER (HCC): ICD-10-CM

## 2020-09-28 RX ORDER — SERTRALINE HYDROCHLORIDE 100 MG/1
TABLET, FILM COATED ORAL
Qty: 30 TABLET | Refills: 5 | Status: SHIPPED | OUTPATIENT
Start: 2020-09-28

## 2021-01-25 ENCOUNTER — OFFICE VISIT (OUTPATIENT)
Dept: INTERNAL MEDICINE CLINIC | Facility: CLINIC | Age: 86
End: 2021-01-25
Payer: COMMERCIAL

## 2021-01-25 VITALS
SYSTOLIC BLOOD PRESSURE: 122 MMHG | HEART RATE: 64 BPM | TEMPERATURE: 97.3 F | OXYGEN SATURATION: 95 % | DIASTOLIC BLOOD PRESSURE: 76 MMHG | BODY MASS INDEX: 23.71 KG/M2 | WEIGHT: 154.8 LBS

## 2021-01-25 DIAGNOSIS — I10 ESSENTIAL HYPERTENSION: ICD-10-CM

## 2021-01-25 DIAGNOSIS — R26.2 AMBULATORY DYSFUNCTION: ICD-10-CM

## 2021-01-25 DIAGNOSIS — E78.2 MIXED HYPERLIPIDEMIA: Chronic | ICD-10-CM

## 2021-01-25 DIAGNOSIS — I73.9 PVD (PERIPHERAL VASCULAR DISEASE) (HCC): ICD-10-CM

## 2021-01-25 DIAGNOSIS — F31.9 BIPOLAR 1 DISORDER (HCC): Primary | ICD-10-CM

## 2021-01-25 PROCEDURE — 3725F SCREEN DEPRESSION PERFORMED: CPT | Performed by: INTERNAL MEDICINE

## 2021-01-25 PROCEDURE — 99214 OFFICE O/P EST MOD 30 MIN: CPT | Performed by: INTERNAL MEDICINE

## 2021-01-25 PROCEDURE — 1160F RVW MEDS BY RX/DR IN RCRD: CPT | Performed by: INTERNAL MEDICINE

## 2021-01-25 PROCEDURE — 4004F PT TOBACCO SCREEN RCVD TLK: CPT | Performed by: INTERNAL MEDICINE

## 2021-01-25 NOTE — PATIENT INSTRUCTIONS

## 2021-01-25 NOTE — PROGRESS NOTES
[facility]    NAME: Lashon Friday  AGE: 80 y o  SEX: male  : 1934     DATE: 2021     Assessment and Plan:     1  Bipolar 1 disorder ([facility] )  2  Essential hypertension  3  Mixed hyperlipidemia  4  Ambulatory dysfunction  5  [facility]per[facility]) ([facility] )    Health is stable at this time  Continue medications as prescribed   Lab work was reviewed  Asked Anita Clayton to let us know when patient received vaccine  Elevate legs with peripheral vascular disease  Bipolar is stable on current psych meds    Orders Placed This Encounter   Procedures    Basic metabolic panel    CBC           Return in about 4 months (around 2021) for Follow-up  Chief Complaint:     Chief Complaint   Patient presents with    Follow-up     labs- 2021      History of Present Illness:     Patient presents for follow-up and to go over labs  Lab work is stable  Has been feeling generally well  Got the Moderna COVID-19 vaccine  He isn't sure whether he got it today or yesterday  Has problems with ambulatory dysfunction and uses a cane or wheelchair  Has PVD  Review of Systems:     Review of Systems   Constitutional: Negative for activity change, appetite change and fatigue  Respiratory: Negative for apnea, cough, chest tightness, shortness of breath and wheezing  Cardiovascular: Negative for chest pain, palpitations and leg swelling  Gastrointestinal: Negative for abdominal distention, abdominal pain, blood in stool, constipation, diarrhea, nausea and vomiting  Musculoskeletal: Positive for arthralgias and gait problem  Negative for back pain  Neurological: Negative for dizziness, weakness, light-headedness, numbness and headaches  Psychiatric/Behavioral: Negative for behavioral problems, confusion, hallucinations, sleep disturbance and suicidal ideas  The patient is not nervous/anxious         Problem List:     Patient Active Problem List Diagnosis    Bipolar 1 disorder ([facility] )    Hyperlipidemia    History of prostate cancer    HTN (hypertension)    Fall    Ambulatory dysfunction    PVD [facility]peripheral vascular disease) ([facility])      Objective:     /76 (BP Location: Left arm, Patient Position: Sitting, Cuff Size: Standard)   Pulse 64   Temp (!) 97 3 °F (36 3 °C) (Temporal) Comment: NO NSAIDS  Wt 70 2 kg (154 lb 12 8 oz) Comment: w/ shoes denied off  SpO2 95%   BMI 23 71 kg/m²     Physical Exam  Vitals signs reviewed  Constitutional:       General: He is not in acute distress  Appearance: He is well-developed  He is not diaphoretic  Neck:      Musculoskeletal: Neck supple  Thyroid: No thyromegaly  Vascular: No JVD  Cardiovascular:      Rate and Rhythm: Normal rate and regular rhythm  Heart sounds: Normal heart sounds  No murmur  Pulmonary:      Effort: Pulmonary effort is normal  No respiratory distress  Breath sounds: Normal breath sounds  No wheezing or rales  Abdominal:      General: Bowel sounds are normal  There is no distension  Palpations: Abdomen is soft  Tenderness: There is no abdominal tenderness  Musculoskeletal:         General: Tenderness (lower legs; has vascular disease) present  Right lower leg: No edema  Left lower leg: No edema  Lymphadenopathy:      Cervical: No cervical adenopathy  Skin:     General: Skin is warm and dry  Neurological:      Mental Status: He is alert  Mental status is at baseline  Gait: Gait abnormal (not tested; in wheelchair)     Psychiatric:         Mood and Affect: Mood normal          Behavior: Behavior normal        Conor Horner DO  MEDICAL [id number]

## 2021-03-18 ENCOUNTER — TELEPHONE (OUTPATIENT)
Dept: INTERNAL MEDICINE CLINIC | Facility: CLINIC | Age: 86
End: 2021-03-18

## 2021-05-03 ENCOUNTER — TELEPHONE (OUTPATIENT)
Dept: INTERNAL MEDICINE CLINIC | Facility: CLINIC | Age: 86
End: 2021-05-03

## 2021-05-03 NOTE — TELEPHONE ENCOUNTER
Lorenza Isaac     Pt has appt coming up and [facility] is asking for any testing orders, labs etc,  to be faxed to [facility]        Fax

## 2021-05-21 ENCOUNTER — OFFICE VISIT (OUTPATIENT)
Dept: INTERNAL MEDICINE CLINIC | Facility: CLINIC | Age: 86
End: 2021-05-21
Payer: COMMERCIAL

## 2021-05-21 VITALS
SYSTOLIC BLOOD PRESSURE: 122 MMHG | HEART RATE: 72 BPM | OXYGEN SATURATION: 97 % | DIASTOLIC BLOOD PRESSURE: 84 MMHG | WEIGHT: 152.2 LBS | BODY MASS INDEX: 23.31 KG/M2 | TEMPERATURE: 97.4 F

## 2021-05-21 DIAGNOSIS — I10 ESSENTIAL HYPERTENSION: ICD-10-CM

## 2021-05-21 DIAGNOSIS — F31.9 BIPOLAR 1 DISORDER (HCC): Primary | ICD-10-CM

## 2021-05-21 DIAGNOSIS — E78.2 MIXED HYPERLIPIDEMIA: Chronic | ICD-10-CM

## 2021-05-21 PROCEDURE — 1160F RVW MEDS BY RX/DR IN RCRD: CPT | Performed by: INTERNAL MEDICINE

## 2021-05-21 PROCEDURE — 99214 OFFICE O/P EST MOD 30 MIN: CPT | Performed by: INTERNAL MEDICINE

## 2021-05-21 PROCEDURE — 4004F PT TOBACCO SCREEN RCVD TLK: CPT | Performed by: INTERNAL MEDICINE

## 2021-05-21 NOTE — PATIENT INSTRUCTIONS

## 2021-05-21 NOTE — PROGRESS NOTES
[facility]    NAME: Francisco Rodriguez  AGE: 80 y o  SEX: male  : 1934     DATE: 2021     Assessment and Plan:     1  Bipolar 1 disorder (HCC)    Stable on sertraline and depakote ER  Will monitor     - Valproic acid level, total; Future  - CBC; Future  - Comprehensive metabolic panel; Future    2  Essential hypertension    Stable off anti-hypertension medications  Will monitor  3  Mixed hyperlipidemia    Cholesterol controlled on statin  Will continue and repeat lipids before next visit  - Lipid panel; Future        Return in about 4 months (around 2021) for Subsequent AWV  Chief Complaint:     Chief Complaint   Patient presents with    Follow-up     4 month and labs- 2021        History of Present Illness:     Patient presents for follow-up  No changes with his health  Got vaccinated months ago  Taking medications  CBC/BMP are normal      Review of Systems:     Review of Systems   Constitutional: Negative  Respiratory: Negative  Cardiovascular: Negative  Gastrointestinal: Negative  Objective:     /84 (BP Location: Left arm, Patient Position: Sitting, Cuff Size: Standard)   Pulse 72   Temp (!) 97 4 °F (36 3 °C) (Temporal) Comment: NO NSAIDS  Wt 69 kg (152 lb 3 2 oz) Comment: w/ shoes denied off  SpO2 97%   BMI 23 31 kg/m²     Physical Exam  Constitutional:       General: He is not in acute distress  Appearance: He is not ill-appearing  Cardiovascular:      Rate and Rhythm: Normal rate and regular rhythm  Heart sounds: No murmur  Pulmonary:      Effort: Pulmonary effort is normal  No respiratory distress  Breath sounds: No wheezing  Abdominal:      General: Bowel sounds are normal  There is no distension  Tenderness: There is no abdominal tenderness  Neurological:      Mental Status: He is alert  Mental status is at baseline     Psychiatric:         Mood and Affect: Mood normal  Behavior: Behavior normal        [facility] [address]

## 2021-07-07 ENCOUNTER — APPOINTMENT (EMERGENCY)
Dept: RADIOLOGY | Facility: HOSPITAL | Age: 86
DRG: 884 | End: 2021-07-07
Payer: COMMERCIAL

## 2021-07-07 ENCOUNTER — HOSPITAL ENCOUNTER (INPATIENT)
Facility: HOSPITAL | Age: 86
LOS: 10 days | Discharge: HOME HEALTH | DRG: 884 | End: 2021-07-20
Attending: EMERGENCY MEDICINE | Admitting: FAMILY MEDICINE
Payer: COMMERCIAL

## 2021-07-07 ENCOUNTER — APPOINTMENT (EMERGENCY)
Dept: CT IMAGING | Facility: HOSPITAL | Age: 86
DRG: 884 | End: 2021-07-07
Payer: COMMERCIAL

## 2021-07-07 ENCOUNTER — TELEPHONE (OUTPATIENT)
Dept: INTERNAL MEDICINE CLINIC | Facility: CLINIC | Age: 86
End: 2021-07-07

## 2021-07-07 DIAGNOSIS — F31.9 BIPOLAR 1 DISORDER (HCC): ICD-10-CM

## 2021-07-07 DIAGNOSIS — S22.080A T12 COMPRESSION FRACTURE (HCC): ICD-10-CM

## 2021-07-07 DIAGNOSIS — R45.1 AGITATION: Primary | ICD-10-CM

## 2021-07-07 DIAGNOSIS — R41.82 ALTERED MENTAL STATUS: ICD-10-CM

## 2021-07-07 PROBLEM — R41.0 DISORIENTATION: Status: ACTIVE | Noted: 2021-07-07

## 2021-07-07 PROBLEM — F39 MOOD DISORDER (HCC): Status: ACTIVE | Noted: 2021-07-07

## 2021-07-07 LAB
ALBUMIN SERPL BCP-MCNC: 3 G/DL (ref 3.5–5)
ALP SERPL-CCNC: 82 U/L (ref 46–116)
ALT SERPL W P-5'-P-CCNC: 13 U/L (ref 12–78)
AMMONIA PLAS-SCNC: <10 UMOL/L (ref 11–35)
ANION GAP SERPL CALCULATED.3IONS-SCNC: 8 MMOL/L (ref 4–13)
AST SERPL W P-5'-P-CCNC: 15 U/L (ref 5–45)
BACTERIA UR QL AUTO: ABNORMAL /HPF
BASOPHILS # BLD AUTO: 0.04 THOUSANDS/ΜL (ref 0–0.1)
BASOPHILS NFR BLD AUTO: 1 % (ref 0–1)
BILIRUB SERPL-MCNC: 0.52 MG/DL (ref 0.2–1)
BILIRUB UR QL STRIP: NEGATIVE
BUN SERPL-MCNC: 11 MG/DL (ref 5–25)
CALCIUM ALBUM COR SERPL-MCNC: 9.8 MG/DL (ref 8.3–10.1)
CALCIUM SERPL-MCNC: 9 MG/DL (ref 8.3–10.1)
CHLORIDE SERPL-SCNC: 106 MMOL/L (ref 100–108)
CLARITY UR: ABNORMAL
CO2 SERPL-SCNC: 29 MMOL/L (ref 21–32)
COLOR UR: YELLOW
CREAT SERPL-MCNC: 0.85 MG/DL (ref 0.6–1.3)
EOSINOPHIL # BLD AUTO: 0.05 THOUSAND/ΜL (ref 0–0.61)
EOSINOPHIL NFR BLD AUTO: 1 % (ref 0–6)
ERYTHROCYTE [DISTWIDTH] IN BLOOD BY AUTOMATED COUNT: 13.4 % (ref 11.6–15.1)
GFR SERPL CREATININE-BSD FRML MDRD: 79 ML/MIN/1.73SQ M
GLUCOSE SERPL-MCNC: 84 MG/DL (ref 65–140)
GLUCOSE SERPL-MCNC: 89 MG/DL (ref 65–140)
GLUCOSE UR STRIP-MCNC: NEGATIVE MG/DL
HCT VFR BLD AUTO: 41.8 % (ref 36.5–49.3)
HGB BLD-MCNC: 13.8 G/DL (ref 12–17)
HGB UR QL STRIP.AUTO: ABNORMAL
IMM GRANULOCYTES # BLD AUTO: 0.02 THOUSAND/UL (ref 0–0.2)
IMM GRANULOCYTES NFR BLD AUTO: 0 % (ref 0–2)
KETONES UR STRIP-MCNC: ABNORMAL MG/DL
LACTATE SERPL-SCNC: 0.8 MMOL/L (ref 0.5–2)
LEUKOCYTE ESTERASE UR QL STRIP: NEGATIVE
LIPASE SERPL-CCNC: 75 U/L (ref 73–393)
LYMPHOCYTES # BLD AUTO: 1.94 THOUSANDS/ΜL (ref 0.6–4.47)
LYMPHOCYTES NFR BLD AUTO: 28 % (ref 14–44)
MAGNESIUM SERPL-MCNC: 1.6 MG/DL (ref 1.6–2.6)
MCH RBC QN AUTO: 31 PG (ref 26.8–34.3)
MCHC RBC AUTO-ENTMCNC: 33 G/DL (ref 31.4–37.4)
MCV RBC AUTO: 94 FL (ref 82–98)
MONOCYTES # BLD AUTO: 0.6 THOUSAND/ΜL (ref 0.17–1.22)
MONOCYTES NFR BLD AUTO: 9 % (ref 4–12)
NEUTROPHILS # BLD AUTO: 4.2 THOUSANDS/ΜL (ref 1.85–7.62)
NEUTS SEG NFR BLD AUTO: 61 % (ref 43–75)
NITRITE UR QL STRIP: NEGATIVE
NON-SQ EPI CELLS URNS QL MICRO: ABNORMAL /HPF
NRBC BLD AUTO-RTO: 0 /100 WBCS
PH UR STRIP.AUTO: 7 [PH]
PLATELET # BLD AUTO: 163 THOUSANDS/UL (ref 149–390)
PMV BLD AUTO: 10.3 FL (ref 8.9–12.7)
POTASSIUM SERPL-SCNC: 4.2 MMOL/L (ref 3.5–5.3)
PROT SERPL-MCNC: 6.1 G/DL (ref 6.4–8.2)
PROT UR STRIP-MCNC: NEGATIVE MG/DL
RBC # BLD AUTO: 4.45 MILLION/UL (ref 3.88–5.62)
RBC #/AREA URNS AUTO: ABNORMAL /HPF
SODIUM SERPL-SCNC: 143 MMOL/L (ref 136–145)
SP GR UR STRIP.AUTO: 1.01 (ref 1–1.03)
TROPONIN I SERPL-MCNC: <0.02 NG/ML
TSH SERPL DL<=0.05 MIU/L-ACNC: 3.57 UIU/ML (ref 0.36–3.74)
UROBILINOGEN UR QL STRIP.AUTO: 1 E.U./DL
WBC # BLD AUTO: 6.85 THOUSAND/UL (ref 4.31–10.16)
WBC #/AREA URNS AUTO: ABNORMAL /HPF

## 2021-07-07 PROCEDURE — 84443 ASSAY THYROID STIM HORMONE: CPT | Performed by: INTERNAL MEDICINE

## 2021-07-07 PROCEDURE — 99285 EMERGENCY DEPT VISIT HI MDM: CPT | Performed by: EMERGENCY MEDICINE

## 2021-07-07 PROCEDURE — 83690 ASSAY OF LIPASE: CPT | Performed by: EMERGENCY MEDICINE

## 2021-07-07 PROCEDURE — 83735 ASSAY OF MAGNESIUM: CPT | Performed by: EMERGENCY MEDICINE

## 2021-07-07 PROCEDURE — 82948 REAGENT STRIP/BLOOD GLUCOSE: CPT

## 2021-07-07 PROCEDURE — 82140 ASSAY OF AMMONIA: CPT | Performed by: INTERNAL MEDICINE

## 2021-07-07 PROCEDURE — 71046 X-RAY EXAM CHEST 2 VIEWS: CPT

## 2021-07-07 PROCEDURE — 85025 COMPLETE CBC W/AUTO DIFF WBC: CPT | Performed by: EMERGENCY MEDICINE

## 2021-07-07 PROCEDURE — 93005 ELECTROCARDIOGRAM TRACING: CPT

## 2021-07-07 PROCEDURE — 99285 EMERGENCY DEPT VISIT HI MDM: CPT

## 2021-07-07 PROCEDURE — 81001 URINALYSIS AUTO W/SCOPE: CPT | Performed by: EMERGENCY MEDICINE

## 2021-07-07 PROCEDURE — 80053 COMPREHEN METABOLIC PANEL: CPT | Performed by: EMERGENCY MEDICINE

## 2021-07-07 PROCEDURE — 36415 COLL VENOUS BLD VENIPUNCTURE: CPT | Performed by: EMERGENCY MEDICINE

## 2021-07-07 PROCEDURE — 83605 ASSAY OF LACTIC ACID: CPT | Performed by: EMERGENCY MEDICINE

## 2021-07-07 PROCEDURE — 84484 ASSAY OF TROPONIN QUANT: CPT | Performed by: EMERGENCY MEDICINE

## 2021-07-07 PROCEDURE — 96360 HYDRATION IV INFUSION INIT: CPT

## 2021-07-07 PROCEDURE — 70450 CT HEAD/BRAIN W/O DYE: CPT

## 2021-07-07 PROCEDURE — 96361 HYDRATE IV INFUSION ADD-ON: CPT

## 2021-07-07 PROCEDURE — 99220 PR INITIAL OBSERVATION CARE/DAY 70 MINUTES: CPT | Performed by: INTERNAL MEDICINE

## 2021-07-07 PROCEDURE — 74177 CT ABD & PELVIS W/CONTRAST: CPT

## 2021-07-07 RX ORDER — DIVALPROEX SODIUM 500 MG/1
1000 TABLET, EXTENDED RELEASE ORAL DAILY
Status: DISCONTINUED | OUTPATIENT
Start: 2021-07-07 | End: 2021-07-20 | Stop reason: HOSPADM

## 2021-07-07 RX ORDER — ASPIRIN 81 MG/1
81 TABLET ORAL DAILY
Status: DISCONTINUED | OUTPATIENT
Start: 2021-07-07 | End: 2021-07-20 | Stop reason: HOSPADM

## 2021-07-07 RX ORDER — ATORVASTATIN CALCIUM 10 MG/1
10 TABLET, FILM COATED ORAL
Status: DISCONTINUED | OUTPATIENT
Start: 2021-07-07 | End: 2021-07-20 | Stop reason: HOSPADM

## 2021-07-07 RX ORDER — OXYCODONE HYDROCHLORIDE 5 MG/1
5 TABLET ORAL EVERY 4 HOURS PRN
COMMUNITY
End: 2022-01-07 | Stop reason: CLARIF

## 2021-07-07 RX ORDER — SENNA AND DOCUSATE SODIUM 50; 8.6 MG/1; MG/1
1 TABLET, FILM COATED ORAL 2 TIMES DAILY
COMMUNITY

## 2021-07-07 RX ORDER — ASPIRIN 81 MG/1
81 TABLET, CHEWABLE ORAL DAILY
COMMUNITY

## 2021-07-07 RX ORDER — AMLODIPINE BESYLATE 5 MG/1
5 TABLET ORAL DAILY
Status: DISCONTINUED | OUTPATIENT
Start: 2021-07-07 | End: 2021-07-20 | Stop reason: HOSPADM

## 2021-07-07 RX ORDER — LIDOCAINE 50 MG/G
1 PATCH TOPICAL DAILY
Status: DISCONTINUED | OUTPATIENT
Start: 2021-07-07 | End: 2021-07-20 | Stop reason: HOSPADM

## 2021-07-07 RX ORDER — AMOXICILLIN 250 MG
1 CAPSULE ORAL
Status: DISCONTINUED | OUTPATIENT
Start: 2021-07-07 | End: 2021-07-20 | Stop reason: HOSPADM

## 2021-07-07 RX ORDER — AMLODIPINE BESYLATE 5 MG/1
5 TABLET ORAL DAILY
COMMUNITY

## 2021-07-07 RX ORDER — ACETAMINOPHEN 325 MG/1
650 TABLET ORAL EVERY 6 HOURS PRN
Status: DISCONTINUED | OUTPATIENT
Start: 2021-07-07 | End: 2021-07-20 | Stop reason: HOSPADM

## 2021-07-07 RX ADMIN — ENOXAPARIN SODIUM 40 MG: 40 INJECTION SUBCUTANEOUS at 17:25

## 2021-07-07 RX ADMIN — ATORVASTATIN CALCIUM 10 MG: 10 TABLET, FILM COATED ORAL at 17:24

## 2021-07-07 RX ADMIN — LIDOCAINE 5% 1 PATCH: 700 PATCH TOPICAL at 17:25

## 2021-07-07 RX ADMIN — ASPIRIN 81 MG: 81 TABLET, DELAYED RELEASE ORAL at 17:24

## 2021-07-07 RX ADMIN — AMLODIPINE BESYLATE 5 MG: 5 TABLET ORAL at 17:24

## 2021-07-07 RX ADMIN — IOHEXOL 100 ML: 350 INJECTION, SOLUTION INTRAVENOUS at 13:07

## 2021-07-07 RX ADMIN — DOCUSATE SODIUM AND SENNOSIDES 1 TABLET: 8.6; 5 TABLET, FILM COATED ORAL at 21:19

## 2021-07-07 RX ADMIN — SODIUM CHLORIDE 1000 ML: 0.9 INJECTION, SOLUTION INTRAVENOUS at 12:16

## 2021-07-07 RX ADMIN — SERTRALINE HYDROCHLORIDE 100 MG: 50 TABLET ORAL at 16:30

## 2021-07-07 RX ADMIN — DIVALPROEX SODIUM 1000 MG: 250 TABLET, FILM COATED, EXTENDED RELEASE ORAL at 16:30

## 2021-07-07 NOTE — H&P
[address] [facility]  H&P- Karlo Muñoz 1934, 80 y o  male MRN: [id number]  Unit/Bed#: FT 05 Encounter: [id number]  Primary Care Provider: Kendra Esteban DO   Date and time admitted to hospital: 7/7/2021 11:29 AM    * Altered mental status  Assessment & Plan  · Suspected delirium versus sundowning versus underlying dementia  · Presenting with complaint of AMS, consisting of agitation, confusion and uncooperative behavior  · With no previous history of dementia on chart review  · Baseline status: Alert, oriented, cooperative   Blood glucose 89 on arrival   Currently no concern for UTI or pneumonia or infectious etiology   Trauma workup with CT head, CT cervical spine, CT lumbar and thoracic consistent with old T12 fracture seen on previous imaging on 06/03    · Follow-up TSH  · Frequent orientation  · Supportive care  · Fall precaution  · PT OT eval      T12 compression fracture ([facility] [address] )  Assessment & Plan  · From prior fall  · During previous admission evaluated by Spine surgery and recommendation of TLSO brace was made  · No changes in imaging during this admission    · Supportive care  · Pain management with lidocaine patch  · PT OT eval    Mood disorder (HCC)  Assessment & Plan  · With anxiety and depression  · Continue home medication of Zoloft 100 mg    Ambulatory dysfunction  Assessment & Plan  · PT OT eval    HTN (hypertension)  Assessment & Plan  · Continue Norvasc 5 mg OD      Hyperlipidemia  Assessment & Plan  · Continue Lipitor 10 mg HS    Bipolar 1 disorder ([facility] [address] )  Assessment & Plan  · Continuing home medication  · Continue Depakote      VTE Prophylaxis: Enoxaparin (Lovenox)  / sequential compression device   Code Status:  Full Code  POLST: POLST form is not discussed and not completed at this time  Anticipated Length of Stay:  Patient will be admitted on an Observation basis with an anticipated length of stay of  less than 2 midnights     Justification for Hospital Stay: Altered mental state    Total Time for Visit, including Counseling / Coordination of Care: 45 minutes  Greater than 50% of this total time spent on direct patient counseling and coordination of care  Chief Complaint:   Altered mental status    History of Present Illness:    Lauri Pizano is a 80 y o  male with PMH-hypertension, hyperlipidemia, bipolar disorder, peripheral vascular disease, with recent T12 compression fracture post fall; now presenting with complaint of altered mental status  Patient is normally a resident of [facility] ([facility]) Per report from ED provider, patient was agitated and aggressive overnight, refusing oral intake, refusing care by nursing staff at [facility]  Normally at patient's baseline he is alert, oriented, cooperative  In the ED, UA and chest Imaging have returned unimpressive  Lab work with no electrolyte abnormalities  No hyperammonemia  Imaging of CT head with no acute intracranial changes  Imaging of spine returning with T12 compression fracture which is not new  Review of Systems:    Review of Systems   Constitutional: Negative for appetite change, chills and fever  HENT: Positive for ear pain  Negative for congestion, rhinorrhea, sinus pain and sore throat  Eyes: Negative for photophobia, pain and visual disturbance  Respiratory: Negative for cough, chest tightness and shortness of breath  Cardiovascular: Negative for chest pain and palpitations  Gastrointestinal: Negative for abdominal pain, constipation, diarrhea, nausea and vomiting  Genitourinary: Negative for difficulty urinating, dysuria, flank pain and urgency  Musculoskeletal: Positive for back pain  Negative for arthralgias and myalgias  Skin: Negative for rash and wound  Neurological: Negative for dizziness, numbness and headaches  Psychiatric/Behavioral: Positive for agitation, behavioral problems and confusion         Past Medical and Surgical History:     Past Medical History:   Diagnosis Date    Arthritis     Chronic venous insufficiency     HTN (hypertension) 10/26/2019    Prostate cancer ([facility] [address] )     PVD (peripheral vascular disease) ([facility] )        Past Surgical History:   Procedure Laterality Date    HERNIA REPAIR      TONSILLECTOMY         Meds/Allergies:    Prior to Admission medications    Medication Sig Start Date End Date Taking? Authorizing Provider   acetaminophen (TYLENOL) 325 mg tablet Take 2 tablets (650 mg total) by mouth every 6 (six) hours as needed for mild pain 7/9/20   Scott Ramon MD   atorvastatin (LIPITOR) 10 mg tablet 1 TABLET BY MOUTH DAILY IN THE EVENING FOR HYPERLIPIDEMIA *PLEASE REORDER* 8/26/20   Moisés Cassidy DO   divalproex sodium (DEPAKOTE ER) 500 mg 24 hr tablet Take 2 tablets (1,000 mg total) by mouth daily 9/14/20   Moisés Cassidy DO   lidocaine (LIDODERM) 5 % Apply 1 patch topically daily Remove & Discard patch within 12 hours or as directed by MD 7/10/20   Scott Ramon MD   sertraline (ZOLOFT) 100 mg tablet 1 TABLET BY MOUTH DAILY FOR BIPOLAR DISORDER *PLEASE REORDER* 9/28/20   Moisés Cassidy DO     I have reviewed home medications using allscripts  Allergies: No Known Allergies    Social History:     Marital Status:     Patient Pre-hospital Living Situation:  Assisted living facility  Substance Use History:   Social History     Substance and Sexual Activity   Alcohol Use Yes    Alcohol/week: 1 0 standard drinks    Types: 1 Standard drinks or equivalent per week    Comment: rarely     Social History     Tobacco Use   Smoking Status Current Some Day Smoker    Years: 10 00    Types: Cigars   Smokeless Tobacco Never Used   Tobacco Comment    occasionaly cigar, rarely     Social History     Substance and Sexual Activity   Drug Use No       Family History:    non-contributory    Physical Exam:     Vitals:   Blood Pressure: 120/60 (07/07/21 1315)  Pulse: 58 (07/07/21 1315)  Temperature: 97 9 °F (36 6 °C) (07/07/21 1134)  Temp Source: Oral (07/07/21 1134)  Respirations: 12 (07/07/21 1315)  SpO2: 99 % (07/07/21 1315)    Physical Exam  Vitals reviewed  Constitutional:       Comments: Weak, frail   HENT:      Head: Normocephalic and atraumatic  Nose: Nose normal    Eyes:      General: No scleral icterus  Right eye: No discharge  Left eye: No discharge  Cardiovascular:      Rate and Rhythm: Normal rate and regular rhythm  Pulses: Normal pulses  Pulmonary:      Effort: Pulmonary effort is normal  No respiratory distress  Breath sounds: Normal breath sounds  Abdominal:      General: Bowel sounds are normal  There is no distension  Palpations: Abdomen is soft  Tenderness: There is no abdominal tenderness  Musculoskeletal:         General: Normal range of motion  Cervical back: Normal range of motion  Skin:     General: Skin is warm and dry  Comments: Dry skin in bilateral lower extremities   Neurological:      Mental Status: He is alert and oriented to person, place, and time  Mental status is at baseline  Comments: Alert, oriented to himself, location and year   Psychiatric:         Mood and Affect: Mood normal          Behavior: Behavior normal            Additional Data:     Lab Results: I have personally reviewed pertinent reports        Results from last 7 days   Lab Units 07/07/21  1215   WBC Thousand/uL 6 85   HEMOGLOBIN g/dL 13 8   HEMATOCRIT % 41 8   PLATELETS Thousands/uL 163   NEUTROS PCT % 61   LYMPHS PCT % 28   MONOS PCT % 9   EOS PCT % 1     Results from last 7 days   Lab Units 07/07/21  1215   SODIUM mmol/L 143   POTASSIUM mmol/L 4 2   CHLORIDE mmol/L 106   CO2 mmol/L 29   BUN mg/dL 11   CREATININE mg/dL 0 85   ANION GAP mmol/L 8   CALCIUM mg/dL 9 0   ALBUMIN g/dL 3 0*   TOTAL BILIRUBIN mg/dL 0 52   ALK PHOS U/L 82   ALT U/L 13   AST U/L 15   GLUCOSE RANDOM mg/dL 84         Results from last 7 days   Lab Units 07/07/21  1201   POC GLUCOSE mg/dl 89         Results from last 7 days   Lab Units 07/07/21  1215   LACTIC ACID mmol/L 0 8       Imaging: I have personally reviewed pertinent reports  CT abdomen pelvis with contrast   Final Result by Adal Arrington MD (07/07 0696)         1  Acute appearing approximately 60% T12 compression fracture with mild osseous retropulsion involving the posterior superior aspect of the vertebral body with resultant mild-moderate ventral central canal narrowing  2   Additional findings as noted  The study was marked in [facility] for immediate notification  Workstation performed: [id number]         CT recon only lumbar spine   Final Result by Adal Arrington MD (07/07 1428)         1  Recent appearing moderate T12 compression fracture with mild osseous retropulsion resulting in mild ventral central canal narrowing  There is a new finding when compared to the study of 7/7/2020    2   Multilevel degenerative change as noted resulting in mild-moderate central canal narrowing in the lumbar spine  See above comments  The study was marked in [facility] for immediate notification  Workstation performed: [id number]         CT head without contrast   Final Result by Adal Arrington MD (07/07 1406)      No acute intracranial abnormality  Microangiopathic changes  Workstation performed: [id number]         XR chest 2 views   ED Interpretation by Yeny Loomis DO (07/07 1304)   NAD          EKG, Pathology, and Other Studies Reviewed on Admission:   · EKG:  No acute ST-T-wave change    Allscripts / Epic Records Reviewed: Yes     ** Please Note: This note has been constructed using a voice recognition system   **

## 2021-07-07 NOTE — ASSESSMENT & PLAN NOTE
· Suspected delirium versus sundowning versus underlying dementia  · Presenting with complaint of AMS, consisting of agitation, confusion and uncooperative behavior  · With no previous history of dementia on chart review  · Baseline status: Alert, oriented, cooperative   Blood glucose 89 on arrival   Currently no concern for UTI or pneumonia or infectious etiology   Trauma workup with CT head, CT cervical spine, CT lumbar and thoracic consistent with old T12 fracture seen on previous imaging on 06/03    · Follow-up TSH  · Frequent orientation  · Supportive care  · Fall precaution  · PT OT eval

## 2021-07-07 NOTE — PLAN OF CARE
Problem: MOBILITY - ADULT  Goal: Maintain or return to baseline ADL function  Description: INTERVENTIONS:  -  Assess patient's ability to carry out ADLs; assess patient's baseline for ADL function and identify physical deficits which impact ability to perform ADLs (bathing, care of mouth/teeth, toileting, grooming, dressing, etc )  - Assess/evaluate cause of self-care deficits   - Assess range of motion  - Assess patient's mobility; develop plan if impaired  - Assess patient's need for assistive devices and provide as appropriate  - Encourage maximum independence but intervene and supervise when necessary  - Involve family in performance of ADLs  - Assess for home care needs following discharge   - Consider OT consult to assist with ADL evaluation and planning for discharge  - Provide patient education as appropriate  Outcome: Progressing  Goal: Maintains/Returns to pre admission functional level  Description: INTERVENTIONS:  - Perform BMAT or MOVE assessment daily    - Set and communicate daily mobility goal to care team and patient/family/caregiver  - Collaborate with rehabilitation services on mobility goals if consulted  - Perform Range of Motion 3 times a day  - Reposition patient every 2 hours    - Dangle patient 2 times a day  - Stand patient 2 times a day  - Ambulate patient 2 times a day  - Out of bed to chair 2 times a day   - Out of bed for meals 2 times a day  - Out of bed for toileting  - Record patient progress and toleration of activity level   Outcome: Progressing     Problem: PAIN - ADULT  Goal: Verbalizes/displays adequate comfort level or baseline comfort level  Description: Interventions:  - Encourage patient to monitor pain and request assistance  - Assess pain using appropriate pain scale  - Administer analgesics based on type and severity of pain and evaluate response  - Implement non-pharmacological measures as appropriate and evaluate response  - Consider cultural and social influences on pain and pain management  - Notify physician/advanced practitioner if interventions unsuccessful or patient reports new pain  Outcome: Progressing     Problem: INFECTION - ADULT  Goal: Absence or prevention of progression during hospitalization  Description: INTERVENTIONS:  - Assess and monitor for signs and symptoms of infection  - Monitor lab/diagnostic results  - Monitor all insertion sites, i e  indwelling lines, tubes, and drains  - Monitor endotracheal if appropriate and nasal secretions for changes in amount and color  - [place] appropriate cooling/warming therapies per order  - Administer medications as ordered  - Instruct and encourage patient and family to use good hand hygiene technique  - Identify and instruct in appropriate isolation precautions for identified infection/condition  Outcome: Progressing  Goal: Absence of fever/infection during neutropenic period  Description: INTERVENTIONS:  - Monitor WBC    Outcome: Progressing     Problem: SAFETY ADULT  Goal: Maintain or return to baseline ADL function  Description: INTERVENTIONS:  -  Assess patient's ability to carry out ADLs; assess patient's baseline for ADL function and identify physical deficits which impact ability to perform ADLs (bathing, care of mouth/teeth, toileting, grooming, dressing, etc )  - Assess/evaluate cause of self-care deficits   - Assess range of motion  - Assess patient's mobility; develop plan if impaired  - Assess patient's need for assistive devices and provide as appropriate  - Encourage maximum independence but intervene and supervise when necessary  - Involve family in performance of ADLs  - Assess for home care needs following discharge   - Consider OT consult to assist with ADL evaluation and planning for discharge  - Provide patient education as appropriate  Outcome: Progressing  Goal: Maintains/Returns to pre admission functional level  Description: INTERVENTIONS:  - Perform BMAT or MOVE assessment daily    - Set and communicate daily mobility goal to care team and patient/family/caregiver  - Collaborate with rehabilitation services on mobility goals if consulted  - Out of bed for toileting  - Record patient progress and toleration of activity level   Outcome: Progressing  Goal: Patient will remain free of falls  Description: INTERVENTIONS:  - Educate patient/family on patient safety including physical limitations  - Instruct patient to call for assistance with activity   - Consult OT/PT to assist with strengthening/mobility   - Keep Call bell within reach  - Keep bed low and locked with side rails adjusted as appropriate  - Keep care items and personal belongings within reach  - Initiate and maintain comfort rounds  - Apply yellow socks and bracelet for high fall risk patients  - Consider moving patient to room near nurses station  Outcome: Progressing     Problem: DISCHARGE PLANNING  Goal: Discharge to home or other facility with appropriate resources  Description: INTERVENTIONS:  - Identify barriers to discharge w/patient and caregiver  - Arrange for needed discharge resources and transportation as appropriate  - Identify discharge learning needs (meds, wound care, etc )  - Arrange for interpretive services to assist at discharge as needed  - Refer to Case Management Department for coordinating discharge planning if the patient needs post-hospital services based on physician/advanced practitioner order or complex needs related to functional status, cognitive ability, or social support system  Outcome: Progressing     Problem: Knowledge Deficit  Goal: Patient/family/caregiver demonstrates understanding of disease process, treatment plan, medications, and discharge instructions  Description: Complete learning assessment and assess knowledge base    Interventions:  - Provide teaching at level of understanding  - Provide teaching via preferred learning methods  Outcome: Progressing

## 2021-07-07 NOTE — ED PROVIDER NOTES
History  Chief Complaint   Patient presents with    Medical Problem     Pt presents from [facility] where he was reported to be aggressive towards staff, refusing care and to eat or drink  Pt reports back pain  Patient is an 59-year-old male past medical history of hypertension, hyperlipidemia, peripheral vascular disease, bipolar disease, prostate cancer presenting for altered mental status  Patient presents from [facility] with complaint from staff that he was aggressive toward staff last night, refusing food, water, care, not allowing them to change in  Patient states that he does not remember these events  He states that he came in today for generalized abdominal pain as well as back pain both chronic x2 week and denies any recent falls, vomiting or diarrhea  Denies any fevers, dysuria, shortness of breath, vision changes, rashes, chest pain  Notes chronic dizziness  Prior to Admission Medications   Prescriptions Last Dose Informant Patient Reported?  Taking?   acetaminophen (TYLENOL) 325 mg tablet  Self No No   Sig: Take 2 tablets (650 mg total) by mouth every 6 (six) hours as needed for mild pain   amLODIPine (NORVASC) 5 mg tablet  Care Giver Yes Yes   Sig: Take 5 mg by mouth daily hold sbp <90   aspirin 81 mg chewable tablet  Care Giver Yes Yes   Sig: Chew 81 mg daily   atorvastatin (LIPITOR) 10 mg tablet  Self No No   Si TABLET BY MOUTH DAILY IN THE EVENING FOR HYPERLIPIDEMIA *PLEASE REORDER*   divalproex sodium (DEPAKOTE ER) 500 mg 24 hr tablet  Self No No   Sig: Take 2 tablets (1,000 mg total) by mouth daily   Patient taking differently: Take 500 mg by mouth 2 (two) times a day    lidocaine (LIDODERM) 5 %  Self No No   Sig: Apply 1 patch topically daily Remove & Discard patch within 12 hours or as directed by MD   oxyCODONE (ROXICODONE) 5 mg immediate release tablet  Care Giver Yes Yes   Sig: Take 5 mg by mouth every 4 (four) hours as needed for moderate pain   senna-docusate sodium (SENOKOT-S) 8 6-50 mg per tablet  Care Giver Yes Yes   Sig: Take 1 tablet by mouth 2 (two) times a day   sertraline (ZOLOFT) 100 mg tablet  Self No No   Si TABLET BY MOUTH DAILY FOR BIPOLAR DISORDER *PLEASE REORDER*      Facility-Administered Medications: None       Past Medical History:   Diagnosis Date    Arthritis     Chronic venous insufficiency     HTN (hypertension) 10/26/2019    Prostate cancer ([facility] )     PVD (peripheral vascular disease) ([place] [address] )        Past Surgical History:   Procedure Laterality Date    HERNIA REPAIR      TONSILLECTOMY         Family History   Problem Relation Age of Onset    Heart disease Mother     Diabetes Father     Diabetes Sister      I have reviewed and agree with the history as documented  E-Cigarette/Vaping    E-Cigarette Use Never User      E-Cigarette/Vaping Substances    Nicotine No     THC No     CBD No     Flavoring No     Other No     Unknown No      Social History     Tobacco Use    Smoking status: Current Some Day Smoker     Years: 10 00     Types: Cigars    Smokeless tobacco: Never Used    Tobacco comment: occasionaly cigar, rarely   Vaping Use    Vaping Use: Never used   Substance Use Topics    Alcohol use: Yes     Alcohol/week: 1 0 standard drinks     Types: 1 Standard drinks or equivalent per week     Comment: rarely    Drug use: No       Review of Systems   All other systems reviewed and are negative  Physical Exam  Physical Exam  Vitals reviewed  Constitutional:       General: He is not in acute distress  Appearance: Normal appearance  He is not ill-appearing  HENT:      Head: Normocephalic and atraumatic  Mouth/Throat:      Mouth: Mucous membranes are moist    Eyes:      Extraocular Movements: Extraocular movements intact  Conjunctiva/sclera: Conjunctivae normal    Cardiovascular:      Rate and Rhythm: Normal rate and regular rhythm  Heart sounds: Normal heart sounds     Pulmonary:      Effort: Pulmonary effort is normal       Breath sounds: Normal breath sounds  Abdominal:      General: Abdomen is flat  Palpations: Abdomen is soft  Tenderness: There is no abdominal tenderness  Musculoskeletal:         General: No swelling  Normal range of motion  Cervical back: Neck supple  Skin:     General: Skin is warm and dry  Neurological:      General: No focal deficit present  Mental Status: He is alert        Coordination: Coordination normal       Comments: AAO x2, not oriented to year   Psychiatric:         Mood and Affect: Mood normal          Vital Signs  ED Triage Vitals   Temperature Pulse Respirations Blood Pressure SpO2   07/07/21 1134 07/07/21 1134 07/07/21 1134 07/07/21 1134 07/07/21 1134   97 9 °F (36 6 °C) 71 20 139/65 97 %      Temp Source Heart Rate Source Patient Position - Orthostatic VS BP Location FiO2 (%)   07/07/21 1134 07/07/21 1315 07/07/21 1134 07/07/21 1134 --   Oral Monitor Sitting Right arm       Pain Score       07/07/21 1615       No Pain           Vitals:    07/07/21 1134 07/07/21 1315 07/07/21 1615   BP: 139/65 120/60 132/60   Pulse: 71 58 57   Patient Position - Orthostatic VS: Sitting Lying          Visual Acuity      ED Medications  Medications   enoxaparin (LOVENOX) subcutaneous injection 40 mg (40 mg Subcutaneous Given 7/7/21 1725)   atorvastatin (LIPITOR) tablet 10 mg (10 mg Oral Given 7/7/21 1724)   divalproex sodium (DEPAKOTE ER) 24 hr tablet 1,000 mg (1,000 mg Oral Given 7/7/21 1630)   sertraline (ZOLOFT) tablet 100 mg (100 mg Oral Given 7/7/21 1630)   amLODIPine (NORVASC) tablet 5 mg (5 mg Oral Given 7/7/21 1724)   aspirin (ECOTRIN LOW STRENGTH) EC tablet 81 mg (81 mg Oral Given 7/7/21 1724)   lidocaine (LIDODERM) 5 % patch 1 patch (1 patch Topical Medication Applied 7/7/21 1725)   senna-docusate sodium (SENOKOT S) 8 6-50 mg per tablet 1 tablet (has no administration in time range)   acetaminophen (TYLENOL) tablet 650 mg (has no administration in time range) sodium chloride 0 9 % bolus 1,000 mL (1,000 mL Intravenous New Bag 7/7/21 1216)   iohexol (OMNIPAQUE) 350 MG/ML injection (SINGLE-DOSE) 100 mL (100 mL Intravenous Given 7/7/21 1307)       Diagnostic Studies  Results Reviewed     Procedure Component Value Units Date/Time    TSH, 3rd generation [[id number]]  (Normal) Collected: 07/07/21 1215    Lab Status: Final result Specimen: Blood from Arm, Left Updated: 07/07/21 1757     TSH 3RD GENERATON 3 572 uIU/mL     Narrative:      Patients undergoing fluorescein dye angiography may retain small amounts of fluorescein in the body for 48-72 hours post procedure  Samples containing fluorescein can produce falsely depressed TSH values  If the patient had this procedure,a specimen should be resubmitted post fluorescein clearance        Ammonia [[id number]]  (Abnormal) Collected: 07/07/21 1724    Lab Status: Final result Specimen: Blood from Arm, Right Updated: 07/07/21 1755     Ammonia <10 umol/L     Urine Microscopic [[id number]]  (Abnormal) Collected: 07/07/21 1244    Lab Status: Final result Specimen: Urine, Straight Cath Updated: 07/07/21 1320     RBC, UA 30-50 /hpf      WBC, UA None Seen /hpf      Epithelial Cells None Seen /hpf      Bacteria, UA None Seen /hpf     Troponin I [[id number]]  (Normal) Collected: 07/07/21 1215    Lab Status: Final result Specimen: Blood from Arm, Left Updated: 07/07/21 1255     Troponin I <0 02 ng/mL     UA w Reflex to Microscopic w Reflex to Culture [[id number]]  (Abnormal) Collected: 07/07/21 1244    Lab Status: Final result Specimen: Urine, Straight Cath Updated: 07/07/21 1255     Color, UA Yellow     Clarity, UA Slightly Cloudy     Specific Gravity, UA 1 015     pH, UA 7 0     Leukocytes, UA Negative     Nitrite, UA Negative     Protein, UA Negative mg/dl      Glucose, UA Negative mg/dl      Ketones, UA Trace mg/dl      Urobilinogen, UA 1 0 E U /dl      Bilirubin, UA Negative     Blood, UA Large    Lactic acid [[id number]]  (Normal) Collected: 07/07/21 1215    Lab Status: Final result Specimen: Blood from Arm, Left Updated: 07/07/21 1250     LACTIC ACID 0 8 mmol/L     Narrative:      Result may be elevated if tourniquet was used during collection      Comprehensive metabolic panel [[id number]]  (Abnormal) Collected: 07/07/21 1215    Lab Status: Final result Specimen: Blood from Arm, Left Updated: 07/07/21 1245     Sodium 143 mmol/L      Potassium 4 2 mmol/L      Chloride 106 mmol/L      CO2 29 mmol/L      ANION GAP 8 mmol/L      BUN 11 mg/dL      Creatinine 0 85 mg/dL      Glucose 84 mg/dL      Calcium 9 0 mg/dL      Corrected Calcium 9 8 mg/dL      AST 15 U/L      ALT 13 U/L      Alkaline Phosphatase 82 U/L      Total Protein 6 1 g/dL      Albumin 3 0 g/dL      Total Bilirubin 0 52 mg/dL      eGFR 79 ml/min/1 73sq m     Narrative:      [place] guidelines for Chronic Kidney Disease (CKD):     Stage 1 with normal or high GFR (GFR > 90 mL/min/1 73 square meters)    Stage 2 Mild CKD (GFR = 60-89 mL/min/1 73 square meters)    Stage 3A Moderate CKD (GFR = 45-59 mL/min/1 73 square meters)    Stage 3B Moderate CKD (GFR = 30-44 mL/min/1 73 square meters)    Stage 4 Severe CKD (GFR = 15-29 mL/min/1 73 square meters)    Stage 5 End Stage CKD (GFR <15 mL/min/1 73 square meters)  Note: GFR calculation is accurate only with a steady state creatinine    Magnesium [[id number]]  (Normal) Collected: 07/07/21 1215    Lab Status: Final result Specimen: Blood from Arm, Left Updated: 07/07/21 1245     Magnesium 1 6 mg/dL     Lipase [[id number]]  (Normal) Collected: 07/07/21 1215    Lab Status: Final result Specimen: Blood from Arm, Left Updated: 07/07/21 1245     Lipase 75 u/L     CBC and differential [[id number]] Collected: 07/07/21 1215    Lab Status: Final result Specimen: Blood from Arm, Left Updated: 07/07/21 1230     WBC 6 85 Thousand/uL      RBC 4 45 Million/uL      Hemoglobin 13 8 g/dL      Hematocrit 41 8 %      MCV 94 fL      MCH 31 0 pg MCHC 33 0 g/dL      RDW 13 4 %      MPV 10 3 fL      Platelets 029 Thousands/uL      nRBC 0 /100 WBCs      Neutrophils Relative 61 %      Immat GRANS % 0 %      Lymphocytes Relative 28 %      Monocytes Relative 9 %      Eosinophils Relative 1 %      Basophils Relative 1 %      Neutrophils Absolute 4 20 Thousands/µL      Immature Grans Absolute 0 02 Thousand/uL      Lymphocytes Absolute 1 94 Thousands/µL      Monocytes Absolute 0 60 Thousand/µL      Eosinophils Absolute 0 05 Thousand/µL      Basophils Absolute 0 04 Thousands/µL     Fingerstick Glucose (POCT) [[id number]]  (Normal) Collected: 07/07/21 1201    Lab Status: Final result Updated: 07/07/21 1202     POC Glucose 89 mg/dl                  CT abdomen pelvis with contrast   Final Result by Pina Gaytan MD (07/07 6106)         1  Acute appearing approximately 60% T12 compression fracture with mild osseous retropulsion involving the posterior superior aspect of the vertebral body with resultant mild-moderate ventral central canal narrowing  2   Additional findings as noted  The study was marked in [facility] for immediate notification  Workstation performed: [id number]         CT recon only lumbar spine   Final Result by Pina Gaytan MD (07/07 1428)         1  Recent appearing moderate T12 compression fracture with mild osseous retropulsion resulting in mild ventral central canal narrowing  There is a new finding when compared to the study of 7/7/2020    2   Multilevel degenerative change as noted resulting in mild-moderate central canal narrowing in the lumbar spine  See above comments  The study was marked in [facility] for immediate notification  Workstation performed: [id number]         CT head without contrast   Final Result by Pina Gaytan MD (07/07 4259)      No acute intracranial abnormality  Microangiopathic changes                    Workstation performed: [id number]         XR chest 2 views   ED Interpretation by [facility] DO Stacie (07/07 1305)   NAD                 Procedures  ECG 12 Lead Documentation Only    Date/Time: 7/7/2021 12:30 PM  Performed by: Nima Billy DO  Authorized by: Nima Billy DO     ECG reviewed by me, the ED Provider: yes    Patient location:  ED  Previous ECG:     Previous ECG:  Compared to current    Similarity:  No change  Interpretation:     Interpretation: normal    Rate:     ECG rate assessment: normal    Rhythm:     Rhythm: sinus rhythm    Ectopy:     Ectopy: none    QRS:     QRS axis:  Left    QRS intervals: Wide  Conduction:     Conduction: abnormal      Abnormal conduction: complete RBBB    ST segments:     ST segments:  Normal  T waves:     T waves: normal               ED Course  ED Course as of Jul 07 2007 Wed Jul 07, 2021   1434 The patient with acute appearing T12 compression fracture with retropulsion causing mild to moderate central canal stenosis  Will discuss with ori Mansfield recent falls, patient's baseline ambulatory status, ability to care for him facility to decide disposition  1441 Have discussed with glue coli to states that patient recently returned from rehab for fracture to his back and has been wheelchair status since  In the setting of known fracture, confined to wheelchair do not feel that he requires further workup for evaluation from this perspective  As patient has no acute changes, does have blood in urinalysis however required straight catheterization to obtain and no white blood cells or bacteria seen do not suspect UTI, have discussed with jenny stevens whether patient may be sent back and they will discussed with the RN call back  1445 Have compared reports of CT lumbar spine today  to CT thoracolumbar spine when patient 1st found to have injury and do not appreciate any changes  Still with mild retropulsion into canal       1524 Jimmy cannot accept patient back due to combativeness, will admit for observation  MDM  Number of Diagnoses or Management Options  Diagnosis management comments: Patient is an 15-year-old male past medical history of hypertension, hyperlipidemia, peripheral vascular disease, bipolar disease, prostate cancer presenting with altered mental status, back pain, abdominal pain  Patient is well-appearing bedside with stable vitals and in no acute distress  He is following commands but states that he does not remember the events for which he was sent in, aggression toward staff, refusing care  He is AAO x2 with no documented history of dementia however EMS states that glue collage stated he does have history of dementia  He has no abdominal tenderness on exam but does have back pain which she reports with flexion extension of his back  Will obtain CT abdomen pelvis, CT lumbar spine, CT head, chest x-ray labs, urinalysis, EKG for further evaluation of patient's altered mental status  Disposition  Final diagnoses:   Agitation     Time reflects when diagnosis was documented in both MDM as applicable and the Disposition within this note     Time User Action Codes Description Comment    7/7/2021  3:25 PM Blaze Talavera Add [R45 1] Agitation       ED Disposition     ED Disposition Condition Date/Time Comment    Admit Stable Wed Jul 7, 2021  3:25 PM Case was discussed with Dr Lionel Halsted and the patient's admission status was agreed to be Admission Status: observation status to the service of Dr Lionel Halsted           Follow-up Information    None         Current Discharge Medication List      CONTINUE these medications which have NOT CHANGED    Details   amLODIPine (NORVASC) 5 mg tablet Take 5 mg by mouth daily hold sbp <90      aspirin 81 mg chewable tablet Chew 81 mg daily      oxyCODONE (ROXICODONE) 5 mg immediate release tablet Take 5 mg by mouth every 4 (four) hours as needed for moderate pain      senna-docusate sodium (SENOKOT-S) 8 6-50 mg per tablet Take 1 tablet by mouth 2 (two) times a day      acetaminophen (TYLENOL) 325 mg tablet Take 2 tablets (650 mg total) by mouth every 6 (six) hours as needed for mild pain  Qty: 30 tablet, Refills: 0    Associated Diagnoses: Fall      atorvastatin (LIPITOR) 10 mg tablet 1 TABLET BY MOUTH DAILY IN THE EVENING FOR HYPERLIPIDEMIA *PLEASE REORDER*  Qty: 30 tablet, Refills: 5    Associated Diagnoses: Mixed hyperlipidemia      divalproex sodium (DEPAKOTE ER) 500 mg 24 hr tablet Take 2 tablets (1,000 mg total) by mouth daily  Qty: 60 tablet, Refills: 5    Associated Diagnoses: Bipolar 1 disorder ([facility])      lidocaine (LIDODERM) 5 % Apply 1 patch topically daily Remove & Discard patch within 12 hours or as directed by MD  Refills: 0    Associated Diagnoses: Fall      sertraline (ZOLOFT) 100 mg tablet 1 TABLET BY MOUTH DAILY FOR BIPOLAR DISORDER *PLEASE REORDER*  Qty: 30 tablet, Refills: 5    Associated Diagnoses: Bipolar 1 disorder ([facility] [address] )           No discharge procedures on file      PDMP Review     None          ED Provider  Electronically Signed by           Saint Snipe, DO  07/07/21 2007

## 2021-07-07 NOTE — ASSESSMENT & PLAN NOTE
· From prior fall  · During previous admission evaluated by Spine surgery and recommendation of TLSO brace was made  · No changes in imaging during this admission    · Supportive care  · Pain management with lidocaine patch  · PT OT marjorie

## 2021-07-07 NOTE — TELEPHONE ENCOUNTER
Pt had fracture on spine; T11 and T12  Is taking meds, won't eat, refusin care; kicking and punching staff  Was at [facility] for rehab; then sent to [facility]  Won't let them take off back Makenna Maurice has set up an ambulance to take him to [facility]

## 2021-07-07 NOTE — TELEPHONE ENCOUNTER
Currently admitted    Nurse from [facility] called and hopes while patient is in they will order physche consult as patient has been very eratic

## 2021-07-08 ENCOUNTER — TELEPHONE (OUTPATIENT)
Dept: INTERNAL MEDICINE CLINIC | Facility: CLINIC | Age: 86
End: 2021-07-08

## 2021-07-08 LAB
ANION GAP SERPL CALCULATED.3IONS-SCNC: 5 MMOL/L (ref 4–13)
BASOPHILS # BLD AUTO: 0.03 THOUSANDS/ΜL (ref 0–0.1)
BASOPHILS NFR BLD AUTO: 0 % (ref 0–1)
BUN SERPL-MCNC: 10 MG/DL (ref 5–25)
CALCIUM SERPL-MCNC: 8.7 MG/DL (ref 8.3–10.1)
CHLORIDE SERPL-SCNC: 106 MMOL/L (ref 100–108)
CO2 SERPL-SCNC: 28 MMOL/L (ref 21–32)
CREAT SERPL-MCNC: 0.73 MG/DL (ref 0.6–1.3)
EOSINOPHIL # BLD AUTO: 0.07 THOUSAND/ΜL (ref 0–0.61)
EOSINOPHIL NFR BLD AUTO: 1 % (ref 0–6)
ERYTHROCYTE [DISTWIDTH] IN BLOOD BY AUTOMATED COUNT: 13.4 % (ref 11.6–15.1)
GFR SERPL CREATININE-BSD FRML MDRD: 84 ML/MIN/1.73SQ M
GLUCOSE SERPL-MCNC: 83 MG/DL (ref 65–140)
HCT VFR BLD AUTO: 38.5 % (ref 36.5–49.3)
HGB BLD-MCNC: 12.8 G/DL (ref 12–17)
IMM GRANULOCYTES # BLD AUTO: 0.02 THOUSAND/UL (ref 0–0.2)
IMM GRANULOCYTES NFR BLD AUTO: 0 % (ref 0–2)
LYMPHOCYTES # BLD AUTO: 2.02 THOUSANDS/ΜL (ref 0.6–4.47)
LYMPHOCYTES NFR BLD AUTO: 27 % (ref 14–44)
MAGNESIUM SERPL-MCNC: 1.5 MG/DL (ref 1.6–2.6)
MCH RBC QN AUTO: 31 PG (ref 26.8–34.3)
MCHC RBC AUTO-ENTMCNC: 33.2 G/DL (ref 31.4–37.4)
MCV RBC AUTO: 93 FL (ref 82–98)
MONOCYTES # BLD AUTO: 0.86 THOUSAND/ΜL (ref 0.17–1.22)
MONOCYTES NFR BLD AUTO: 11 % (ref 4–12)
NEUTROPHILS # BLD AUTO: 4.59 THOUSANDS/ΜL (ref 1.85–7.62)
NEUTS SEG NFR BLD AUTO: 61 % (ref 43–75)
NRBC BLD AUTO-RTO: 0 /100 WBCS
PLATELET # BLD AUTO: 151 THOUSANDS/UL (ref 149–390)
PMV BLD AUTO: 10.3 FL (ref 8.9–12.7)
POTASSIUM SERPL-SCNC: 3.9 MMOL/L (ref 3.5–5.3)
RBC # BLD AUTO: 4.13 MILLION/UL (ref 3.88–5.62)
SODIUM SERPL-SCNC: 139 MMOL/L (ref 136–145)
WBC # BLD AUTO: 7.59 THOUSAND/UL (ref 4.31–10.16)

## 2021-07-08 PROCEDURE — 80048 BASIC METABOLIC PNL TOTAL CA: CPT | Performed by: INTERNAL MEDICINE

## 2021-07-08 PROCEDURE — 97167 OT EVAL HIGH COMPLEX 60 MIN: CPT

## 2021-07-08 PROCEDURE — 83735 ASSAY OF MAGNESIUM: CPT | Performed by: INTERNAL MEDICINE

## 2021-07-08 PROCEDURE — 85025 COMPLETE CBC W/AUTO DIFF WBC: CPT | Performed by: INTERNAL MEDICINE

## 2021-07-08 PROCEDURE — 99221 1ST HOSP IP/OBS SF/LOW 40: CPT | Performed by: PSYCHIATRY & NEUROLOGY

## 2021-07-08 PROCEDURE — 97530 THERAPEUTIC ACTIVITIES: CPT

## 2021-07-08 PROCEDURE — 97163 PT EVAL HIGH COMPLEX 45 MIN: CPT

## 2021-07-08 PROCEDURE — 99225 PR SBSQ OBSERVATION CARE/DAY 25 MINUTES: CPT | Performed by: INTERNAL MEDICINE

## 2021-07-08 RX ORDER — MAGNESIUM SULFATE 1 G/100ML
1 INJECTION INTRAVENOUS ONCE
Status: COMPLETED | OUTPATIENT
Start: 2021-07-08 | End: 2021-07-08

## 2021-07-08 RX ADMIN — ASPIRIN 81 MG: 81 TABLET, DELAYED RELEASE ORAL at 08:41

## 2021-07-08 RX ADMIN — LIDOCAINE 5% 1 PATCH: 700 PATCH TOPICAL at 08:42

## 2021-07-08 RX ADMIN — ATORVASTATIN CALCIUM 10 MG: 10 TABLET, FILM COATED ORAL at 17:17

## 2021-07-08 RX ADMIN — SERTRALINE HYDROCHLORIDE 100 MG: 50 TABLET ORAL at 08:46

## 2021-07-08 RX ADMIN — ENOXAPARIN SODIUM 40 MG: 40 INJECTION SUBCUTANEOUS at 08:41

## 2021-07-08 RX ADMIN — DOCUSATE SODIUM AND SENNOSIDES 1 TABLET: 8.6; 5 TABLET, FILM COATED ORAL at 21:40

## 2021-07-08 RX ADMIN — DIVALPROEX SODIUM 1000 MG: 250 TABLET, FILM COATED, EXTENDED RELEASE ORAL at 08:46

## 2021-07-08 RX ADMIN — MAGNESIUM SULFATE HEPTAHYDRATE 1 G: 1 INJECTION, SOLUTION INTRAVENOUS at 14:32

## 2021-07-08 NOTE — TELEPHONE ENCOUNTER
Faxed office note from May with Dr Zuhair Sampson to [facility] - per request by Bowen VASQUEZ#: [phone]

## 2021-07-08 NOTE — UTILIZATION REVIEW
Initial Clinical Review    Admission: Date/Time/Statement:   Admission Orders (From admission, onward)     Ordered        07/07/21 1525  Place in Observation  Once                   Orders Placed This Encounter   Procedures    Place in Observation     Standing Status:   Standing     Number of Occurrences:   1     Order Specific Question:   Level of Care     Answer:   Med Surg [16]     ED Arrival Information     Expected Arrival Acuity    - 7/7/2021 11:28 Urgent         Means of arrival Escorted by Service Admission type    Ambulance [address]  [facility] General Medicine Urgent         Arrival complaint    Failure To Thrive        Chief Complaint   Patient presents with    Medical Problem     Pt presents from [facility] where he was reported to be aggressive towards staff, refusing care and to eat or drink  Pt reports back pain  Initial Presentation: 79 yo male to ED from NH via EMS w/ change in mental status   patient was agitated and aggressive overnight, refusing oral intake, refusing care by nursing staff at [facility]  Normally at patient's baseline he is alert, oriented, cooperative  Hx of recent fall resulting in T12 compression fx   Suspected delirium versus sundowning versus underlying dementia, glucose 89 , no concern for UTI or pneumonia or infectious etiology   Admitted OBS status plan f/u tsh , freq orientation , supportive care , PT OT eval , pain mngt   PE : oriented to person , location and year     7/8 IM Note  acute encephalopathy, likely believes superimposed on dementia, all workup so far negative for reversible causes  Oriented to self and place only   Cognitive deficit on conversation   7/8 Behavioral Health   Continue Depakote and Zoloft at current dosing for treatment of bipolar disorder  After cleared of any acute encephalopathy/delirium, it if he continues to manifest cognitive impairment then consider starting Aricept 5 mg every day   Will nee OP psych f/u     7/8 PT eval   STR upon DC     7/9 IM Note   evaluated by psychiatrist no changes in medications at this point  Patient's mental status appears to be at baseline at this point  He is essentially clear for discharge   CM working on placement   ED Triage Vitals   Temperature Pulse Respirations Blood Pressure SpO2   07/07/21 1134 07/07/21 1134 07/07/21 1134 07/07/21 1134 07/07/21 1134   97 9 °F (36 6 °C) 71 20 139/65 97 %      Temp Source Heart Rate Source Patient Position - Orthostatic VS BP Location FiO2 (%)   07/07/21 1134 07/07/21 1315 07/07/21 1134 07/07/21 1134 --   Oral Monitor Sitting Right arm       Pain Score       07/07/21 1615       No Pain          Wt Readings from Last 1 Encounters:   07/08/21 71 4 kg (157 lb 6 5 oz)     Additional Vital Signs:   07/09/21 07:45:49  97 1 °F (36 2 °C)Abnormal   56  19  139/67  91  97 %  --  --   07/08/21 22:41:20  --  --  19  105/63  77  --  --  --   07/08/21 1939  --  --  --  --  --  96 %  None (Room air)  --   07/08/21 15:52:44  99 2 °F (37 3 °C)  101  --  119/63  82  95 %  --  --   07/08/21 08:46:03  --  63  --  107/59  75  96 %           07/07/21 22:59:31  97 5 °F (36 4 °C)  69  18  102/47Abnormal   65  95 %  --  --   07/07/21 1712  --  --  --  --  --  --  None (Room air)  --   07/07/21 16:15:33  98 °F (36 7 °C)  57  --  132/60  84  98 %  --  --   07/07/21 1315  --  58  12  120/60  86  99 %  None (Room air)  Lying   07/07/21 1300  --  --  --  --  --  --  None (Room          Pertinent Labs/Diagnostic Test Results:     7/7 EKG  Rhythm: sinus rhythm     Ectopy:     Ectopy: none     QRS:     QRS axis:  Left     QRS intervals:  Wide   Conduction:     Conduction: abnormal       Abnormal conduction: complete RBBB     ST segments:     ST segments:  Normal   T waves:     T waves: normal      Results from last 7 days   Lab Units 07/08/21  0520 07/07/21  1215   WBC Thousand/uL 7 59 6 85   HEMOGLOBIN g/dL 12 8 13 8   HEMATOCRIT % 38 5 41 8   PLATELETS Thousands/uL 151 163   NEUTROS ABS Thousands/µL 4  59 4 20     Results from last 7 days   Lab Units 07/08/21  0520 07/07/21  1215   SODIUM mmol/L 139 143   POTASSIUM mmol/L 3 9 4 2   CHLORIDE mmol/L 106 106   CO2 mmol/L 28 29   ANION GAP mmol/L 5 8   BUN mg/dL 10 11   CREATININE mg/dL 0 73 0 85   EGFR ml/min/1 73sq m 84 79   CALCIUM mg/dL 8 7 9 0   MAGNESIUM mg/dL 1 5* 1 6     Results from last 7 days   Lab Units 07/07/21  1724 07/07/21  1215   AST U/L  --  15   ALT U/L  --  13   ALK PHOS U/L  --  82   TOTAL PROTEIN g/dL  --  6 1*   ALBUMIN g/dL  --  3 0*   TOTAL BILIRUBIN mg/dL  --  0 52   AMMONIA umol/L <10*  --      Results from last 7 days   Lab Units 07/07/21  1201   POC GLUCOSE mg/dl 89     Results from last 7 days   Lab Units 07/08/21  0520 07/07/21  1215   GLUCOSE RANDOM mg/dL 83 84       Results from last 7 days   Lab Units 07/07/21  1215   TROPONIN I ng/mL <0 02     Results from last 7 days   Lab Units 07/07/21  1215   TSH 3RD GENERATON uIU/mL 3 572     Results from last 7 days   Lab Units 07/07/21  1215   LACTIC ACID mmol/L 0 8     Results from last 7 days   Lab Units 07/07/21  1215   LIPASE u/L 75     Results from last 7 days   Lab Units 07/07/21  1244   CLARITY UA  Slightly Cloudy   COLOR UA  Yellow   SPEC GRAV UA  1 015   PH UA  7 0   GLUCOSE UA mg/dl Negative   KETONES UA mg/dl Trace*   BLOOD UA  Large*   PROTEIN UA mg/dl Negative   NITRITE UA  Negative   BILIRUBIN UA  Negative   UROBILINOGEN UA E U /dl 1 0   LEUKOCYTES UA  Negative   WBC UA /hpf None Seen   RBC UA /hpf 30-50*   BACTERIA UA /hpf None Seen   EPITHELIAL CELLS WET PREP /hpf None Seen       ED Treatment:   Medication Administration from 07/07/2021 1128 to 07/07/2021 1558       Date/Time Order Dose Route Action     07/07/2021 1216 sodium chloride 0 9 % bolus 1,000 mL 1,000 mL Intravenous New Bag        Past Medical History:   Diagnosis Date    Arthritis     Chronic venous insufficiency     HTN (hypertension) 10/26/2019    Pro[facility] ([facility] [address] )     PVD (peripheral vascular disease) [facility])      Present on Admission:   Ambulatory dysfunction   HTN (hypertension)   Hyperlipidemia   Bipolar 1 disorder (HCC)      Admitting Diagnosis: Agitation [R45 1]  Failure to thrive in adult [R62 7]  Age/Sex: 80 y o  male  Admission Orders:  Scheduled Medications:  amLODIPine, 5 mg, Oral, Daily  aspirin, 81 mg, Oral, Daily  atorvastatin, 10 mg, Oral, Daily With Dinner  divalproex sodium, 1,000 mg, Oral, Daily  enoxaparin, 40 mg, Subcutaneous, Daily  lidocaine, 1 patch, Topical, Daily  senna-docusate sodium, 1 tablet, Oral, HS  sertraline, 100 mg, Oral, Daily      Continuous IV Infusions:     PRN Meds:  acetaminophen, 650 mg, Oral, Q6H PRN    tele    IP CONSULT TO CASE MANAGEMENT  IP CONSULT TO PSYCHIATRY    Network Utilization Review Department  ATTENTION: Please call with any questions or concerns to [phone] and carefully listen to the prompts so that you are directed to the right person  All voicemails are confidential   Avelina Cowden all requests for admission clinical reviews, approved or denied determinations and any other requests to dedicated fax number below belonging to the campus where the patient is receiving treatment   List of dedicated fax numbers for the Facilities:  [address] DENIALS (Administrative/Medical Necessity) [phone]   [address] (Maternity/NICU/Pediatrics) [phone]   [address] Shira Ellis [address]156 [id number] [place] [address] Rachel Geiger [address] [facility] [place] [address] [phone]

## 2021-07-08 NOTE — TELEMEDICINE
TeleConsultation - [address] [facility] Seese 80 y o  male MRN: [id number]  Unit/Bed#: -01 Encounter: [id number]        REQUIRED DOCUMENTATION:     1  This service was provided via Telemedicine  2  Provider located at [place]  3  TeleMed provider: Wing Duffy  4  Identify all parties in room with patient during tele consult:  Patient and staff assisting with impaired  5  Patient was then informed that this was a Telemedicine visit and that the exam was being conducted confidentially over secure lines  My office door was closed  No one else was in the room  Patient acknowledged consent and understanding of privacy and security of the Telemedicine visit, and gave us permission to have the assistant stay in the room in order to assist with the history and to conduct the exam   I informed the patient that I have reviewed their record in Epic and presented the opportunity for them to ask any questions regarding the visit today  The patient agreed to participate  Assessment/Plan     Assessment:  Acute encephalopathy/delirium likely superimposed on dementia; history of bipolar 1 disorder    Plan:   Continue Depakote and Zoloft at current dosing for treatment of bipolar disorder  After cleared of any acute encephalopathy/delirium, it if he continues to manifest cognitive impairment then consider starting Aricept 5 mg every day  Currently the patient does not be criteria for inpatient psychiatric treatment  He will need outpatient psychiatric follow-up  The patient does not have a level of awareness to understand current health concerns or the ability currently to evaluate treatment options to make his decisions in his best interest regarding his medical care  He is in need of a power of  or guardian to handle his healthcare matters at this time      Chief Complaint:  The patient has no complaint at this time  History of Present Illness     Reason for Consult / Marcus Boas Problem:  Psychiatric consultation for delirium possibly superimposed on dementia with recent behavioral disturbance prior to presentation  Patient is a 80 y o  male who presented to the emergency department after becoming aggressive with staff and after refusing food, water, care and not allow staff to change in  The patient does not recall these events  Since admission the patient has been calm and cooperative but mental status has been consistent with acute encephalopathy/delirium possibly superimposed on dementia  The patient does not recall why he presented here  Past psychiatric history:  Patient admits to see his psychiatrist previously for Mcintosh Coma  He denies he has felt that way recently  He also states that Psychiatry had question his ability to care for himself  Social history:  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  He was a very poor historian  He claims that he has been living with his sister taking care of himself but says his sister is there to help with what every knees  He admits to drinking alcohol about every other day approximately 1 glass of was the period he denies smoking or use of other drugs  Family history:  The patient denies any psychiatric family history     Mental status examination:  The patient is alert  When asked for the date he said it was August 2022  When asked where he was he said he was at and [facility] all male in [place]  He was able to give me his full name and his date of birth  At times he appeared to have difficulty understanding questions  Questions frequently had to be repeated  At times answers given to questions seemed unrelated  It is difficult to discern how much of this may be secondary to hearing impairment and how much is due to current cognitive impairment  When given 3 words to memorize he had difficulty to immediately be able recall 3 words    After 5 attempts he was able recall 3 words   After 5 minutes he was able recall 2 out of the 3 words  Thought process was tangential at times  Insight and judgment are impaired by his current cognitive impairment  The patient does not have a level of awareness to understand current health concerns or the ability currently to evaluate treatment options to make his decisions in his best interest regarding his medical care  He is in need of a power of  or guardian to handle his healthcare matters at this time  Inpatient consult to Psychiatry  Consult performed by: Dayton Presley  Consult ordered by: Adonis Delcid MD        Past Medical History:   Diagnosis Date    Arthritis     Chronic venous insufficiency     HTN (hypertension) 10/26/2019    Prostate cancer ([facility] )     PVD (peripheral vascular disease) ([facility] )        Medical Review Of Systems:  Review of Systems    Meds/Allergies   all current active meds have been reviewed  No Known Allergies    Objective   Vital signs in last 24 hours:  Temp:  [97 5 °F (36 4 °C)-98 4 °F (36 9 °C)] 98 4 °F (36 9 °C)  HR:  [57-69] 63  Resp:  [15-18] 15  BP: (102-132)/(47-60) 107/59      Intake/Output Summary (Last 24 hours) at 7/8/2021 1336  Last data filed at 7/8/2021 5576  Gross per 24 hour   Intake 180 ml   Output 450 ml   Net -270 ml         Lab Results:  Reviewed  Imaging Studies:  Reviewed  EKG, Pathology, and Other Studies:  Reviewed    Code Status: Level 1 - Full Code  Advance Directive and Living Will:      Power of :    POLST:      Counseling / Coordination of Care  Total floor / unit time spent today 30 minutes  Greater than 50% of total time was spent with the patient and / or family counseling and / or coordination of care   A description of the counseling / coordination of care:  Chart review, patient evaluation, coordination with staff, he medication with provider

## 2021-07-08 NOTE — ASSESSMENT & PLAN NOTE
· Suspected sundowning versus underlying dementia  · Presenting with complaint of AMS, consisting of agitation, confusion and uncooperative behavior- patient is oriented to person, place and time on today's encounter  Pleasant and cooperative  · Baseline status: Alert, oriented, cooperative   Currently no concern for UTI or pneumonia or infectious etiology   Trauma workup with CT head, CT cervical spine, CT lumbar and thoracic consistent with old T12 fracture seen on previous imaging on 06/03   TSH WNL   Ammonia WNL    Mild hypomagnesemia on blood work; repleted  [facility] Psychiatry consulted:  Appreciate recommendations, no significant changes recommended at this time, can consider beginning Aricept 5 mg OD if patient continues to have behavioral disturbance    · Frequent orientation  · Supportive care  · Fall precautions  · PT OT eval- recommendations for PARS/ STR on discharge  Follow-up with case management for appropriate post-discharge placement

## 2021-07-08 NOTE — PROGRESS NOTES
[facility] [facility]  Progress Note Nemesio Heredia 1934, 80 y o  male MRN: [id number]  Unit/Bed#: -Melvin Encounter: [id number]  Primary Care Provider: Ellen Richmond DO   Date and time admitted to hospital: 7/7/2021 11:29 AM    * Altered mental status  Assessment & Plan  · Suspected delirium versus sundowning versus underlying dementia  · Presenting with complaint of AMS, consisting of agitation, confusion and uncooperative behavior  · With no previous history of dementia on chart review  · Baseline status: Alert, oriented, cooperative   Blood glucose 89 on arrival   Currently no concern for UTI or pneumonia or infectious etiology   Trauma workup with CT head, CT cervical spine, CT lumbar and thoracic consistent with old T12 fracture seen on previous imaging on 06/03   TSH WNL   Ammonia WNL    Mild hypomagnesemia on blood work; repleted    · Frequent orientation  · Supportive care  · Fall precautions  · Psych consult for titration of psych medications; (as per request of staff at [facility])  · PT OT eval      T12 compression fracture ([address] )  Assessment & Plan  · From prior fall  · During previous admission evaluated by Spine surgery and recommendation of TLSO brace was made  · No changes in imaging during this admission    · Supportive care  · Pain management with lidocaine patch  · PT OT eval    Mood disorder ([address] )  Assessment & Plan  · With anxiety and depression  · Continue home medication of Zoloft 100 mg    Ambulatory dysfunction  Assessment & Plan  · PT OT eval    HTN (hypertension)  Assessment & Plan  · Continue Norvasc 5 mg OD      Hyperlipidemia  Assessment & Plan  · Continue Lipitor 10 mg HS    Bipolar 1 disorder ([address] )  Assessment & Plan  · Continuing home medication  · Continue Depakote      VTE Pharmacologic Prophylaxis:   Pharmacologic: Enoxaparin (Lovenox)  Mechanical VTE Prophylaxis in Place: Yes    Discussions with Specialists or Other Care Team Provider: Nurse, case management    Education and Discussions with Family / Patient: spoke to patients nephew regarding patients current clinical condition and general management plan going forward  Patient's nephew Vivien Vasquez is POA  He reports that he would ideally like his uncle to return to [facility] when medically cleared for discharge  Current Length of Stay: 0 day(s)    Current Patient Status: Observation     Discharge Plan / Estimated Discharge Date: pending    Code Status: Level 1 - Full Code      Subjective:   Patient was seen and examined by me at bedside  Communicated clearly  No particular overnight event reported  No report of agitation or aggressive behavior overnight  Hemodynamically stable and afebrile  Pt is alert, does appear to have some cognitive decline consistent with dementia  Currently alert and oriented as to himself and his location  Objective:     Vitals:   Temp (24hrs), Av °F (36 7 °C), Min:97 5 °F (36 4 °C), Max:98 4 °F (36 9 °C)    Temp:  [97 5 °F (36 4 °C)-98 4 °F (36 9 °C)] 98 4 °F (36 9 °C)  HR:  [57-69] 63  Resp:  [15-18] 15  BP: (102-132)/(47-60) 107/59  SpO2:  [95 %-98 %] 96 %  Body mass index is 21 35 kg/m²  Input and Output Summary (last 24 hours): Intake/Output Summary (Last 24 hours) at 2021 1315  Last data filed at 2021 3770  Gross per 24 hour   Intake 180 ml   Output 450 ml   Net -270 ml       Physical Exam:     Physical Exam  Vitals reviewed  Constitutional:       Appearance: Normal appearance  Comments: Weak, frail   HENT:      Head: Normocephalic and atraumatic  Nose: Nose normal    Eyes:      General: No scleral icterus  Right eye: No discharge  Left eye: No discharge  Cardiovascular:      Rate and Rhythm: Normal rate and regular rhythm  Pulses: Normal pulses  Pulmonary:      Effort: Pulmonary effort is normal  No respiratory distress  Breath sounds: Normal breath sounds     Abdominal:      General: Bowel sounds are normal  There is no distension  Palpations: Abdomen is soft  Tenderness: There is no abdominal tenderness  Musculoskeletal:         General: Normal range of motion  Cervical back: Normal range of motion  Skin:     General: Skin is warm and dry  Neurological:      Mental Status: He is alert  Comments: Pleasant, alert, oriented as to himself and location   Psychiatric:         Mood and Affect: Mood normal          Behavior: Behavior normal              Additional Data:     Labs:    Results from last 7 days   Lab Units 07/08/21  0520   WBC Thousand/uL 7 59   HEMOGLOBIN g/dL 12 8   HEMATOCRIT % 38 5   PLATELETS Thousands/uL 151   NEUTROS PCT % 61   LYMPHS PCT % 27   MONOS PCT % 11   EOS PCT % 1     Results from last 7 days   Lab Units 07/08/21  0520 07/07/21  1215   POTASSIUM mmol/L 3 9 4 2   CHLORIDE mmol/L 106 106   CO2 mmol/L 28 29   BUN mg/dL 10 11   CREATININE mg/dL 0 73 0 85   CALCIUM mg/dL 8 7 9 0   ALK PHOS U/L  --  82   ALT U/L  --  13   AST U/L  --  15           * I Have Reviewed All Lab Data Listed Above  * Additional Pertinent Lab Tests Reviewed:  All Labs Within Last 24 Hours Reviewed    Imaging:    Imaging Reports Reviewed Today Include:  None  Imaging Personally Reviewed by Myself Includes:  None    Recent Cultures (last 7 days):           Last 24 Hours Medication List:   Current Facility-Administered Medications   Medication Dose Route Frequency Provider Last Rate    acetaminophen  650 mg Oral Q6H PRN Ge Whitney MD      amLODIPine  5 mg Oral Daily Sharmila Mendez MD      aspirin  81 mg Oral Daily Sharmila Mendez MD      atorvastatin  10 mg Oral Daily With Phuong Hayward MD      divalproex sodium  1,000 mg Oral Daily Sharmila Mendez MD      enoxaparin  40 mg Subcutaneous Daily Sharmila Mendez MD      lidocaine  1 patch Topical Daily Sharmila Mendez MD      magnesium sulfate  1 g Intravenous Once Ge Whitney MD      senna-docusate sodium  1 tablet Oral HS Rosalind Gurrola MD      sertraline  100 mg Oral Daily Rosalind Gurrola MD          Today, Patient Was Seen By: Josh Weldon MD    ** Please Note: This note has been constructed using a voice recognition system   **

## 2021-07-08 NOTE — PLAN OF CARE
Problem: OCCUPATIONAL THERAPY ADULT  Goal: Performs self-care activities at highest level of function for planned discharge setting  See evaluation for individualized goals  Description: Treatment Interventions: ADL retraining, Functional transfer training, UE strengthening/ROM, Endurance training, Cognitive reorientation, Patient/family training, Continued evaluation, Energy conservation, Activityengagement, Equipment evaluation/education          See flowsheet documentation for full assessment, interventions and recommendations  Note: Limitation: Decreased ADL status, Decreased UE ROM, Decreased UE strength, Decreased Safe judgement during ADL, Decreased cognition, Decreased endurance, Decreased self-care trans, Decreased high-level ADLs  Prognosis: Good, Fair  Assessment: Patient is a 80 y o  male seen for OT evaluation s/p admit to [address] [facility] on 7/7/2021 w/Altered mental status  Commorbidities affecting patient's functional performance at time of assessment include: T12 compression fracture, mood disorder, ambulatory dysfunction, HTN, hyperlipidemia, and bipolar 1 disorder  Patient  has a past medical history of Arthritis, Chronic venous insufficiency, HTN (hypertension) (10/26/2019), Prostate cancer ([facility] ), and PVD (peripheral vascular disease) ([facility] )  Orders placed for OT evaluation and treatment, TLSO donned throughout evaluation  Performed at least two patient identifiers during session including name and wristband  Prior to admission, patient was living at [facility] where he was receiving assistance from facility staff for ADLs and IADLs  Personal factors affecting patient at time of initial evaluation include: limited insight into deficits, difficulty performing ADLs and difficulty performing IADLs   Upon evaluation, patient requires minimal  and moderate assist for UB ADLs, maximal assist for LB ADLs, transfers and functional ambulation in room and bathroom with moderate assist x1-2, with the use of Rolling Walker  Patient is oriented to name,, oriented to situation,, disoriented to time, and disoriented to place,, presents with limited ability to recall information after a brief period of time (short term memory) / working memory   Occupational performance is affected by the following deficits: orientation, limited active ROM, decreased muscle strength, degenerative arthritic joint changes, dynamic sit/ stand balance deficit with poor standing tolerance time for self care and functional mobility, decreased activity tolerance, impaired memory, impaired problem solving, decreased safety awareness, increased pain and postural control and postural alignment deficit, requiring external assistance to complete transitional movements, and decreased trunk control  Therapist completed  extensive additional review of medical records and additional review of physical, cognitive or psychosocial history, clinical examination identifying 5 or more performance deficits, clinical decision making of a high complexity , consistent with a high complexity level evaluation  Patient to benefit from continued Occupational Therapy treatment while in the hospital to address deficits as defined above and maximize level of functional independence with ADLs and functional mobility  Occupational Performance areas to address include: eating, grooming , bathing/ shower, dressing, toilet hygiene, transfer to all surfaces, functional mobility, Leisure Participation and Social participation  From OT standpoint, recommendation at time of d/c would be post-acute rehabilitation services         OT Discharge Recommendation: Post acute rehabilitation services

## 2021-07-08 NOTE — PHYSICAL THERAPY NOTE
Physical Therapy Evaluation     Patient's Name: Diogenes Hudson    Admitting Diagnosis  Agitation [R45 1]  Failure to thrive in adult [R62 7]    Problem List  Patient Active Problem List   Diagnosis    Bipolar 1 disorder ([facility] )    Hyperlipidemia    History of prostate cancer    HTN (hypertension)    Fall    Ambulatory dysfunction    [facility]    Altered mental status    Mood disorder ([facility] )    T12 compression fracture ([facility] )       Past Medical History  Past Medical History:   Diagnosis Date    Arthritis     Chronic venous insufficiency     HTN (hypertension) 10/26/2019    Prostate cancer ([facility] )     PVD (peripheral vascular disease) ([facility] )        Past Surgical History  Past Surgical History:   Procedure Laterality Date    HERNIA REPAIR      TONSILLECTOMY            07/08/21 1345   PT Last Visit   PT Visit Date 07/08/21   Note Type   Note type Evaluation   Pain Assessment   Pain Assessment Tool 0-10   Pain Score 5   Pain Location/Orientation Orientation: Left; Location: Back  ("side")   Home Living   Type of Home Assisted living  [facility])   Home Layout One level   [address] [place] [address],[address]; Wheelchair-manual   Additional Comments [facility] [place] [facility]; however chart review from previous hospitalization reports pt lives at [address] [place] [address]  "Zulma Hernandez MD - 06/03/2021 12:18 PM EDT Patient currently residing at [facility]  This writer spoke with nurse who is familiar with patient  He states that patient has been there for approximately 1 year, transferred out of [place] at [facility] after doing well with PT there  Patient is unsteady on his feet, utilizes wheelchair when out of his room, able to transfer to bed and into wheelchair or utilize bathroom independently, utilizes rolling walker when in his room  Does have some behavioral issues at times, will refuse to eat  No history of violence or agitation  Medications were reconciled, Zoloft and Depakote were changed appropriately "   Prior Function   Level of [place] Needs assistance with ADLs and functional mobility; Needs assistance with IADLs   Lives With Facility staff   Receives Help From Family; Other (Comment); Home health  (staff at [facility]  home PT)   ADL Assistance Needs assistance   IADLs Needs assistance   Falls in the last 6 months   ("8")   Comments Pt questionable historian, MARIA ELENA to follow up  Prior to PT evaluation, OT Gildardo Coley telephoned Elizabeth Whitfield from [facility] to inquire about pt's existing TLSO brace he was fitted for at [address] [place] [address] in June 2021  Elizabeth Whitfield reports TLSO is still at [facility] reports he will deliver brace this date in order for PT/OT to perform functional mobility assessments  Elizabeth Whitfield reports he gets home PT & OT services at [facility]  Per CR "Plan of Care - Oraliazeeshan Socrates - 06/04/2021 9:52 AM EDT "CM called and spoke to [address] [place] [address] at [facility] to confirm all info  He explained that Alysha Arce is mostly independent in his room but should be utilizing a RW  Outside of his room, he scoots himself around in a WC  They provide his medications and assist with bathing and dressing  Elizabeth Whitfield suggested an inpatient rehab stay upon discharge  I then called and spoke to his nephew/POA Jorge  He confirmed all info  I made him aware that it is anticipated that Alysha Arce will require inpatient rehab upon discharge  He was at the [facility] [place] in the past and was unhappy "   Restrictions/Precautions   Weight Bearing Precautions Per Order No   Braces or Orthoses TLSO  Donald Garcia delivered TLSO brace prior to PT/OT evals, therapy donned TLSO prior to OOB mobility, remained donned when pt seated OOB in recliner)   Other Precautions Cognitive; Chair Alarm; Bed Alarm; Fall Risk;Pain;Hard of hearing;Spinal precautions;Multiple lines   General   Additional Pertinent History Per chart review;  Bam Luevano PA-C - 06/03/2021 4:09 PM EDT Imaging and case reviewed with attending  No acute neurosurgical intervention  Patient seen at bedside and plan reviewed  Recommend [place] TLSO when upright  Follow-up with Neurosurgery APC as outpatient  Family/Caregiver Present No   Cognition   Overall Cognitive Status Impaired   Arousal/Participation Alert   Orientation Level Oriented to person;Disoriented to place; Disoriented to time;Oriented to situation  [facility]" "August 2022")   Memory Decreased recall of biographical information;Decreased short term memory;Decreased recall of recent events;Decreased recall of precautions   Following Commands Follows one step commands with increased time or repetition   Comments pt agreeable to PT evaluation   RUE Assessment   RUE Assessment   (defer to OT eval for comments)   LUE Assessment   LUE Assessment   (defer to OT eval for comments)   RLE Assessment   RLE Assessment   (assessed c functional mobility: 3+/5 grossly)   LLE Assessment   LLE Assessment   (assessed c functional mobility: 3+/5 grossly)   Coordination   Movements are Fluid and Coordinated 1   Sensation WFL   Bed Mobility   Rolling L 3  Moderate assistance  (LOG ROLL TECHNIQUE)   Additional items Assist x 2;HOB elevated; Bedrails; Increased time required;Verbal cues;LE management   Supine to Sit 2  Maximal assistance   Additional items Assist x 2;HOB elevated; Bedrails; Increased time required;LE management;Verbal cues  (LOG ROLL TECHNIQUE)   Transfers   Sit to Stand 3  Moderate assistance   Additional items Assist x 2;Armrests; Increased time required;Verbal cues   Stand to Sit 3  Moderate assistance   Additional items Assist x 2;Armrests; Increased time required;Verbal cues   Ambulation/Elevation   Gait pattern Decreased foot clearance; Short stride; Step to;Excessively slow; Shuffling   Gait Assistance 3  Moderate assist   Additional items Assist x 1;Verbal cues; Tactile cues  (SBA of 2nd for safety)   Assistive Device Rolling walker   Distance 5' from EOB to recliner   Balance   Static Sitting Fair -  (retropulsion, posterior LOB requiring increased A)   Dynamic Sitting Poor +   Static Standing Poor +   Dynamic Standing Poor   Ambulatory Poor   Endurance Deficit   Endurance Deficit Yes   Activity Tolerance   Activity Tolerance Patient limited by fatigue;Patient limited by pain   Medical Staff Made Aware CATRACHITO Rodriguez Yvette   Nurse Made Aware RN [id number] Perla Nadeem verbalized pt appropriate to see, made aware of session outcome/recs   Assessment   Prognosis Fair   Problem List Decreased strength;Decreased endurance; Impaired balance;Decreased mobility; Decreased cognition; Impaired hearing;Pain;Orthopedic restrictions   Assessment Pt is 80 y o  male seen for PT evaluation on 7/8/2021 s/p admit to [facility] on 7/7/2021 w/ Altered mental status  PT consulted to assess pt's functional mobility and d/c needs  Order placed for PT eval and tx, w/ up w/ A order  Performed at least 2 patient identifiers during session: Name and wristband  Comorbidities affecting pt's physical performance at time of assessment include:  has a past medical history of Arthritis, Chronic venous insufficiency, HTN (hypertension) (10/26/2019), Pro[facility] ([facility] ), and PVD (peripheral vascular disease) ([facility] )  Pt poor historian, CM to follow up  See information above pertaining to PLOF and home environment per chart review from previous hospitalization at [facility]  Personal factors affecting pt at time of IE include: inaccessible home environment, ambulating w/ assistive device, inability to ambulate household distances, inability to navigate level surfaces w/o external assistance, decreased cognition, positive fall history, hearing impairments and decreased initiation and engagement   Please find objective findings from PT assessment regarding body systems outlined above with impairments and limitations including weakness, impaired balance, decreased endurance, gait deviations, pain, decreased activity tolerance, fall risk, orthopedic restrictions and decreased cognition, as well as mobility assessment (need for cueing for mobility technique)  The following objective measures performed on IE also reveal limitations: Barthel Index: 20/100 and Modified Ward: 4 (moderate/severe disability)  Pt's clinical presentation is currently unstable/unpredictable seen in pt's presentation of abnormal lab value(s), need for input for task focus and mobility technique and ongoing medical assessment  Pt to benefit from continued PT tx to address deficits as defined above and maximize level of functional independent mobility and consistency  From PT/mobility standpoint, recommendation at time of d/c would be post acute rehabilitation services pending progress in order to facilitate return to PLOF  Barriers to Discharge Inaccessible home environment;Decreased caregiver support   Goals   Patient Goals none expressed   STG Expiration Date 07/18/21   Short Term Goal #1 In 7-10 days: Increase bilateral LE strength 1/2 grade to facilitate independent mobility, Perform all bed mobility tasks with min A of 1 to decrease caregiver burden, Perform all transfers with min A of 1 to improve independence, Ambulate > 50 ft  with least restrictive assistive device with min A of 1 w/o LOB and w/ normalized gait pattern 100% of the time, Increase all balance 1/2 grade to decrease risk for falls, Improve Barthel Index score to 35 or greater to facilitate independence and PT provider will perform functional balance assessment to determine fall risk   PT Treatment Day 1   Plan   Treatment/Interventions LE strengthening/ROM; Functional transfer training; Therapeutic exercise; Endurance training;Patient/family training;Equipment eval/education; Bed mobility;Gait training;Spoke to nursing;OT;Continued evaluation   PT Frequency   (3-5x/wk)   Recommendation   PT Discharge Recommendation Post acute rehabilitation services   Equipment Recommended   (TBD)   PT - OK to Discharge Yes  (when medically cleared if to STR)   AM-PAC Basic Mobility Inpatient   Turning in Bed Without Bedrails 1   Lying on Back to Sitting on Edge of Flat Bed 1   Moving Bed to Chair 2   Standing Up From Chair 2   Walk in Room 1   Climb 3-5 Stairs 1   Basic Mobility Inpatient Raw Score 8   Turning Head Towards Sound 3   Follow Simple Instructions 3   Low Function Basic Mobility Raw Score 14   Low Function Basic Mobility Standardized Score 22 01   Modified Ward Scale   Modified [place] Scale 4   Barthel Index   Feeding 5   Bathing 0   Grooming Score 0   Dressing Score 5   Bladder Score 0   Bowels Score 0   Toilet Use Score 5   Transfers (Bed/Chair) Score 5   Mobility (Level Surface) Score 0   Stairs Score 0   Barthel Index Score 20         Edgar Flanagan, PT, DPT    Physical Therapy Treatment Note       S:  Pt agreeable to PT treatment session s/p PT eval, in no apparent distress and responsive      O:  Pt seen for PT treatment session this date with interventions consisting of therapeutic activity consisting of training: sit<>stand transfers and log roll technique; education pertaining to TLSO brace  VC required for technique      A:  Pt requiring maximal vc for appropriate hand/foot placement during sit to stand transfers from various surfaces/heights  Pt able to tolerate static standing x1min duration with use of RW and min A during hygiene care  Pt educated on TLSO brace components, wearing schedule per MD recommendation, maintenance of brace, donning/doffing, skin inspection  Educated that brace may need to be repositioned throughout the day  Handout provided and reviewed with pt at this time  Plan to continue training next session  Pt able to verbalize understanding w/ all components  Education to [facility] staff on same, including don/doffing, wearing schedule, skin inspection of NSG q shift as well as assessing circulation for appropriate fit of brace   Post session: pt returned back to recliner, chair alarm engaged, all needs in reach and RN notified of session findings/recommendations     P:  Continue to recommend STR at time of d/c in order to maximize pt's functional independence and safety w/ mobility  Pt continues to be functioning below baseline level, and remains limited 2* factors listed above  PT will continue to see pt while here in order to address the deficits listed above and provide interventions consistent w/ POC in effort to achieve STGs      Amaury Finley, PT, DPT

## 2021-07-08 NOTE — PLAN OF CARE
Problem: PHYSICAL THERAPY ADULT  Goal: Performs mobility at highest level of function for planned discharge setting  See evaluation for individualized goals  Description: Treatment/Interventions: LE strengthening/ROM, Functional transfer training, Therapeutic exercise, Endurance training, Patient/family training, Equipment eval/education, Bed mobility, Gait training, Spoke to nursing, OT, Continued evaluation  Equipment Recommended:  (TBD)       See flowsheet documentation for full assessment, interventions and recommendations  7/8/2021 1436 by Yuly Aguilar, PT  Note: Prognosis: Fair  Problem List: Decreased strength, Decreased endurance, Impaired balance, Decreased mobility, Decreased cognition, Impaired hearing, Pain, Orthopedic restrictions  Assessment: Pt is 80 y o  male seen for PT evaluation on 7/8/2021 s/p admit to [facility] on 7/7/2021 w/ Altered mental status  PT consulted to assess pt's functional mobility and d/c needs  Order placed for PT eval and tx, w/ up w/ A order  Performed at least 2 patient identifiers during session: Name and wristband  Comorbidities affecting pt's physical performance at time of assessment include:  has a past medical history of Arthritis, Chronic venous insufficiency, HTN (hypertension) (10/26/2019), Prostate cancer ([facility] ), and PVD (peripheral vascular disease) ([facility] )  Pt poor historian, CM to follow up  See information above pertaining to PLOF and home environment per chart review from previous hospitalization at [facility]  Personal factors affecting pt at time of IE include: inaccessible home environment, ambulating w/ assistive device, inability to ambulate household distances, inability to navigate level surfaces w/o external assistance, decreased cognition, positive fall history, hearing impairments and decreased initiation and engagement   Please find objective findings from PT assessment regarding body systems outlined above with impairments and limitations including weakness, impaired balance, decreased endurance, gait deviations, pain, decreased activity tolerance, fall risk, orthopedic restrictions and decreased cognition, as well as mobility assessment (need for cueing for mobility technique)  The following objective measures performed on IE also reveal limitations: Barthel Index: 20/100 and Modified Ward: 4 (moderate/severe disability)  Pt's clinical presentation is currently unstable/unpredictable seen in pt's presentation of abnormal lab value(s), need for input for task focus and mobility technique and ongoing medical assessment  Pt to benefit from continued PT tx to address deficits as defined above and maximize level of functional independent mobility and consistency  From PT/mobility standpoint, recommendation at time of d/c would be post acute rehabilitation services pending progress in order to facilitate return to PLOF  Barriers to Discharge: Inaccessible home environment, Decreased caregiver support        PT Discharge Recommendation: Post acute rehabilitation services     PT - OK to Discharge: Yes (when medically cleared if to STR)    See flowsheet documentation for full assessment  7/8/2021 1436 by Jyoti Girard PT  Note: Prognosis: Fair  Problem List: Decreased strength, Decreased endurance, Impaired balance, Decreased mobility, Decreased cognition, Impaired hearing, Pain, Orthopedic restrictions  Assessment: Pt is 80 y o  male seen for PT evaluation on 7/8/2021 s/p admit to [facility] on 7/7/2021 w/ Altered mental status  PT consulted to assess pt's functional mobility and d/c needs  Order placed for PT eval and tx, w/ up w/ A order  Performed at least 2 patient identifiers during session: Name and wristband   Comorbidities affecting pt's physical performance at time of assessment include:  has a past medical history of Arthritis, Chronic venous insufficiency, HTN (hypertension) (10/26/2019), Prostate cancer ([facility] ), and PVD (peripheral vascular disease) ([facility] )  Pt poor historian, CM to follow up  See information above pertaining to PLOF and home environment per chart review from previous hospitalization at [facility]  Personal factors affecting pt at time of IE include: inaccessible home environment, ambulating w/ assistive device, inability to ambulate household distances, inability to navigate level surfaces w/o external assistance, decreased cognition, positive fall history, hearing impairments and decreased initiation and engagement  Please find objective findings from PT assessment regarding body systems outlined above with impairments and limitations including weakness, impaired balance, decreased endurance, gait deviations, pain, decreased activity tolerance, fall risk, orthopedic restrictions and decreased cognition, as well as mobility assessment (need for cueing for mobility technique)  The following objective measures performed on IE also reveal limitations: Barthel Index: 20/100 and Modified [place]: 4 (moderate/severe disability)  Pt's clinical presentation is currently unstable/unpredictable seen in pt's presentation of abnormal lab value(s), need for input for task focus and mobility technique and ongoing medical assessment  Pt to benefit from continued PT tx to address deficits as defined above and maximize level of functional independent mobility and consistency  From PT/mobility standpoint, recommendation at time of d/c would be post acute rehabilitation services pending progress in order to facilitate return to PLOF  Barriers to Discharge: Inaccessible home environment, Decreased caregiver support        PT Discharge Recommendation: Post acute rehabilitation services     PT - OK to Discharge: Yes (when medically cleared if to STR)    See flowsheet documentation for full assessment

## 2021-07-08 NOTE — CASE MANAGEMENT
CM met with pt at bedside and also spoke to Amaris Brannon from [place] where pt resides  Pt is under OBS status and this was reviewed and signed by pt  Copy to pt and copy to MR for scanning  Pt lives at [facility] in a 2nd floor private room with an elevator present to reach  Pt is assisted with ADL's by the staff and uses a cane or walker to ambulate  He is receiving PT/OT through [facility] (OUTPATIENT [facility])  He has used [facility] in the past   No STR hx   Denies substance abuse or tobacco abuse  He has a hx of anxiety, depression and bipolar disorder and is taking Depakote for this  Amaris Brannon has noticed a change in his behavior over the last week  He has become incontinent and is not eating  He has also become aggressive with the staff  He has also been almost continuously masturbating  Amaris Brannon is requesting a psych eval to adjust medications and perhaps starting something new like Seroquel  Pt has not been seen by a psychiatrist in 2 years  His PCP is Dr Karissa Bloom  He uses [facility] and has no problem with co-pays  Pt states that he has an Advanced Directive and his POA is his nephew Yasmine Stokes  He is transported to appointments by the [employer]  Will need transportation home when medically cleared  Amaris Brannon will accept back as long as pt is eval by psych and is under better control  SLIM (Dr Jae Navarro informed)  [address] [place] [address]  CM reviewed discharge planning process including the following: identifying help at home, patient preference for discharge planning needs, pharmacy preference, and availability of treatment team to discuss questions or concerns patient and/or family may have regarding understanding medications and recognizing signs and symptoms once discharged  CM also encouraged patient to follow up with all recommended appointments after discharge  Patient advised of importance for patient and family to participate in managing patients medical well being

## 2021-07-08 NOTE — OCCUPATIONAL THERAPY NOTE
Occupational Therapy Evaluation Note        Patient Name: Dayton Segura  ZIBLC'V Date: 7/8/2021 07/08/21 1408   OT Last Visit   OT Visit Date 07/08/21   Note Type   Note type Evaluation   Restrictions/Precautions   Weight Bearing Precautions Per Order No   Braces or Orthoses TLSO   Other Precautions Cognitive; Chair Alarm; Bed Alarm;Hard of hearing; Fall Risk;Pain;Spinal precautions;Multiple lines   Pain Assessment   Pain Assessment Tool 0-10   Pain Score 5   Pain Location/Orientation Orientation: Left; Location: Back  ("side")   Hospital Pain Intervention(s) Repositioned; Ambulation/increased activity   Multiple Pain Sites No   Home Living   Type of Home Assisted living  [facility])   Home Layout One level;Performs ADLs on one level   [address]; Wheelchair-manual   Additional Comments pt reports he lives in [place] at his [facility]; however chart review from previous hospitalization reports pt lives at [address] [place] [address]  "Akilah Aguilar MD - 06/03/2021 12:18 PM EDT Patient currently residing at [facility] in [facility]  This writer spoke with nurse who is familiar with patient  He states that patient has been there for approximately 1 year, transferred out of [facility] after doing well with PT there  Patient is unsteady on his feet, utilizes wheelchair when out of his room, able to transfer to bed and into wheelchair or utilize bathroom independently, utilizes rolling walker when in his room  Does have some behavioral issues at times, will refuse to eat  No history of violence or agitation  Medications were reconciled, Zoloft and Depakote were changed appropriately "   Prior Function   Level of [place] Needs assistance with IADLs; Needs assistance with ADLs and functional mobility   Lives With Facility staff   Receives Help From Family;Home health; Other (Comment)  (Facility staff, home OT/PT)   ADL Assistance Needs assistance   IADLs Needs assistance [place] in the last 6 months   ("8")   Comments Patient is a questionable historian at baseline, CM to follow up  Prior to OT evaluation, this therapist telephoned Trevon Lucy from [place] to inquire about pt's existing TLSO brace he was fitted for at [facility] in June 2021  Trevon Ayala reports TLSO is still at [facility] Abbie reports that he will deliver brace this date in order for OT/PT to perform evaluations/functional mobility  Per CR "Plan of Care - Esperanza Guevara - 06/04/2021 9:52 AM EDT "CM called and spoke to [address] [place] [address] at [facility] to confirm all info  He explained that eJlly Morales is mostly independent in his room but should be utilizing a RW  Outside of his room, he scoots himself around in a WC  They provide his medications and assist with bathing and dressing  Trevon Ayala suggested an inpatient rehab stay upon discharge  I then called and spoke to his nephew/POA Jorge  He confirmed all info  I made him aware that it is anticipated that Jelly Morales will require inpatient rehab upon discharge  He was at the [facility] in the past and was unhappy "   Lifestyle   Autonomy Per chart review, patient lives at [place] where he receives assistance from facility staff for ADLs and IADLs  Patient reports that he uses both a walker and a manual wheelchair for functional mobility     Reciprocal Relationships Supportive facility staff and family   Intrinsic Gratification Reading (nonfiction) and watching TV   Psychosocial   Psychosocial (WDL) WDL   ADL   Eating Assistance 5  Supervision/Setup   Grooming Assistance 4  Minimal Assistance   UB Bathing Assistance 4  Minimal Assistance   LB Bathing Assistance 2  Maximal Assistance   UB Dressing Assistance 3  Moderate Assistance   LB Dressing Assistance 2  Maximal Assistance   Toileting Assistance  2  Maximal Assistance   Functional Assistance 2  Maximal Assistance   Additional Comments ADL assist levels based on pt's functional performance during OT evaluation   Bed Mobility   Rolling L 3  Moderate assistance  (Log roll technique for back safety)   Additional items Assist x 2;HOB elevated; Bedrails; Increased time required;Verbal cues;LE management   Supine to Sit 2  Maximal assistance   Additional items Assist x 2;HOB elevated; Bedrails; Increased time required;Verbal cues;LE management   Additional Comments Patient received lying supine in bed upon OT arrival; at end of session: pt seated OOB to recliner chair w/ all needs within reach and chair alarm activated, left in care of PT  TLSO brace donned upon pt sitting EOB  Transfers   Sit to Stand 3  Moderate assistance   Additional items Assist x 2; Increased time required;Verbal cues   Stand to Sit 3  Moderate assistance   Additional items Assist x 2; Increased time required;Verbal cues   Additional Comments Performed functional transfers using RW  Patient with noted initial posterior lean upon static standing, requiring verbal and tactile cueing for shifting weight anteriorly  Functional Mobility   Functional Mobility 3  Moderate assistance   Additional Comments x1; 3-4 steps to recliner chair w/ no overt LOB or SOB   Patient with noted decreased safety awareness with transitional movements   Additional items Rolling walker   Balance   Static Sitting Fair -   Dynamic Sitting Poor +   Static Standing Poor +   Dynamic Standing Poor   Ambulatory Poor   Activity Tolerance   Activity Tolerance Patient limited by fatigue;Patient limited by pain   Medical Staff Made Aware PT Patricia Salomon   Nurse Made Aware HENRI Hartmann confirmed pt appropriate for therapy and made aware of session outcomes   RUE Assessment   RUE Assessment X  (Decreased overhead movements and strength based formally)   RUE Overall AROM   R Shoulder Flexion ~90 degrees shoulder flexion   RUE Strength   R Elbow Extension 4-/5   LUE Assessment   LUE Assessment X  (Decreased overhead movements and strength based formally)   LUE Overall AROM   L Shoulder Flexion ~90 degrees shoulder flexion   LUE Strength L Elbow Extension 4-/5   Hand Function   Gross Motor Coordination   (Further assessment required)   Fine Motor Coordination   (Further assessment required)   Sensation   Light Touch No apparent deficits  (BUEs)   Vision-Basic Assessment   Current Vision Wears glasses only for reading  (per pt; "I need new glasses")   Cognition   Overall Cognitive Status Impaired   Arousal/Participation Responsive; Cooperative   Attention Attends with cues to redirect   Orientation Level Oriented to person;Oriented to situation;Disoriented to time;Disoriented to place  [facility]" "August 2022")   Memory Decreased short term memory;Decreased recall of recent events;Decreased recall of biographical information;Decreased recall of precautions   Following Commands Follows one step commands with increased time or repetition   Comments Patient agreeable to OT evaluation   Assessment   Limitation Decreased ADL status; Decreased UE ROM; Decreased UE strength;Decreased Safe judgement during ADL;Decreased cognition;Decreased endurance;Decreased self-care trans;Decreased high-level ADLs   Prognosis Good;Fair   Assessment Patient is a 80 y o  male seen for OT evaluation s/p admit to [address] [facility] on 7/7/2021 w/Altered mental status  Commorbidities affecting patient's functional performance at time of assessment include: T12 compression fracture, mood disorder, ambulatory dysfunction, HTN, hyperlipidemia, and bipolar 1 disorder  Patient  has a past medical history of Arthritis, Chronic venous insufficiency, HTN (hypertension) (10/26/2019), Prostate cancer ([facility] [address] ), and PVD (peripheral vascular disease) ([facility] [address] )  Orders placed for OT evaluation and treatment, TLSO donned throughout evaluation  Performed at least two patient identifiers during session including name and wristband  Prior to admission, patient was living at [facility] where he was receiving assistance from facility staff for ADLs and IADLs   Personal factors affecting patient at time of initial evaluation include: limited insight into deficits, difficulty performing ADLs and difficulty performing IADLs  Upon evaluation, patient requires minimal  and moderate assist for UB ADLs, maximal assist for LB ADLs, transfers and functional ambulation in room and bathroom with moderate assist x1-2, with the use of Rolling Walker  Patient is oriented to name,, oriented to situation,, disoriented to time, and disoriented to place,, presents with limited ability to recall information after a brief period of time (short term memory) / working memory   Occupational performance is affected by the following deficits: orientation, limited active ROM, decreased muscle strength, degenerative arthritic joint changes, dynamic sit/ stand balance deficit with poor standing tolerance time for self care and functional mobility, decreased activity tolerance, impaired memory, impaired problem solving, decreased safety awareness, increased pain and postural control and postural alignment deficit, requiring external assistance to complete transitional movements, and decreased trunk control  Therapist completed  extensive additional review of medical records and additional review of physical, cognitive or psychosocial history, clinical examination identifying 5 or more performance deficits, clinical decision making of a high complexity , consistent with a high complexity level evaluation  Patient to benefit from continued Occupational Therapy treatment while in the hospital to address deficits as defined above and maximize level of functional independence with ADLs and functional mobility  Occupational Performance areas to address include: eating, grooming , bathing/ shower, dressing, toilet hygiene, transfer to all surfaces, functional mobility, Leisure Participation and Social participation  From OT standpoint, recommendation at time of d/c would be post-acute rehabilitation services  Goals   Patient Goals none expressed at time of IE   Plan   Treatment Interventions ADL retraining;Functional transfer training;UE strengthening/ROM; Endurance training;Cognitive reorientation;Patient/family training;Continued evaluation; Energy conservation; Activityengagement;Equipment evaluation/education   Goal Expiration Date 07/22/21   OT Treatment Day 0   OT Frequency 3-5x/wk   Recommendation   OT Discharge Recommendation Post acute rehabilitation services   Additional Comments  The patient's raw score on the AM-PAC Daily Activity inpatient short form is 14, standardized score is 33 39, less than 39 4  Patients at this level are likely to benefit from discharge to post-acute rehabilitation services  Please refer to the recommendation of the Occupational Therapist for safe discharge planning  AM-PAC Daily Activity Inpatient   Lower Body Dressing 2   Bathing 2   Toileting 2   Upper Body Dressing 2   Grooming 3   Eating 3   Daily Activity Raw Score 14   Daily Activity Standardized Score (Calc for Raw Score >=11) 33 39   AM-PAC Applied Cognition Inpatient   Following a Speech/Presentation 2   Understanding Ordinary Conversation 3   Taking Medications 1   Remembering Where Things Are Placed or Put Away 1   Remembering List of 4-5 Errands 1   Taking Care of Complicated Tasks 1   Applied Cognition Raw Score 9   Applied Cognition Standardized Score 22 48   Barthel Index   Feeding 5   Bathing 0   Grooming Score 0   Dressing Score 5   Bladder Score 0   Bowels Score 0   Toilet Use Score 5   Transfers (Bed/Chair) Score 5   Mobility (Level Surface) Score 0   Stairs Score 0   Barthel Index Score 20   Modified Wood Scale   Modified Ward Scale 4     Occupational Therapy Goals to be completed in 7-14 Days:    1- Patient will increase OOB/ sitting tolerance to 2-4 hours per day for increased participation in self care and leisure tasks with no s/s of exertion      2- Patient will increase standing tolerance time to 5 minutes with unilateral UE support to complete sink level ADLs@ min assist level  3- Patient will follow 100% simple one step directives without verbal cues  4- Patient will increase sitting tolerance at edge of bed to 20 minutes to complete UB ADLs @ min assist level  5- Patient will transfer bed to Chair / toilet with min assist with AD as indicated  6- Patient will complete UB ADLs with set up assist     7- Patient will complete LB ADLs with min assist with the use of adaptive equipment as indicated  8- Patient will complete toileting hygiene with mod assist/ supervision for thoroughness  9- Patient/ Family will demonstrate competency with UE Home Exercise Program      10- Pt will improve static and dynamic sitting balance to fair to increase safety and independence during functional transfers and ADL and decrease risk for falls  11- Patient will complete rolling to R/L with use of side rail and min assist x1       Jenny Hector OTR/L

## 2021-07-08 NOTE — CASE MANAGEMENT
Karen Hector called and states pt active w/ their agency for home care  Referral via allscripts  Await intake determination  A post acute care recommendation was made by your care team for [facility]  Discussed [place] [facility] with patient  Choice is to return/continue services

## 2021-07-08 NOTE — ASSESSMENT & PLAN NOTE
· Suspected delirium versus sundowning versus underlying dementia  · Presenting with complaint of AMS, consisting of agitation, confusion and uncooperative behavior  · With no previous history of dementia on chart review  · Baseline status: Alert, oriented, cooperative   Blood glucose 89 on arrival   Currently no concern for UTI or pneumonia or infectious etiology   Trauma workup with CT head, CT cervical spine, CT lumbar and thoracic consistent with old T12 fracture seen on previous imaging on 06/03   TSH WNL   Ammonia WNL    Mild hypomagnesemia on blood work; repleted    · Frequent orientation  · Supportive care  · Fall precautions  · Psych consult for titration of psych medications; (as per request of staff at [facility])  · PT OT eval

## 2021-07-08 NOTE — PLAN OF CARE
Problem: MOBILITY - ADULT  Goal: Maintain or return to baseline ADL function  Description: INTERVENTIONS:  -  Assess patient's ability to carry out ADLs; assess patient's baseline for ADL function and identify physical deficits which impact ability to perform ADLs (bathing, care of mouth/teeth, toileting, grooming, dressing, etc )  - Assess/evaluate cause of self-care deficits   - Assess range of motion  - Assess patient's mobility; develop plan if impaired  - Assess patient's need for assistive devices and provide as appropriate  - Encourage maximum independence but intervene and supervise when necessary  - Involve family in performance of ADLs  - Assess for home care needs following discharge   - Consider OT consult to assist with ADL evaluation and planning for discharge  - Provide patient education as appropriate  Outcome: Progressing  Goal: Maintains/Returns to pre admission functional level  Description: INTERVENTIONS:  - Perform BMAT or MOVE assessment daily    - Set and communicate daily mobility goal to care team and patient/family/caregiver  - Collaborate with rehabilitation services on mobility goals if consulted  - Reposition patient every 2 hours      - Record patient progress and toleration of activity level   Outcome: Progressing

## 2021-07-09 PROCEDURE — 99232 SBSQ HOSP IP/OBS MODERATE 35: CPT | Performed by: INTERNAL MEDICINE

## 2021-07-09 RX ADMIN — SERTRALINE HYDROCHLORIDE 100 MG: 50 TABLET ORAL at 10:00

## 2021-07-09 RX ADMIN — DIVALPROEX SODIUM 1000 MG: 250 TABLET, FILM COATED, EXTENDED RELEASE ORAL at 10:00

## 2021-07-09 RX ADMIN — ENOXAPARIN SODIUM 40 MG: 40 INJECTION SUBCUTANEOUS at 10:00

## 2021-07-09 RX ADMIN — ATORVASTATIN CALCIUM 10 MG: 10 TABLET, FILM COATED ORAL at 17:30

## 2021-07-09 RX ADMIN — ASPIRIN 81 MG: 81 TABLET, DELAYED RELEASE ORAL at 10:00

## 2021-07-09 RX ADMIN — LIDOCAINE 5% 1 PATCH: 700 PATCH TOPICAL at 10:00

## 2021-07-09 RX ADMIN — AMLODIPINE BESYLATE 5 MG: 5 TABLET ORAL at 10:00

## 2021-07-09 NOTE — CASE MANAGEMENT
CM telephoned Malachi Res (Sister) [phone] (H) person answering telephone was male and states wrong number  CM left voicemail message for callback for [facility] )   [phone] Spencer Pena

## 2021-07-09 NOTE — PLAN OF CARE
Problem: MOBILITY - ADULT  Goal: Maintain or return to baseline ADL function  Description: INTERVENTIONS:  -  Assess patient's ability to carry out ADLs; assess patient's baseline for ADL function and identify physical deficits which impact ability to perform ADLs (bathing, care of mouth/teeth, toileting, grooming, dressing, etc )  - Assess/evaluate cause of self-care deficits   - Assess range of motion  - Assess patient's mobility; develop plan if impaired  - Assess patient's need for assistive devices and provide as appropriate  - Encourage maximum independence but intervene and supervise when necessary  - Involve family in performance of ADLs  - Assess for home care needs following discharge   - Consider OT consult to assist with ADL evaluation and planning for discharge  - Provide patient education as appropriate  Outcome: Progressing  Goal: Maintains/Returns to pre admission functional level  Description: INTERVENTIONS:  - Perform BMAT or MOVE assessment daily    - Set and communicate daily mobility goal to care team and patient/family/caregiver  - Collaborate with rehabilitation services on mobility goals if consulted  - Perform Range of Motion 2 times a day  - Reposition patient every 2 hours    - Dangle patient   - Stand patient   - Ambulate patient   - Out of bed to chair   - Out of bed for meals   - Out of bed for toileting  - Record patient progress and toleration of activity level   Outcome: Progressing     Problem: PAIN - ADULT  Goal: Verbalizes/displays adequate comfort level or baseline comfort level  Description: Interventions:  - Encourage patient to monitor pain and request assistance  - Assess pain using appropriate pain scale  - Administer analgesics based on type and severity of pain and evaluate response  - Implement non-pharmacological measures as appropriate and evaluate response  - Consider cultural and social influences on pain and pain management  - Notify physician/advanced practitioner if interventions unsuccessful or patient reports new pain  Outcome: Progressing     Problem: INFECTION - ADULT  Goal: Absence or prevention of progression during hospitalization  Description: INTERVENTIONS:  - Assess and monitor for signs and symptoms of infection  - Monitor lab/diagnostic results  - Monitor all insertion sites, i e  indwelling lines, tubes, and drains  - Monitor endotracheal if appropriate and nasal secretions for changes in amount and color  - [place] appropriate cooling/warming therapies per order  - Administer medications as ordered  - Instruct and encourage patient and family to use good hand hygiene technique  - Identify and instruct in appropriate isolation precautions for identified infection/condition  Outcome: Progressing  Goal: Absence of fever/infection during neutropenic period  Description: INTERVENTIONS:  - Monitor WBC  Outcome: Progressing     Problem: SAFETY ADULT  Goal: Maintain or return to baseline ADL function  Description: INTERVENTIONS:  -  Assess patient's ability to carry out ADLs; assess patient's baseline for ADL function and identify physical deficits which impact ability to perform ADLs (bathing, care of mouth/teeth, toileting, grooming, dressing, etc )  - Assess/evaluate cause of self-care deficits   - Assess range of motion  - Assess patient's mobility; develop plan if impaired  - Assess patient's need for assistive devices and provide as appropriate  - Encourage maximum independence but intervene and supervise when necessary  - Involve family in performance of ADLs  - Assess for home care needs following discharge   - Consider OT consult to assist with ADL evaluation and planning for discharge  - Provide patient education as appropriate  Outcome: Progressing  Goal: Maintains/Returns to pre admission functional level  Description: INTERVENTIONS:  - Perform BMAT or MOVE assessment daily    - Set and communicate daily mobility goal to care team and patient/family/caregiver  - Collaborate with rehabilitation services on mobility goals if consulted  - Perform Range of Motion 2 times a day  - Reposition patient every 2 hours    - Dangle patient   - Stand patient   - Ambulate patient   - Out of bed to chair  - Out of bed for meals   - Out of bed for toileting  - Record patient progress and toleration of activity level   Outcome: Progressing  Goal: Patient will remain free of falls  Description: INTERVENTIONS:  - Educate patient/family on patient safety including physical limitations  - Instruct patient to call for assistance with activity   - Consult OT/PT to assist with strengthening/mobility   - Keep Call bell within reach  - Keep bed low and locked with side rails adjusted as appropriate  - Keep care items and personal belongings within reach  - Initiate and maintain comfort rounds  - Make Fall Risk Sign visible to staff  - Offer Toileting every 2 Hours, in advance of need  - Initiate/Maintain alarm  - Obtain necessary fall risk management equipment  - Apply yellow socks and bracelet for high fall risk patients  - Consider moving patient to room near nurses station  Outcome: Progressing     Problem: DISCHARGE PLANNING  Goal: Discharge to home or other facility with appropriate resources  Description: INTERVENTIONS:  - Identify barriers to discharge w/patient and caregiver  - Arrange for needed discharge resources and transportation as appropriate  - Identify discharge learning needs (meds, wound care, etc )  - Arrange for interpretive services to assist at discharge as needed  - Refer to Case Management Department for coordinating discharge planning if the patient needs post-hospital services based on physician/advanced practitioner order or complex needs related to functional status, cognitive ability, or social support system  Outcome: Progressing     Problem: Knowledge Deficit  Goal: Patient/family/caregiver demonstrates understanding of disease process, treatment plan, medications, and discharge instructions  Description: Complete learning assessment and assess knowledge base    Interventions:  - Provide teaching at level of understanding  - Provide teaching via preferred learning methods  Outcome: Progressing     Problem: Potential for Falls  Goal: Patient will remain free of falls  Description: INTERVENTIONS:  - Educate patient/family on patient safety including physical limitations  - Instruct patient to call for assistance with activity   - Consult OT/PT to assist with strengthening/mobility   - Keep Call bell within reach  - Keep bed low and locked with side rails adjusted as appropriate  - Keep care items and personal belongings within reach  - Initiate and maintain comfort rounds  - Make Fall Risk Sign visible to staff  - Offer Toileting every 2 Hours, in advance of need  - Initiate/Maintain alarm  - Obtain necessary fall risk management equipment  - Apply yellow socks and bracelet for high fall risk patients  - Consider moving patient to room near nurses station  Outcome: Progressing     Problem: Prexisting or High Potential for Compromised Skin Integrity  Goal: Skin integrity is maintained or improved  Description: INTERVENTIONS:  - Identify patients at risk for skin breakdown  - Assess and monitor skin integrity  - Assess and monitor nutrition and hydration status  - Monitor labs   - Assess for incontinence   - Turn and reposition patient  - Assist with mobility/ambulation  - Relieve pressure over bony prominences  - Avoid friction and shearing  - Provide appropriate hygiene as needed including keeping skin clean and dry  - Evaluate need for skin moisturizer/barrier cream  - Collaborate with interdisciplinary team   - Patient/family teaching  - Consider wound care consult   Outcome: Progressing

## 2021-07-09 NOTE — PROGRESS NOTES
[facility]  Progress Note Gary Barrett 1934, 80 y o  male MRN: [id number]  Unit/Bed#: -01 Encounter: [id number]  Primary Care Provider: Bob Landon DO   Date and time admitted to hospital: 7/7/2021 11:29 AM    * Altered mental status  Assessment & Plan  · Suspected sundowning versus underlying dementia  · Presenting with complaint of AMS, consisting of agitation, confusion and uncooperative behavior  · With no previous history of dementia on chart review  · Baseline status: Alert, oriented, cooperative   Blood glucose 89 on arrival   Currently no concern for UTI or pneumonia or infectious etiology   Trauma workup with CT head, CT cervical spine, CT lumbar and thoracic consistent with old T12 fracture seen on previous imaging on 06/03   TSH WNL   Ammonia WNL    Mild hypomagnesemia on blood work; repleted   Psychiatry consulted:  Appreciate recommendations, no significant changes recommended at this time, can consider beginning Aricept 5 mg OD if patient continues to have behavioral disturbance    · Frequent orientation  · Supportive care  · Fall precautions  · PT OT eval      T12 compression fracture ([address] )  Assessment & Plan  · From prior fall  · During previous admission evaluated by Spine surgery and recommendation of TLSO brace was made  · No changes in imaging during this admission    · Supportive care  · Pain management with lidocaine patch  · PT OT eval    Mood disorder ([address] )  Assessment & Plan  · With anxiety and depression  · Continue home medication of Zoloft 100 mg    Ambulatory dysfunction  Assessment & Plan  · PT OT    HTN (hypertension)  Assessment & Plan  · Continue Norvasc 5 mg OD      Hyperlipidemia  Assessment & Plan  · Continue Lipitor 10 mg HS    Bipolar 1 disorder ([address] )  Assessment & Plan  · Continuing home medication  · Continue Depakote      VTE Pharmacologic Prophylaxis:   Pharmacologic: Enoxaparin (Lovenox)  Mechanical VTE Prophylaxis in Place: Yes    Discussions with Specialists or Other Care Team Provider:  Nurse,     Education and Discussions with Family / Patient:  Was unable to reach patient's POA (nephew Jhonathan Anderson) via phone  I did leave a voicemail  Will re-attempt to contact him at a later time    Current Length of Stay: 0 day(s)    Current Patient Status: Observation     Discharge Plan / Estimated Discharge Date: pending placement    Code Status: Level 1 - Full Code      Subjective:   Patient was seen and examined by me at bedside  Communicated clearly  No particular overnight event reported  No report of agitation or aggressive behavior overnight  Hemodynamically stable and afebrile  Patient is alert, remains pleasant and cooperative  He is alert and oriented as to his self , he is aware he is in a hospital, does not recall name of town, does not recall month or year  Objective:     Vitals:   Temp (24hrs), Av 2 °F (36 8 °C), Min:97 1 °F (36 2 °C), Max:99 2 °F (37 3 °C)    Temp:  [97 1 °F (36 2 °C)-99 2 °F (37 3 °C)] 97 1 °F (36 2 °C)  HR:  [] 56  Resp:  [19] 19  BP: (105-139)/(63-67) 139/67  SpO2:  [95 %-97 %] 97 %  Body mass index is 21 35 kg/m²  Input and Output Summary (last 24 hours): Intake/Output Summary (Last 24 hours) at 2021 1157  Last data filed at 2021 1801  Gross per 24 hour   Intake 240 ml   Output 200 ml   Net 40 ml       Physical Exam:     Physical Exam  Vitals reviewed  Constitutional:       Appearance: Normal appearance  Comments: Weak, frail   HENT:      Head: Normocephalic and atraumatic  Nose: Nose normal    Eyes:      General: No scleral icterus  Right eye: No discharge  Left eye: No discharge  Cardiovascular:      Rate and Rhythm: Normal rate and regular rhythm  Pulses: Normal pulses  Pulmonary:      Effort: Pulmonary effort is normal  No respiratory distress  Breath sounds: Normal breath sounds     Abdominal:      General: Bowel sounds are normal  There is no distension  Palpations: Abdomen is soft  Tenderness: There is no abdominal tenderness  Musculoskeletal:         General: Normal range of motion  Cervical back: Normal range of motion  Skin:     General: Skin is warm and dry  Neurological:      Mental Status: He is alert  Comments: Pleasant, alert, oriented as to himself  Remains disoriented as to name of town, month or year   Psychiatric:         Mood and Affect: Mood normal          Behavior: Behavior normal              Additional Data:     Labs:    Results from last 7 days   Lab Units 07/08/21  0520   WBC Thousand/uL 7 59   HEMOGLOBIN g/dL 12 8   HEMATOCRIT % 38 5   PLATELETS Thousands/uL 151   NEUTROS PCT % 61   LYMPHS PCT % 27   MONOS PCT % 11   EOS PCT % 1     Results from last 7 days   Lab Units 07/08/21  0520 07/07/21  1215   POTASSIUM mmol/L 3 9 4 2   CHLORIDE mmol/L 106 106   CO2 mmol/L 28 29   BUN mg/dL 10 11   CREATININE mg/dL 0 73 0 85   CALCIUM mg/dL 8 7 9 0   ALK PHOS U/L  --  82   ALT U/L  --  13   AST U/L  --  15           * I Have Reviewed All Lab Data Listed Above  * Additional Pertinent Lab Tests Reviewed:  All Labs Within Last 24 Hours Reviewed    Imaging:    Imaging Reports Reviewed Today Include:  none  Imaging Personally Reviewed by Myself Includes:  none    Recent Cultures (last 7 days):           Last 24 Hours Medication List:   Current Facility-Administered Medications   Medication Dose Route Frequency Provider Last Rate    acetaminophen  650 mg Oral Q6H PRN Eric Douglass MD      amLODIPine  5 mg Oral Daily Zina Pill, MD      aspirin  81 mg Oral Daily Zina Pill, MD      atorvastatin  10 mg Oral Daily With Irena Mast MD      divalproex sodium  1,000 mg Oral Daily Zina Pill, MD      enoxaparin  40 mg Subcutaneous Daily Zina Pill, MD      lidocaine  1 patch Topical Daily Zina Pill, MD      senna-docusate sodium  1 tablet Oral HS Zina Pill, MD      sertraline 100 mg Oral Daily Shireen Rosario MD          Today, Patient Was Seen By: Latanya Sevilla MD    ** Please Note: This note has been constructed using a voice recognition system   **

## 2021-07-10 LAB
ATRIAL RATE: 63 BPM
P AXIS: 15 DEGREES
PR INTERVAL: 140 MS
QRS AXIS: -38 DEGREES
QRSD INTERVAL: 120 MS
QT INTERVAL: 420 MS
QTC INTERVAL: 429 MS
T WAVE AXIS: 19 DEGREES
VENTRICULAR RATE: 63 BPM

## 2021-07-10 PROCEDURE — 99232 SBSQ HOSP IP/OBS MODERATE 35: CPT | Performed by: INTERNAL MEDICINE

## 2021-07-10 PROCEDURE — 93010 ELECTROCARDIOGRAM REPORT: CPT | Performed by: INTERNAL MEDICINE

## 2021-07-10 RX ADMIN — DIVALPROEX SODIUM 1000 MG: 250 TABLET, FILM COATED, EXTENDED RELEASE ORAL at 09:35

## 2021-07-10 RX ADMIN — ENOXAPARIN SODIUM 40 MG: 40 INJECTION SUBCUTANEOUS at 09:36

## 2021-07-10 RX ADMIN — ATORVASTATIN CALCIUM 10 MG: 10 TABLET, FILM COATED ORAL at 17:07

## 2021-07-10 RX ADMIN — ASPIRIN 81 MG: 81 TABLET, DELAYED RELEASE ORAL at 09:36

## 2021-07-10 RX ADMIN — AMLODIPINE BESYLATE 5 MG: 5 TABLET ORAL at 09:36

## 2021-07-10 RX ADMIN — DOCUSATE SODIUM AND SENNOSIDES 1 TABLET: 8.6; 5 TABLET, FILM COATED ORAL at 21:59

## 2021-07-10 RX ADMIN — SERTRALINE HYDROCHLORIDE 100 MG: 50 TABLET ORAL at 09:35

## 2021-07-10 RX ADMIN — LIDOCAINE 5% 1 PATCH: 700 PATCH TOPICAL at 09:43

## 2021-07-10 NOTE — PROGRESS NOTES
[facility]  Progress Note Wandy Ha 1934, 80 y o  male MRN: [id number]  Unit/Bed#: MS Cinthya-Melvin Encounter: [id number]  Primary Care Provider: Albertina Johnson DO   Date and time admitted to hospital: 7/7/2021 11:29 AM    * Altered mental status  Assessment & Plan  · Suspected sundowning versus underlying dementia  · Presenting with complaint of AMS, consisting of agitation, confusion and uncooperative behavior  · Baseline status: Alert, oriented, cooperative   Currently no concern for UTI or pneumonia or infectious etiology   Trauma workup with CT head, CT cervical spine, CT lumbar and thoracic consistent with old T12 fracture seen on previous imaging on 06/03   TSH WNL   Ammonia WNL    Mild hypomagnesemia on blood work; repleted   Psychiatry consulted:  Appreciate recommendations, no significant changes recommended at this time, can consider beginning Aricept 5 mg OD if patient continues to have behavioral disturbance    · Frequent orientation  · Supportive care  · Fall precautions  · PT OT eval      T12 compression fracture ([facility] [address] )  Assessment & Plan  · From prior fall  · During previous admission evaluated by Spine surgery and recommendation of TLSO brace was made  · No changes in imaging during this admission    · Supportive care  · Pain management with lidocaine patch  · PT OT eval    Mood disorder ([address] )  Assessment & Plan  · With anxiety and depression  · Continue home medication of Zoloft 100 mg    Ambulatory dysfunction  Assessment & Plan  · PT OT    HTN (hypertension)  Assessment & Plan  · Continue Norvasc 5 mg OD      Hyperlipidemia  Assessment & Plan  · Continue Lipitor 10 mg HS    Bipolar 1 disorder ([address] )  Assessment & Plan  · Continuing home medication  · Continue Depakote      VTE Pharmacologic Prophylaxis:   Pharmacologic: Enoxaparin (Lovenox)  Mechanical VTE Prophylaxis in Place: Yes    Discussions with Specialists or Other Care Team Provider:  Nurse    Education and Discussions with Family / Patient: was able to reach out to pts nephew over phone  He reports that his mother and patient's sister has passed away on the date of 21  He is aware that patient may not be able to return to [facility]  He reports that he is open to options other than [facility]  Current Length of Stay: 1 day(s)    Current Patient Status: Observation     Discharge Plan / Estimated Discharge Date: pending placement    Code Status: Level 1 - Full Code      Subjective:   Patient was seen and examined by me at bedside  Communicated clearly  No particular overnight event reported  Hemodynamically stable and afebrile  Patient remains pleasant, alert, oriented as to himself  He has no new complaints or concerns  Reports he is doing good  Objective:     Vitals:   Temp (24hrs), Av °F (36 1 °C), Min:96 4 °F (35 8 °C), Max:97 4 °F (36 3 °C)    Temp:  [96 4 °F (35 8 °C)-97 4 °F (36 3 °C)] 97 4 °F (36 3 °C)  HR:  [56-66] 56  Resp:  [18-20] 20  BP: (103-116)/(52-61) 116/61  SpO2:  [96 %-97 %] 97 %  Body mass index is 21 65 kg/m²  Input and Output Summary (last 24 hours): Intake/Output Summary (Last 24 hours) at 7/10/2021 1257  Last data filed at 2021 1915  Gross per 24 hour   Intake 360 ml   Output --   Net 360 ml       Physical Exam:     Physical Exam  Vitals reviewed  Constitutional:       Appearance: Normal appearance  Comments: Weak, frail   HENT:      Head: Normocephalic and atraumatic  Nose: Nose normal    Eyes:      General: No scleral icterus  Right eye: No discharge  Left eye: No discharge  Cardiovascular:      Rate and Rhythm: Normal rate and regular rhythm  Pulses: Normal pulses  Pulmonary:      Effort: Pulmonary effort is normal  No respiratory distress  Breath sounds: Normal breath sounds  Abdominal:      General: Bowel sounds are normal  There is no distension  Palpations: Abdomen is soft  Tenderness:  There is no abdominal tenderness  Musculoskeletal:         General: Normal range of motion  Cervical back: Normal range of motion  Skin:     General: Skin is warm and dry  Neurological:      Mental Status: He is alert  Comments: alert, pleasant, cooperative, oriented as to himself   Psychiatric:         Mood and Affect: Mood normal          Behavior: Behavior normal              Additional Data:     Labs:    Results from last 7 days   Lab Units 07/08/21  0520   WBC Thousand/uL 7 59   HEMOGLOBIN g/dL 12 8   HEMATOCRIT % 38 5   PLATELETS Thousands/uL 151   NEUTROS PCT % 61   LYMPHS PCT % 27   MONOS PCT % 11   EOS PCT % 1     Results from last 7 days   Lab Units 07/08/21  0520 07/07/21  1215   POTASSIUM mmol/L 3 9 4 2   CHLORIDE mmol/L 106 106   CO2 mmol/L 28 29   BUN mg/dL 10 11   CREATININE mg/dL 0 73 0 85   CALCIUM mg/dL 8 7 9 0   ALK PHOS U/L  --  82   ALT U/L  --  13   AST U/L  --  15           * I Have Reviewed All Lab Data Listed Above    * Additional Pertinent Lab Tests Reviewed: No New Labs Available For Today    Imaging:    Imaging Reports Reviewed Today Include:  None  Imaging Personally Reviewed by Myself Includes:  None    Recent Cultures (last 7 days):           Last 24 Hours Medication List:   Current Facility-Administered Medications   Medication Dose Route Frequency Provider Last Rate    acetaminophen  650 mg Oral Q6H PRN Koko Le MD      amLODIPine  5 mg Oral Daily Chico Fay MD      aspirin  81 mg Oral Daily Chico Fay MD      atorvastatin  10 mg Oral Daily With Cm Davey MD      divalproex sodium  1,000 mg Oral Daily Chico Fay MD      enoxaparin  40 mg Subcutaneous Daily Chico Fay MD      lidocaine  1 patch Topical Daily Chico Fay MD      senna-docusate sodium  1 tablet Oral HS Chico Fay MD      sertraline  100 mg Oral Daily Chico Fay MD          Today, Patient Was Seen By: Koko Le MD    ** Please Note: This note has been constructed using a voice recognition system   **

## 2021-07-11 PROCEDURE — 99232 SBSQ HOSP IP/OBS MODERATE 35: CPT | Performed by: INTERNAL MEDICINE

## 2021-07-11 RX ADMIN — ENOXAPARIN SODIUM 40 MG: 40 INJECTION SUBCUTANEOUS at 09:33

## 2021-07-11 RX ADMIN — LIDOCAINE 5% 1 PATCH: 700 PATCH TOPICAL at 09:33

## 2021-07-11 RX ADMIN — DIVALPROEX SODIUM 1000 MG: 250 TABLET, FILM COATED, EXTENDED RELEASE ORAL at 10:09

## 2021-07-11 RX ADMIN — ASPIRIN 81 MG: 81 TABLET, DELAYED RELEASE ORAL at 09:33

## 2021-07-11 RX ADMIN — DOCUSATE SODIUM AND SENNOSIDES 1 TABLET: 8.6; 5 TABLET, FILM COATED ORAL at 21:37

## 2021-07-11 RX ADMIN — SERTRALINE HYDROCHLORIDE 100 MG: 50 TABLET ORAL at 10:08

## 2021-07-11 RX ADMIN — ATORVASTATIN CALCIUM 10 MG: 10 TABLET, FILM COATED ORAL at 17:08

## 2021-07-11 RX ADMIN — AMLODIPINE BESYLATE 5 MG: 5 TABLET ORAL at 09:33

## 2021-07-11 NOTE — PROGRESS NOTES
[address] [facility]  Progress Note Thania Silvestre 1934, 80 y o  male MRN: [id number]  Unit/Bed#: MS Cinthya-Melvin Encounter: [id number]  Primary Care Provider: Stiven Tomas DO   Date and time admitted to hospital: 7/7/2021 11:29 AM    T12 compression fracture ([address] )  Assessment & Plan  · From prior fall  · During previous admission evaluated by Spine surgery and recommendation of TLSO brace was made  · No changes in imaging during this admission    · Supportive care  · Pain management with lidocaine patch  · PT OT eval    Mood disorder (HCC)  Assessment & Plan  · With anxiety and depression  · Continue home medication of Zoloft 100 mg    Ambulatory dysfunction  Assessment & Plan  · PT OT    HTN (hypertension)  Assessment & Plan  · Continue Norvasc 5 mg OD      Hyperlipidemia  Assessment & Plan  · Continue Lipitor 10 mg HS    Bipolar 1 disorder (HCC)  Assessment & Plan  · Continuing home medication  · Continue Depakote    * Altered mental status  Assessment & Plan  · Suspected sundowning versus underlying dementia  · Presenting with complaint of AMS, consisting of agitation, confusion and uncooperative behavior- patient is oriented to person, place and time on today's encounter  Pleasant and cooperative  · Baseline status: Alert, oriented, cooperative   Currently no concern for UTI or pneumonia or infectious etiology   Trauma workup with CT head, CT cervical spine, CT lumbar and thoracic consistent with old T12 fracture seen on previous imaging on 06/03   TSH WNL   Ammonia WNL    Mild hypomagnesemia on blood work; repleted  Dina Shrestha Psychiatry consulted:  Appreciate recommendations, no significant changes recommended at this time, can consider beginning Aricept 5 mg OD if patient continues to have behavioral disturbance    · Frequent orientation  · Supportive care  · Fall precautions  · PT OT eval- recommendations for PARS/ STR on discharge   Follow-up with case management for appropriate post-discharge placement  VTE Pharmacologic Prophylaxis:   Pharmacologic: Enoxaparin (Lovenox)  Mechanical VTE Prophylaxis in Place: Yes    Patient Centered Rounds: I have performed bedside rounds with nursing staff today  Discussions with Specialists or Other Care Team Provider: None  Education and Discussions with Family / Patient:     Time Spent for Care: 45 minutes  More than 50% of total time spent on counseling and coordination of care as described above  Current Length of Stay: 2 day(s)    Current Patient Status: Inpatient   Certification Statement: The patient will continue to require additional inpatient hospital stay due to Appropriate post-discharge placement  Discharge Plan / Estimated Discharge Date: As above / 2021  Code Status: Level 1 - Full Code      Subjective:   Patient interviewed today at the bedside  Patient alert and oriented to person, place and time  No acute issues reported  Objective:     Vitals:   Temp (24hrs), Av °F (36 7 °C), Min:97 5 °F (36 4 °C), Max:98 3 °F (36 8 °C)    Temp:  [97 5 °F (36 4 °C)-98 3 °F (36 8 °C)] 98 1 °F (36 7 °C)  HR:  [58-76] 60  Resp:  [17-18] 18  BP: ()/(55-63) 99/63  SpO2:  [97 %-100 %] 100 %  Body mass index is 21 71 kg/m²  Input and Output Summary (last 24 hours): Intake/Output Summary (Last 24 hours) at 2021 1513  Last data filed at 2021 0900  Gross per 24 hour   Intake 480 ml   Output 1500 ml   Net -1020 ml       Physical Exam:     Physical Exam  Vitals reviewed  Constitutional:       Appearance: Normal appearance  HENT:      Head: Normocephalic and atraumatic  Mouth/Throat:      Mouth: Mucous membranes are moist    Eyes:      Extraocular Movements: Extraocular movements intact  Cardiovascular:      Rate and Rhythm: Normal rate and regular rhythm  Heart sounds: S1 normal and S2 normal    Pulmonary:      Effort: Pulmonary effort is normal       Breath sounds: Normal breath sounds  Abdominal:      Palpations: Abdomen is soft  Musculoskeletal:         General: Normal range of motion  Cervical back: Neck supple  Right lower leg: No edema  Left lower leg: No edema  Skin:     General: Skin is warm and dry  Neurological:      Mental Status: He is alert and oriented to person, place, and time  Psychiatric:         Mood and Affect: Mood normal          Behavior: Behavior normal          Additional Data:     Labs:    Results from last 7 days   Lab Units 07/08/21  0520   WBC Thousand/uL 7 59   HEMOGLOBIN g/dL 12 8   HEMATOCRIT % 38 5   PLATELETS Thousands/uL 151   NEUTROS PCT % 61   LYMPHS PCT % 27   MONOS PCT % 11   EOS PCT % 1     Results from last 7 days   Lab Units 07/08/21  0520 07/07/21  1215   POTASSIUM mmol/L 3 9 4 2   CHLORIDE mmol/L 106 106   CO2 mmol/L 28 29   BUN mg/dL 10 11   CREATININE mg/dL 0 73 0 85   CALCIUM mg/dL 8 7 9 0   ALK PHOS U/L  --  82   ALT U/L  --  13   AST U/L  --  15           * I Have Reviewed All Lab Data Listed Above  * Additional Pertinent Lab Tests Reviewed: All Labs Within Last 24 Hours Reviewed    Imaging:    Imaging Reports Reviewed Today Include: None  Imaging Personally Reviewed by Myself Includes:  None        Recent Cultures (last 7 days):           Last 24 Hours Medication List:   Current Facility-Administered Medications   Medication Dose Route Frequency Provider Last Rate    acetaminophen  650 mg Oral Q6H PRN Tye Badillo MD      amLODIPine  5 mg Oral Daily Airam Hughes MD      aspirin  81 mg Oral Daily Airam Hughes MD      atorvastatin  10 mg Oral Daily With Emily Webber MD      divalproex sodium  1,000 mg Oral Daily Airam Hughes MD      enoxaparin  40 mg Subcutaneous Daily Airam Hughes MD      lidocaine  1 patch Topical Daily Airam Hughes MD      senna-docusate sodium  1 tablet Oral HS Airam Hughes MD      sertraline  100 mg Oral Daily Airam Hughes MD          Today, Patient Was Seen By: Prince ragland Diaz Romero MD    ** Please Note: Dragon 360 Dictation voice to text software may have been used in the creation of this document   **

## 2021-07-11 NOTE — QUICK NOTE
Attempts made to contact both patient's sister and nephew to provide an updated hospitalization course were unsuccessful; voice message left with the latter

## 2021-07-11 NOTE — UTILIZATION REVIEW
Initial Clinical Review    Admission: Date/Time/Statement:   Admission Orders: Observation 7/7 @ 1525 and changed to Inpatient on 7/10 @ 1436  Pt requiring continued stay for unsafe d/c  Ordered        07/10/21 1436  Inpatient Admission  Once         07/07/21 1525  Place in Observation  Once                   Orders Placed This Encounter   Procedures    Inpatient Admission     Standing Status:   Standing     Number of Occurrences:   1     Order Specific Question:   Level of Care     Answer:   Med Surg [16]     Order Specific Question:   Estimated length of stay     Answer:   More than 2 Midnights     Order Specific Question:   Certification     Answer:   I certify that inpatient services are medically necessary for this patient for a duration of greater than two midnights  See H&P and MD Progress Notes for additional information about the patient's course of treatment  ED Arrival Information     Expected Arrival Acuity    - 7/7/2021 11:28 Urgent         Means of arrival Escorted by Service Admission type    Ambulance [address]  [facility] General Medicine Urgent         Arrival complaint    Failure To Thrive        Chief Complaint   Patient presents with    Medical Problem     Pt presents from [facility] where he was reported to be aggressive towards staff, refusing care and to eat or drink  Pt reports back pain  Initial Presentation: 79 yo male to ED from NH via EMS w/ change in mental status   patient was agitated and aggressive overnight, refusing oral intake, refusing care by nursing staff at [facility]  Normally at patient's baseline he is alert, oriented, cooperative  Hx of recent fall resulting in T12 compression fx   Suspected delirium versus sundowning versus underlying dementia, glucose 89 , no concern for UTI or pneumonia or infectious etiology   Admitted OBS status plan f/u tsh , freq orientation , supportive care , PT OT eval , pain mngt      PE : oriented to person , location and year 7/8 IM Note  acute encephalopathy, likely believes superimposed on dementia, all workup so far negative for reversible causes  Oriented to self and place only   Cognitive deficit on conversation   7/8 Behavioral Health   Continue Depakote and Zoloft at current dosing for treatment of bipolar disorder  After cleared of any acute encephalopathy/delirium, it if he continues to manifest cognitive impairment then consider starting Aricept 5 mg every day  Will nee OP psych f/u     7/8 PT eval   STR upon DC     7/9 IM Note   evaluated by psychiatrist no changes in medications at this point  Patient's mental status appears to be at baseline at this point  He is essentially clear for discharge  CM working on placement  Call placed to pt;s nephew and awaiting call back  7/10 Progress notes; Pain control  Continue home meds  Awaiting placement    Date: 7/11  Day 2:   Progress notes;  T12 compression fracture which is chronic, appears to be at this point in time at baseline cognition  Still sometimes disoriented although cooperative  Pending placement  Case management working on it  Continue to monitor closely  Patient remains pleasant, alert, oriented as to himself        ED Triage Vitals   Temperature Pulse Respirations Blood Pressure SpO2   07/07/21 1134 07/07/21 1134 07/07/21 1134 07/07/21 1134 07/07/21 1134   97 9 °F (36 6 °C) 71 20 139/65 97 %      Temp Source Heart Rate Source Patient Position - Orthostatic VS BP Location FiO2 (%)   07/07/21 1134 07/07/21 1315 07/07/21 1134 07/07/21 1134 --   Oral Monitor Sitting Right arm       Pain Score       07/07/21 1615       No Pain          Wt Readings from Last 1 Encounters:   07/11/21 72 6 kg (160 lb 0 9 oz)     Additional Vital Signs:   07/11/21 07:47:39  98 2 °F (36 8 °C)  60  18  124/59  81  98 %  --  --   07/10/21 21:59:48  98 3 °F (36 8 °C)  76  18  100/55  70  97 %  --  --   07/10/21 2135  --  --  --  --  --  --  None (Room air)  --   07/10/21 15:20:42 97 5 °F (36 4 °C)  58  17  119/59  79  98 %  --  --   07/10/21 0940  --  --  --  --  --  --  None (Room air)  --   07/10/21 08:31:03  97 4 °F (36 3 °C)  Abnormal   56  20  116/61  79  97 %  None (Room air)  Lying   07/09/21 21:58:33  97 1 °F (36 2 °C)  Abnormal   --  --  103/52  69  --  --  --   07/09/21 21:57:33  97 1 °F (36 2 °C)  Abnormal   66  19  103/52  69  96 %           07/09/21 07:45:49  97 1 °F (36 2 °C)Abnormal   56  19  139/67  91  97 %  --  --   07/08/21 22:41:20  --  --  19  105/63  77  --  --  --   07/08/21 1939  --  --  --  --  --  96 %  None (Room air)  --   07/08/21 15:52:44  99 2 °F (37 3 °C)  101  --  119/63  82  95 %  --  --   07/08/21 08:46:03  --  63  --  107/59  75  96 %           07/07/21 22:59:31  97 5 °F (36 4 °C)  69  18  102/47Abnormal   65  95 %  --  --   07/07/21 1712  --  --  --  --  --  --  None (Room air)  --   07/07/21 16:15:33  98 °F (36 7 °C)  57  --  132/60  84  98 %  --  --   07/07/21 1315  --  58  12  120/60  86  99 %  None (Room air)  Lying   07/07/21 1300  --  --  --  --  --  --  None (Room          Pertinent Labs/Diagnostic Test Results:     7/7 EKG  Rhythm: sinus rhythm     Ectopy:     Ectopy: none     QRS:     QRS axis:  Left     QRS intervals:  Wide   Conduction:     Conduction: abnormal       Abnormal conduction: complete RBBB     ST segments:     ST segments:  Normal   T waves:     T waves: normal      Results from last 7 days   Lab Units 07/08/21  0520 07/07/21  1215   WBC Thousand/uL 7 59 6 85   HEMOGLOBIN g/dL 12 8 13 8   HEMATOCRIT % 38 5 41 8   PLATELETS Thousands/uL 151 163   NEUTROS ABS Thousands/µL 4 59 4 20     Results from last 7 days   Lab Units 07/08/21  0520 07/07/21  1215   SODIUM mmol/L 139 143   POTASSIUM mmol/L 3 9 4 2   CHLORIDE mmol/L 106 106   CO2 mmol/L 28 29   ANION GAP mmol/L 5 8   BUN mg/dL 10 11   CREATININE mg/dL 0 73 0 85   EGFR ml/min/1 73sq m 84 79   CALCIUM mg/dL 8 7 9 0   MAGNESIUM mg/dL 1 5* 1 6     Results from last 7 days   Lab Units 07/07/21  1724 07/07/21  1215   AST U/L  --  15   ALT U/L  --  13   ALK PHOS U/L  --  82   TOTAL PROTEIN g/dL  --  6 1*   ALBUMIN g/dL  --  3 0*   TOTAL BILIRUBIN mg/dL  --  0 52   AMMONIA umol/L <10*  --      Results from last 7 days   Lab Units 07/07/21  1201   POC GLUCOSE mg/dl 89     Results from last 7 days   Lab Units 07/08/21  0520 07/07/21  1215   GLUCOSE RANDOM mg/dL 83 84       Results from last 7 days   Lab Units 07/07/21  1215   TROPONIN I ng/mL <0 02     Results from last 7 days   Lab Units 07/07/21  1215   TSH 3RD GENERATON uIU/mL 3 572     Results from last 7 days   Lab Units 07/07/21  1215   LACTIC ACID mmol/L 0 8     Results from last 7 days   Lab Units 07/07/21  1215   LIPASE u/L 75     Results from last 7 days   Lab Units 07/07/21  1244   CLARITY UA  Slightly Cloudy   COLOR UA  Yellow   SPEC GRAV UA  1 015   PH UA  7 0   GLUCOSE UA mg/dl Negative   KETONES UA mg/dl Trace*   BLOOD UA  Large*   PROTEIN UA mg/dl Negative   NITRITE UA  Negative   BILIRUBIN UA  Negative   UROBILINOGEN UA E U /dl 1 0   LEUKOCYTES UA  Negative   WBC UA /hpf None Seen   RBC UA /hpf 30-50*   BACTERIA UA /hpf None Seen   EPITHELIAL CELLS WET PREP /hpf None Seen       ED Treatment:   Medication Administration from 07/07/2021 1128 to 07/07/2021 1558       Date/Time Order Dose Route Action     07/07/2021 1216 sodium chloride 0 9 % bolus 1,000 mL 1,000 mL Intravenous New Bag        Past Medical History:   Diagnosis Date    Arthritis     Chronic venous insufficiency     HTN (hypertension) 10/26/2019    [facility] [address]     PVD (peripheral vascular disease) ([facility] [address]      Present on Admission:   Ambulatory dysfunction   HTN (hypertension)   Hyperlipidemia   Bipolar 1 disorder (HCC)      Admitting Diagnosis: Agitation [R45 1]  Failure to thrive in adult [R62 7]  Age/Sex: 80 y o  male     Admission Orders:  Scheduled Medications:  amLODIPine, 5 mg, Oral, Daily  aspirin, 81 mg, Oral, Daily  atorvastatin, 10 mg, Oral, Daily With Dinner  divalproex sodium, 1,000 mg, Oral, Daily  enoxaparin, 40 mg, Subcutaneous, Daily  lidocaine, 1 patch, Topical, Daily  senna-docusate sodium, 1 tablet, Oral, HS  sertraline, 100 mg, Oral, Daily      Continuous IV Infusions:     PRN Meds:  acetaminophen, 650 mg, Oral, Q6H PRN    tele    IP CONSULT TO CASE MANAGEMENT  IP CONSULT TO PSYCHIATRY    Network Utilization Review Department  ATTENTION: Please call with any questions or concerns to [phone] and carefully listen to the prompts so that you are directed to the right person  All voicemails are confidential   Magan Calender all requests for admission clinical reviews, approved or denied determinations and any other requests to dedicated fax number below belonging to the campus where the patient is receiving treatment   List of dedicated fax numbers for the Facilities:  [address] DENIALS (Administrative/Medical Necessity) [phone]   [address] (Maternity/NICU/Pediatrics) [phone]   [address] Shira Saldañael Caleb [address] [id number] [place] [address] [address] [facility] [place] [address] [phone]

## 2021-07-11 NOTE — PLAN OF CARE
Problem: MOBILITY - ADULT  Goal: Maintain or return to baseline ADL function  Description: INTERVENTIONS:  -  Assess patient's ability to carry out ADLs; assess patient's baseline for ADL function and identify physical deficits which impact ability to perform ADLs (bathing, care of mouth/teeth, toileting, grooming, dressing, etc )  - Assess/evaluate cause of self-care deficits   - Assess range of motion  - Assess patient's mobility; develop plan if impaired  - Assess patient's need for assistive devices and provide as appropriate  - Encourage maximum independence but intervene and supervise when necessary  - Involve family in performance of ADLs  - Assess for home care needs following discharge   - Consider OT consult to assist with ADL evaluation and planning for discharge  - Provide patient education as appropriate  Outcome: Progressing     Problem: PAIN - ADULT  Goal: Verbalizes/displays adequate comfort level or baseline comfort level  Description: Interventions:  - Encourage patient to monitor pain and request assistance  - Assess pain using appropriate pain scale  - Administer analgesics based on type and severity of pain and evaluate response  - Implement non-pharmacological measures as appropriate and evaluate response  - Consider cultural and social influences on pain and pain management  - Notify physician/advanced practitioner if interventions unsuccessful or patient reports new pain  Outcome: Progressing     Problem: INFECTION - ADULT  Goal: Absence or prevention of progression during hospitalization  Description: INTERVENTIONS:  - Assess and monitor for signs and symptoms of infection  - Monitor lab/diagnostic results  - Monitor all insertion sites, i e  indwelling lines, tubes, and drains  - Monitor endotracheal if appropriate and nasal secretions for changes in amount and color  - [place] appropriate cooling/warming therapies per order  - Administer medications as ordered  - Instruct and encourage patient and family to use good hand hygiene technique  - Identify and instruct in appropriate isolation precautions for identified infection/condition  Outcome: Progressing     Problem: Knowledge Deficit  Goal: Patient/family/caregiver demonstrates understanding of disease process, treatment plan, medications, and discharge instructions  Description: Complete learning assessment and assess knowledge base    Interventions:  - Provide teaching at level of understanding  - Provide teaching via preferred learning methods  Outcome: Progressing     Problem: DISCHARGE PLANNING  Goal: Discharge to home or other facility with appropriate resources  Description: INTERVENTIONS:  - Identify barriers to discharge w/patient and caregiver  - Arrange for needed discharge resources and transportation as appropriate  - Identify discharge learning needs (meds, wound care, etc )  - Arrange for interpretive services to assist at discharge as needed  - Refer to Case Management Department for coordinating discharge planning if the patient needs post-hospital services based on physician/advanced practitioner order or complex needs related to functional status, cognitive ability, or social support system  Outcome: Progressing

## 2021-07-12 PROCEDURE — 99232 SBSQ HOSP IP/OBS MODERATE 35: CPT | Performed by: INTERNAL MEDICINE

## 2021-07-12 PROCEDURE — 97530 THERAPEUTIC ACTIVITIES: CPT

## 2021-07-12 PROCEDURE — 97535 SELF CARE MNGMENT TRAINING: CPT

## 2021-07-12 PROCEDURE — 97110 THERAPEUTIC EXERCISES: CPT

## 2021-07-12 RX ADMIN — DIVALPROEX SODIUM 1000 MG: 250 TABLET, FILM COATED, EXTENDED RELEASE ORAL at 10:53

## 2021-07-12 RX ADMIN — ASPIRIN 81 MG: 81 TABLET, DELAYED RELEASE ORAL at 08:46

## 2021-07-12 RX ADMIN — LIDOCAINE 5% 1 PATCH: 700 PATCH TOPICAL at 08:49

## 2021-07-12 RX ADMIN — ATORVASTATIN CALCIUM 10 MG: 10 TABLET, FILM COATED ORAL at 16:58

## 2021-07-12 RX ADMIN — SERTRALINE HYDROCHLORIDE 100 MG: 50 TABLET ORAL at 10:54

## 2021-07-12 RX ADMIN — DOCUSATE SODIUM AND SENNOSIDES 1 TABLET: 8.6; 5 TABLET, FILM COATED ORAL at 22:27

## 2021-07-12 RX ADMIN — ENOXAPARIN SODIUM 40 MG: 40 INJECTION SUBCUTANEOUS at 08:47

## 2021-07-12 RX ADMIN — AMLODIPINE BESYLATE 5 MG: 5 TABLET ORAL at 08:49

## 2021-07-12 NOTE — OCCUPATIONAL THERAPY NOTE
Occupational Therapy Treatment Note        Patient Name: Hermelindo Alejandre  QLJPD'N Date: 7/12/2021 07/12/21 1005   OT Last Visit   OT Visit Date 07/12/21   Note Type   Note Type Treatment   Restrictions/Precautions   Weight Bearing Precautions Per Order No   Braces or Orthoses TLSO   Other Precautions Cognitive; Chair Alarm; Bed Alarm; Fall Risk;Hard of hearing;Spinal precautions;Multiple lines   Pain Assessment   Pain Assessment Tool 0-10   Pain Score No Pain   ADL   Where Assessed Edge of bed   Eating Assistance 5  Supervision/Setup   Eating Deficit Setup; Increased time to complete   Grooming Assistance 4  Minimal Assistance   Grooming Deficit Setup; Increased time to complete   Grooming Comments Based on pt's functional performance during OT treatment session   UB Bathing Assistance 4  Minimal Assistance   UB Bathing Deficit Setup;Verbal cueing;Supervision/safety; Increased time to complete   LB Bathing Assistance 3  Moderate Assistance   LB Bathing Deficit Setup;Verbal cueing;Supervision/safety; Increased time to complete;Right lower leg including foot; Left lower leg including foot   UB Dressing Assistance 4  Minimal Assistance   UB Dressing Deficit Setup;Verbal cueing; Increased time to complete   LB Dressing Assistance 2  Maximal Assistance   LB Dressing Comments Based on pt's functional performance during OT treatment session   Toileting Assistance  2  Maximal Assistance   Toileting Comments Based on pt's functional performance during OT treatment session   Functional Standing Tolerance   Time ~30 seconds-1 minute   Activity Functional transfers and mobility   Bed Mobility   Rolling L 4  Minimal assistance  (Log roll technique)   Additional items Assist x 2;Bedrails; Increased time required;Verbal cues;LE management   Supine to Sit 3  Moderate assistance   Additional items Assist x 2;HOB elevated; Increased time required;Verbal cues;LE management   Additional Comments Patient received lying supine in bed upon OT arrival; at end of session: pt seated OOB to recliner chair w/ all needs within reach and chair alarm activated  TLSO brace donned upon pt sitting EOB  Transfers   Sit to Stand 4  Minimal assistance   Additional items Assist x 2; Increased time required;Verbal cues   Stand to Sit 4  Minimal assistance   Additional items Assist x 2;Armrests; Increased time required;Verbal cues   Additional Comments Performed functional transfers using RW  Patient with noted posterior lean upon static standing, requiring mod verbal and tactile cueing for upright posture and anterior weight shift  Functional Mobility   Functional Mobility 3  Moderate assistance   Additional Comments x2; 3-4 steps to recliner chair   Additional items Rolling walker   Cognition   Overall Cognitive Status Impaired   Arousal/Participation Responsive; Cooperative   Attention Attends with cues to redirect   Orientation Level Oriented to person;Disoriented to time;Disoriented to place  (Oriented to year only)   Memory Decreased recall of biographical information;Decreased short term memory;Decreased recall of recent events;Decreased recall of precautions   Following Commands Follows one step commands with increased time or repetition   Comments Patient agreeable to OT treatment session   Activity Tolerance   Activity Tolerance Patient limited by fatigue  (BP prior to mobility: 106/62 mmHg)   Medical Staff Made Aware Discussed case with HENRI Garrett   Assessment   Assessment Patient participated in Skilled OT session this date with interventions consisting of ADL re training with the use of correct body mechnaics, maintaining back safety precautions,  therapeutic activities to: increase activity tolerance, increase dynamic sit/ stand balance during functional activity , increase postural control, increase trunk control and increase OOB/ sitting tolerance, and increasing functional standing tolerance   Patient agreeable to OT treatment session, upon arrival patient was found supine in bed, alert, responsive  and in no apparent distress  In comparison to previous session, patient with improvements in EOB sitting tolerance and functional transfers  Patient participated in [address] and LB ADLs while seated unsupported at EOB; please see above flowsheet for ADL assist levels  Patient required minimal verbal cueing for initiation and sequencing of ADLs  Patient demonstrated fair- static and poor+ dynamic sitting balance during functional tasks, and demonstrated poor+ static and poor dynamic standing balance during transitional movements  Patient performed functional transfers with minimal assist x2 with RW and TLSO donned and functional mobility with mod assist x2  Patient requiring verbal cues for safety, verbal cues for correct technique and one step directives  Patient continues to be functioning below baseline level, occupational performance remains limited secondary to factors listed above and increased risk for falls and injury  From OT standpoint, recommendation at time of d/c would be post-acute rehabilitation services  Patient to benefit from continued Occupational Therapy treatment while in the hospital to address deficits as defined above and maximize level of functional independence with ADLs and functional mobility  Plan   Treatment Interventions ADL retraining;Functional transfer training;UE strengthening/ROM; Endurance training;Cognitive reorientation;Patient/family training;Continued evaluation; Energy conservation; Activityengagement;Equipment evaluation/education   Goal Expiration Date 07/22/21   OT Treatment Day 1   OT Frequency 3-5x/wk   Recommendation   OT Discharge Recommendation Post acute rehabilitation services   AM-PAC Daily Activity Inpatient   Lower Body Dressing 2   Bathing 2   Toileting 2   Upper Body Dressing 3   Grooming 3   Eating 3   Daily Activity Raw Score 15   Daily Activity Standardized Score (Calc for Raw Score >=11) 34 69   AM-PAC Applied Cognition Inpatient   Following a Speech/Presentation 2   Understanding Ordinary Conversation 3   Taking Medications 1   Remembering Where Things Are Placed or Put Away 1   Remembering List of 4-5 Errands 1   Taking Care of Complicated Tasks 1   Applied Cognition Raw Score 9   Applied Cognition Standardized Score 22 48   Modified Lawrence Scale   Modified Ward Scale 4     Jose M Reese, OTR/L

## 2021-07-12 NOTE — PROGRESS NOTES
[address] [facility]  Progress Note Miryam Swift 1934, 80 y o  male MRN: [id number]  Unit/Bed#: MS [facility] Encounter: [id number]  Primary Care Provider: Lakshmi Henderson DO   Date and time admitted to hospital: 7/7/2021 11:29 AM    * Altered mental status  Assessment & Plan  · Suspected sundowning versus underlying dementia  · Presenting with complaint of AMS, consisting of agitation, confusion and uncooperative behavior- patient now more stable although still has periods of disorientation  · Baseline status: Alert, oriented, cooperative but with periods of confusion   Currently no concern for UTI or pneumonia or infectious etiology   Trauma workup with CT head, CT cervical spine, CT lumbar and thoracic consistent with old T12 fracture seen on previous imaging on 06/03   TSH WNL   Ammonia WNL    Mild hypomagnesemia on blood work; repleted   Psychiatry consulted:  Appreciate recommendations, no significant changes recommended at this time, can consider beginning Aricept 5 mg OD if patient continues to have behavioral disturbance    · Continue Frequent orientation  · Supportive care  · Fall precautions  · PT OT eval- recommendations for PARS/ STR on discharge  Follow-up with case management for appropriate post-discharge placement        T12 compression fracture ([address] )  Assessment & Plan  · From prior fall  · During previous admission evaluated by Spine surgery and recommendation of TLSO brace was made  · No changes in imaging during this admission    · Supportive care  · Pain management with lidocaine patch  · PT OT eval    Mood disorder (HCC)  Assessment & Plan  · With anxiety and depression  · Continue home medication of Zoloft 100 mg    Ambulatory dysfunction  Assessment & Plan  · PT OT has evaluated patient, recommended placement    HTN (hypertension)  Assessment & Plan  · Continue Norvasc 5 mg OD      Hyperlipidemia  Assessment & Plan  · Continue Lipitor 10 mg HS    Bipolar 1 disorder [facility])  Assessment & Plan  · Continuing home medication  · Continue Depakote    VTE Pharmacologic Prophylaxis:   Pharmacologic: Enoxaparin (Lovenox)  Mechanical VTE Prophylaxis in Place: Yes    Patient Centered Rounds: I have performed bedside rounds with nursing staff today  Discussions with Specialists or Other Care Team Provider:  Discussed with care management team    Education and Discussions with Family / Patient:  Patient / attempted to call, no answer    Time Spent for Care: 30 minutes  More than 50% of total time spent on counseling and coordination of care as described above  Current Length of Stay: 3 day(s)    Current Patient Status: Inpatient   Certification Statement: The patient will continue to require additional inpatient hospital stay due to Need for placement    Discharge Plan:  Once stable    Code Status: Level 1 - Full Code      Subjective:     Patient evaluated this morning  Appears comfortable  Oriented to self and place but not time or situation  No overnight events reported  Denies nausea, vomiting, diarrhea constipation  Objective:     Vitals:   Temp (24hrs), Av 8 °F (36 6 °C), Min:97 3 °F (36 3 °C), Max:98 1 °F (36 7 °C)    Temp:  [97 3 °F (36 3 °C)-98 1 °F (36 7 °C)] 97 3 °F (36 3 °C)  HR:  [54-74] 74  Resp:  [16-17] 16  BP: ()/() 106/62  SpO2:  [95 %-100 %] 95 %  Body mass index is 21 71 kg/m²  Input and Output Summary (last 24 hours): Intake/Output Summary (Last 24 hours) at 2021 1209  Last data filed at 2021 2231  Gross per 24 hour   Intake --   Output 500 ml   Net -500 ml       Physical Exam:     Physical Exam  Vitals and nursing note reviewed  Constitutional:       Appearance: Normal appearance  Comments: Elderly male in bed, awake   HENT:      Head: Normocephalic and atraumatic  Right Ear: External ear normal       Left Ear: External ear normal       Nose: Nose normal  No congestion or rhinorrhea        Mouth/Throat: Mouth: Mucous membranes are dry  Pharynx: Oropharynx is clear  No oropharyngeal exudate or posterior oropharyngeal erythema  Eyes:      General: No scleral icterus  Right eye: No discharge  Left eye: No discharge  Pupils: Pupils are equal, round, and reactive to light  Neck:      Vascular: No carotid bruit  Cardiovascular:      Rate and Rhythm: Normal rate and regular rhythm  Pulses: Normal pulses  Heart sounds: No murmur heard  No friction rub  No gallop  Pulmonary:      Effort: Pulmonary effort is normal  No respiratory distress  Breath sounds: Normal breath sounds  No stridor  No wheezing, rhonchi or rales  Abdominal:      General: Abdomen is flat  Bowel sounds are normal  There is no distension  Palpations: Abdomen is soft  There is no mass  Tenderness: There is no abdominal tenderness  There is no guarding or rebound  Hernia: No hernia is present  Musculoskeletal:         General: No swelling, tenderness, deformity or signs of injury  Normal range of motion  Cervical back: Normal range of motion  No rigidity  No muscular tenderness  Lymphadenopathy:      Cervical: No cervical adenopathy  Skin:     General: Skin is warm and dry  Capillary Refill: Capillary refill takes less than 2 seconds  Coloration: Skin is not jaundiced or pale  Findings: No bruising or erythema  Neurological:      General: No focal deficit present  Mental Status: He is alert  Mental status is at baseline  He is disoriented  Cranial Nerves: No cranial nerve deficit  Sensory: No sensory deficit  Motor: No weakness  Coordination: Coordination normal       Deep Tendon Reflexes: Reflexes normal       Comments: Oriented to self and place but not time or situation   Psychiatric:         Mood and Affect: Mood normal          Behavior: Behavior normal          Thought Content:  Thought content normal          Judgment: Judgment normal          Additional Data:     Labs:    Results from last 7 days   Lab Units 07/08/21  0520   WBC Thousand/uL 7 59   HEMOGLOBIN g/dL 12 8   HEMATOCRIT % 38 5   PLATELETS Thousands/uL 151   NEUTROS PCT % 61   LYMPHS PCT % 27   MONOS PCT % 11   EOS PCT % 1     Results from last 7 days   Lab Units 07/08/21  0520 07/07/21  1215   SODIUM mmol/L 139 143   POTASSIUM mmol/L 3 9 4 2   CHLORIDE mmol/L 106 106   CO2 mmol/L 28 29   BUN mg/dL 10 11   CREATININE mg/dL 0 73 0 85   ANION GAP mmol/L 5 8   CALCIUM mg/dL 8 7 9 0   ALBUMIN g/dL  --  3 0*   TOTAL BILIRUBIN mg/dL  --  0 52   ALK PHOS U/L  --  82   ALT U/L  --  13   AST U/L  --  15   GLUCOSE RANDOM mg/dL 83 84         Results from last 7 days   Lab Units 07/07/21  1201   POC GLUCOSE mg/dl 89         Results from last 7 days   Lab Units 07/07/21  1215   LACTIC ACID mmol/L 0 8           * I Have Reviewed All Lab Data Listed Above  * Additional Pertinent Lab Tests Reviewed: All [facility] Admission Reviewed      Recent Cultures (last 7 days):           Last 24 Hours Medication List:   Current Facility-Administered Medications   Medication Dose Route Frequency Provider Last Rate    acetaminophen  650 mg Oral Q6H PRN Huong Wilks MD      amLODIPine  5 mg Oral Daily Alek Urias MD      aspirin  81 mg Oral Daily Alek Urias MD      atorvastatin  10 mg Oral Daily With Siomara Courtney MD      divalproex sodium  1,000 mg Oral Daily Alek Urias MD      enoxaparin  40 mg Subcutaneous Daily Alek Urias MD      lidocaine  1 patch Topical Daily Alek Urias MD      senna-docusate sodium  1 tablet Oral HS Alek Urias MD      sertraline  100 mg Oral Daily Alek Urias MD          Today, Patient Was Seen By: Romaine Lawler MD    ** Please Note: Dictation voice to text software may have been used in the creation of this document   **

## 2021-07-12 NOTE — PLAN OF CARE
Problem: OCCUPATIONAL THERAPY ADULT  Goal: Performs self-care activities at highest level of function for planned discharge setting  See evaluation for individualized goals  Description: Treatment Interventions: ADL retraining, Functional transfer training, UE strengthening/ROM, Endurance training, Cognitive reorientation, Patient/family training, Continued evaluation, Energy conservation, Activityengagement, Equipment evaluation/education          See flowsheet documentation for full assessment, interventions and recommendations  Outcome: Progressing  Note: Limitation: Decreased ADL status, Decreased UE ROM, Decreased UE strength, Decreased Safe judgement during ADL, Decreased cognition, Decreased endurance, Decreased self-care trans, Decreased high-level ADLs  Prognosis: Good, Fair  Assessment: Patient participated in Skilled OT session this date with interventions consisting of ADL re training with the use of correct body mechnaics, maintaining back safety precautions,  therapeutic activities to: increase activity tolerance, increase dynamic sit/ stand balance during functional activity , increase postural control, increase trunk control and increase OOB/ sitting tolerance, and increasing functional standing tolerance  Patient agreeable to OT treatment session, upon arrival patient was found supine in bed, alert, responsive  and in no apparent distress  In comparison to previous session, patient with improvements in EOB sitting tolerance and functional transfers  Patient participated in [address] and LB ADLs while seated unsupported at EOB; please see above flowsheet for ADL assist levels  Patient required minimal verbal cueing for initiation and sequencing of ADLs  Patient demonstrated fair- static and poor+ dynamic sitting balance during functional tasks, and demonstrated poor+ static and poor dynamic standing balance during transitional movements   Patient performed functional transfers with minimal assist x2 with RW and TLSO donned and functional mobility with mod assist x2  Patient requiring verbal cues for safety, verbal cues for correct technique and one step directives  Patient continues to be functioning below baseline level, occupational performance remains limited secondary to factors listed above and increased risk for falls and injury  From OT standpoint, recommendation at time of d/c would be post-acute rehabilitation services  Patient to benefit from continued Occupational Therapy treatment while in the hospital to address deficits as defined above and maximize level of functional independence with ADLs and functional mobility        OT Discharge Recommendation: Post acute rehabilitation services

## 2021-07-12 NOTE — CASE MANAGEMENT
CM left voicemail message for callback [place] [facility] )   [phone] Damon Bernal)  Per [employer] from [facility]  Son Arias is recently

## 2021-07-12 NOTE — ASSESSMENT & PLAN NOTE
· Suspected sundowning versus underlying dementia  · Presenting with complaint of AMS, consisting of agitation, confusion and uncooperative behavior- patient now more stable although still has periods of disorientation  · Baseline status: Alert, oriented, cooperative but with periods of confusion   Currently no concern for UTI or pneumonia or infectious etiology   Trauma workup with CT head, CT cervical spine, CT lumbar and thoracic consistent with old T12 fracture seen on previous imaging on 06/03   TSH WNL   Ammonia WNL    Mild hypomagnesemia on blood work; repleted  [facility] Psychiatry consulted:  Appreciate recommendations, no significant changes recommended at this time, can consider beginning Aricept 5 mg OD if patient continues to have behavioral disturbance    · Continue Frequent orientation  · Supportive care  · Fall precautions  · PT OT eval- recommendations for PARS/ STR on discharge  Follow-up with case management for appropriate post-discharge placement

## 2021-07-12 NOTE — PLAN OF CARE
Problem: PHYSICAL THERAPY ADULT  Goal: Performs mobility at highest level of function for planned discharge setting  See evaluation for individualized goals  Description: Treatment/Interventions: LE strengthening/ROM, Functional transfer training, Therapeutic exercise, Endurance training, Patient/family training, Equipment eval/education, Bed mobility, Gait training, Spoke to nursing, OT, Continued evaluation  Equipment Recommended:  (TBD)       See flowsheet documentation for full assessment, interventions and recommendations  Outcome: Progressing  Note: Prognosis: Fair  Problem List: Decreased strength, Decreased endurance, Impaired balance, Decreased mobility, Decreased cognition, Impaired hearing, Pain, Orthopedic restrictions  Assessment: Pt seen for PT treatment session this date with interventions consisting of Therapeutic exercise consisting of: AROM 2 sets of 10 reps B LE in sitting and long sitting position  Pt deferred further activity due to c/o fatigue  Pt agreeable to PT treatment session upon arrival, pt found seated OOB in recliner, in no apparent distress  In comparison to previous session, pt with improvements in ex tolerance  Post session: pt returned back to recliner, chair alarm engaged and all needs in reach  Continue to recommend post acute rehabilitation services at time of d/c in order to maximize pt's functional independence and safety w/ mobility  Pt continues to be functioning below baseline level, and remains limited 2* factors listed above and including decreased strength, endurance & safe functional mobility  PT will continue to see pt during current hospitalization in order to address the deficits listed above and provide interventions consistent w/ POC in effort to achieve STGs     Barriers to Discharge: Inaccessible home environment, Decreased caregiver support        PT Discharge Recommendation: Post acute rehabilitation services     PT - OK to Discharge: Yes (when med cleared to STR)    See flowsheet documentation for full assessment

## 2021-07-12 NOTE — PHYSICAL THERAPY NOTE
07/12/21 1112   PT Last Visit   PT Visit Date 07/12/21   Note Type   Note Type Treatment   Pain Assessment   Pain Assessment Tool Pain Assessment not indicated - pt denies pain   Restrictions/Precautions   Weight Bearing Precautions Per Order No   Braces or Orthoses TLSO   Other Precautions Cognitive; Chair Alarm; Bed Alarm; Fall Risk;Hard of hearing;Multiple lines;Spinal precautions   General   Chart Reviewed Yes   Family/Caregiver Present No   Cognition   Overall Cognitive Status Impaired   Arousal/Participation Responsive; Cooperative   Attention Attends with cues to redirect   Orientation Level Oriented to person   Memory Decreased recall of biographical information;Decreased short term memory;Decreased recall of recent events;Decreased recall of precautions   Following Commands Follows one step commands with increased time or repetition   Comments pt agreed to PT session- LE ex in chair   Subjective   Subjective "I'm worn out "   Bed Mobility   Additional Comments pt OOB in recliner to begin & end session, TLSO in place & all needs in reach, alarm on   Transfers   Sit to Stand   (pt deferred transfers)   Additional Comments pt deferred transfers   Ambulation/Elevation   Gait pattern   (pt deferred amb attempts)   Balance   Static Sitting Fair -   Dynamic Sitting Poor +   Endurance Deficit   Endurance Deficit Yes   Activity Tolerance   Activity Tolerance Patient limited by fatigue   Nurse Made Aware RN [facility] HAMLET   Exercises   Heelslides Sitting;20 reps;AROM; Bilateral  (long sitting)   Hip Flexion Sitting;20 reps;AROM; Bilateral   Hip Abduction Sitting;20 reps;AROM; Bilateral   Hip Adduction Sitting;20 reps;AROM; Bilateral   Knee AROM Long Arc Quad Sitting;20 reps;AROM; Bilateral   Ankle Pumps Sitting;20 reps;AROM; Bilateral   Assessment   Prognosis Fair   Problem List Decreased strength;Decreased endurance; Impaired balance;Decreased mobility; Decreased cognition; Impaired hearing;Pain;Orthopedic restrictions Assessment Pt seen for PT treatment session this date with interventions consisting of Therapeutic exercise consisting of: AROM 2 sets of 10 reps B LE in sitting and long sitting position  Pt deferred further activity due to c/o fatigue  Pt agreeable to PT treatment session upon arrival, pt found seated OOB in recliner, in no apparent distress  In comparison to previous session, pt with improvements in ex tolerance  Post session: pt returned back to recliner, chair alarm engaged and all needs in reach  Continue to recommend post acute rehabilitation services at time of d/c in order to maximize pt's functional independence and safety w/ mobility  Pt continues to be functioning below baseline level, and remains limited 2* factors listed above and including decreased strength, endurance & safe functional mobility  PT will continue to see pt during current hospitalization in order to address the deficits listed above and provide interventions consistent w/ POC in effort to achieve STGs  Barriers to Discharge Inaccessible home environment;Decreased caregiver support   Goals   Patient Goals to rest   PT Treatment Day 2   Plan   Treatment/Interventions Functional transfer training;LE strengthening/ROM; Therapeutic exercise; Endurance training;Cognitive reorientation;Patient/family training;Equipment eval/education; Bed mobility;Gait training;Spoke to nursing   Progress Slow progress, decreased activity tolerance   PT Frequency   (3-5 x week)   Recommendation   PT Discharge Recommendation Post acute rehabilitation services   PT - OK to Discharge Yes  (when med cleared to STR)   [facility] [address] Mobility Inpatient   Turning in Bed Without Bedrails 1   Lying on Back to Sitting on Edge of Flat Bed 1   Moving Bed to Chair 2   Standing Up From Chair 2   Walk in Room 1   Climb 3-5 Stairs 1   Basic Mobility Inpatient Raw Score 8   The patient's AM-PAC Basic Mobility Inpatient Short Form Low Function Raw Score 8 , Standardized Score is 22 01  A standardized score less 42 9 suggests the patient may benefit from discharge to post-acute rehab services   Please also refer to the recommendation of the Physical Therapist for safe discharge planning as discussed with Physical Therapist   Cris Martínez PTA

## 2021-07-13 PROCEDURE — 99232 SBSQ HOSP IP/OBS MODERATE 35: CPT | Performed by: STUDENT IN AN ORGANIZED HEALTH CARE EDUCATION/TRAINING PROGRAM

## 2021-07-13 RX ADMIN — SERTRALINE HYDROCHLORIDE 100 MG: 50 TABLET ORAL at 12:09

## 2021-07-13 RX ADMIN — DOCUSATE SODIUM AND SENNOSIDES 1 TABLET: 8.6; 5 TABLET, FILM COATED ORAL at 21:31

## 2021-07-13 RX ADMIN — AMLODIPINE BESYLATE 5 MG: 5 TABLET ORAL at 10:17

## 2021-07-13 RX ADMIN — LIDOCAINE 5% 1 PATCH: 700 PATCH TOPICAL at 10:24

## 2021-07-13 RX ADMIN — ATORVASTATIN CALCIUM 10 MG: 10 TABLET, FILM COATED ORAL at 15:46

## 2021-07-13 RX ADMIN — ASPIRIN 81 MG: 81 TABLET, DELAYED RELEASE ORAL at 10:17

## 2021-07-13 RX ADMIN — DIVALPROEX SODIUM 1000 MG: 250 TABLET, FILM COATED, EXTENDED RELEASE ORAL at 12:09

## 2021-07-13 RX ADMIN — ENOXAPARIN SODIUM 40 MG: 40 INJECTION SUBCUTANEOUS at 10:17

## 2021-07-13 NOTE — PROGRESS NOTES
[facility]  Progress Note Geovanny Patrick 1934, 80 y o  male MRN: [id number]  Unit/Bed#: -[facility] Encounter: [id number]  Primary Care Provider: Alejandra Gibbs DO   Date and time admitted to hospital: 7/7/2021 11:29 AM    * Altered mental status  Assessment & Plan  · Suspected sundowning versus underlying dementia  · Presenting with complaint of AMS, consisting of agitation, confusion and uncooperative behavior-   · Baseline status: Alert, oriented, cooperative but with periods of confusion  · Currently patient remains pleasantly confused; no reports of agitation or aggressiveness since presenting   Trauma workup with CT head, CT cervical spine, CT lumbar and thoracic consistent with old T12 fracture seen on previous imaging on 06/03   UA no evidence of infection, chest x-ray WNL   TSH WNL   Ammonia WNL    Mild hypomagnesemia on blood work; repleted   Psychiatry consulted:no significant changes recommended at this time, can consider beginning Aricept 5 mg OD if patient continues to have behavioral disturbance    · Continue Frequent orientation  · Supportive care  · Fall precautions  · PT OT eval- recommendations for PARS/ STR on discharge  Follow-up with case management for appropriate post-discharge placement        T12 compression fracture ([address] )  Assessment & Plan  · From prior fall  · During previous admission evaluated by Spine surgery and recommendation of TLSO brace was made  · No changes in imaging during this admission    · Supportive care  · Pain management with lidocaine patch  · PT OT eval    Mood disorder (HCC)  Assessment & Plan  · With anxiety and depression  · Continue home medication of Zoloft 100 mg    Ambulatory dysfunction  Assessment & Plan  · PT OT has evaluated patient, recommended placement    HTN (hypertension)  Assessment & Plan  · Continue Norvasc 5 mg OD      Hyperlipidemia  Assessment & Plan  · Continue Lipitor 10 mg HS    Bipolar 1 disorder [facility])  Assessment & Plan  · Continuing home medication  · Continue Depakote        VTE Pharmacologic Prophylaxis:   Pharmacologic: Enoxaparin (Lovenox)  Mechanical VTE Prophylaxis in Place: Yes    Discussions with Specialists or Other Care Team Provider:  Nurse    Education and Discussions with Family / Patient: Patients nephew, Frankey Bolder (POA)  He is over have patient's current clinical status, current management plan going forward, however the case Management is continuing to look for placement for patient  Current Length of Stay: 4 day(s)    Current Patient Status: Inpatient     Discharge Plan / Estimated Discharge Date:  Pending  Code Status: Level 1 - Full Code      Subjective:   Patient was seen and examined by me at bedside  Communicated clearly  No particular overnight event reported  Hemodynamically stable and afebrile  Patient continues to be at his baseline mental status of pleasantly confused  Does report he continues to have back pain in relation to his compression fracture and osteoarthritis  He has no new complaints or concerns  Objective:     Vitals:   Temp (24hrs), Av 4 °F (36 3 °C), Min:97 1 °F (36 2 °C), Max:97 6 °F (36 4 °C)    Temp:  [97 1 °F (36 2 °C)-97 6 °F (36 4 °C)] 97 1 °F (36 2 °C)  HR:  [52-79] 63  Resp:  [16-18] 16  BP: (115-128)/(50-72) 120/62  SpO2:  [95 %-98 %] 96 %  Body mass index is 21 71 kg/m²  Input and Output Summary (last 24 hours): Intake/Output Summary (Last 24 hours) at 2021 1129  Last data filed at 2021  Gross per 24 hour   Intake 120 ml   Output 1250 ml   Net -1130 ml       Physical Exam:     Physical Exam  Vitals reviewed  Constitutional:       Appearance: Normal appearance  HENT:      Head: Normocephalic and atraumatic  Nose: Nose normal    Eyes:      General: No scleral icterus  Right eye: No discharge  Left eye: No discharge  Cardiovascular:      Rate and Rhythm: Normal rate and regular rhythm  Pulses: Normal pulses  Pulmonary:      Effort: Pulmonary effort is normal  No respiratory distress  Breath sounds: Normal breath sounds  Abdominal:      General: Bowel sounds are normal  There is no distension  Palpations: Abdomen is soft  Tenderness: There is no abdominal tenderness  Musculoskeletal:         General: Normal range of motion  Cervical back: Normal range of motion  Skin:     General: Skin is warm and dry  Neurological:      Mental Status: He is alert  Mental status is at baseline  Comments: Alert, pleasantly confused   Psychiatric:         Behavior: Behavior normal            Additional Data:     Labs:    Results from last 7 days   Lab Units 07/08/21  0520   WBC Thousand/uL 7 59   HEMOGLOBIN g/dL 12 8   HEMATOCRIT % 38 5   PLATELETS Thousands/uL 151   NEUTROS PCT % 61   LYMPHS PCT % 27   MONOS PCT % 11   EOS PCT % 1     Results from last 7 days   Lab Units 07/08/21  0520 07/07/21  1215   POTASSIUM mmol/L 3 9 4 2   CHLORIDE mmol/L 106 106   CO2 mmol/L 28 29   BUN mg/dL 10 11   CREATININE mg/dL 0 73 0 85   CALCIUM mg/dL 8 7 9 0   ALK PHOS U/L  --  82   ALT U/L  --  13   AST U/L  --  15           * I Have Reviewed All Lab Data Listed Above    * Additional Pertinent Lab Tests Reviewed: No New Labs Available For Today    Imaging:    Imaging Reports Reviewed Today Include:  None  Imaging Personally Reviewed by Myself Includes:  None    Recent Cultures (last 7 days):           Last 24 Hours Medication List:   Current Facility-Administered Medications   Medication Dose Route Frequency Provider Last Rate    acetaminophen  650 mg Oral Q6H PRN Latanya Sevilla MD      amLODIPine  5 mg Oral Daily Shireen Rosario MD      aspirin  81 mg Oral Daily Shireen Rosario MD      atorvastatin  10 mg Oral Daily With Norm Falk MD      divalproex sodium  1,000 mg Oral Daily Shireen Rosario MD      enoxaparin  40 mg Subcutaneous Daily Shireen Rosario MD      lidocaine  1 patch Topical Daily Kori Jones MD      senna-docusate sodium  1 tablet Oral HS Kori Jones MD      sertraline  100 mg Oral Daily Kori Jones MD          Today, Patient Was Seen By: Stann Schlatter, MD    ** Please Note: This note has been constructed using a voice recognition system   **

## 2021-07-13 NOTE — PHYSICAL THERAPY NOTE
Physical Therapy Cancellation Note    Attempted to see pt for PT session at 12:32, pt was eating lunch  Again at 13:48, pt was still eating   Attempt again as tolerated  Jeanne Allen, PTA

## 2021-07-13 NOTE — ASSESSMENT & PLAN NOTE
· Suspected sundowning versus underlying dementia  · Presenting with complaint of AMS, consisting of agitation, confusion and uncooperative behavior-   · Baseline status: Alert, oriented, cooperative but with periods of confusion  · Currently patient remains pleasantly confused; no reports of agitation or aggressiveness since presenting   Trauma workup with CT head, CT cervical spine, CT lumbar and thoracic consistent with old T12 fracture seen on previous imaging on 06/03   UA no evidence of infection, chest x-ray WNL   TSH WNL   Ammonia WNL    Mild hypomagnesemia on blood work; repleted   Psychiatry consulted:no significant changes recommended at this time, can consider beginning Aricept 5 mg OD if patient continues to have behavioral disturbance    · Continue Frequent orientation  · Supportive care  · Fall precautions  · PT OT eval- recommendations for PARS/ STR on discharge  Follow-up with case management for appropriate post-discharge placement

## 2021-07-13 NOTE — CASE MANAGEMENT
MARIA ELENA left voicemail message for callback for J[facility] )   [phone] ([place])

## 2021-07-13 NOTE — PLAN OF CARE
Problem: MOBILITY - ADULT  Goal: Maintain or return to baseline ADL function  Description: INTERVENTIONS:  -  Assess patient's ability to carry out ADLs; assess patient's baseline for ADL function and identify physical deficits which impact ability to perform ADLs (bathing, care of mouth/teeth, toileting, grooming, dressing, etc )  - Assess/evaluate cause of self-care deficits   - Assess range of motion  - Assess patient's mobility; develop plan if impaired  - Assess patient's need for assistive devices and provide as appropriate  - Encourage maximum independence but intervene and supervise when necessary  - Involve family in performance of ADLs  - Assess for home care needs following discharge   - Consider OT consult to assist with ADL evaluation and planning for discharge  - Provide patient education as appropriate  Outcome: Progressing  Goal: Maintains/Returns to pre admission functional level  Description: INTERVENTIONS:  - Perform BMAT or MOVE assessment daily    - Set and communicate daily mobility goal to care team and patient/family/caregiver  - Collaborate with rehabilitation services on mobility goals if consulted  - Perform Range of Motion 3 times a day  - Reposition patient every 2 hours    - Dangle patient 3 times a day  - Stand patient 3 times a day  - Ambulate patient 3 times a day  - Out of bed to chair 3 times a day   - Out of bed for meals 3 times a day  - Out of bed for toileting  - Record patient progress and toleration of activity level   Outcome: Progressing     Problem: PAIN - ADULT  Goal: Verbalizes/displays adequate comfort level or baseline comfort level  Description: Interventions:  - Encourage patient to monitor pain and request assistance  - Assess pain using appropriate pain scale  - Administer analgesics based on type and severity of pain and evaluate response  - Implement non-pharmacological measures as appropriate and evaluate response  - Consider cultural and social influences on pain and pain management  - Notify physician/advanced practitioner if interventions unsuccessful or patient reports new pain  Outcome: Progressing     Problem: INFECTION - ADULT  Goal: Absence or prevention of progression during hospitalization  Description: INTERVENTIONS:  - Assess and monitor for signs and symptoms of infection  - Monitor lab/diagnostic results  - Monitor all insertion sites, i e  indwelling lines, tubes, and drains  - Monitor endotracheal if appropriate and nasal secretions for changes in amount and color  - [place] appropriate cooling/warming therapies per order  - Administer medications as ordered  - Instruct and encourage patient and family to use good hand hygiene technique  - Identify and instruct in appropriate isolation precautions for identified infection/condition  Outcome: Progressing  Goal: Absence of fever/infection during neutropenic period  Description: INTERVENTIONS:  - Monitor WBC    Outcome: Progressing     Problem: SAFETY ADULT  Goal: Maintain or return to baseline ADL function  Description: INTERVENTIONS:  -  Assess patient's ability to carry out ADLs; assess patient's baseline for ADL function and identify physical deficits which impact ability to perform ADLs (bathing, care of mouth/teeth, toileting, grooming, dressing, etc )  - Assess/evaluate cause of self-care deficits   - Assess range of motion  - Assess patient's mobility; develop plan if impaired  - Assess patient's need for assistive devices and provide as appropriate  - Encourage maximum independence but intervene and supervise when necessary  - Involve family in performance of ADLs  - Assess for home care needs following discharge   - Consider OT consult to assist with ADL evaluation and planning for discharge  - Provide patient education as appropriate  Outcome: Progressing  Goal: Maintains/Returns to pre admission functional level  Description: INTERVENTIONS:  - Perform BMAT or MOVE assessment daily    - Set and communicate daily mobility goal to care team and patient/family/caregiver  - Collaborate with rehabilitation services on mobility goals if consulted  - Perform Range of Motion 3 times a day  - Reposition patient every 2 hours    - Dangle patient 3 times a day  - Stand patient 3 times a day  - Ambulate patient 3 times a day  - Out of bed to chair 3 times a day   - Out of bed for meals 3 times a day  - Out of bed for toileting  - Record patient progress and toleration of activity level   Outcome: Progressing  Goal: Patient will remain free of falls  Description: INTERVENTIONS:  - Educate patient/family on patient safety including physical limitations  - Instruct patient to call for assistance with activity   - Consult OT/PT to assist with strengthening/mobility   - Keep Call bell within reach  - Keep bed low and locked with side rails adjusted as appropriate  - Keep care items and personal belongings within reach  - Initiate and maintain comfort rounds  - Make Fall Risk Sign visible to staff  - Offer Toileting every 2 Hours, in advance of need  - Initiate/Maintain bed alarm  - Obtain necessary fall risk management equipment  - Apply yellow socks and bracelet for high fall risk patients  - Consider moving patient to room near nurses station  Outcome: Progressing     Problem: DISCHARGE PLANNING  Goal: Discharge to home or other facility with appropriate resources  Description: INTERVENTIONS:  - Identify barriers to discharge w/patient and caregiver  - Arrange for needed discharge resources and transportation as appropriate  - Identify discharge learning needs (meds, wound care, etc )  - Arrange for interpretive services to assist at discharge as needed  - Refer to Case Management Department for coordinating discharge planning if the patient needs post-hospital services based on physician/advanced practitioner order or complex needs related to functional status, cognitive ability, or social support system  Outcome: Progressing     Problem: Knowledge Deficit  Goal: Patient/family/caregiver demonstrates understanding of disease process, treatment plan, medications, and discharge instructions  Description: Complete learning assessment and assess knowledge base    Interventions:  - Provide teaching at level of understanding  - Provide teaching via preferred learning methods  Outcome: Progressing     Problem: Potential for Falls  Goal: Patient will remain free of falls  Description: INTERVENTIONS:  - Educate patient/family on patient safety including physical limitations  - Instruct patient to call for assistance with activity   - Consult OT/PT to assist with strengthening/mobility   - Keep Call bell within reach  - Keep bed low and locked with side rails adjusted as appropriate  - Keep care items and personal belongings within reach  - Initiate and maintain comfort rounds  - Make Fall Risk Sign visible to staff  - Offer Toileting every 2 Hours, in advance of need  - Initiate/Maintain bed alarm  - Obtain necessary fall risk management equipment  - Apply yellow socks and bracelet for high fall risk patients  - Consider moving patient to room near nurses station  Outcome: Progressing     Problem: Prexisting or High Potential for Compromised Skin Integrity  Goal: Skin integrity is maintained or improved  Description: INTERVENTIONS:  - Identify patients at risk for skin breakdown  - Assess and monitor skin integrity  - Assess and monitor nutrition and hydration status  - Monitor labs   - Assess for incontinence   - Turn and reposition patient  - Assist with mobility/ambulation  - Relieve pressure over bony prominences  - Avoid friction and shearing  - Provide appropriate hygiene as needed including keeping skin clean and dry  - Evaluate need for skin moisturizer/barrier cream  - Collaborate with interdisciplinary team   - Patient/family teaching  - Consider wound care consult   Outcome: Progressing

## 2021-07-14 LAB
ANION GAP SERPL CALCULATED.3IONS-SCNC: 4 MMOL/L (ref 4–13)
BUN SERPL-MCNC: 19 MG/DL (ref 5–25)
CALCIUM SERPL-MCNC: 8.4 MG/DL (ref 8.3–10.1)
CHLORIDE SERPL-SCNC: 107 MMOL/L (ref 100–108)
CO2 SERPL-SCNC: 29 MMOL/L (ref 21–32)
CREAT SERPL-MCNC: 0.82 MG/DL (ref 0.6–1.3)
GFR SERPL CREATININE-BSD FRML MDRD: 80 ML/MIN/1.73SQ M
GLUCOSE SERPL-MCNC: 88 MG/DL (ref 65–140)
POTASSIUM SERPL-SCNC: 4.4 MMOL/L (ref 3.5–5.3)
SODIUM SERPL-SCNC: 140 MMOL/L (ref 136–145)

## 2021-07-14 PROCEDURE — 99232 SBSQ HOSP IP/OBS MODERATE 35: CPT | Performed by: STUDENT IN AN ORGANIZED HEALTH CARE EDUCATION/TRAINING PROGRAM

## 2021-07-14 PROCEDURE — 97116 GAIT TRAINING THERAPY: CPT

## 2021-07-14 PROCEDURE — 80048 BASIC METABOLIC PNL TOTAL CA: CPT | Performed by: INTERNAL MEDICINE

## 2021-07-14 PROCEDURE — 97110 THERAPEUTIC EXERCISES: CPT

## 2021-07-14 RX ADMIN — ACETAMINOPHEN 650 MG: 325 TABLET, FILM COATED ORAL at 17:55

## 2021-07-14 RX ADMIN — ACETAMINOPHEN 650 MG: 325 TABLET, FILM COATED ORAL at 12:22

## 2021-07-14 RX ADMIN — ASPIRIN 81 MG: 81 TABLET, DELAYED RELEASE ORAL at 12:23

## 2021-07-14 RX ADMIN — SERTRALINE HYDROCHLORIDE 100 MG: 50 TABLET ORAL at 15:04

## 2021-07-14 RX ADMIN — ENOXAPARIN SODIUM 40 MG: 40 INJECTION SUBCUTANEOUS at 12:22

## 2021-07-14 RX ADMIN — LIDOCAINE 5% 1 PATCH: 700 PATCH TOPICAL at 12:22

## 2021-07-14 RX ADMIN — ATORVASTATIN CALCIUM 10 MG: 10 TABLET, FILM COATED ORAL at 17:55

## 2021-07-14 RX ADMIN — DIVALPROEX SODIUM 1000 MG: 250 TABLET, FILM COATED, EXTENDED RELEASE ORAL at 15:04

## 2021-07-14 NOTE — PLAN OF CARE
Problem: PHYSICAL THERAPY ADULT  Goal: Performs mobility at highest level of function for planned discharge setting  See evaluation for individualized goals  Description: Treatment/Interventions: LE strengthening/ROM, Functional transfer training, Therapeutic exercise, Endurance training, Patient/family training, Equipment eval/education, Bed mobility, Gait training, Spoke to nursing, OT, Continued evaluation  Equipment Recommended:  (TBD)       See flowsheet documentation for full assessment, interventions and recommendations  7/14/2021 1428 by Jaden Mccracken PT  Outcome: Progressing  Note: Prognosis: Fair  Problem List: Decreased strength, Decreased endurance, Impaired balance, Decreased mobility, Decreased cognition, Impaired hearing, Pain  Assessment: Chart reviewed  Pt was received supine in bed in NAD and agreeable to PT session  TLSO brace was donned  Pt was seen for physical therapy on this date with interventions consisting of therapeutic activity including bed mobility and sit to stand transfers, therapeutic exercises consisting of AROM, 20 reps, bilateral lower extremities in the sitting position, and gait training with emphasis on improving pt's ability to ambulate level surfaces 8 feet with RW and minimal assist x 2 provided by therapist  In comparison to previous sessions pt with improvements  Pt able to ambulate a slightly increased distance with RW  Pt exhibits decreased step length, decreased heel strike, and increased bilateral knee flexion in stance phase  Pt requires cues for upright posture  With initial sit to stand pt exhibits posterior trunk lean and requires verbal and tactile cues to shift his weight anteriorly  Pt tolerated therapeutic exercise well  Post session pt was left seated in recliner in NAD, +chair alarm, +TLSO donned   Pt continues to be functioning below baseline level and remains limited secondary to pain, decreased lower extremity strength, impaired balance, decreased endurance, gait deviations, and decreased functional mobility  Continue to recommend STR at time of discharge to maximize pt's functional independence and safety with mobility  PT will continue to see pt while here in order to address the deficits listed above and provide interventions consistent with the POC in effort to achieve short term goals  Barriers to Discharge: Inaccessible home environment, Decreased caregiver support     PT Discharge Recommendation: Post acute rehabilitation services     PT - OK to Discharge: Yes (when medically cleared, if to STR)    See flowsheet documentation for full assessment

## 2021-07-14 NOTE — PHYSICAL THERAPY NOTE
Physical Therapy Treatment Note     07/14/21 1330   PT Last Visit   PT Visit Date 07/14/21   Note Type   Note Type Treatment   Pain Assessment   Pain Assessment Tool FLACC   Pain Score No Pain  (pt denied pain at rest and reported negligible with activity)   Pain Location/Orientation Location: Back   Pain Rating: FLACC (Rest) - Face 0   Pain Rating: FLACC (Rest) - Legs 0   Pain Rating: FLACC (Rest) - Activity 0   Pain Rating: FLACC (Rest) - Cry 0   Pain Rating: FLACC (Rest) - Consolability 0   Score: FLACC (Rest) 0   Pain Rating: FLACC (Activity) - Face 1   Pain Rating: FLACC (Activity) - Legs 0   Pain Rating: FLACC (Activity) - Activity 1   Pain Rating: FLACC (Activity) - Cry 1   Pain Rating: FLACC (Activity) - Consolability 1   Score: FLACC (Activity) 4   Restrictions/Precautions   Weight Bearing Precautions Per Order No   Braces or Orthoses TLSO  (donned when sitting at EOB)   Other Precautions Cognitive; Chair Alarm; Bed Alarm; Fall Risk;Pain;Spinal precautions;Hard of hearing   General   Chart Reviewed Yes   Response to Previous Treatment Patient with no complaints from previous session  Family/Caregiver Present No   Cognition   Overall Cognitive Status Impaired   Arousal/Participation Alert; Responsive; Cooperative   Attention Attends with cues to redirect   Orientation Level Oriented to person;Disoriented to place; Disoriented to time;Disoriented to situation   Memory Decreased short term memory;Decreased recall of recent events;Decreased recall of precautions   Following Commands Follows one step commands with increased time or repetition   Comments Pt agreeable to PT treatment session  Subjective   Subjective "I'll get up "   Bed Mobility   Rolling L 3  Moderate assistance   Additional items Assist x 1;Bedrails; Increased time required;Verbal cues;LE management  (log roll for back safety)   Supine to Sit 2  Maximal assistance   Additional items Assist x 1;Bedrails; Increased time required;Verbal cues;LE Spoke with pharmacist and confirmed to continue taking nortriptyline, per MD message below.   Aruna Rojas RN      management   Transfers   Sit to Stand 4  Minimal assistance   Additional items Assist x 2; Increased time required;Verbal cues   Stand to Sit 4  Minimal assistance   Additional items Assist x 2;Armrests; Increased time required;Verbal cues   Additional Comments Pt with initial posterior trunk lean when standing   Ambulation/Elevation   Gait pattern Excessively slow; Step to;Short stride; Shuffling;Decreased foot clearance   Gait Assistance 4  Minimal assist   Additional items Assist x 2;Verbal cues   Assistive Device Rolling walker   Distance 8 feet   Stair Management Assistance Not tested   Balance   Static Sitting Fair   Dynamic Sitting Fair -   Static Standing Poor +   Dynamic Standing Poor   Ambulatory Poor   Endurance Deficit   Endurance Deficit Yes   Activity Tolerance   Activity Tolerance Patient tolerated treatment well   Nurse Made Aware Discussed case with HENRI [place]; post session pt was left seated in recliner in NAD, all belongings within reach, +chair alarm   Exercises   Hip Flexion Sitting;20 reps;AROM; Bilateral   Knee AROM Long Arc Quad Sitting;20 reps;AROM; Bilateral   Ankle Pumps Sitting;20 reps;AROM; Bilateral   Assessment   Prognosis Fair   Problem List Decreased strength;Decreased endurance; Impaired balance;Decreased mobility; Decreased cognition; Impaired hearing;Pain   Assessment Chart reviewed  Pt was received supine in bed in NAD and agreeable to PT session  TLSO brace was donned  Pt was seen for physical therapy on this date with interventions consisting of therapeutic activity including bed mobility and sit to stand transfers, therapeutic exercises consisting of AROM, 20 reps, bilateral lower extremities in the sitting position, and gait training with emphasis on improving pt's ability to ambulate level surfaces 8 feet with RW and minimal assist x 2 provided by therapist  In comparison to previous sessions pt with improvements  Pt able to ambulate a slightly increased distance with RW   Pt exhibits decreased step length, decreased heel strike, and increased bilateral knee flexion in stance phase  Pt requires cues for upright posture  With initial sit to stand pt exhibits posterior trunk lean and requires verbal and tactile cues to shift his weight anteriorly  Pt tolerated therapeutic exercise well  Post session pt was left seated in recliner in NAD, +chair alarm, +TLSO donned  Pt continues to be functioning below baseline level and remains limited secondary to pain, decreased lower extremity strength, impaired balance, decreased endurance, gait deviations, and decreased functional mobility  Continue to recommend STR at time of discharge to maximize pt's functional independence and safety with mobility  PT will continue to see pt while here in order to address the deficits listed above and provide interventions consistent with the POC in effort to achieve short term goals  Barriers to Discharge Inaccessible home environment;Decreased caregiver support   Goals   STG Expiration Date 07/18/21   Plan   Treatment/Interventions Functional transfer training;LE strengthening/ROM; Therapeutic exercise; Endurance training;Cognitive reorientation;Patient/family training;Bed mobility;Gait training;Spoke to nursing;OT   Progress Progressing toward goals   PT Frequency Other (Comment)  (3-5x/wk)   Recommendation   PT Discharge Recommendation Post acute rehabilitation services   PT - OK to Discharge Yes  (when medically cleared, if to STR)   AM-PAC Basic Mobility Inpatient   Turning in Bed Without Bedrails 2   Lying on Back to Sitting on Edge of Flat Bed 1   Moving Bed to Chair 2   Standing Up From Chair 2   Walk in Room 1   Climb 3-5 Stairs 1   Basic Mobility Inpatient Raw Score 9   Turning Head Towards Sound 3   Follow Simple Instructions 3   Low Function Basic Mobility Raw Score 15   Low Function Basic Mobility Standardized Score 23 9     Leelee Vinson PT, DPT    Time of PT treatment session: [id number]  27 minutes

## 2021-07-14 NOTE — PLAN OF CARE
Problem: MOBILITY - ADULT  Goal: Maintain or return to baseline ADL function  Description: INTERVENTIONS:  -  Assess patient's ability to carry out ADLs; assess patient's baseline for ADL function and identify physical deficits which impact ability to perform ADLs (bathing, care of mouth/teeth, toileting, grooming, dressing, etc )  - Assess/evaluate cause of self-care deficits   - Assess range of motion  - Assess patient's mobility; develop plan if impaired  - Assess patient's need for assistive devices and provide as appropriate  - Encourage maximum independence but intervene and supervise when necessary  - Involve family in performance of ADLs  - Assess for home care needs following discharge   - Consider OT consult to assist with ADL evaluation and planning for discharge  - Provide patient education as appropriate  Outcome: Progressing  Goal: Maintains/Returns to pre admission functional level  Description: INTERVENTIONS:  - Perform BMAT or MOVE assessment daily    - Set and communicate daily mobility goal to care team and patient/family/caregiver  - Collaborate with rehabilitation services on mobility goals if consulted    - Reposition patient every 2 hours      - Stand patient 2 times a day    - Out of bed to chair 2 times a day   - Out of bed for meals 2  times a day  Problem: PAIN - ADULT  Goal: Verbalizes/displays adequate comfort level or baseline comfort level  Description: Interventions:  - Encourage patient to monitor pain and request assistance  - Assess pain using appropriate pain scale  - Administer analgesics based on type and severity of pain and evaluate response  - Implement non-pharmacological measures as appropriate and evaluate response  - Consider cultural and social influences on pain and pain management  - Notify physician/advanced practitioner if interventions unsuccessful or patient reports new pain  Outcome: Progressing     Problem: INFECTION - ADULT  Goal: Absence or prevention of progression during hospitalization  Description: INTERVENTIONS:  - Assess and monitor for signs and symptoms of infection  - Monitor lab/diagnostic results  - Monitor all insertion sites, i e  indwelling lines, tubes, and drains  - Monitor endotracheal if appropriate and nasal secretions for changes in amount and color  - [place] appropriate cooling/warming therapies per order  - Administer medications as ordered  - Instruct and encourage patient and family to use good hand hygiene technique  - Identify and instruct in appropriate isolation precautions for identified infection/condition  Outcome: Progressing  Goal: Absence of fever/infection during neutropenic period  Description: INTERVENTIONS:  - Monitor WBC    Outcome: Progressing     Problem: SAFETY ADULT  Goal: Maintain or return to baseline ADL function  Description: INTERVENTIONS:  -  Assess patient's ability to carry out ADLs; assess patient's baseline for ADL function and identify physical deficits which impact ability to perform ADLs (bathing, care of mouth/teeth, toileting, grooming, dressing, etc )  - Assess/evaluate cause of self-care deficits   - Assess range of motion  - Assess patient's mobility; develop plan if impaired  - Assess patient's need for assistive devices and provide as appropriate  - Encourage maximum independence but intervene and supervise when necessary  - Involve family in performance of ADLs  - Assess for home care needs following discharge   - Consider OT consult to assist with ADL evaluation and planning for discharge  - Provide patient education as appropriate  Outcome: Progressing  Goal: Maintains/Returns to pre admission functional level  Description: INTERVENTIONS:  - Perform BMAT or MOVE assessment daily    - Set and communicate daily mobility goal to care team and patient/family/caregiver  - Collaborate with rehabilitation services on mobility goals if consulted  - Reposition patient every 2 hours      - Stand patient 2 times a day    - Out of bed to chair 2 times a day   - Out of bed for meals 2  times a day  - Out of bed for toileting  Problem: Knowledge Deficit  Goal: Patient/family/caregiver demonstrates understanding of disease process, treatment plan, medications, and discharge instructions  Description: Complete learning assessment and assess knowledge base    Interventions:  - Provide teaching at level of understanding  - Provide teaching via preferred learning methods  Outcome: Progressing     Problem: Potential for Falls  Goal: Patient will remain free of falls  Description: INTERVENTIONS:  -  Assess patient's ability to carry out ADLs; assess patient's baseline for ADL function and identify physical deficits which impact ability to perform ADLs (bathing, care of mouth/teeth, toileting, grooming, dressing, etc )  - Assess/evaluate cause of self-care deficits   - Assess range of motion  - Assess patient's mobility; develop plan if impaired  - Assess patient's need for assistive devices and provide as appropriate  - Encourage maximum independence but intervene and supervise when necessary  - Involve family in performance of ADLs  - Assess for home care needs following discharge   - Consider OT consult to assist with ADL evaluation and planning for discharge  - Provide patient education as appropriate  Outcome: Progressing     Problem: Prexisting or High Potential for Compromised Skin Integrity  Goal: Skin integrity is maintained or improved  Description: INTERVENTIONS:  - Identify patients at risk for skin breakdown  - Assess and monitor skin integrity  - Assess and monitor nutrition and hydration status  - Monitor labs   - Assess for incontinence   - Turn and reposition patient  - Assist with mobility/ambulation  - Relieve pressure over bony prominences  - Avoid friction and shearing  - Provide appropriate hygiene as needed including keeping skin clean and dry  - Evaluate need for skin moisturizer/barrier cream  - Collaborate with interdisciplinary team   - Patient/family teaching  - Consider wound care consult   Outcome: Progressing       - Record patient progress and toleration of activity level   Outcome: Progressing  Goal: Patient will remain free of falls  Description: INTERVENTIONS:  -  Assess patient's ability to carry out ADLs; assess patient's baseline for ADL function and identify physical deficits which impact ability to perform ADLs (bathing, care of mouth/teeth, toileting, grooming, dressing, etc )  - Assess/evaluate cause of self-care deficits   - Assess range of motion  - Assess patient's mobility; develop plan if impaired  - Assess patient's need for assistive devices and provide as appropriate  - Encourage maximum independence but intervene and supervise when necessary  - Involve family in performance of ADLs  - Assess for home care needs following discharge   - Consider OT consult to assist with ADL evaluation and planning for discharge  - Provide patient education as appropriate  Outcome: Progressing     - Out of bed for toileting  - Record patient progress and toleration of activity level   Outcome: Progressing

## 2021-07-14 NOTE — PROGRESS NOTES
[facility]  Progress Note Roma Hunter 1934, 80 y o  male MRN: [id number]  Unit/Bed#: [facility] Encounter: [id number]  Primary Care Provider: Clinton Palomares DO   Date and time admitted to hospital: 7/7/2021 11:29 AM    * Altered mental status  Assessment & Plan  · Suspected sundowning versus underlying dementia  · Presented from [facility] with complaint of agitation, confusion  · Pt has been pleasantly confused during this admission   CT head with no acute changes   UA no evidence of infection, chest x-ray WNL   TSH WNL   Ammonia WNL    Mild hypomagnesemia on blood work; repleted   Psychiatry consulted:no significant changes recommended at this time, can consider beginning Aricept 5 mg OD if patient continues to have behavioral disturbance    · Continue Frequent orientation  · Supportive care  · Fall precautions  · PT OT eval- recommendations for PARS/ STR on discharge  · Case management following for placement    T12 compression fracture ([address] )  Assessment & Plan  · From prior fall  · During previous admission evaluated by Spine surgery and recommendation of TLSO brace was made  · No changes in imaging during this admission    · Supportive care  · Pain management with lidocaine patch  · PT OT eval:  Short-term rehab    Mood disorder ([facility] [address] )  Assessment & Plan  · With anxiety and depression  · Continue home medication of Zoloft 100 mg    Ambulatory dysfunction  Assessment & Plan  · PT OT has evaluated patient, recommended placement    HTN (hypertension)  Assessment & Plan  · Continue Norvasc 5 mg OD      Hyperlipidemia  Assessment & Plan  · Continue Lipitor 10 mg HS    Bipolar 1 disorder ([facility] [address] )  Assessment & Plan  · Continuing home medication  · Continue Depakote      VTE Pharmacologic Prophylaxis:   Pharmacologic: Enoxaparin (Lovenox)  Mechanical VTE Prophylaxis in Place: Yes    Discussions with Specialists or Other Care Team Provider:  Nurse    Education and Discussions with Family / Patient:  Patient; patient's POA previously updated regarding patient's current clinical condition, current management plan going forward, current search for placement  Current Length of Stay: 5 day(s)    Current Patient Status: Inpatient     Discharge Plan / Estimated Discharge Date:  Pending placement  Code Status: Level 1 - Full Code      Subjective:   Patient was seen and examined by me at bedside  Communicated clearly  No particular overnight event reported  Hemodynamically stable and afebrile  Patient remains alert, pleasantly confused  Does appear to be at his baseline mental status  Denies any current pain, dyspnea  Has no new complaints or concerns  Objective:     Vitals:   Temp (24hrs), Av 7 °F (36 5 °C), Min:97 7 °F (36 5 °C), Max:97 7 °F (36 5 °C)    Temp:  [97 7 °F (36 5 °C)] 97 7 °F (36 5 °C)  HR:  [61-75] 61  Resp:  [16-17] 17  BP: (111-117)/(62-72) 111/72  SpO2:  [96 %] 96 %  Body mass index is 21 71 kg/m²  Input and Output Summary (last 24 hours): Intake/Output Summary (Last 24 hours) at 2021 1059  Last data filed at 2021 0556  Gross per 24 hour   Intake 420 ml   Output 1225 ml   Net -805 ml       Physical Exam:     Physical Exam  Vitals reviewed  Constitutional:       General: He is not in acute distress  Appearance: He is not ill-appearing or toxic-appearing  HENT:      Head: Normocephalic and atraumatic  Nose: Nose normal    Eyes:      General: No scleral icterus  Right eye: No discharge  Left eye: No discharge  Cardiovascular:      Rate and Rhythm: Normal rate and regular rhythm  Pulses: Normal pulses  Pulmonary:      Effort: Pulmonary effort is normal  No respiratory distress  Abdominal:      General: Bowel sounds are normal  There is no distension  Palpations: Abdomen is soft  Tenderness: There is no abdominal tenderness  Musculoskeletal:         General: Normal range of motion        Cervical back: Normal range of motion  Skin:     General: Skin is warm and dry  Neurological:      Mental Status: He is alert  Mental status is at baseline  Comments: Alert, pleasantly confused   Psychiatric:         Mood and Affect: Mood normal          Behavior: Behavior normal            Additional Data:     Labs:    Results from last 7 days   Lab Units 07/08/21  0520   WBC Thousand/uL 7 59   HEMOGLOBIN g/dL 12 8   HEMATOCRIT % 38 5   PLATELETS Thousands/uL 151   NEUTROS PCT % 61   LYMPHS PCT % 27   MONOS PCT % 11   EOS PCT % 1     Results from last 7 days   Lab Units 07/14/21  0500 07/07/21  1215   POTASSIUM mmol/L 4 4 4 2   CHLORIDE mmol/L 107 106   CO2 mmol/L 29 29   BUN mg/dL 19 11   CREATININE mg/dL 0 82 0 85   CALCIUM mg/dL 8 4 9 0   ALK PHOS U/L  --  82   ALT U/L  --  13   AST U/L  --  15           * I Have Reviewed All Lab Data Listed Above  * Additional Pertinent Lab Tests Reviewed: All Labs Within Last 24 Hours Reviewed    Imaging:    Imaging Reports Reviewed Today Include:  None  Imaging Personally Reviewed by Myself Includes:  None    Recent Cultures (last 7 days):           Last 24 Hours Medication List:   Current Facility-Administered Medications   Medication Dose Route Frequency Provider Last Rate    acetaminophen  650 mg Oral Q6H PRN Eric Douglass MD      amLODIPine  5 mg Oral Daily Zina Pill, MD      aspirin  81 mg Oral Daily Zina Pill, MD      atorvastatin  10 mg Oral Daily With Irena Mast MD      divalproex sodium  1,000 mg Oral Daily Zina Pill, MD      enoxaparin  40 mg Subcutaneous Daily Zina Pill, MD      lidocaine  1 patch Topical Daily Zina Pill, MD      senna-docusate sodium  1 tablet Oral HS Izna Pill, MD      sertraline  100 mg Oral Daily Zina Pill, MD          Today, Patient Was Seen By: Eric Douglass MD    ** Please Note: This note has been constructed using a voice recognition system   **

## 2021-07-14 NOTE — ASSESSMENT & PLAN NOTE
· Suspected sundowning versus underlying dementia  · Presented from [facility] with complaint of agitation, confusion  · Pt has been pleasantly confused during this admission   CT head with no acute changes   UA no evidence of infection, chest x-ray WNL   TSH WNL   Ammonia WNL    Mild hypomagnesemia on blood work; repleted   Psychiatry consulted:no significant changes recommended at this time, can consider beginning Aricept 5 mg OD if patient continues to have behavioral disturbance    · Continue Frequent orientation  · Supportive care  · Fall precautions  · PT OT eval- recommendations for PARS/ STR on discharge  · Case management following for placement

## 2021-07-14 NOTE — ASSESSMENT & PLAN NOTE
· From prior fall  · During previous admission evaluated by Spine surgery and recommendation of TLSO brace was made  · No changes in imaging during this admission    · Supportive care  · Pain management with lidocaine patch  · PT OT eval:  Short-term rehab

## 2021-07-15 PROCEDURE — 97110 THERAPEUTIC EXERCISES: CPT

## 2021-07-15 PROCEDURE — 99232 SBSQ HOSP IP/OBS MODERATE 35: CPT | Performed by: STUDENT IN AN ORGANIZED HEALTH CARE EDUCATION/TRAINING PROGRAM

## 2021-07-15 PROCEDURE — 97530 THERAPEUTIC ACTIVITIES: CPT

## 2021-07-15 RX ADMIN — DIVALPROEX SODIUM 1000 MG: 250 TABLET, FILM COATED, EXTENDED RELEASE ORAL at 09:46

## 2021-07-15 RX ADMIN — SERTRALINE HYDROCHLORIDE 100 MG: 50 TABLET ORAL at 09:46

## 2021-07-15 RX ADMIN — LIDOCAINE 5% 1 PATCH: 700 PATCH TOPICAL at 09:49

## 2021-07-15 RX ADMIN — ASPIRIN 81 MG: 81 TABLET, DELAYED RELEASE ORAL at 09:46

## 2021-07-15 RX ADMIN — ENOXAPARIN SODIUM 40 MG: 40 INJECTION SUBCUTANEOUS at 09:48

## 2021-07-15 RX ADMIN — ATORVASTATIN CALCIUM 10 MG: 10 TABLET, FILM COATED ORAL at 17:59

## 2021-07-15 NOTE — PHYSICAL THERAPY NOTE
PT Progress Note (25min)  (8:15-8:40)       07/15/21 0840   PT Last Visit   PT Visit Date 07/15/21   Note Type   Note Type Treatment   Pain Assessment   Pain Assessment Tool Pain Assessment not indicated - pt denies pain   Restrictions/Precautions   Weight Bearing Precautions Per Order No   Braces or Orthoses TLSO  (donned sitting EOB)   Other Precautions Cognitive; Chair Alarm; Bed Alarm;Telemetry; Fall Risk;Spinal precautions  (back safety precautions for jt protection)   General   Chart Reviewed Yes   Response to Previous Treatment Patient with no complaints from previous session  Family/Caregiver Present No   Cognition   Orientation Level Oriented to person   Subjective   Subjective "I'd like to try to get up"  Bed Mobility   Rolling L 3  Moderate assistance   Additional items Assist x 1;Bedrails; Increased time required;Verbal cues;LE management   Supine to Sit 2  Maximal assistance   Additional items Assist x 2;HOB elevated; Bedrails; Increased time required;Verbal cues;LE management   Transfers   Sit to Stand 3  Moderate assistance   Additional items Assist x 2;Verbal cues; Increased time required  (x3 trials)   Stand to Sit 3  Moderate assistance   Additional items Assist x 2;Armrests; Verbal cues; Increased time required   Additional Comments posterior trunk lean noted c initial stance  bed/chair swap performed c pt in standing  Ambulation/Elevation   Gait pattern Not appropriate  (pt c difficulty advancing LEs)   Balance   Static Sitting Fair   Dynamic Sitting Fair -   Static Standing Poor +   Dynamic Standing Poor   Endurance Deficit   Endurance Deficit Yes   Activity Tolerance   Activity Tolerance Patient limited by fatigue   Nurse Made Aware HENRI Mccord present during PT session + aware pt required bed/chair swap this session  chair alarm activated + TLSO donned  Exercises   Knee AROM Long Arc Quad Sitting;10 reps;AROM; Bilateral   Marching Sitting;10 reps;AROM; Bilateral   Assessment   Prognosis Fair Problem List Decreased strength;Decreased endurance; Impaired balance;Decreased mobility; Decreased cognition; Impaired hearing   Assessment pt agreeable to PT session  presents c increased weakness/stiffness this session  able to complete sit<>stand x3 trials mod (A)x2, however unable to advance LEs for ambulation 2* weakness/fatigue  performed bed/chair swap while pt supported c RW  tolerated sitting in recliner at end of session c TLSO donned + chair alarm activated  initiated seated AROM ther ex B/L LE x10 reps c min verbal cues for technique  will cont skilled PT to further maximize functional mobility + improve quality of life  AM-PAC raw score 9 indicating pt would benefit from skilled PT, however please refer to PT d/c recommendation for safe d/c planning  Barriers to Discharge Inaccessible home environment;Decreased caregiver support   Goals   Patient Goals "to eat breakfast"  STG Expiration Date 07/18/21   PT Treatment Day 3   Plan   Treatment/Interventions Functional transfer training;LE strengthening/ROM; Therapeutic exercise; Endurance training;Patient/family training;Bed mobility; Equipment eval/education;Gait training;Spoke to nursing   Progress Slow progress, decreased activity tolerance   PT Frequency Other (Comment)  (3-5x/wk)   Recommendation   PT Discharge Recommendation Post acute rehabilitation services   PT - OK to Discharge Yes  (to STR when stable  )   AM-PAC Basic Mobility Inpatient   Turning in Bed Without Bedrails 2   Lying on Back to Sitting on Edge of Flat Bed 1   Moving Bed to Chair 2   Standing Up From Chair 2   Walk in Room 1   Climb 3-5 Stairs 1   Basic Mobility Inpatient Raw Score 9   Turning Head Towards Sound 3   Follow Simple Instructions 3   Low Function Basic Mobility Raw Score 15   Low Function Basic Mobility Standardized Score 23 9     Gilda Jackson, PT, DPT

## 2021-07-15 NOTE — CASE MANAGEMENT
CM left voicemail message for callback for [place] [facility] [place]   [phone] ([place])

## 2021-07-15 NOTE — PROGRESS NOTES
[facility]  Progress Note Arnoldo Lim 1934, 80 y o  male MRN: [id number]  Unit/Bed#: MS C[facility]ya[facility]Mel[facility] Encounter: [id number]  Primary Care Provider: Nick Navarro DO   Date and time admitted to hospital: 7/7/2021 11:29 AM    * Altered mental status  Assessment & Plan  · Suspected sundowning versus underlying dementia  · Presented from nursing home with complaint of agitation, confusion  · Pt has been pleasantly confused during this admission   CT head with no acute changes   UA no evidence of infection, chest x-ray WNL   TSH WNL   Ammonia WNL    Mild hypomagnesemia on blood work; repleted   Psychiatry consulted:no significant changes recommended at this time, can consider beginning Aricept 5 mg OD if patient continues to have behavioral disturbance    · Continue Frequent orientation  · Supportive care  · Fall precautions  · PT OT eval- recommendations for PARS/ STR on discharge  · Case management following for placement    T12 compression fracture ([address] )  Assessment & Plan  · From prior fall  · During previous admission evaluated by Spine surgery and recommendation of TLSO brace was made  · No changes in imaging during this admission    · Supportive care  · Pain management with lidocaine patch  · PT OT eval:  Short-term rehab    Mood disorder ([facility] [address] )  Assessment & Plan  · With anxiety and depression  · Continue home medication of Zoloft 100 mg    Ambulatory dysfunction  Assessment & Plan  · PT OT has evaluated patient, recommended placement    HTN (hypertension)  Assessment & Plan  · Continue Norvasc 5 mg OD      Hyperlipidemia  Assessment & Plan  · Continue Lipitor 10 mg HS    Bipolar 1 disorder ([address] )  Assessment & Plan  · Continuing home medication  · Continue Depakote      VTE Pharmacologic Prophylaxis:   Pharmacologic: Enoxaparin (Lovenox)  Mechanical VTE Prophylaxis in Place: Yes    Discussions with Specialists or Other Care Team Provider:  No    Current Length of Stay: 6 day(s)    Current Patient Status: Inpatient     Discharge Plan / Estimated Discharge Date:  Pending placement  Code Status: Level 1 - Full Code      Subjective:   Patient was seen and examined by me at bedside  Communicated clearly  No particular overnight event reported  Hemodynamically stable and afebrile  Remains pleasantly confused  Patient denies any current back pain  Reports he is doing well  Denies any new complaints or concerns    Objective:     Vitals:   Temp (24hrs), Av 6 °F (36 4 °C), Min:97 5 °F (36 4 °C), Max:97 8 °F (36 6 °C)    Temp:  [97 5 °F (36 4 °C)-97 8 °F (36 6 °C)] 97 5 °F (36 4 °C)  HR:  [53-91] 53  Resp:  [16-17] 16  BP: (107-113)/(53-60) 109/56  SpO2:  [91 %-98 %] 98 %  Body mass index is 21 71 kg/m²  Input and Output Summary (last 24 hours): Intake/Output Summary (Last 24 hours) at 7/15/2021 1031  Last data filed at 7/15/2021 0616  Gross per 24 hour   Intake 240 ml   Output 475 ml   Net -235 ml       Physical Exam:     Physical Exam  Vitals reviewed  Constitutional:       General: He is not in acute distress  Appearance: Normal appearance  He is not ill-appearing or toxic-appearing  HENT:      Head: Normocephalic and atraumatic  Nose: Nose normal    Eyes:      General: No scleral icterus  Right eye: No discharge  Left eye: No discharge  Cardiovascular:      Rate and Rhythm: Normal rate and regular rhythm  Pulses: Normal pulses  Pulmonary:      Effort: Pulmonary effort is normal  No respiratory distress  Abdominal:      General: Bowel sounds are normal  There is no distension  Palpations: Abdomen is soft  Tenderness: There is no abdominal tenderness  Musculoskeletal:         General: Normal range of motion  Cervical back: Normal range of motion  Skin:     General: Skin is warm and dry  Neurological:      Mental Status: He is alert  Mental status is at baseline        Comments: Alert, cooperative, pleasantly confused Psychiatric:         Mood and Affect: Mood normal          Behavior: Behavior normal            Additional Data:     Labs:        Results from last 7 days   Lab Units 07/14/21  0500   POTASSIUM mmol/L 4 4   CHLORIDE mmol/L 107   CO2 mmol/L 29   BUN mg/dL 19   CREATININE mg/dL 0 82   CALCIUM mg/dL 8 4           * I Have Reviewed All Lab Data Listed Above  * Additional Pertinent Lab Tests Reviewed: All Labs Within Last 24 Hours Reviewed    Imaging:    Imaging Reports Reviewed Today Include:  None  Imaging Personally Reviewed by Myself Includes:  None    Recent Cultures (last 7 days):           Last 24 Hours Medication List:   Current Facility-Administered Medications   Medication Dose Route Frequency Provider Last Rate    acetaminophen  650 mg Oral Q6H PRN Radha Aleman MD      amLODIPine  5 mg Oral Daily Prashant Willard MD      aspirin  81 mg Oral Daily Prashant Willard MD      atorvastatin  10 mg Oral Daily With Anila Steele MD      divalproex sodium  1,000 mg Oral Daily Prashant Willard MD      enoxaparin  40 mg Subcutaneous Daily Prashant Willard MD      lidocaine  1 patch Topical Daily Prashant Willard MD      senna-docusate sodium  1 tablet Oral HS Prashant Willard MD      sertraline  100 mg Oral Daily Prashant Willard MD          Today, Patient Was Seen By: Radha Aleman MD    ** Please Note: This note has been constructed using a voice recognition system   **

## 2021-07-15 NOTE — PLAN OF CARE
Problem: PHYSICAL THERAPY ADULT  Goal: Performs mobility at highest level of function for planned discharge setting  See evaluation for individualized goals  Description: Treatment/Interventions: LE strengthening/ROM, Functional transfer training, Therapeutic exercise, Endurance training, Patient/family training, Equipment eval/education, Bed mobility, Gait training, Spoke to nursing, OT, Continued evaluation  Equipment Recommended:  (TBD)       See flowsheet documentation for full assessment, interventions and recommendations  Outcome: Progressing  Note: Prognosis: Fair  Problem List: Decreased strength, Decreased endurance, Impaired balance, Decreased mobility, Decreased cognition, Impaired hearing  Assessment: pt agreeable to PT session  presents c increased weakness/stiffness this session  able to complete sit<>stand x3 trials mod (A)x2, however unable to advance LEs for ambulation 2* weakness/fatigue  performed bed/chair swap while pt supported c RW  tolerated sitting in recliner at end of session c TLSO donned + chair alarm activated  initiated seated AROM ther ex B/L LE x10 reps c min verbal cues for technique  will cont skilled PT to further maximize functional mobility + improve quality of life  AM-PAC raw score 9 indicating pt would benefit from skilled PT, however please refer to PT d/c recommendation for safe d/c planning  Barriers to Discharge: Inaccessible home environment, Decreased caregiver support        PT Discharge Recommendation: Post acute rehabilitation services     PT - OK to Discharge: Yes    See flowsheet documentation for full assessment

## 2021-07-15 NOTE — UTILIZATION REVIEW
Continued Stay Review    Date:  7/15                         Current Patient Class: IP   Current Level of Care: MS     HPI:86 y o  male initially admitted on  7/7 w/ acute encephalopathy and an unsafe DC plan   Assessment/Plan: pt is medically cleared and is waiting placement STR   CM working on STR placement       Vital Signs:   07/15/21 1007  --  --  --  --  --  --  None (Room air)  --   07/15/21 07:24:10  97 5 °F (36 4 °C)  53Abnormal   16  109/56  74  98 %             Pertinent Labs/Diagnostic Results:     Results from last 7 days   Lab Units 07/14/21  0500   SODIUM mmol/L 140   POTASSIUM mmol/L 4 4   CHLORIDE mmol/L 107   CO2 mmol/L 29   ANION GAP mmol/L 4   BUN mg/dL 19   CREATININE mg/dL 0 82   EGFR ml/min/1 73sq m 80   CALCIUM mg/dL 8 4     Results from last 7 days   Lab Units 07/14/21  0500   GLUCOSE RANDOM mg/dL 88     Medications:   Scheduled Medications:  amLODIPine, 5 mg, Oral, Daily  aspirin, 81 mg, Oral, Daily  atorvastatin, 10 mg, Oral, Daily With Dinner  divalproex sodium, 1,000 mg, Oral, Daily  enoxaparin, 40 mg, Subcutaneous, Daily  lidocaine, 1 patch, Topical, Daily  senna-docusate sodium, 1 tablet, Oral, HS  sertraline, 100 mg, Oral, Daily      Continuous IV Infusions:     PRN Meds:  acetaminophen, 650 mg, Oral, Q6H PRN        Discharge Plan: TBD     Network Utilization Review Department  ATTENTION: Please call with any questions or concerns to [phone] and carefully listen to the prompts so that you are directed to the right person  All voicemails are confidential   Angie May all requests for admission clinical reviews, approved or denied determinations and any other requests to dedicated fax number below belonging to the campus where the patient is receiving treatment   List of dedicated fax numbers for the Facilities:  [address] DENIALS (Administrative/Medical Necessity) [phone]   [address] [place] [address] (Maternity/NICU/Pediatrics) [phone]   [facility] [address] [facility] [address] [place] [address] [address] [place] [address] [facility] [id number] [place] [address] Rachel Geiger [address] [place] [id number] [phone]

## 2021-07-15 NOTE — CASE MANAGEMENT
CM spoke to [facility] ()  about PT recommendations for STRT  Chooses [place] [facility], [address] and [employer]  Referrals via allscripts  Await admission dtermination  A post acute care recommendation was made by your care team for STR  Discussed Freedom of Choice with caregiver  List of facilities given to caregiver via in person  caregiver aware the list is custom filtered for them by preference  and that [facility]'s post acute providers are designated

## 2021-07-16 LAB — SARS-COV-2 RNA RESP QL NAA+PROBE: NEGATIVE

## 2021-07-16 PROCEDURE — U0003 INFECTIOUS AGENT DETECTION BY NUCLEIC ACID (DNA OR RNA); SEVERE ACUTE RESPIRATORY SYNDROME CORONAVIRUS 2 (SARS-COV-2) (CORONAVIRUS DISEASE [COVID-19]), AMPLIFIED PROBE TECHNIQUE, MAKING USE OF HIGH THROUGHPUT TECHNOLOGIES AS DESCRIBED BY CMS-2020-01-R: HCPCS | Performed by: INTERNAL MEDICINE

## 2021-07-16 PROCEDURE — U0005 INFEC AGEN DETEC AMPLI PROBE: HCPCS | Performed by: INTERNAL MEDICINE

## 2021-07-16 PROCEDURE — 99232 SBSQ HOSP IP/OBS MODERATE 35: CPT | Performed by: STUDENT IN AN ORGANIZED HEALTH CARE EDUCATION/TRAINING PROGRAM

## 2021-07-16 RX ADMIN — SERTRALINE HYDROCHLORIDE 100 MG: 50 TABLET ORAL at 11:30

## 2021-07-16 RX ADMIN — LIDOCAINE 5% 1 PATCH: 700 PATCH TOPICAL at 11:29

## 2021-07-16 RX ADMIN — ASPIRIN 81 MG: 81 TABLET, DELAYED RELEASE ORAL at 11:30

## 2021-07-16 RX ADMIN — ENOXAPARIN SODIUM 40 MG: 40 INJECTION SUBCUTANEOUS at 11:29

## 2021-07-16 RX ADMIN — DIVALPROEX SODIUM 1000 MG: 250 TABLET, FILM COATED, EXTENDED RELEASE ORAL at 11:46

## 2021-07-16 RX ADMIN — AMLODIPINE BESYLATE 5 MG: 5 TABLET ORAL at 11:30

## 2021-07-16 RX ADMIN — ATORVASTATIN CALCIUM 10 MG: 10 TABLET, FILM COATED ORAL at 18:20

## 2021-07-16 NOTE — PROGRESS NOTES
[facility]  Progress Note [facility] 1934, 80 y o  male MRN: [id number]  Unit/Bed#: MS [place] Encounter: [id number]  Primary Care Provider: Mina Ruiz DO   Date and time admitted to hospital: 7/7/2021 11:29 AM    * Altered mental status  Assessment & Plan  · Suspected sundowning versus underlying dementia  · Presented from [facility] with complaint of agitation, confusion  · Pt has been pleasantly confused during this admission   CT head with no acute changes   UA no evidence of infection, chest x-ray WNL   TSH WNL   Ammonia WNL    Mild hypomagnesemia on blood work; repleted   Psychiatry consulted:no significant changes recommended at this time, can consider beginning Aricept 5 mg OD if patient continues to have behavioral disturbance    · Continue Frequent orientation  · Supportive care  · Fall precautions  · PT OT eval- recommendations for PARS/ STR on discharge  · Case management following for placement; authorization pending for [facility]    T12 compression fracture [facility])  Assessment & Plan  · From prior fall  · During previous admission evaluated by Spine surgery and recommendation of TLSO brace was made  · No changes in imaging during this admission    · Supportive care  · Pain management with lidocaine patch  · PT OT eval:  Short-term rehab; pending authorization for [facility]    Mood disorder [facility])  Assessment & Plan  · With anxiety and depression  · Continue home medication of Zoloft 100 mg    Ambulatory dysfunction  Assessment & Plan  · PT OT has evaluated patient, recommended placement to short-term rehab    HTN (hypertension)  Assessment & Plan  · Continue Norvasc 5 mg OD      Hyperlipidemia  Assessment & Plan  · Continue Lipitor 10 mg HS    Bipolar 1 disorder ([facility] [address] )  Assessment & Plan  · Continuing home medication  · Continue Depakote      VTE Pharmacologic Prophylaxis:   Pharmacologic: Enoxaparin (Lovenox)  Mechanical VTE Prophylaxis in Place: Yes    Discussions with Specialists or Other Care Team Provider:      Current Length of Stay: 7 day(s)    Current Patient Status: Inpatient     Discharge Plan / Estimated Discharge Date:  Pending placement  Code Status: Level 1 - Full Code      Subjective:   Patient was seen and examined by me at bedside  Communicated clearly  No particular overnight event reported  Hemodynamically stable and afebrile  Patient remains pleasantly confused  Has no new complaints or concerns  Objective:     Vitals:   Temp (24hrs), Av 7 °F (36 5 °C), Min:97 4 °F (36 3 °C), Max:98 °F (36 7 °C)    Temp:  [97 4 °F (36 3 °C)-98 °F (36 7 °C)] 98 °F (36 7 °C)  HR:  [55-59] 55  Resp:  [17-19] 17  BP: (124-125)/(57) 125/57  SpO2:  [97 %-99 %] 98 %  Body mass index is 20 38 kg/m²  Input and Output Summary (last 24 hours): Intake/Output Summary (Last 24 hours) at 2021 1134  Last data filed at 2021 0755  Gross per 24 hour   Intake --   Output 850 ml   Net -850 ml       Physical Exam:     Physical Exam  Vitals reviewed  Constitutional:       General: He is not in acute distress  Appearance: Normal appearance  He is not ill-appearing or toxic-appearing  HENT:      Head: Normocephalic and atraumatic  Nose: Nose normal    Eyes:      General: No scleral icterus  Right eye: No discharge  Left eye: No discharge  Cardiovascular:      Rate and Rhythm: Normal rate and regular rhythm  Pulses: Normal pulses  Pulmonary:      Effort: Pulmonary effort is normal  No respiratory distress  Abdominal:      General: Bowel sounds are normal  There is no distension  Palpations: Abdomen is soft  Tenderness: There is no abdominal tenderness  Musculoskeletal:         General: Normal range of motion  Cervical back: Normal range of motion  Skin:     General: Skin is warm and dry  Neurological:      Mental Status: He is alert  Mental status is at baseline  Comments: Alert, cooperative, pleasantly confused     Psychiatric:         Mood and Affect: Mood normal          Behavior: Behavior normal            Additional Data:     Labs:        Results from last 7 days   Lab Units 07/14/21  0500   POTASSIUM mmol/L 4 4   CHLORIDE mmol/L 107   CO2 mmol/L 29   BUN mg/dL 19   CREATININE mg/dL 0 82   CALCIUM mg/dL 8 4           * I Have Reviewed All Lab Data Listed Above  * Additional Pertinent Lab Tests Reviewed: No New Labs Available For Today    Imaging:    Imaging Reports Reviewed Today Include:  None  Imaging Personally Reviewed by Myself Includes:  None    Recent Cultures (last 7 days):           Last 24 Hours Medication List:   Current Facility-Administered Medications   Medication Dose Route Frequency Provider Last Rate    acetaminophen  650 mg Oral Q6H PRN Heather Ferguson MD      amLODIPine  5 mg Oral Daily Nasra Wbeb MD      aspirin  81 mg Oral Daily Nasra Webb MD      atorvastatin  10 mg Oral Daily With Mikal Darden MD      divalproex sodium  1,000 mg Oral Daily Nasra Webb MD      enoxaparin  40 mg Subcutaneous Daily Nasra Webb MD      lidocaine  1 patch Topical Daily Nasra Webb MD      senna-docusate sodium  1 tablet Oral HS Nasra Webb MD      sertraline  100 mg Oral Daily Nasra Webb MD          Today, Patient Was Seen By: Heather Ferguson MD    ** Please Note: This note has been constructed using a voice recognition system   **

## 2021-07-16 NOTE — CASE MANAGEMENT
IMM reviewed w/ [facility] )   [phone] (M) IMM filed in medical records bin

## 2021-07-16 NOTE — DISCHARGE INSTR - AVS FIRST PAGE
Discharge instructions from hospitalist  1  Follow-up with your primary care physician in 1 week in regards to recent hospitalization    2  Take your medications regularly    3  Come back to the ER if symptoms recur or worsen    4  Activity as tolerated    5   Diet :  Cardiac healthy

## 2021-07-16 NOTE — DISCHARGE INSTRUCTIONS
Altered Mental Status   WHAT YOU NEED TO KNOW:   Altered mental status (AMS) is a disruption in how your brain works that causes a change in behavior  This change can happen suddenly or over days  AMS ranges from slight confusion to total disorientation and increased sleepiness to coma  DISCHARGE INSTRUCTIONS:   Medicines:   · Antibiotics  help fight or prevent infection  Take your antibiotics until they are gone, even if you feel better  · Take your medicine as directed  Contact your healthcare provider if you think your medicine is not helping or if you have side effects  Tell him of her if you are allergic to any medicine  Keep a list of the medicines, vitamins, and herbs you take  Include the amounts, and when and why you take them  Bring the list or the pill bottles to follow-up visits  Carry your medicine list with you in case of an emergency  Follow up with your healthcare provider as directed:  Write down your questions so you remember to ask them during your visits  Contact your healthcare provider if:   · You have sudden changes in behavior  · You are more sleepy or confused than usual      · You have questions or concerns about your condition or care  Seek care immediately or call 911 if:   · Your speech is slurred or you are rambling  · You have a seizure  · You are not able to move any part of your body freely  · Someone close to you cannot wake you up  © Copyright [address] Information is for End User's use only and may not be sold, redistributed or otherwise used for commercial purposes  All illustrations and images included in CareNotes® are the copyrighted property of A D A M , Inc  or [facility] Joan Allred   The above information is an  only  It is not intended as medical advice for individual conditions or treatments  Talk to your doctor, nurse or pharmacist before following any medical regimen to see if it is safe and effective for you

## 2021-07-16 NOTE — CASE MANAGEMENT
CM spoke to [facility] admissions who will accept pt and start auth today  PASRR completed and uploaded to [place]

## 2021-07-17 PROCEDURE — 99232 SBSQ HOSP IP/OBS MODERATE 35: CPT | Performed by: STUDENT IN AN ORGANIZED HEALTH CARE EDUCATION/TRAINING PROGRAM

## 2021-07-17 RX ADMIN — SERTRALINE HYDROCHLORIDE 100 MG: 50 TABLET ORAL at 08:18

## 2021-07-17 RX ADMIN — LIDOCAINE 5% 1 PATCH: 700 PATCH TOPICAL at 08:27

## 2021-07-17 RX ADMIN — DOCUSATE SODIUM AND SENNOSIDES 1 TABLET: 8.6; 5 TABLET, FILM COATED ORAL at 22:17

## 2021-07-17 RX ADMIN — ATORVASTATIN CALCIUM 10 MG: 10 TABLET, FILM COATED ORAL at 17:09

## 2021-07-17 RX ADMIN — AMLODIPINE BESYLATE 5 MG: 5 TABLET ORAL at 08:18

## 2021-07-17 RX ADMIN — DIVALPROEX SODIUM 1000 MG: 250 TABLET, FILM COATED, EXTENDED RELEASE ORAL at 08:27

## 2021-07-17 RX ADMIN — ASPIRIN 81 MG: 81 TABLET, DELAYED RELEASE ORAL at 08:18

## 2021-07-17 RX ADMIN — ENOXAPARIN SODIUM 40 MG: 40 INJECTION SUBCUTANEOUS at 08:18

## 2021-07-17 NOTE — CASE MANAGEMENT
Auth was denied for STR   Peer to peer to be done by Monday at 4p [phone]  CM queried ref Number for case for physican to use  Await response via [employer]

## 2021-07-17 NOTE — PLAN OF CARE
Problem: MOBILITY - ADULT  Goal: Maintain or return to baseline ADL function  Description: INTERVENTIONS:  -  Assess patient's ability to carry out ADLs; assess patient's baseline for ADL function and identify physical deficits which impact ability to perform ADLs (bathing, care of mouth/teeth, toileting, grooming, dressing, etc )  - Assess/evaluate cause of self-care deficits   - Assess range of motion  - Assess patient's mobility; develop plan if impaired  - Assess patient's need for assistive devices and provide as appropriate  - Encourage maximum independence but intervene and supervise when necessary  - Involve family in performance of ADLs  - Assess for home care needs following discharge   - Consider OT consult to assist with ADL evaluation and planning for discharge  - Provide patient education as appropriate  Outcome: Progressing  Goal: Maintains/Returns to pre admission functional level  Description: INTERVENTIONS:  - Perform BMAT or MOVE assessment daily    - Set and communicate daily mobility goal to care team and patient/family/caregiver     - Collaborate with rehabilitation services on mobility goals if consulted  - Out of bed for toileting  - Record patient progress and toleration of activity level   Outcome: Progressing

## 2021-07-17 NOTE — PROGRESS NOTES
[facility]  Progress Note Martir Jaimes 1934, 80 y o  male MRN: [id number]  Unit/Bed#: [facility] Encounter: [id number]  Primary Care Provider: Getachew Hayward DO   Date and time admitted to hospital: 7/7/2021 11:29 AM    * Altered mental status  Assessment & Plan  · Suspected sundowning versus underlying dementia  · Presented from [facility] with complaint of agitation, confusion  · Pt has been pleasantly confused during this admission   CT head with no acute changes   UA no evidence of infection, chest x-ray WNL   TSH WNL   Ammonia WNL    Mild hypomagnesemia on blood work; repleted   Psychiatry consulted:no significant changes recommended at this time, can consider beginning Aricept 5 mg OD if patient continues to have behavioral disturbance    · Continue Frequent orientation  · Supportive care  · Fall precautions  · PT OT eval- recommendations for PARS/ STR on discharge  · Case management following for placement    T12 compression fracture ([facility] [address] )  Assessment & Plan  · From prior fall  · During previous admission evaluated by Spine surgery and recommendation of TLSO brace was made  · No changes in imaging during this admission    · Supportive care  · Pain management with lidocaine patch  · PT OT eval:  Short-term rehab    Mood disorder ([facility] [address] )  Assessment & Plan  · With anxiety and depression  · Continue home medication of Zoloft 100 mg    Ambulatory dysfunction  Assessment & Plan  · PT OT has evaluated patient, recommended placement to short-term rehab    HTN (hypertension)  Assessment & Plan  · Continue Norvasc 5 mg OD      Hyperlipidemia  Assessment & Plan  · Continue Lipitor 10 mg HS    Bipolar 1 disorder ([facility] [address] )  Assessment & Plan  · Continuing home medication  · Continue Depakote          VTE Pharmacologic Prophylaxis:   VTE Score: 3 Moderate Risk (Score 3-4) - Pharmacological DVT Prophylaxis Ordered: Enoxaparin (Lovenox)      Mechanical VTE Prophylaxis in Place: Yes    Patient Centered Rounds: I have performed bedside rounds with nursing staff today  Current Length of Stay: 8 day(s)    Current Patient Status: Inpatient     Discharge Plan / Estimated Discharge Date:  Pending placement    Code Status: Level 1 - Full Code      Subjective:   Patient was seen and examined by me at bedside  Communicated clearly  No particular overnight event reported  Hemodynamically stable and afebrile  Patient remains alert, pleasant in demeanor  Today alert and oriented as to himself, his location and the year  Denies any new concerns or complaint  Objective:     Vitals:   Temp (24hrs), Av 4 °F (36 3 °C), Min:97 2 °F (36 2 °C), Max:97 7 °F (36 5 °C)    Temp:  [97 2 °F (36 2 °C)-97 7 °F (36 5 °C)] 97 2 °F (36 2 °C)  HR:  [54-62] 54  Resp:  [17-20] 20  BP: (113-115)/(51-53) 115/51  SpO2:  [97 %-98 %] 97 %  Body mass index is 20 38 kg/m²  Input and Output Summary (last 24 hours): Intake/Output Summary (Last 24 hours) at 2021 1058  Last data filed at 2021 1418  Gross per 24 hour   Intake --   Output 280 ml   Net -280 ml       Physical Exam:     Physical Exam  Vitals reviewed  HENT:      Head: Normocephalic and atraumatic  Eyes:      General: No scleral icterus  Right eye: No discharge  Left eye: No discharge  Cardiovascular:      Rate and Rhythm: Normal rate and regular rhythm  Pulses: Normal pulses  Heart sounds: Normal heart sounds  Pulmonary:      Effort: Pulmonary effort is normal  No respiratory distress  Abdominal:      General: There is no distension  Palpations: Abdomen is soft  Tenderness: There is no abdominal tenderness  Musculoskeletal:         General: Normal range of motion  Cervical back: Normal range of motion  No muscular tenderness  Skin:     General: Skin is warm and dry  Neurological:      Mental Status: He is alert and oriented to person, place, and time  Mental status is at baseline        Comments: Oriented to self, location, year   Psychiatric:         Mood and Affect: Mood normal          Behavior: Behavior normal           Additional Data:     Labs:      Results from last 7 days   Lab Units 07/14/21  0500   SODIUM mmol/L 140   POTASSIUM mmol/L 4 4   CHLORIDE mmol/L 107   CO2 mmol/L 29   BUN mg/dL 19   CREATININE mg/dL 0 82   ANION GAP mmol/L 4   CALCIUM mg/dL 8 4   GLUCOSE RANDOM mg/dL 88                       Imaging: Reviewed radiology reports from this admission including: CT head    Recent Cultures (last 7 days):           Lines/Drains:  Invasive Devices     Drain            External Urinary Catheter Medium 9 days                Telemetry:        Last 24 Hours Medication List:   Current Facility-Administered Medications   Medication Dose Route Frequency Provider Last Rate    acetaminophen  650 mg Oral Q6H PRN Adonis Delcid MD      amLODIPine  5 mg Oral Daily Lashae Arguelles MD      aspirin  81 mg Oral Daily Lashae Arguelles MD      atorvastatin  10 mg Oral Daily With Hardik Marks MD      divalproex sodium  1,000 mg Oral Daily Lashae Arguelles MD      enoxaparin  40 mg Subcutaneous Daily Lashae Arguelles MD      lidocaine  1 patch Topical Daily Lashae Arguelles MD      senna-docusate sodium  1 tablet Oral HS Lashae Arguelles MD      sertraline  100 mg Oral Daily Lashae Arguelles MD          Today, Patient Was Seen By: Adonis Delcid MD    ** Please Note: This note has been constructed using a voice recognition system   **

## 2021-07-18 PROCEDURE — 99232 SBSQ HOSP IP/OBS MODERATE 35: CPT | Performed by: STUDENT IN AN ORGANIZED HEALTH CARE EDUCATION/TRAINING PROGRAM

## 2021-07-18 RX ADMIN — AMLODIPINE BESYLATE 5 MG: 5 TABLET ORAL at 08:11

## 2021-07-18 RX ADMIN — ENOXAPARIN SODIUM 40 MG: 40 INJECTION SUBCUTANEOUS at 08:11

## 2021-07-18 RX ADMIN — DOCUSATE SODIUM AND SENNOSIDES 1 TABLET: 8.6; 5 TABLET, FILM COATED ORAL at 21:38

## 2021-07-18 RX ADMIN — LIDOCAINE 5% 1 PATCH: 700 PATCH TOPICAL at 08:13

## 2021-07-18 RX ADMIN — ATORVASTATIN CALCIUM 10 MG: 10 TABLET, FILM COATED ORAL at 17:23

## 2021-07-18 RX ADMIN — ASPIRIN 81 MG: 81 TABLET, DELAYED RELEASE ORAL at 08:11

## 2021-07-18 RX ADMIN — SERTRALINE HYDROCHLORIDE 100 MG: 50 TABLET ORAL at 08:11

## 2021-07-18 RX ADMIN — DIVALPROEX SODIUM 1000 MG: 250 TABLET, FILM COATED, EXTENDED RELEASE ORAL at 08:17

## 2021-07-18 NOTE — PLAN OF CARE
Problem: MOBILITY - ADULT  Goal: Maintain or return to baseline ADL function  Description: INTERVENTIONS:  -  Assess patient's ability to carry out ADLs; assess patient's baseline for ADL function and identify physical deficits which impact ability to perform ADLs (bathing, care of mouth/teeth, toileting, grooming, dressing, etc )  - Assess/evaluate cause of self-care deficits   - Assess range of motion  - Assess patient's mobility; develop plan if impaired  - Assess patient's need for assistive devices and provide as appropriate  - Encourage maximum independence but intervene and supervise when necessary  - Involve family in performance of ADLs  - Assess for home care needs following discharge   - Consider OT consult to assist with ADL evaluation and planning for discharge  - Provide patient education as appropriate  Outcome: Progressing  Goal: Maintains/Returns to pre admission functional level  Description: INTERVENTIONS:  - Perform BMAT or MOVE assessment daily    - Set and communicate daily mobility goal to care team and patient/family/caregiver  - Collaborate with rehabilitation services on mobility goals if consulted  - Reposition patient every 2 hours    - Out of bed for toileting  - Record patient progress and toleration of activity level   Outcome: Progressing

## 2021-07-18 NOTE — ASSESSMENT & PLAN NOTE
· PT OT has evaluated patient, recommended placement to short-term rehab; short-term rehab denied upon peer to peer review  · Patient accepted back at [facility]

## 2021-07-18 NOTE — ASSESSMENT & PLAN NOTE
· Suspected sundowning versus underlying dementia  · [facility]ed from [facility] with complaint of agitation, confusion  · Pt has been pleasantly confused during this admission   CT head with no acute changes   UA no evidence of infection, chest x-ray WNL   TSH WNL   Ammonia WNL    Mild hypomagnesemia on blood work; repleted   Psychiatry consulted:no significant changes recommended at this time, can consider beginning Aricept 5 mg OD if patient continues to have behavioral disturbance    · Continue Frequent orientation  · Supportive care  · Fall precautions  · PT OT eval- recommendations for PARS/ STR on discharge  · [place] [facility]

## 2021-07-18 NOTE — ASSESSMENT & PLAN NOTE
· From prior fall  · During previous admission evaluated by Spine surgery and recommendation of TLSO brace was made  · No changes in imaging during this admission    · Supportive care  · Pain management with lidocaine patch  · TLSO brace  · [place] [facility]

## 2021-07-18 NOTE — PLAN OF CARE
Problem: NEUROSENSORY - ADULT  Goal: Achieves stable or improved neurological status  Description: INTERVENTIONS  - Monitor and report changes in neurological status  - Monitor vital signs such as temperature, blood pressure, glucose, and any other labs ordered   - Initiate measures to prevent increased intracranial pressure  - Monitor for seizure activity and implement precautions if appropriate      Outcome: Progressing     Problem: Potential for Falls  Goal: Patient will remain free of falls  Description: INTERVENTIONS:  - Educate patient/family on patient safety including physical limitations  - Instruct patient to call for assistance with activity   - Consult OT/PT to assist with strengthening/mobility   - Keep Call bell within reach  - Keep bed low and locked with side rails adjusted as appropriate  - Keep care items and personal belongings within reach  - Initiate and maintain comfort rounds  - Make Fall Risk Sign visible to staff  - Offer Toileting every two Hours, in advance of need  - Initiate/Maintain bed alarm  - Obtain necessary fall risk management equipment: bed alarm, call bell  - Apply yellow socks and bracelet for high fall risk patients  - Consider moving patient to room near nurses station  Outcome: Progressing     Problem: DISCHARGE PLANNING  Goal: Discharge to home or other facility with appropriate resources  Description: INTERVENTIONS:  - Identify barriers to discharge w/patient and caregiver  - Arrange for needed discharge resources and transportation as appropriate  - Identify discharge learning needs (meds, wound care, etc )  - Arrange for interpretive services to assist at discharge as needed  - Refer to Case Management Department for coordinating discharge planning if the patient needs post-hospital services based on physician/advanced practitioner order or complex needs related to functional status, cognitive ability, or social support system  Outcome: Progressing     Problem: SAFETY ADULT  Goal: Maintain or return to baseline ADL function  Description: INTERVENTIONS:  -  Assess patient's ability to carry out ADLs; assess patient's baseline for ADL function and identify physical deficits which impact ability to perform ADLs (bathing, care of mouth/teeth, toileting, grooming, dressing, etc )  - Assess/evaluate cause of self-care deficits   - Assess range of motion  - Assess patient's mobility; develop plan if impaired  - Assess patient's need for assistive devices and provide as appropriate  - Encourage maximum independence but intervene and supervise when necessary  - Involve family in performance of ADLs  - Assess for home care needs following discharge   - Consider OT consult to assist with ADL evaluation and planning for discharge  - Provide patient education as appropriate  Outcome: Progressing

## 2021-07-18 NOTE — PROGRESS NOTES
[facility]  Progress Note Phill Calles 1934, 80 y o  male MRN: [id number]  Unit/Bed#: [facility] Encounter: [id number]  Primary Care Provider: Jeb Ray DO   Date and time admitted to hospital: 7/7/2021 11:29 AM    * Altered mental status  Assessment & Plan  · Suspected sundowning versus underlying dementia  · [facility] shelter with [facility] of agitation, confusion  · Pt has been pleasantly confused during this admission   CT head with no acute changes   UA no evidence of infection, chest x-ray WNL   TSH WNL   Ammonia WNL    Mild hypomagnesemia on blood work; repleted   Psychiatry consulted:no significant changes recommended at this time, can consider beginning Aricept 5 mg OD if patient continues to have behavioral disturbance    · Continue Frequent orientation  · Supportive care  · Fall precautions  · PT OT eval- recommendations for PARS/ STR on discharge  · Case management following for placement    T12 compression fracture ([facility] [address] )  Assessment & Plan  · From prior fall  · During previous admission evaluated by Spine surgery and recommendation of TLSO brace was made  · No changes in imaging during this admission    · Supportive care  · Pain management with lidocaine patch  · PT OT eval:  Short-term rehab    Mood disorder ([facility] [address] )  Assessment & Plan  · With anxiety and depression  · Continue home medication of Zoloft 100 mg    Ambulatory dysfunction  Assessment & Plan  · PT OT has evaluated patient, recommended placement to short-term rehab    HTN (hypertension)  Assessment & Plan  · Continue Norvasc 5 mg OD      Hyperlipidemia  Assessment & Plan  · Continue Lipitor 10 mg HS    Bipolar 1 disorder ([facility] [address] )  Assessment & Plan  · Continuing home medication  · Continue Depakote        VTE Pharmacologic Prophylaxis:   VTE Score: 3 Moderate Risk (Score 3-4) - Pharmacological DVT Prophylaxis Ordered: Enoxaparin (Lovenox)      Mechanical VTE Prophylaxis in Place: Yes    Patient Centered Rounds: I have performed bedside rounds with nursing staff today  Current Length of Stay: 9 day(s)    Current Patient Status: Inpatient     Discharge Plan / Estimated Discharge Date:  Pending placement    Code Status: Level 1 - Full Code      Subjective:   Patient was seen and examined by me at bedside  He communicates clearly however he remains hard of hearing  Denies any somatic or psychiatric complaints  He tolerates his diets without any complaints  Objective:     Vitals:   Temp (24hrs), Av 8 °F (36 6 °C), Min:97 4 °F (36 3 °C), Max:98 2 °F (36 8 °C)    Temp:  [97 4 °F (36 3 °C)-98 2 °F (36 8 °C)] 97 4 °F (36 3 °C)  HR:  [56-71] 56  Resp:  [20] 20  BP: ()/(52-73) 125/73  SpO2:  [94 %-99 %] 99 %  Body mass index is 20 38 kg/m²  Input and Output Summary (last 24 hours):     No intake or output data in the 24 hours ending 21 1506    Physical Exam:     Physical Exam  Vitals reviewed  HENT:      Head: Normocephalic and atraumatic  Eyes:      General: No scleral icterus  Right eye: No discharge  Left eye: No discharge  Cardiovascular:      Rate and Rhythm: Normal rate and regular rhythm  Pulses: Normal pulses  Heart sounds: Normal heart sounds  Pulmonary:      Effort: Pulmonary effort is normal  No respiratory distress  Abdominal:      General: There is no distension  Palpations: Abdomen is soft  Tenderness: There is no abdominal tenderness  Musculoskeletal:         General: Normal range of motion  Cervical back: Normal range of motion  No muscular tenderness  Skin:     General: Skin is warm and dry  Neurological:      Mental Status: He is alert and oriented to person, place, and time  Mental status is at baseline        Comments: Oriented to self, location, year   Psychiatric:         Mood and Affect: Mood normal          Behavior: Behavior normal           Additional Data:     Labs:      Results from last 7 days   Lab Units 07/14/21  0500   SODIUM mmol/L 140   POTASSIUM mmol/L 4 4   CHLORIDE mmol/L 107   CO2 mmol/L 29   BUN mg/dL 19   CREATININE mg/dL 0 82   ANION GAP mmol/L 4   CALCIUM mg/dL 8 4   GLUCOSE RANDOM mg/dL 88                       Imaging: Reviewed radiology reports from this admission including: CT head    Recent Cultures (last 7 days):           Lines/Drains:  Invasive Devices     Drain            External Urinary Catheter Medium 10 days                Telemetry:        Last 24 Hours Medication List:   Current Facility-Administered Medications   Medication Dose Route Frequency Provider Last Rate    acetaminophen  650 mg Oral Q6H PRN Anjum Miller MD      amLODIPine  5 mg Oral Daily Martir Wills MD      aspirin  81 mg Oral Daily Martir Wills MD      atorvastatin  10 mg Oral Daily With Dick Mac MD      divalproex sodium  1,000 mg Oral Daily Martir Wills MD      enoxaparin  40 mg Subcutaneous Daily Martir Wills MD      lidocaine  1 patch Topical Daily Martir Wills MD      senna-docusate sodium  1 tablet Oral HS Martir Wills MD      sertraline  100 mg Oral Daily aMrtir Wills MD          Today, Patient Was Seen By: Pavel Duran MD    ** Please Note: This note has been constructed using a voice recognition system   **

## 2021-07-19 PROCEDURE — 99232 SBSQ HOSP IP/OBS MODERATE 35: CPT | Performed by: STUDENT IN AN ORGANIZED HEALTH CARE EDUCATION/TRAINING PROGRAM

## 2021-07-19 RX ADMIN — SERTRALINE HYDROCHLORIDE 100 MG: 50 TABLET ORAL at 09:54

## 2021-07-19 RX ADMIN — AMLODIPINE BESYLATE 5 MG: 5 TABLET ORAL at 09:54

## 2021-07-19 RX ADMIN — DIVALPROEX SODIUM 1000 MG: 250 TABLET, FILM COATED, EXTENDED RELEASE ORAL at 09:56

## 2021-07-19 RX ADMIN — ASPIRIN 81 MG: 81 TABLET, DELAYED RELEASE ORAL at 09:54

## 2021-07-19 RX ADMIN — ENOXAPARIN SODIUM 40 MG: 40 INJECTION SUBCUTANEOUS at 09:54

## 2021-07-19 RX ADMIN — ATORVASTATIN CALCIUM 10 MG: 10 TABLET, FILM COATED ORAL at 16:21

## 2021-07-19 RX ADMIN — LIDOCAINE 5% 1 PATCH: 700 PATCH TOPICAL at 09:54

## 2021-07-19 NOTE — PROGRESS NOTES
[address] [facility]  Progress Note Gary Mole 1934, 80 y o  male MRN: [id number]  Unit/Bed#: [facility] Encounter: [id number]  Primary Care Provider: Mary Nichols DO   Date and time admitted to hospital: 7/7/2021 11:29 AM    * Altered mental status  Assessment & Plan  · Suspected sundowning versus underlying dementia  · [place] with complaint of agitation, confusion  · Pt has been pleasantly confused during this admission   CT head with no acute changes   UA no evidence of infection, chest x-ray WNL   TSH WNL   Ammonia WNL    Mild hypomagnesemia on blood work; repleted   Psychiatry consulted:no significant changes recommended at this time, can consider beginning Aricept 5 mg OD if patient continues to have behavioral disturbance    · Continue Frequent orientation  · Supportive care  · Fall precautions  · PT OT eval- recommendations for PARS/ STR on discharge  · Short-term rehab denied; patient has upcoming peer to peer review    T12 compression fracture ([facility] [address] )  Assessment & Plan  · From prior fall  · During previous admission evaluated by Spine surgery and recommendation of TLSO brace was made  · No changes in imaging during this admission    · Supportive care  · Pain management with lidocaine patch  · PT OT eval:  Short-term rehab    Mood disorder ([facility] [address] )  Assessment & Plan  · With anxiety and depression  · Continue home medication of Zoloft 100 mg    Ambulatory dysfunction  Assessment & Plan  · PT OT has evaluated patient, recommended placement to short-term rehab  · Short-term rehab denied; case Management continuing to follow    HTN (hypertension)  Assessment & Plan  · Continue Norvasc 5 mg OD      Hyperlipidemia  Assessment & Plan  · Continue Lipitor 10 mg HS    Bipolar 1 disorder ([facility] [address] )  Assessment & Plan  · Continuing home medication  · Continue Depakote        VTE Pharmacologic Prophylaxis:   VTE Score: 3 Moderate Risk (Score 3-4) - Pharmacological DVT Prophylaxis Ordered: Enoxaparin (Lovenox)  Mechanical VTE Prophylaxis in Place: Yes    Patient Centered Rounds: I have performed bedside rounds with nursing staff today  Current Length of Stay: 10 day(s)    Current Patient Status: Inpatient     Discharge Plan / Estimated Discharge Date:  Pending placement    Code Status: Level 1 - Full Code      Subjective:   Patient was seen and examined by me at bedside  Communicated clearly  No particular overnight event reported  Hemodynamically stable and afebrile  Patient denies any new complaints or concerns  Denies any back pain  Objective:     Vitals:   Temp (24hrs), Av 9 °F (36 6 °C), Min:97 6 °F (36 4 °C), Max:98 1 °F (36 7 °C)    Temp:  [97 6 °F (36 4 °C)-98 1 °F (36 7 °C)] 98 1 °F (36 7 °C)  HR:  [54-76] 54  Resp:  [15-18] 15  BP: (117-126)/(55-62) 124/62  SpO2:  [94 %-96 %] 96 %  Body mass index is 20 38 kg/m²  Input and Output Summary (last 24 hours):     No intake or output data in the 24 hours ending 21 1147    Physical Exam:     Physical Exam  Vitals reviewed  Constitutional:       General: He is not in acute distress  Appearance: He is not ill-appearing  HENT:      Head: Normocephalic and atraumatic  Eyes:      General: No scleral icterus  Right eye: No discharge  Left eye: No discharge  Cardiovascular:      Rate and Rhythm: Normal rate and regular rhythm  Pulses: Normal pulses  Heart sounds: Normal heart sounds  No murmur heard  Pulmonary:      Effort: Pulmonary effort is normal  No respiratory distress  Breath sounds: Normal breath sounds  No wheezing  Abdominal:      General: There is no distension  Palpations: Abdomen is soft  Tenderness: There is no abdominal tenderness  Musculoskeletal:         General: No swelling, tenderness or signs of injury  Normal range of motion  Cervical back: Normal range of motion  No muscular tenderness  Skin:     General: Skin is warm and dry  Neurological:      Mental Status: He is alert  Mental status is at baseline  Psychiatric:         Mood and Affect: Mood normal          Behavior: Behavior normal           Additional Data:     Labs:      Results from last 7 days   Lab Units 07/14/21  0500   SODIUM mmol/L 140   POTASSIUM mmol/L 4 4   CHLORIDE mmol/L 107   CO2 mmol/L 29   BUN mg/dL 19   CREATININE mg/dL 0 82   ANION GAP mmol/L 4   CALCIUM mg/dL 8 4   GLUCOSE RANDOM mg/dL 88                       Imaging: Reviewed radiology reports from this admission including: CT head    Recent Cultures (last 7 days):           Lines/Drains:  Invasive Devices     Drain            External Urinary Catheter Medium <1 day                Telemetry:        Last 24 Hours Medication List:   Current Facility-Administered Medications   Medication Dose Route Frequency Provider Last Rate    acetaminophen  650 mg Oral Q6H PRN Capri Faulkner MD      amLODIPine  5 mg Oral Daily Tien Seweney MD      aspirin  81 mg Oral Daily Tien Sweeney MD      atorvastatin  10 mg Oral Daily With Makayla Trejo MD      divalproex sodium  1,000 mg Oral Daily Tien Sweeney MD      enoxaparin  40 mg Subcutaneous Daily Tien Sweeney MD      lidocaine  1 patch Topical Daily Tien Sweeney MD      senna-docusate sodium  1 tablet Oral HS Tien Sweeney MD      sertraline  100 mg Oral Daily Tien Sweeney MD          Today, Patient Was Seen By: Capri Faulkner MD    ** Please Note: This note has been constructed using a voice recognition system   **

## 2021-07-19 NOTE — PLAN OF CARE
Problem: NEUROSENSORY - ADULT  Goal: Achieves stable or improved neurological status  Description: INTERVENTIONS  - Monitor and report changes in neurological status  - Monitor vital signs such as temperature, blood pressure, glucose, and any other labs ordered   - Initiate measures to prevent increased intracranial pressure  - Monitor for seizure activity and implement precautions if appropriate      Outcome: Progressing     Problem: Prexisting or High Potential for Compromised Skin Integrity  Goal: Skin integrity is maintained or improved  Description: INTERVENTIONS:  - Identify patients at risk for skin breakdown  - Assess and monitor skin integrity  - Assess and monitor nutrition and hydration status  - Monitor labs   - Assess for incontinence   - Turn and reposition patient  - Assist with mobility/ambulation  - Relieve pressure over bony prominences  - Avoid friction and shearing  - Provide appropriate hygiene as needed including keeping skin clean and dry  - Evaluate need for skin moisturizer/barrier cream  - Collaborate with interdisciplinary team   - Patient/family teaching  - Consider wound care consult   Outcome: Progressing     Problem: Potential for Falls  Goal: Patient will remain free of falls  Description: INTERVENTIONS:  - Educate patient/family on patient safety including physical limitations  - Instruct patient to call for assistance with activity   - Consult OT/PT to assist with strengthening/mobility   - Keep Call bell within reach  - Keep bed low and locked with side rails adjusted as appropriate  - Keep care items and personal belongings within reach  - Initiate and maintain comfort rounds  - Make Fall Risk Sign visible to staff  - Offer Toileting every two Hours, in advance of need  - Initiate/Maintain bed alarm  - Obtain necessary fall risk management equipment: bed alarm  - Apply yellow socks and bracelet for high fall risk patients  - Consider moving patient to room near nurses station  Outcome: Progressing     Problem: DISCHARGE PLANNING  Goal: Discharge to home or other facility with appropriate resources  Description: INTERVENTIONS:  - Identify barriers to discharge w/patient and caregiver  - Arrange for needed discharge resources and transportation as appropriate  - Identify discharge learning needs (meds, wound care, etc )  - Arrange for interpretive services to assist at discharge as needed  - Refer to Case Management Department for coordinating discharge planning if the patient needs post-hospital services based on physician/advanced practitioner order or complex needs related to functional status, cognitive ability, or social support system  Outcome: Progressing     Problem: SAFETY ADULT  Goal: Maintain or return to baseline ADL function  Description: INTERVENTIONS:  -  Assess patient's ability to carry out ADLs; assess patient's baseline for ADL function and identify physical deficits which impact ability to perform ADLs (bathing, care of mouth/teeth, toileting, grooming, dressing, etc )  - Assess/evaluate cause of self-care deficits   - Assess range of motion  - Assess patient's mobility; develop plan if impaired  - Assess patient's need for assistive devices and provide as appropriate  - Encourage maximum independence but intervene and supervise when necessary  - Involve family in performance of ADLs  - Assess for home care needs following discharge   - Consider OT consult to assist with ADL evaluation and planning for discharge  - Provide patient education as appropriate  Outcome: Progressing

## 2021-07-19 NOTE — CASE MANAGEMENT
CM discussed pt in interdisciplinary care rounds  Dr Orlando Fletcher to do peer to peer   For STR denial

## 2021-07-19 NOTE — CASE MANAGEMENT
CM notified Dr Nelida Palmer of denial of STR and peer to peer information by tiger text as a friendly reminder

## 2021-07-19 NOTE — CASE MANAGEMENT
IMM reviewed w/ [facility] )   [phone] (M)COPY left at bedside  IMM filed in medical records bin

## 2021-07-19 NOTE — UTILIZATION REVIEW
Continued Stay Review    Date: 7/19                          Current Patient Class: IP   Current Level of Care:  MS     HPI:86 y o  male initially admitted on 7/7 w/ acute encephalopathy and an unsafe DC plan          Assessment/Plan: pt pending placement   Cont freq orientation   STR denied , has upcoming P2P   Vital Signs:   07/19/21 07:41:49  98 1 °F (36 7 °C)  54Abnormal   15  124/62  83  96 %           Pertinent Labs/Diagnostic Results:   Results from last 7 days   Lab Units 07/16/21  1140   SARS-COV-2  Negative     Results from last 7 days   Lab Units 07/14/21  0500   SODIUM mmol/L 140   POTASSIUM mmol/L 4 4   CHLORIDE mmol/L 107   CO2 mmol/L 29   ANION GAP mmol/L 4   BUN mg/dL 19   CREATININE mg/dL 0 82   EGFR ml/min/1 73sq m 80   CALCIUM mg/dL 8 4     Results from last 7 days   Lab Units 07/14/21  0500   GLUCOSE RANDOM mg/dL 88       Medications:   Scheduled Medications:  amLODIPine, 5 mg, Oral, Daily  aspirin, 81 mg, Oral, Daily  atorvastatin, 10 mg, Oral, Daily With Dinner  divalproex sodium, 1,000 mg, Oral, Daily  enoxaparin, 40 mg, Subcutaneous, Daily  lidocaine, 1 patch, Topical, Daily  senna-docusate sodium, 1 tablet, Oral, HS  sertraline, 100 mg, Oral, Daily      Continuous IV Infusions:     PRN Meds:  acetaminophen, 650 mg, Oral, Q6H PRN        Discharge Plan:TBD     Network Utilization Review Department  ATTENTION: Please call with any questions or concerns to [phone] and carefully listen to the prompts so that you are directed to the right person  All voicemails are confidential   [facility] all requests for admission clinical reviews, approved or denied determinations and any other requests to dedicated fax number below belonging to the campus where the patient is receiving treatment   List of dedicated fax numbers for the Facilities:  [address] DENIALS (Administrative/Medical Necessity) [phone]   [address] (Maternity/NICU/Pediatrics) [phone] [address] [place] [address] [address] [place] [address] [address] [facility] [address] [address] [place] [address] [address] [place] [address] [phone]

## 2021-07-20 VITALS
OXYGEN SATURATION: 98 % | TEMPERATURE: 95.7 F | SYSTOLIC BLOOD PRESSURE: 120 MMHG | HEIGHT: 72 IN | DIASTOLIC BLOOD PRESSURE: 65 MMHG | HEART RATE: 51 BPM | WEIGHT: 150.3 LBS | BODY MASS INDEX: 20.36 KG/M2 | RESPIRATION RATE: 17 BRPM

## 2021-07-20 PROBLEM — R41.82 ALTERED MENTAL STATUS: Status: RESOLVED | Noted: 2021-07-07 | Resolved: 2021-07-20

## 2021-07-20 PROCEDURE — 99238 HOSP IP/OBS DSCHRG MGMT 30/<: CPT | Performed by: INTERNAL MEDICINE

## 2021-07-20 RX ADMIN — AMLODIPINE BESYLATE 5 MG: 5 TABLET ORAL at 10:03

## 2021-07-20 RX ADMIN — ASPIRIN 81 MG: 81 TABLET, DELAYED RELEASE ORAL at 10:03

## 2021-07-20 RX ADMIN — SERTRALINE HYDROCHLORIDE 100 MG: 50 TABLET ORAL at 10:03

## 2021-07-20 RX ADMIN — DIVALPROEX SODIUM 1000 MG: 250 TABLET, FILM COATED, EXTENDED RELEASE ORAL at 10:03

## 2021-07-20 NOTE — CASE MANAGEMENT
CMN completed printed signed w/ copy to medical records bin  CMN and facesheet filed in sticker chart for transfer  CM called [facility] dispatcher and scheduled BLS transport for 1000 via [facility]    Dr Leah Mdconough made aware  RN Stella Tate made aware  Regina Nino Roberts)   [phone] (M) made aware via voicemail message on  telephone  Silvia Pollack [phone] made aware via telephone conversation

## 2021-07-20 NOTE — DISCHARGE SUMMARY
[address] [facility]  Discharge- Jonathan Sanchezty 1934, 80 y o  male MRN: [id number]  Unit/Bed#: MS [facility] Encounter: [id number]  Primary Care Provider: Albertina Johnson DO   Date and time admitted to hospital: 7/7/2021 11:29 AM    * Altered mental status-resolved as of 7/20/2021  Assessment & Plan  · Suspected sundowning versus underlying dementia  · Presented from [facility] with complaint of agitation, confusion  · Pt has been pleasantly confused during this admission   CT head with no acute changes   UA no evidence of infection, chest x-ray WNL   TSH WNL   Ammonia WNL    Mild hypomagnesemia on blood work; repleted   Psychiatry consulted:no significant changes recommended at this time, can consider beginning Aricept 5 mg OD if patient continues to have behavioral disturbance    · Continue Frequent orientation  · Supportive care  · Fall precautions  · PT OT eval- recommendations for PARS/ STR on discharge  · Patient accepted back at [facility]    T12 compression fracture ([facility] )  Assessment & Plan  · From prior fall  · During previous admission evaluated by Spine surgery and recommendation of TLSO brace was made  · No changes in imaging during this admission    · Supportive care  · Pain management with lidocaine patch  · TLSO brace  · Patient accepted back at [facility]    Mood disorder ([facility] )  Assessment & Plan  · With anxiety and depression  · Continue home medication of Zoloft 100 mg    Ambulatory dysfunction  Assessment & Plan  · PT OT has evaluated patient, recommended placement to short-term rehab; short-term rehab denied upon peer to peer review  · Patient accepted back at [facility]    HTN (hypertension)  Assessment & Plan  · Continue Norvasc 5 mg OD      Hyperlipidemia  Assessment & Plan  · Continue Lipitor 10 mg HS    Bipolar 1 disorder ([facility] )  Assessment & Plan  · Continuing home medication  · Continue Depakote        PCP: Albertina Johnson DO  Admission Date: 7/7/2021  Discharge Date: 07/20/21    Disposition:     Other: [facility]; [facility]    Reason for Admission:  Altered mental status    Consultations During Hospital Stay:  · Psychiatry    Procedures Performed:     · None    Primary diagnosis:    Altered mental status; likely due to sundowning versus underlying dementia; stable    Secondary diagnosis:   T12 compression fracture; stable; TLSO brace    Significant Findings / Test Results:     · None    Incidental Findings:   · None     Test Results Pending at Discharge (will require follow up): · None     Outpatient Tests Requested:  · None    Complications:  None    HPI:    Hubert Montenegro is a 80 y o  male with PMH-hypertension, hyperlipidemia, bipolar disorder, peripheral vascular disease, with recent T12 compression fracture post fall; now presenting with complaint of altered mental status  Patient is normally a resident of [facility] ([facility]) Per report from ED provider, patient was agitated and aggressive overnight, refusing oral intake, refusing care by nursing staff at [facility]  Normally at patient's baseline he is alert, oriented, cooperative      In the ED, UA and chest Imaging have returned unimpressive  Lab work with no electrolyte abnormalities  No hyperammonemia  Imaging of CT head with no acute intracranial changes  Imaging of spine returning with T12 compression fracture which is not new  Hospital Course:     Post admission, patient had no episodes of confusion or agitation  Initial presenting complaint likely due to sundowning versus underlying dementia  He was evaluated by Psychiatry during this admission, no significant changes were recommended to his medications  However, psychiatry did state patient can be started on Aricept 5 mg OD if there is recurrence of his agitation  Evaluated by PT OT-recommendation of short-term rehab was made  Patient was denied short-term rehab upon peer to peer review    Subsequently his original [facility] facility, [facility] has accepted the patient back  Condition at Discharge: good     Discharge Day Visit / Exam:     Subjective:  Patient was seen and examined by me at bedside  Communicated clearly  No particular overnight event reported  Hemodynamically stable and afebrile  Patient has no new complaints or concerns  He is aware that he will be discharged back to [facility] today  Vitals: Blood Pressure: 120/65 (07/20/21 0805)  Pulse: (!) 51 (07/20/21 0805)  Temperature: (!) 95 7 °F (35 4 °C) (07/20/21 0805)  Temp Source: Oral (07/18/21 2200)  Respirations: 17 (07/20/21 0805)  Height: 6' (182 9 cm) (07/19/21 1237)  Weight - Scale: 68 2 kg (150 lb 4 8 oz) (07/16/21 0600)  SpO2: 98 % (07/20/21 0805)  Exam:   Physical Exam  Vitals reviewed  Constitutional:       General: He is not in acute distress  Appearance: Normal appearance  He is not ill-appearing or toxic-appearing  HENT:      Head: Normocephalic and atraumatic  Nose: Nose normal    Eyes:      General: No scleral icterus  Right eye: No discharge  Left eye: No discharge  Cardiovascular:      Rate and Rhythm: Normal rate and regular rhythm  Pulses: Normal pulses  Pulmonary:      Effort: Pulmonary effort is normal  No respiratory distress  Abdominal:      General: Bowel sounds are normal  There is no distension  Palpations: Abdomen is soft  Tenderness: There is no abdominal tenderness  Musculoskeletal:         General: Normal range of motion  Cervical back: Normal range of motion  Skin:     General: Skin is warm and dry  Neurological:      Mental Status: He is alert  Mental status is at baseline  Comments: Alert, cooperative, pleasantly confused     Psychiatric:         Mood and Affect: Mood normal          Behavior: Behavior normal        Patients family:  Patient's POA (nephew-Jorge brambila) aware of patient's discharge back to [facility]  He had no  concerns or questions regarding discharge process      Discharge instructions/Information to patient and family:   See after visit summary for information provided to patient and family  Provisions for Follow-Up Care:  See after visit summary for information related to follow-up care and any pertinent home health orders  Planned Readmission: None     Discharge Medications:  See after visit summary for reconciled discharge medications provided to patient and family        ** Please Note: This note has been constructed using a voice recognition system **

## 2021-07-20 NOTE — TRANSPORTATION MEDICAL NECESSITY
Section I - General Information    Name of Patient: Sandeep Thakkar                 : 1934    Medicare #: [id number]  Transport Date: 21 (PCS is valid for round trips on this date and for all repetitive trips in the 60-day range as noted below )  Origin: [facility] [address]: Grace Hamilton  Is the pt's stay covered under Medicare Part A (PPS/DRG)   [x]     Closest appropriate facility? If no, why is transport to more distant facility required? Yes  If hospice pt, is this transport related to pt's terminal illness? No       Section II - Medical Necessity Questionnaire  Ambulance transportation is medically necessary only if other means of transport are contraindicated or would be potentially harmful to the patient  To meet this requirement, the patient must either be "bed confined" or suffer from a condition such that transport by means other than ambulance is contraindicated by the patient's condition  The following questions must be answered by the medical professional signing below for this form to be valid:    1)  Describe the MEDICAL CONDITION (physical and/or mental) of this patient AT [address] [place] [address] that requires the patient to be transported in an ambulance and why transport by other means is contraindicated by the patient's condition:DEMENTIA, CONFUSION, ALTERED MENTAL STATUS,     2) Is the patient "bed confined" as defined below? Yes  To be "be confined" the patient must satisfy all three of the following conditions: (1) unable to get up from bed without Assistance; AND (2) unable to ambulate; AND (3) unable to sit in a chair or wheelchair  3) Can this patient safely be transported by car or wheelchair van (i e , seated during transport without a medical attendant or monitoring)?    No    4) In addition to completing questions 1-3 above, please check any of the following conditions that apply*:   *Note: supporting documentation for any boxes checked must be maintained in the patient's medical records  If hosp-hosp transfer, describe services needed at 2nd facility not available at 1st facility? Patient is confused  Patient is combative  Medical attendant required   Unable to tolerate seated position for time needed to transport       Section III - Signature of Physician or Healthcare Professional  I certify that the above information is true and correct based on my evaluation of this patient, and represent that the patient requires transport by ambulance and that other forms of transport are contraindicated  I understand that this information will be used by the Centers for Medicare and Medicaid Services (CMS) to support the determination of medical necessity for ambulance services, and I represent that I have personal knowledge of the patient's condition at time of transport  [x]  If this box is checked, I also certify that the patient is physically or mentally incapable of signing the ambulance service's claim and that the institution with which I am affiliated has furnished care, services, or assistance to the patient  My signature below is made on behalf of the patient pursuant to 42 CFR §424 36(b)(4)  In accordance with 42 CFR §424 37, the specific reason(s) that the patient is physically or mentally incapable of signing the claim form is as follows: DEMENTIA      Signature of Physician* or Healthcare Professional______________________________________________________________  Signature Date 07/20/21 (For scheduled repetitive transports, this form is not valid for transports performed more than 60 days after this date)    Printed Name & Credentials of Physician or Healthcare Professional (MD, , RN, etc )____JUNE MERLYN ÁLVAREZ CM ____________________________  *Form must be signed by patient's attending physician for scheduled, repetitive transports   For non-repetitive, unscheduled ambulance transports, if unable to obtain the signature of the attending physician, any of the following may sign (choose appropriate option below)  [] Physician Assistant []  Clinical Nurse Specialist [x]  Registered Nurse  []  Nurse Practitioner  [x] Discharge Planner

## 2021-07-20 NOTE — CASE MANAGEMENT
Denial upheld  Yesterday per Dr Xiomara Rene  CM spoke to [employer]  [facility] [employer] states they will take pt back to [place] today  Justyna Vincent

## 2021-07-21 ENCOUNTER — TRANSITIONAL CARE MANAGEMENT (OUTPATIENT)
Dept: INTERNAL MEDICINE CLINIC | Facility: CLINIC | Age: 86
End: 2021-07-21

## 2021-07-21 NOTE — UTILIZATION REVIEW
Notification of Discharge   This is a Notification of Discharge from our facility [address]  Please be advised that this patient has been discharge from our facility  Below you will find the admission and discharge date and time including the patients disposition  UTILIZATION REVIEW CONTACT:  Duane Carlson  Utilization   Network Utilization Review Department  Phone: [phone] x carefully listen to the prompts  All voicemails are confidential   Email: [email or web address]     PHYSICIAN ADVISORY SERVICES:  FOR [id number] REVIEW - MEDICAL NECESSITY DENIAL  Phone: [phone]  Fax: [phone]  Email: [email or web address]     PRESENTATION DATE: 7/7/2021 11:29 AM  OBERVATION ADMISSION DATE:   INPATIENT ADMISSION DATE: 7/9/21  1:28 PM   DISCHARGE DATE: 7/20/2021 10:09 AM  DISPOSITION: Non [facility] Non [facility]      IMPORTANT INFORMATION:  Send all requests for admission clinical reviews, approved or denied determinations and any other requests to dedicated fax number below belonging to the campus where the patient is receiving treatment   List of dedicated fax numbers:  [address] DENIALS (Administrative/Medical Necessity) [phone]   [address] [place] [address] (Maternity/NICU/Pediatrics) [phone]   Lakhwinder Orellana [phone]   Rishi Washington [phone]   Onel Garcia [phone]   Óscar Sanchez [facility] [address] [facility] [phone]   [facility]  [phone]   [address] [facility]  [facility][note 3] [address] [facility] [phone]

## 2021-08-06 DIAGNOSIS — M54.50 CHRONIC BILATERAL LOW BACK PAIN WITHOUT SCIATICA: Primary | ICD-10-CM
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
## 2021-08-06 DIAGNOSIS — G89.29 CHRONIC BILATERAL LOW BACK PAIN WITHOUT SCIATICA: Primary | ICD-10-CM

## 2021-08-06 RX ORDER — LIDOCAINE 4 G/G
1 PATCH TOPICAL DAILY
Qty: 30 PATCH | Refills: 3 | Status: SHIPPED | OUTPATIENT
Start: 2021-08-06

## 2021-08-06 RX ORDER — LIDOCAINE 4 G/G
1 PATCH TOPICAL DAILY
Qty: 30 PATCH | Refills: 3 | Status: SHIPPED | OUTPATIENT
Start: 2021-08-06 | End: 2021-08-06 | Stop reason: SDUPTHER

## 2021-08-11 ENCOUNTER — TELEPHONE (OUTPATIENT)
Dept: INTERNAL MEDICINE CLINIC | Facility: CLINIC | Age: 86
End: 2021-08-11

## 2021-08-11 DIAGNOSIS — R13.10 DYSPHAGIA, UNSPECIFIED TYPE: Primary | ICD-10-CM

## 2021-08-11 NOTE — TELEPHONE ENCOUNTER
PT  LEDY  GLUCO   DARSHANAGE  HIS  NURSE  CALLED   ASKING  FOR  AN  ORDER  TO  HAVE  SOME   SPEECH THERAPY  DONE     ANY  QUESTIONS   CALL PT  Yasmani George [phone]

## 2021-09-20 DIAGNOSIS — F31.9 BIPOLAR 1 DISORDER (HCC): ICD-10-CM

## 2021-09-20 DIAGNOSIS — R26.2 AMBULATORY DYSFUNCTION: ICD-10-CM

## 2021-09-20 DIAGNOSIS — I73.9 PVD (PERIPHERAL VASCULAR DISEASE) (HCC): Primary | ICD-10-CM

## 2021-10-25 ENCOUNTER — TELEPHONE (OUTPATIENT)
Dept: INTERNAL MEDICINE CLINIC | Facility: CLINIC | Age: 86
End: 2021-10-25

## 2022-01-07 ENCOUNTER — OFFICE VISIT (OUTPATIENT)
Dept: INTERNAL MEDICINE CLINIC | Facility: CLINIC | Age: 87
End: 2022-01-07
Payer: COMMERCIAL

## 2022-01-07 VITALS
DIASTOLIC BLOOD PRESSURE: 60 MMHG | BODY MASS INDEX: 21.43 KG/M2 | TEMPERATURE: 97 F | OXYGEN SATURATION: 95 % | HEART RATE: 64 BPM | SYSTOLIC BLOOD PRESSURE: 110 MMHG | WEIGHT: 158 LBS

## 2022-01-07 DIAGNOSIS — Z00.00 MEDICARE ANNUAL WELLNESS VISIT, SUBSEQUENT: ICD-10-CM

## 2022-01-07 DIAGNOSIS — F31.9 BIPOLAR AFFECTIVE DISORDER, REMISSION STATUS UNSPECIFIED (HCC): ICD-10-CM

## 2022-01-07 DIAGNOSIS — I10 PRIMARY HYPERTENSION: ICD-10-CM

## 2022-01-07 DIAGNOSIS — F03.90 DEMENTIA WITHOUT BEHAVIORAL DISTURBANCE, UNSPECIFIED DEMENTIA TYPE (HCC): Primary | ICD-10-CM

## 2022-01-07 DIAGNOSIS — I73.9 PERIPHERAL VASCULAR DISEASE, UNSPECIFIED (HCC): ICD-10-CM
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
## 2022-01-07 PROBLEM — Z87.81 HISTORY OF VERTEBRAL FRACTURE: Chronic | Status: ACTIVE | Noted: 2021-07-07

## 2022-01-07 PROBLEM — Z86.16 PERSONAL HISTORY OF COVID-19: Status: ACTIVE | Noted: 2021-10-01

## 2022-01-07 PROCEDURE — 99214 OFFICE O/P EST MOD 30 MIN: CPT | Performed by: INTERNAL MEDICINE

## 2022-01-07 PROCEDURE — 3725F SCREEN DEPRESSION PERFORMED: CPT | Performed by: INTERNAL MEDICINE

## 2022-01-07 PROCEDURE — 3288F FALL RISK ASSESSMENT DOCD: CPT | Performed by: INTERNAL MEDICINE

## 2022-01-07 PROCEDURE — 1160F RVW MEDS BY RX/DR IN RCRD: CPT | Performed by: INTERNAL MEDICINE

## 2022-01-07 PROCEDURE — 4004F PT TOBACCO SCREEN RCVD TLK: CPT | Performed by: INTERNAL MEDICINE

## 2022-01-07 PROCEDURE — 1170F FXNL STATUS ASSESSED: CPT | Performed by: INTERNAL MEDICINE

## 2022-01-07 PROCEDURE — G0439 PPPS, SUBSEQ VISIT: HCPCS | Performed by: INTERNAL MEDICINE

## 2022-01-07 PROCEDURE — 1125F AMNT PAIN NOTED PAIN PRSNT: CPT | Performed by: INTERNAL MEDICINE

## 2022-01-07 NOTE — PROGRESS NOTES
Reviewed PTA medication list with patient/caregiver and patient/caregiver denies any additional medications. Patient admits to having a responsible adult care for them at home for at least 24 hours after surgery. Patient encouraged to use gown warming system and informed that using said warming gown to regulate body temperature prior to a procedure has been shown to help reduce the risks of blood clots and infection. Patient's pharmacy of choice verified and documented in PTA medication section. Dual skin assessment & fall risk band verification completed with Halley Muhammad RN. [facility]    NAME: Jae [place]  AGE: 80 y o  SEX: male  : 1934     DATE: 2022     Assessment and Plan:     1  Dementia without behavioral disturbance, unspecified dementia type (HCC)    Stable  Brain stimulating exercises encouraged  - CBC; Future    2  Bipolar affective disorder, remission status unspecified ([facility] )    Continue zoloft and depakote as prescribed  Has seen psychiatry in the past     3  Peripheral vascular disease, unspecified ([facility] )    Elevate legs and discussed medication to help moisturize his legs  4  Primary hypertension    Check labs  Continue anti-HTN medication     - Basic metabolic panel; Future  - Lipid panel; Future          Return in about 6 months (around 2022) for Follow-up  Chief Complaint:     Chief Complaint   Patient presents with    Medicare Wellness Visit      History of Present Illness:       Lissett Omalley presents for follow-up  No changes to his health  He is a poor historian  Had covid back in October  Did not require hospitalization  Underlying dementia and mental health problems  Due for labs  Back in the summer, he had a fall and had T12 compression fracture  Mainly wheelchair bound  Review of Systems:     Review of Systems   Unable to perform ROS: Dementia          Objective:     /60 (BP Location: Left arm, Patient Position: Sitting, Cuff Size: Standard)   Pulse 64   Temp (!) 97 °F (36 1 °C) (Tympanic)   Wt 71 7 kg (158 lb)   SpO2 95%   BMI 21 43 kg/m²     Physical Exam  Constitutional:       General: He is not in acute distress  Appearance: He is not ill-appearing  Cardiovascular:      Rate and Rhythm: Normal rate and regular rhythm  Heart sounds: No murmur heard  Pulmonary:      Effort: Pulmonary effort is normal  No respiratory distress  Breath sounds: No wheezing  Abdominal:      General: Bowel sounds are normal  There is no distension  Tenderness:  There is no abdominal tenderness  Musculoskeletal:      Right lower leg: No edema  Left lower leg: No edema  Neurological:      Mental Status: He is alert           Shon Arenas DO  [facility] [address] [place] [address]

## 2022-01-07 NOTE — PATIENT INSTRUCTIONS
Medicare Preventive Visit Patient Instructions  Thank you for completing your Welcome to Medicare Visit or Medicare Annual Wellness Visit today  Your next wellness visit will be due in one year (1/8/2023)  The screening/preventive services that you may require over the next 5-10 years are detailed below  Some tests may not apply to you based off risk factors and/or age  Screening tests ordered at today's visit but not completed yet may show as past due  Also, please note that scanned in results may not display below  Preventive Screenings:  Service Recommendations Previous Testing/Comments   Colorectal Cancer Screening  · Colonoscopy    · Fecal Occult Blood Test (FOBT)/Fecal Immunochemical Test (FIT)  · Fecal DNA/Cologuard Test  · Flexible Sigmoidoscopy Age: 54-65 years old   Colonoscopy: every 10 years (May be performed more frequently if at higher risk)  OR  FOBT/FIT: every 1 year  OR  Cologuard: every 3 years  OR  Sigmoidoscopy: every 5 years  Screening may be recommended earlier than age 48 if at higher risk for colorectal cancer  Also, an individualized decision between you and your healthcare provider will decide whether screening between the ages of 74-80 would be appropriate   Colonoscopy: Not on file  FOBT/FIT: Not on file  Cologuard: Not on file  Sigmoidoscopy: Not on file    Screening Not Indicated     Prostate Cancer Screening Individualized decision between patient and health care provider in men between ages of 53-78   Medicare will cover every 12 months beginning on the day after your 50th birthday PSA: No results in last 5 years     History Prostate Cancer  Screening Not Indicated     Hepatitis C Screening Once for adults born between [birth year] and 1965  More frequently in patients at high risk for Hepatitis C Hep C Antibody: Not on file    Screening Not Indicated   Diabetes Screening 1-2 times per year if you're at risk for diabetes or have pre-diabetes Fasting glucose: 82 mg/dL   A1C: No results in last 5 years    Screening Current   Cholesterol Screening Once every 5 years if you don't have a lipid disorder  May order more often based on risk factors  Lipid panel: 01/13/2021    Screening Not Indicated  History Lipid Disorder      Other Preventive Screenings Covered by Medicare:  1  Abdominal Aortic Aneurysm (AAA) Screening: covered once if your at risk  You're considered to be at risk if you have a family history of AAA or a male between the age of 73-68 who smoking at least 100 cigarettes in your lifetime  2  Lung Cancer Screening: covers low dose CT scan once per year if you meet all of the following conditions: (1) Age 50-69; (2) No signs or symptoms of lung cancer; (3) Current smoker or have quit smoking within the last 15 years; (4) You have a tobacco smoking history of at least 30 pack years (packs per day x number of years you smoked); (5) You get a written order from a healthcare provider  3  Glaucoma Screening: covered annually if you're considered high risk: (1) You have diabetes OR (2) Family history of glaucoma OR (3)  aged 48 and older OR (3)  American aged 72 and older  3  Osteoporosis Screening: covered every 2 years if you meet one of the following conditions: (1) Have a vertebral abnormality; (2) On glucocorticoid therapy for more than 3 months; (3) Have primary hyperparathyroidism; (4) On osteoporosis medications and need to assess response to drug therapy  5  HIV Screening: covered annually if you're between the age of 12-76  Also covered annually if you are younger than 13 and older than 72 with risk factors for HIV infection  For pregnant patients, it is covered up to 3 times per pregnancy      Immunizations:  Immunization Recommendations   Influenza Vaccine Annual influenza vaccination during flu season is recommended for all persons aged >= 6 months who do not have contraindications   Pneumococcal Vaccine (Prevnar and Pneumovax)  * Prevnar = PCV13  * Pneumovax = PPSV23 Adults 25-60 years old: 1-3 doses may be recommended based on certain risk factors  Adults 72 years old: Prevnar (PCV13) vaccine recommended followed by Pneumovax (PPSV23) vaccine  If already received PPSV23 since turning 65, then PCV13 recommended at least one year after PPSV23 dose  Hepatitis B Vaccine 3 dose series if at intermediate or high risk (ex: diabetes, end stage renal disease, liver disease)   Tetanus (Td) Vaccine - COST NOT COVERED BY MEDICARE PART B Following completion of primary series, a booster dose should be given every 10 years to maintain immunity against tetanus  Td may also be given as tetanus wound prophylaxis  Tdap Vaccine - COST NOT COVERED BY MEDICARE PART B Recommended at least once for all adults  For pregnant patients, recommended with each pregnancy  Shingles Vaccine (Shingrix) - COST NOT COVERED BY MEDICARE PART B  2 shot series recommended in those aged 48 and above     Health Maintenance Due:  There are no preventive care reminders to display for this patient  Immunizations Due:      Topic Date Due    DTaP,Tdap,and Td Vaccines (1 - Tdap) 04/08/1998    Influenza Vaccine (1) 09/01/2021    COVID-19 Vaccine (3 - Booster for Moderna series) 09/08/2021     Advance Directives   What are advance directives? Advance directives are legal documents that state your wishes and plans for medical care  These plans are made ahead of time in case you lose your ability to make decisions for yourself  Advance directives can apply to any medical decision, such as the treatments you want, and if you want to donate organs  What are the types of advance directives? There are many types of advance directives, and each state has rules about how to use them  You may choose a combination of any of the following:  · Living will: This is a written record of the treatment you want  You can also choose which treatments you do not want, which to limit, and which to stop at a certain time  This includes surgery, medicine, IV fluid, and tube feedings  · Durable power of  for healthcare [facility]): This is a written record that states who you want to make healthcare choices for you when you are unable to make them for yourself  This person, called a proxy, is usually a family member or a friend  You may choose more than 1 proxy  · Do not resuscitate (DNR) order:  A DNR order is used in case your heart stops beating or you stop breathing  It is a request not to have certain forms of treatment, such as CPR  A DNR order may be included in other types of advance directives  · Medical directive: This covers the care that you want if you are in a coma, near death, or unable to make decisions for yourself  You can list the treatments you want for each condition  Treatment may include pain medicine, surgery, blood transfusions, dialysis, IV or tube feedings, and a ventilator (breathing machine)  · Values history: This document has questions about your views, beliefs, and how you feel and think about life  This information can help others choose the care that you would choose  Why are advance directives important? An advance directive helps you control your care  Although spoken wishes may be used, it is better to have your wishes written down  Spoken wishes can be misunderstood, or not followed  Treatments may be given even if you do not want them  An advance directive may make it easier for your family to make difficult choices about your care  Cigarette Smoking and Your Health   Risks to your health if you smoke:  Nicotine and other chemicals found in tobacco damage every cell in your body  Even if you are a light smoker, you have an increased risk for cancer, heart disease, and lung disease  If you are pregnant or have diabetes, smoking increases your risk for complications     Benefits to your health if you stop smoking:   · You decrease respiratory symptoms such as coughing, wheezing, and shortness of breath  · You reduce your risk for cancers of the lung, mouth, throat, kidney, bladder, pancreas, stomach, and cervix  If you already have cancer, you increase the benefits of chemotherapy  You also reduce your risk for cancer returning or a second cancer from developing  · You reduce your risk for heart disease, blood clots, heart attack, and stroke  · You reduce your risk for lung infections, and diseases such as pneumonia, asthma, chronic bronchitis, and emphysema  · Your circulation improves  More oxygen can be delivered to your body  If you have diabetes, you lower your risk for complications, such as kidney, artery, and eye diseases  You also lower your risk for nerve damage  Nerve damage can lead to amputations, poor vision, and blindness  · You improve your body's ability to heal and to fight infections  For more information and support to stop smoking:   · [employer]  gov  Phone: 7- 777 - 738-0002  Web Address: www [employer]    [address] 2018 Information is for End User's use only and may not be sold, redistributed or otherwise used for commercial purposes   All illustrations and images included in CareNotes® are the copyrighted property of A D A M , Inc  or [address] [place] [address]

## 2022-01-07 NOTE — PROGRESS NOTES
Assessment and Plan:     1  Medicare annual wellness visit, subsequent       Preventive health issues were discussed with patient, and age appropriate screening tests were ordered as noted in patient's After Visit Summary  Personalized health advice and appropriate referrals for health education or preventive services given if needed, as noted in patient's After Visit Summary  History of Present Illness:     Patient presents for Medicare Annual Wellness visit    Patient Care Team:  Kendy Jess, DO as PCP - General (Internal Medicine)  Kendy Jess, DO as PCP - [address] [place] [address] (RTE)  Kendy Jess, DO as PCP - [facility] ([facility])     Problem List:     Patient Active Problem List   Diagnosis    Bipolar 1 disorder ([facility] )    Hyperlipidemia    History of prostate cancer    HTN (hypertension)    Fall    Ambulatory dysfunction    PVD (peripheral vascular disease) ([facility] )    Mood disorder ([facility] )    T12 compression fracture ([facility] )      Past Medical and Surgical History:     Past Medical History:   Diagnosis Date    Arthritis     Chronic venous insufficiency     HTN (hypertension) 10/26/2019    [facility]     PVD (peripheral vascular disease) ([facility] )      Past Surgical History:   Procedure Laterality Date    HERNIA REPAIR      TONSILLECTOMY        Family History:     Family History   Problem Relation Age of Onset    Heart disease Mother     Diabetes Father     Diabetes Sister       Social History:     Social History     Socioeconomic History    Marital status:      Spouse name: None    Number of children: None    Years of education: None    Highest education level: None   Occupational History    None   Tobacco Use    Smoking status: Current Some Day Smoker     Years: 10 00     Types: Cigars    Smokeless tobacco: Never Used    Tobacco comment: occasionaly cigar, rarely   Vaping Use    Vaping Use: Never used   Substance and Sexual Activity    Alcohol use:  Yes Alcohol/week: 1 0 standard drink     Types: 1 Standard drinks or equivalent per week     Comment: rarely    Drug use: No    Sexual activity: Not Currently   Other Topics Concern    None   Social History Narrative    None     Social Determinants of Health     Financial Resource Strain: Not on file   Food Insecurity: Not on file   Transportation Needs: Not on file   Physical Activity: Inactive    Days of Exercise per Week: 0 days   [employer] of Exercise per Session: 0 min   Stress: No Stress Concern Present    Feeling of Stress : Only a little   Social Connections: Not on file   Intimate Partner Violence: Not on file   Housing Stability: Not on file      Medications and Allergies:     Current Outpatient Medications   Medication Sig Dispense Refill    acetaminophen (TYLENOL) 325 mg tablet Take 2 tablets (650 mg total) by mouth every 6 (six) hours as needed for mild pain 30 tablet 0    amLODIPine (NORVASC) 5 mg tablet Take 5 mg by mouth daily hold sbp <90      aspirin 81 mg chewable tablet Chew 81 mg daily      atorvastatin (LIPITOR) 10 mg tablet 1 TABLET BY MOUTH DAILY IN THE EVENING FOR HYPERLIPIDEMIA *PLEASE REORDER* 30 tablet 5    divalproex sodium (DEPAKOTE ER) 500 mg 24 hr tablet Take 2 tablets (1,000 mg total) by mouth daily (Patient taking differently: Take 500 mg by mouth 2 (two) times a day ) 60 tablet 5    Lidocaine (Aspercreme Lidocaine) 4 % PTCH Apply 1 application topically daily 30 patch 3    oxyCODONE (ROXICODONE) 5 mg immediate release tablet Take 5 mg by mouth every 4 (four) hours as needed for moderate pain      senna-docusate sodium (SENOKOT-S) 8 6-50 mg per tablet Take 1 tablet by mouth 2 (two) times a day      sertraline (ZOLOFT) 100 mg tablet 1 TABLET BY MOUTH DAILY FOR BIPOLAR DISORDER *PLEASE REORDER* 30 tablet 5     No current facility-administered medications for this visit       No Known Allergies   Immunizations:     Immunization History   Administered Date(s) Administered   Lyn Courser COVID-19 MODERNA VACC 0 5 ML IM 01/25/2021, 03/08/2021    INFLUENZA 12/14/2004, 12/14/2005, 12/04/2006, 10/05/2017, 10/14/2020    Pneumococcal Conjugate 13-Valent 03/05/2019    Pneumococcal Polysaccharide PPV23 09/14/2020    Td (adult), adsorbed 04/07/1998      Health Maintenance: There are no preventive care reminders to display for this patient  Topic Date Due    DTaP,Tdap,and Td Vaccines (1 - Tdap) 04/08/1998    Influenza Vaccine (1) 09/01/2021    COVID-19 Vaccine (3 - Booster for Moderna series) 09/08/2021      Medicare Health Risk Assessment:     /60 (BP Location: Left arm, Patient Position: Sitting, Cuff Size: Standard)   Pulse 64   Temp (!) 97 °F (36 1 °C) (Tympanic)   Wt 71 7 kg (158 lb)   SpO2 95%   BMI 21 43 kg/m²      Amisha Padilla is here for his Subsequent Wellness visit  Last Medicare Wellness visit information reviewed, patient interviewed and updates made to the record today  Health Risk Assessment:   Patient rates overall health as good  Patient feels that their physical health rating is same  Patient is satisfied with their life  Eyesight was rated as slightly worse  Hearing was rated as slightly worse  Patient feels that their emotional and mental health rating is same  Patients states they are never, rarely angry  Patient states they are never, rarely unusually tired/fatigued  Pain experienced in the last 7 days has been none  Patient states that he has experienced no weight loss or gain in last 6 months  Depression Screening:   PHQ-2 Score: 0      Fall Risk Screening: In the past year, patient has experienced: no history of falling in past year      Home Safety:  Patient has trouble with stairs inside or outside of their home  Patient has working smoke alarms and has working carbon monoxide detector  Home safety hazards include: none  Nutrition:   Current diet is Regular  Medications:   Patient is not currently taking any over-the-counter supplements  Patient is not able to manage medications  Activities of Daily Living (ADLs)/Instrumental Activities of Daily Living (IADLs):   Walk and transfer into and out of bed and chair?: Yes  Dress and groom yourself?: Yes    Bathe or shower yourself?: Yes    Feed yourself? Yes  Do your laundry/housekeeping?: No  Manage your money, pay your bills and track your expenses?: No  Make your own meals?: No    Do your own shopping?: No    Durable Medical Equipment Suppliers  None    Previous Hospitalizations:   Any hospitalizations or ED visits within the last 12 months?: Yes    How many hospitalizations have you had in the last year?: 3-4    Advance Care Planning:   Living will: Yes    Durable POA for healthcare: Yes    Advanced directive: Yes    Five wishes given: No      Cognitive Screening:   Provider or family/friend/caregiver concerned regarding cognition?: Yes    Cognition Comments: Has known underlying dementia     PREVENTIVE SCREENINGS      Cardiovascular Screening:    General: Screening Not Indicated and History Lipid Disorder      Diabetes Screening:     General: Screening Current      Colorectal Cancer Screening:     General: Screening Not Indicated      Prostate Cancer Screening:    General: History Prostate Cancer and Screening Not Indicated      Osteoporosis Screening:    General: Screening Not Indicated      Abdominal Aortic Aneurysm (AAA) Screening:    Risk factors include: tobacco use        General: Screening Not Indicated      Lung Cancer Screening:     General: Screening Not Indicated      Hepatitis C Screening:    General: Screening Not Indicated    Screening, Brief Intervention, and Referral to Treatment (SBIRT)    Screening  Typical number of drinks in a day: 0  Typical number of drinks in a week: 0  Interpretation: Low risk drinking behavior      AUDIT-C Screenin) How often did you have a drink containing alcohol in the past year? never  2) How many drinks did you have on a typical day when you were drinking in the past year? 0  3) How often did you have 6 or more drinks on one occasion in the past year? never    AUDIT-C Score: 0  Interpretation: Score 0-3 (male): Negative screen for alcohol misuse    Single Item Drug Screening:  How often have you used an illegal drug (including marijuana) or a prescription medication for non-medical reasons in the past year? never    Single Item Drug Screen Score: 0  Interpretation: Negative screen for possible drug use disorder    Brief Intervention  Alcohol & drug use screenings were reviewed  No concerns regarding substance use disorder identified  Other Counseling Topics:   Car/seat belt/driving safety, skin self-exam, sunscreen and regular weightbearing exercise         Suleman Fulton, DO

## 2022-01-13 ENCOUNTER — TELEPHONE (OUTPATIENT)
Dept: INTERNAL MEDICINE CLINIC | Facility: CLINIC | Age: 87
End: 2022-01-13

## 2022-03-15 ENCOUNTER — TELEPHONE (OUTPATIENT)
Dept: INTERNAL MEDICINE CLINIC | Facility: CLINIC | Age: 87
End: 2022-03-15

## 2022-03-15 NOTE — TELEPHONE ENCOUNTER
Received in the mail medication orders for this pt under Dr Guera Ovalle to be signed  The orders were mailed back to [employer]  White and yellow copies    Once the signed orders are received; they will provide the facility with a signed copy for the residents chart      [facility]

## 2022-08-02 ENCOUNTER — RA CDI HCC (OUTPATIENT)
Dept: OTHER | Facility: HOSPITAL | Age: 87
End: 2022-08-02

## 2022-08-02 NOTE — PROGRESS NOTES
[facility]  coding opportunities       Chart reviewed, no opportunity found:   [address]        Patients Insurance     Medicare Insurance: [employer]

## 2022-08-05 ENCOUNTER — TELEPHONE (OUTPATIENT)
Dept: INTERNAL MEDICINE CLINIC | Facility: CLINIC | Age: 87
End: 2022-08-05

## 2022-08-05 NOTE — TELEPHONE ENCOUNTER
PLEASE Fax office note after his visit on:  Mon 8/15 at 3PM with Dr Julien Borja- Fax to [facility]    F#: [phone]    Per Nena's request over the phone  P#: [phone]

## 2022-08-15 ENCOUNTER — OFFICE VISIT (OUTPATIENT)
Dept: INTERNAL MEDICINE CLINIC | Facility: CLINIC | Age: 87
End: 2022-08-15
Payer: COMMERCIAL

## 2022-08-15 VITALS
TEMPERATURE: 97.6 F | SYSTOLIC BLOOD PRESSURE: 100 MMHG | RESPIRATION RATE: 18 BRPM | DIASTOLIC BLOOD PRESSURE: 58 MMHG | HEART RATE: 49 BPM

## 2022-08-15 DIAGNOSIS — F03.90 DEMENTIA WITHOUT BEHAVIORAL DISTURBANCE, UNSPECIFIED DEMENTIA TYPE: ICD-10-CM

## 2022-08-15 DIAGNOSIS — Z99.3 WHEELCHAIR BOUND: ICD-10-CM

## 2022-08-15 DIAGNOSIS — E78.2 MIXED HYPERLIPIDEMIA: Chronic | ICD-10-CM

## 2022-08-15 DIAGNOSIS — I10 PRIMARY HYPERTENSION: Primary | ICD-10-CM

## 2022-08-15 DIAGNOSIS — F31.9 BIPOLAR 1 DISORDER (HCC): ICD-10-CM

## 2022-08-15 PROCEDURE — 99214 OFFICE O/P EST MOD 30 MIN: CPT | Performed by: INTERNAL MEDICINE

## 2022-08-15 PROCEDURE — 1160F RVW MEDS BY RX/DR IN RCRD: CPT | Performed by: INTERNAL MEDICINE

## 2022-08-16 NOTE — PROGRESS NOTES
[facility]    NAME: Roman Portillo  AGE: 80 y o  SEX: male  : 1934     DATE: 8/15/2022     Assessment and Plan:     1  Primary hypertension    BP stable  Continue anti-HTN therapy  2  Dementia without behavioral disturbance, unspecified dementia type ([address] )    Stable at this time and residing at [address] [place] [address]  Needs 24/7 supervision  3  Bipolar 1 disorder (HCC)    Stable on current mood stabilizers  No changes to medications today  - CBC; Future  - Basic metabolic panel; Future    4  Mixed hyperlipidemia    Continue statin  Cholesterol controlled  - Lipid panel; Future    5  Wheelchair bound    BMI Counseling: There is no height or weight on file to calculate BMI  Follow-up plan was not completed due to elderly patient (72 years old) where weight reduction/weight gain would complicate underlying health condition such as: illness or physical disability and mental illness, dementia, or confusion  Return in about 6 months (around 2/15/2023) for Subsequent AWV  History of Present Illness:     Renny Villa presents for follow-up  Very hard of hearing and is a poor historian  Ultimately, he denies any concerns or complaints during today's visit  No interval history provided by patient's nephew  Taking medications as prescribed  Labs are stable  He is in a wheelchair  Has generalized weakness  Review of Systems:     Review of Systems   Unable to perform ROS: Dementia   Neurological: Positive for weakness  Objective:     /58 (BP Location: Left arm, Patient Position: Sitting, Cuff Size: Standard)   Pulse (!) 49   Temp 97 6 °F (36 4 °C) (Temporal)   Resp 18     Physical Exam  Constitutional:       General: He is not in acute distress  Appearance: He is not ill-appearing  HENT:      Mouth/Throat:      Comments: Poor dentition  Cardiovascular:      Rate and Rhythm: Normal rate and regular rhythm        Heart sounds: No murmur heard   Pulmonary:      Effort: Pulmonary effort is normal  No respiratory distress  Breath sounds: No wheezing  Abdominal:      General: Bowel sounds are normal  There is no distension  Tenderness: There is no abdominal tenderness  Musculoskeletal:      Right lower leg: No edema  Left lower leg: No edema  Neurological:      Mental Status: He is alert  Gait: Gait abnormal (not tested; in wheelchair)         Pushpa Powers DO  [facility] [address] [place] [address]

## 2022-09-14 NOTE — ASSESSMENT & PLAN NOTE
Lo whit  Wound care until podiatry sees patient  Podiatry stated wounds appeared more superficial when debrided  X-rays negative for osteomyelitis    Improving cellulitis looks resolving Well-Baby Nursery Mother's Bedside/Non-

## 2022-09-15 ENCOUNTER — APPOINTMENT (OUTPATIENT)
Dept: RADIOLOGY | Facility: HOSPITAL | Age: 87
DRG: 177 | End: 2022-09-15
Payer: COMMERCIAL

## 2022-09-15 ENCOUNTER — HOSPITAL ENCOUNTER (INPATIENT)
Facility: HOSPITAL | Age: 87
LOS: 8 days | Discharge: NURSING, REHAB OR LONG-TERM CARE | DRG: 177 | End: 2022-09-23
Attending: EMERGENCY MEDICINE | Admitting: FAMILY MEDICINE
Payer: COMMERCIAL

## 2022-09-15 ENCOUNTER — APPOINTMENT (EMERGENCY)
Dept: CT IMAGING | Facility: HOSPITAL | Age: 87
DRG: 177 | End: 2022-09-15
Payer: COMMERCIAL

## 2022-09-15 DIAGNOSIS — J69.0 ASPIRATION PNEUMONIA (HCC): Primary | ICD-10-CM

## 2022-09-15 PROBLEM — I50.9 ACUTE CHF (CONGESTIVE HEART FAILURE) (HCC): Status: ACTIVE | Noted: 2022-09-15

## 2022-09-15 PROBLEM — N30.90 CYSTITIS: Status: ACTIVE | Noted: 2022-09-15

## 2022-09-15 LAB
ALBUMIN SERPL BCP-MCNC: 2.7 G/DL (ref 3.5–5)
ALP SERPL-CCNC: 40 U/L (ref 46–116)
ALT SERPL W P-5'-P-CCNC: 12 U/L (ref 12–78)
ANION GAP SERPL CALCULATED.3IONS-SCNC: 3 MMOL/L (ref 4–13)
APTT PPP: 35 SECONDS (ref 23–37)
AST SERPL W P-5'-P-CCNC: 12 U/L (ref 5–45)
BASE EX.OXY STD BLDV CALC-SCNC: 41.4 % (ref 60–80)
BASE EXCESS BLDV CALC-SCNC: 4 MMOL/L
BASOPHILS # BLD AUTO: 0.03 THOUSANDS/ΜL (ref 0–0.1)
BASOPHILS NFR BLD AUTO: 0 % (ref 0–1)
BILIRUB SERPL-MCNC: 0.54 MG/DL (ref 0.2–1)
BUN SERPL-MCNC: 14 MG/DL (ref 5–25)
CALCIUM ALBUM COR SERPL-MCNC: 10 MG/DL (ref 8.3–10.1)
CALCIUM SERPL-MCNC: 9 MG/DL (ref 8.3–10.1)
CARDIAC TROPONIN I PNL SERPL HS: 13 NG/L
CHLORIDE SERPL-SCNC: 106 MMOL/L (ref 96–108)
CO2 SERPL-SCNC: 30 MMOL/L (ref 21–32)
CREAT SERPL-MCNC: 0.95 MG/DL (ref 0.6–1.3)
EOSINOPHIL # BLD AUTO: 0.03 THOUSAND/ΜL (ref 0–0.61)
EOSINOPHIL NFR BLD AUTO: 0 % (ref 0–6)
ERYTHROCYTE [DISTWIDTH] IN BLOOD BY AUTOMATED COUNT: 14.3 % (ref 11.6–15.1)
FLUAV RNA RESP QL NAA+PROBE: NEGATIVE
FLUBV RNA RESP QL NAA+PROBE: NEGATIVE
GFR SERPL CREATININE-BSD FRML MDRD: 71 ML/MIN/1.73SQ M
GLUCOSE SERPL-MCNC: 81 MG/DL (ref 65–140)
HCO3 BLDV-SCNC: 29.6 MMOL/L (ref 24–30)
HCT VFR BLD AUTO: 38.4 % (ref 36.5–49.3)
HGB BLD-MCNC: 12.7 G/DL (ref 12–17)
IMM GRANULOCYTES # BLD AUTO: 0.02 THOUSAND/UL (ref 0–0.2)
IMM GRANULOCYTES NFR BLD AUTO: 0 % (ref 0–2)
INR PPP: 1.18 (ref 0.84–1.19)
LACTATE SERPL-SCNC: 1.5 MMOL/L (ref 0.5–2)
LYMPHOCYTES # BLD AUTO: 2.43 THOUSANDS/ΜL (ref 0.6–4.47)
LYMPHOCYTES NFR BLD AUTO: 28 % (ref 14–44)
MCH RBC QN AUTO: 31.4 PG (ref 26.8–34.3)
MCHC RBC AUTO-ENTMCNC: 33.1 G/DL (ref 31.4–37.4)
MCV RBC AUTO: 95 FL (ref 82–98)
MONOCYTES # BLD AUTO: 1.11 THOUSAND/ΜL (ref 0.17–1.22)
MONOCYTES NFR BLD AUTO: 13 % (ref 4–12)
NEUTROPHILS # BLD AUTO: 5.1 THOUSANDS/ΜL (ref 1.85–7.62)
NEUTS SEG NFR BLD AUTO: 59 % (ref 43–75)
NRBC BLD AUTO-RTO: 0 /100 WBCS
NT-PROBNP SERPL-MCNC: 720 PG/ML
O2 CT BLDV-SCNC: 7.5 ML/DL
PCO2 BLDV: 48.7 MM HG (ref 42–50)
PH BLDV: 7.4 [PH] (ref 7.3–7.4)
PLATELET # BLD AUTO: 162 THOUSANDS/UL (ref 149–390)
PMV BLD AUTO: 10.4 FL (ref 8.9–12.7)
PO2 BLDV: 23.5 MM HG (ref 35–45)
POTASSIUM SERPL-SCNC: 4.3 MMOL/L (ref 3.5–5.3)
PROCALCITONIN SERPL-MCNC: 0.07 NG/ML
PROT SERPL-MCNC: 6.2 G/DL (ref 6.4–8.4)
PROTHROMBIN TIME: 14.8 SECONDS (ref 11.6–14.5)
RBC # BLD AUTO: 4.05 MILLION/UL (ref 3.88–5.62)
RSV RNA RESP QL NAA+PROBE: NEGATIVE
SARS-COV-2 RNA RESP QL NAA+PROBE: NEGATIVE
SODIUM SERPL-SCNC: 139 MMOL/L (ref 135–147)
WBC # BLD AUTO: 8.72 THOUSAND/UL (ref 4.31–10.16)

## 2022-09-15 PROCEDURE — 93005 ELECTROCARDIOGRAM TRACING: CPT

## 2022-09-15 PROCEDURE — 99285 EMERGENCY DEPT VISIT HI MDM: CPT | Performed by: EMERGENCY MEDICINE

## 2022-09-15 PROCEDURE — 99285 EMERGENCY DEPT VISIT HI MDM: CPT

## 2022-09-15 PROCEDURE — 71275 CT ANGIOGRAPHY CHEST: CPT

## 2022-09-15 PROCEDURE — G1004 CDSM NDSC: HCPCS

## 2022-09-15 PROCEDURE — 94760 N-INVAS EAR/PLS OXIMETRY 1: CPT

## 2022-09-15 PROCEDURE — 36415 COLL VENOUS BLD VENIPUNCTURE: CPT | Performed by: EMERGENCY MEDICINE

## 2022-09-15 PROCEDURE — 83880 ASSAY OF NATRIURETIC PEPTIDE: CPT | Performed by: EMERGENCY MEDICINE

## 2022-09-15 PROCEDURE — 80053 COMPREHEN METABOLIC PANEL: CPT | Performed by: EMERGENCY MEDICINE

## 2022-09-15 PROCEDURE — 71046 X-RAY EXAM CHEST 2 VIEWS: CPT

## 2022-09-15 PROCEDURE — 0241U HB NFCT DS VIR RESP RNA 4 TRGT: CPT | Performed by: EMERGENCY MEDICINE

## 2022-09-15 PROCEDURE — 82805 BLOOD GASES W/O2 SATURATION: CPT | Performed by: EMERGENCY MEDICINE

## 2022-09-15 PROCEDURE — 84145 PROCALCITONIN (PCT): CPT | Performed by: EMERGENCY MEDICINE

## 2022-09-15 PROCEDURE — 83605 ASSAY OF LACTIC ACID: CPT | Performed by: EMERGENCY MEDICINE

## 2022-09-15 PROCEDURE — 94664 DEMO&/EVAL PT USE INHALER: CPT

## 2022-09-15 PROCEDURE — 84484 ASSAY OF TROPONIN QUANT: CPT | Performed by: EMERGENCY MEDICINE

## 2022-09-15 PROCEDURE — 74177 CT ABD & PELVIS W/CONTRAST: CPT

## 2022-09-15 PROCEDURE — 85025 COMPLETE CBC W/AUTO DIFF WBC: CPT | Performed by: EMERGENCY MEDICINE

## 2022-09-15 PROCEDURE — 99223 1ST HOSP IP/OBS HIGH 75: CPT | Performed by: INTERNAL MEDICINE

## 2022-09-15 PROCEDURE — 85730 THROMBOPLASTIN TIME PARTIAL: CPT | Performed by: EMERGENCY MEDICINE

## 2022-09-15 PROCEDURE — 87040 BLOOD CULTURE FOR BACTERIA: CPT | Performed by: EMERGENCY MEDICINE

## 2022-09-15 PROCEDURE — 85610 PROTHROMBIN TIME: CPT | Performed by: EMERGENCY MEDICINE

## 2022-09-15 RX ORDER — DIVALPROEX SODIUM 500 MG/1
500 TABLET, EXTENDED RELEASE ORAL 2 TIMES DAILY
Status: DISCONTINUED | OUTPATIENT
Start: 2022-09-15 | End: 2022-09-15

## 2022-09-15 RX ORDER — SERTRALINE HYDROCHLORIDE 100 MG/1
100 TABLET, FILM COATED ORAL DAILY
Status: DISCONTINUED | OUTPATIENT
Start: 2022-09-15 | End: 2022-09-23 | Stop reason: HOSPADM

## 2022-09-15 RX ORDER — HEPARIN SODIUM 5000 [USP'U]/ML
5000 INJECTION, SOLUTION INTRAVENOUS; SUBCUTANEOUS EVERY 8 HOURS SCHEDULED
Status: DISCONTINUED | OUTPATIENT
Start: 2022-09-15 | End: 2022-09-23 | Stop reason: HOSPADM

## 2022-09-15 RX ORDER — ACETAMINOPHEN 325 MG/1
650 TABLET ORAL EVERY 6 HOURS PRN
Status: DISCONTINUED | OUTPATIENT
Start: 2022-09-15 | End: 2022-09-23 | Stop reason: HOSPADM

## 2022-09-15 RX ORDER — FUROSEMIDE 10 MG/ML
20 INJECTION INTRAMUSCULAR; INTRAVENOUS ONCE
Status: COMPLETED | OUTPATIENT
Start: 2022-09-15 | End: 2022-09-15

## 2022-09-15 RX ORDER — ASPIRIN 81 MG/1
81 TABLET, CHEWABLE ORAL DAILY
Status: DISCONTINUED | OUTPATIENT
Start: 2022-09-15 | End: 2022-09-23 | Stop reason: HOSPADM

## 2022-09-15 RX ORDER — ATORVASTATIN CALCIUM 10 MG/1
10 TABLET, FILM COATED ORAL
Status: DISCONTINUED | OUTPATIENT
Start: 2022-09-15 | End: 2022-09-23 | Stop reason: HOSPADM

## 2022-09-15 RX ORDER — DIVALPROEX SODIUM 125 MG/1
500 CAPSULE, COATED PELLETS ORAL EVERY 12 HOURS SCHEDULED
Status: DISCONTINUED | OUTPATIENT
Start: 2022-09-15 | End: 2022-09-23 | Stop reason: HOSPADM

## 2022-09-15 RX ADMIN — IOHEXOL 100 ML: 350 INJECTION, SOLUTION INTRAVENOUS at 12:03

## 2022-09-15 RX ADMIN — SERTRALINE HYDROCHLORIDE 100 MG: 50 TABLET ORAL at 16:55

## 2022-09-15 RX ADMIN — ASPIRIN 81 MG: 81 TABLET, CHEWABLE ORAL at 16:55

## 2022-09-15 RX ADMIN — ATORVASTATIN CALCIUM 10 MG: 10 TABLET, FILM COATED ORAL at 16:55

## 2022-09-15 RX ADMIN — CEFEPIME HYDROCHLORIDE 2000 MG: 2 INJECTION, POWDER, FOR SOLUTION INTRAVENOUS at 14:15

## 2022-09-15 RX ADMIN — HEPARIN SODIUM 5000 UNITS: 5000 INJECTION INTRAVENOUS; SUBCUTANEOUS at 16:55

## 2022-09-15 RX ADMIN — HEPARIN SODIUM 5000 UNITS: 5000 INJECTION INTRAVENOUS; SUBCUTANEOUS at 23:39

## 2022-09-15 RX ADMIN — FUROSEMIDE 20 MG: 10 INJECTION, SOLUTION INTRAMUSCULAR; INTRAVENOUS at 14:15

## 2022-09-15 NOTE — ASSESSMENT & PLAN NOTE
· Alert and oriented to self and Minimally interactive at baseline  · Appears to be at baseline currently  · Supportive care

## 2022-09-15 NOTE — ASSESSMENT & PLAN NOTE
Wt Readings from Last 3 Encounters:   01/07/22 71 7 kg (158 lb)   07/16/21 68 2 kg (150 lb 4 8 oz)   05/21/21 69 kg (152 lb 3 2 oz)     · No known history of CHF noted in chart review  · Mild volume overload with elevated proBNP and chest x-ray showing vascular congestion  · Received 1 dose of IV Lasix 20 mg  · Monitor I&Os and daily weights  · Diurese as needed, patient currently appears comfortable from the respiratory standpoint and saturating well on room air therefore will hold off on further diuretics for today  · Check echo

## 2022-09-15 NOTE — ASSESSMENT & PLAN NOTE
· Continue home medications  · Unfortunately there is shortage of IV Depacon therefore will switch it to sprinkles form pending swallow eval

## 2022-09-15 NOTE — ASSESSMENT & PLAN NOTE
· Present on admission with reported coughing and shortness of breath  CT chest showed finding suggestive of aspiration pneumonia  · Patient did not meet SIRS criteria on admission  · Start ceftriaxone  · Initial procalcitonin negative, follow-up on repeat and if negative then consider discontinuing IV antibiotics as this could be pneumonitis  · Follow-up on blood cultures  · Follow-up on sputum cultures urine strep and Legionella  · Aspiration precautions  · NPO for now speech therapy/swallowing evaluation requested  · Briefly discussed expectation and wishes with patient's nephew    Patient's nephew requested to think about making any decision in regards of feeding tubes if needed

## 2022-09-15 NOTE — H&P
[id number]  H&P- Marquez Nephew 1934, 80 y o  male MRN: [id number]  Unit/Bed#: BORIS Encounter: [id number]  Primary Care Provider: Mikayla Koch DO   Date and time admitted to hospital: 9/15/2022 10:43 AM    * Aspiration pneumonia [facility])  Assessment & Plan  · Present on admission with reported coughing and shortness of breath  CT chest showed finding suggestive of aspiration pneumonia  · Patient did not meet SIRS criteria on admission  · Start ceftriaxone  · Initial procalcitonin negative, follow-up on repeat and if negative then consider discontinuing IV antibiotics as this could be pneumonitis  · Follow-up on blood cultures  · Follow-up on sputum cultures urine strep and Legionella  · Aspiration precautions  · NPO for now speech therapy/swallowing evaluation requested  · Briefly discussed expectation and wishes with patient's nephew    Patient's nephew requested to think about making any decision in regards of feeding tubes if needed    Cystitis  Assessment & Plan  · Noted on CT abdomen/pelvis  · Obtain UA  · Patient unable to provide any history regarding urinary symptoms  · Continue coverage with ceftriaxone    Acute CHF (congestive heart failure) ([facility])  Assessment & Plan  Wt Readings from Last 3 Encounters:   01/07/22 71 7 kg (158 lb)   07/16/21 68 2 kg (150 lb 4 8 oz)   05/21/21 69 kg (152 lb 3 2 oz)     · No known history of CHF noted in chart review  · Mild volume overload with elevated proBNP and chest x-ray showing vascular congestion  · Received 1 dose of IV Lasix 20 mg  · Monitor I&Os and daily weights  · Diurese as needed, patient currently appears comfortable from the respiratory standpoint and saturating well on room air therefore will hold off on further diuretics for today  · Check echo        Dementia without behavioral disturbance, unspecified dementia type ([facility] [address] )  Assessment & Plan  · Alert and oriented to self and Minimally interactive at baseline  · Appears to be at baseline currently  · Supportive care      PVD (peripheral vascular disease) ([facility] [address] )  Assessment & Plan  Continue aspirin and statin    HTN (hypertension)  Assessment & Plan  · Blood pressure appears to be on the soft side  · Hold home amlodipine  · Monitor blood pressure    Bipolar 1 disorder (HCC)  Assessment & Plan  · Continue home medications  · Unfortunately there is shortage of IV Depacon therefore will switch it to sprinkles form pending swallow eval    VTE Prophylaxis: Heparin  / sequential compression device   Code Status:  DNR/DNI  POLST: POLST form is not discussed and not completed at this time  Discussion with family:  Discussed with patient's nephew (POA) at bedside and questions answered    Anticipated Length of Stay:  Patient will be admitted on an Inpatient basis with an anticipated length of stay of  greater than 2 midnights  Justification for Hospital Stay:  Aspiration requiring IV antibiotic and further workup    Total Time for Visit, including Counseling / Coordination of Care: 70 minutes  Greater than 50% of this total time spent on direct patient counseling and coordination of care  Chief Complaint:   Shortness of breath    History of Present Illness:    Felix Clement is a 80 y o  male with past medical history significant for hypertension, hyperlipidemia, bipolar disorder, dementia and history of prostate cancer who presents with cough and shortness of breath noted today and brought from [facility] personal home care  There was reported hypotension and hypoxia prior to admission however blood pressure and oxygen saturations have been stable since admission  Patient has dementia and minimally interactive at baseline therefore was not able to obtain any history from him and most of the history was obtained from chart review and patient's nephew who is his POA  Workup in ED showed findings concerning for aspiration pneumonia on CT chest with mild vascular congestion on chest x-ray  Patient did not meet SIRS criteria on admission  Patient will be admitted for IV antibiotic and further workup including swallow eval and pneumonia workup  Had a brief discussion with patient's nephew who is his POA about expectations given patient's current status and comorbidities  He stated he definitely does not want patient to suffer and would like him to be DNR/DNI  He stated that he went through hospice care with his parents therefore he understands what it means and would like to have some time to think about his decision in regards to next steps including feeding tube if needed versus hospice in the next couple of days if no improvement noted with patient's condition  Review of Systems:    Review of Systems   Unable to perform ROS: Dementia       Past Medical and Surgical History:     Past Medical History:   Diagnosis Date    Arthritis     Chronic venous insufficiency     HTN (hypertension) 10/26/2019    Prostate cancer ([facility] )     PVD (peripheral vascular disease) ([facility] )        Past Surgical History:   Procedure Laterality Date    HERNIA REPAIR      TONSILLECTOMY         Meds/Allergies:    Prior to Admission medications    Medication Sig Start Date End Date Taking?  Authorizing Provider   acetaminophen (TYLENOL) 325 mg tablet Take 2 tablets (650 mg total) by mouth every 6 (six) hours as needed for mild pain 7/9/20   Glenroy Wise MD   amLODIPine (NORVASC) 5 mg tablet Take 5 mg by mouth daily hold sbp <90    Historical Provider, MD   aspirin 81 mg chewable tablet Chew 81 mg daily    Historical Provider, MD   atorvastatin (LIPITOR) 10 mg tablet 1 TABLET BY MOUTH DAILY IN THE EVENING FOR HYPERLIPIDEMIA *PLEASE REORDER* 8/26/20   Moisés Cassidy DO   divalproex sodium (DEPAKOTE ER) 500 mg 24 hr tablet Take 2 tablets (1,000 mg total) by mouth daily  Patient taking differently: Take 500 mg by mouth 2 (two) times a day 9/14/20   Moisés Cassidy DO   Lidocaine (Aspercreme Lidocaine) 4 % PTCH Apply 1 application topically daily 8/6/21   Moisés Cassidy DO   senna-docusate sodium (SENOKOT-S) 8 6-50 mg per tablet Take 1 tablet by mouth 2 (two) times a day    Historical Provider, MD   sertraline (ZOLOFT) 100 mg tablet 1 TABLET BY MOUTH DAILY FOR BIPOLAR DISORDER *PLEASE REORDER* 9/28/20   Jill Lose, DO     I have reviewed home medications with patient family member  Allergies: No Known Allergies    Social History:     Marital Status:    Occupation:   Patient Pre-hospital Living Situation:  From [address] [place] [address] personal care home  Patient Pre-hospital Level of Mobility:  With assistance  Patient Pre-hospital Diet Restrictions:  None  Substance Use History:   Social History     Substance and Sexual Activity   Alcohol Use Yes    Alcohol/week: 1 0 standard drink    Types: 1 Standard drinks or equivalent per week    Comment: rarely     Social History     Tobacco Use   Smoking Status Former Smoker    Years: 10 00    Types: Cigars   Smokeless Tobacco Never Used     Social History     Substance and Sexual Activity   Drug Use No       Family History:    Family History   Problem Relation Age of Onset    Heart disease Mother     Diabetes Father     Diabetes Sister        Physical Exam:     Vitals:   Blood Pressure: 131/70 (09/15/22 1530)  Pulse: 83 (09/15/22 1530)  Temperature: 98 9 °F (37 2 °C) (09/15/22 1051)  Temp Source: Oral (09/15/22 1051)  Respirations: (!) 24 (09/15/22 1530)  SpO2: 93 % (09/15/22 1530)    Physical Exam  Vitals and nursing note reviewed  Constitutional:       General: He is not in acute distress  Appearance: He is ill-appearing  He is not diaphoretic  HENT:      Head: Normocephalic and atraumatic  Mouth/Throat:      Mouth: Mucous membranes are moist       Pharynx: Oropharynx is clear  Eyes:      Extraocular Movements: Extraocular movements intact        Conjunctiva/sclera: Conjunctivae normal    Cardiovascular:      Rate and Rhythm: Normal rate and regular rhythm  Heart sounds: Normal heart sounds  Pulmonary:      Effort: Pulmonary effort is normal  No respiratory distress  Breath sounds: Rales present  No wheezing  Abdominal:      General: Bowel sounds are normal       Palpations: Abdomen is soft  Tenderness: There is no abdominal tenderness  Musculoskeletal:      Cervical back: Normal range of motion  Right lower leg: No edema  Left lower leg: No edema  Skin:     General: Skin is warm and dry  Neurological:      General: No focal deficit present  Mental Status: He is alert  Mental status is at baseline  Comments: Alert, oriented to self and minimally interactive at baseline    Psychiatric:         Mood and Affect: Mood normal          Behavior: Behavior normal            Additional Data:     Lab Results: I have personally reviewed pertinent reports  Results from last 7 days   Lab Units 09/15/22  1116   WBC Thousand/uL 8 72   HEMOGLOBIN g/dL 12 7   HEMATOCRIT % 38 4   PLATELETS Thousands/uL 162   NEUTROS PCT % 59   LYMPHS PCT % 28   MONOS PCT % 13*   EOS PCT % 0     Results from last 7 days   Lab Units 09/15/22  1116   SODIUM mmol/L 139   POTASSIUM mmol/L 4 3   CHLORIDE mmol/L 106   CO2 mmol/L 30   BUN mg/dL 14   CREATININE mg/dL 0 95   ANION GAP mmol/L 3*   CALCIUM mg/dL 9 0   ALBUMIN g/dL 2 7*   TOTAL BILIRUBIN mg/dL 0 54   ALK PHOS U/L 40*   ALT U/L 12   AST U/L 12   GLUCOSE RANDOM mg/dL 81     Results from last 7 days   Lab Units 09/15/22  1116   INR  1 18             Results from last 7 days   Lab Units 09/15/22  1128 09/15/22  1116   LACTIC ACID mmol/L  --  1 5   PROCALCITONIN ng/ml 0 07  --        Imaging: I have personally reviewed pertinent reports  XR chest pa & lateral   Final Result by Sal Beasley MD (09/15 1240)      Mild pulmonary vascular congestion                    Workstation performed: [id number]         PE Study with CT Abdomen and Pelvis with contrast   Final Result by Blima Frankel, DO (09/15 1315)      No pulmonary embolus seen within the pulmonary trunk, main, lobar, or proximal segmental branches  More peripheral branches are poorly assessed secondary to artifact   Dependent secretions in the trachea with extension along the bilateral mainstem bronchi and extending into the bilateral lower lobes where there are segments of bronchial occlusion  Consolidations along the lower lobe and lingula bronchial trees with    more focal consolidation in the left lower lobe most likely pneumonia , suspicious for aspiration  Small bilateral pleural effusions and interlobular septal thickening consistent with edema  Urinary bladder wall thickening and perivesicular inflammation may be secondary to cystitis and/or prostatomegaly  Recommend correlation with urinalysis  Increased height loss of T12 compared to prior  Additional findings as above  The study was marked in EPIC for significant notification  Workstation performed: [id number]             EKG, Pathology, and Other Studies Reviewed on Admission:       Allscripts / Epic Records Reviewed: Yes     ** Please Note: This note has been constructed using a voice recognition system   **

## 2022-09-15 NOTE — ED PROVIDER NOTES
History  Chief Complaint   Patient presents with    Decreased Oxygen Level     Pt BIBEMS from [facility] w co low O2 levels  Per ems pt had hypotension on the truck  VS are stable here  Per [facility] pt is more tired than usual       Patient is an 59-year-old male past medical history of hypertension, hyperlipidemia, bipolar disorder, peptic ulcer disease, dementia presenting for hypoxia  Patient has had cough and glucose lodged and was hypoxic today, hypotensive on route which has resolved  Will go ahead also states more tired than usual   Patient complains of bilateral leg pain with no other complaints  Prior to Admission Medications   Prescriptions Last Dose Informant Patient Reported? Taking?    Lidocaine (Aspercreme Lidocaine) 4 % PTCH   No No   Sig: Apply 1 application topically daily   acetaminophen (TYLENOL) 325 mg tablet  Self No No   Sig: Take 2 tablets (650 mg total) by mouth every 6 (six) hours as needed for mild pain   amLODIPine (NORVASC) 5 mg tablet  Care Giver Yes No   Sig: Take 5 mg by mouth daily hold sbp <90   aspirin 81 mg chewable tablet  Care Giver Yes No   Sig: Chew 81 mg daily   atorvastatin (LIPITOR) 10 mg tablet  Self No No   Si TABLET BY MOUTH DAILY IN THE EVENING FOR HYPERLIPIDEMIA *PLEASE REORDER*   divalproex sodium (DEPAKOTE ER) 500 mg 24 hr tablet  Self No No   Sig: Take 2 tablets (1,000 mg total) by mouth daily   Patient taking differently: Take 500 mg by mouth 2 (two) times a day   senna-docusate sodium (SENOKOT-S) 8 6-50 mg per tablet  Care Giver Yes No   Sig: Take 1 tablet by mouth 2 (two) times a day   sertraline (ZOLOFT) 100 mg tablet  Self No No   Si TABLET BY MOUTH DAILY FOR BIPOLAR DISORDER *PLEASE REORDER*      Facility-Administered Medications: None       Past Medical History:   Diagnosis Date    Arthritis     Chronic venous insufficiency     HTN (hypertension) 10/26/2019    [facility] cancer ([facility] [address] )     PVD (peripheral vascular disease) ([facility] [address] ) Past Surgical History:   Procedure Laterality Date    HERNIA REPAIR      TONSILLECTOMY         Family History   Problem Relation Age of Onset    Heart disease Mother     Diabetes Father     Diabetes Sister      I have reviewed and agree with the history as documented  E-Cigarette/Vaping    E-Cigarette Use Never User      E-Cigarette/Vaping Substances    Nicotine No     THC No     CBD No     Flavoring No     Other No     Unknown No      Social History     Tobacco Use    Smoking status: Former Smoker     Years: 10 00     Types: Cigars    Smokeless tobacco: Never Used   Vaping Use    Vaping Use: Never used   Substance Use Topics    Alcohol use: Yes     Alcohol/week: 1 0 standard drink     Types: 1 Standard drinks or equivalent per week     Comment: rarely    Drug use: No       Review of Systems   All other systems reviewed and are negative  Physical Exam  Physical Exam  Vitals reviewed  Constitutional:       General: He is not in acute distress  Appearance: Normal appearance  He is not ill-appearing  HENT:      Mouth/Throat:      Mouth: Mucous membranes are dry  Eyes:      Conjunctiva/sclera: Conjunctivae normal    Cardiovascular:      Rate and Rhythm: Normal rate and regular rhythm  Heart sounds: Normal heart sounds  Pulmonary:      Effort: Pulmonary effort is normal       Comments: Bibasilar rales worse on the right  Chest:      Chest wall: Tenderness present  Abdominal:      General: Abdomen is flat  Palpations: Abdomen is soft  Tenderness: There is abdominal tenderness  There is guarding  There is no right CVA tenderness or left CVA tenderness  Comments: Right upper quadrant tenderness with guarding with deep palpation   Musculoskeletal:         General: Tenderness present  No swelling  Normal range of motion  Cervical back: Neck supple  Comments: Bilateral lower extremity tenderness without edema   Skin:     General: Skin is warm and dry  Neurological:      General: No focal deficit present  Mental Status: He is alert  Motor: No weakness        Comments: AAO x2, not oriented to time   Psychiatric:         Mood and Affect: Mood normal          Vital Signs  ED Triage Vitals [09/15/22 1051]   Temperature Pulse Respirations Blood Pressure SpO2   98 9 °F (37 2 °C) 61 18 128/60 99 %      Temp Source Heart Rate Source Patient Position - Orthostatic VS BP Location FiO2 (%)   Oral Monitor Lying Right arm --      Pain Score       --           Vitals:    09/15/22 1145 09/15/22 1230 09/15/22 1330 09/15/22 1400   BP: 101/54 108/53 124/56 126/59   Pulse: 60 61 71 74   Patient Position - Orthostatic VS: Lying Lying Lying Lying         Visual Acuity      ED Medications  Medications   acetaminophen (TYLENOL) tablet 650 mg (has no administration in time range)   aspirin chewable tablet 81 mg (has no administration in time range)   atorvastatin (LIPITOR) tablet 10 mg (has no administration in time range)   sertraline (ZOLOFT) tablet 100 mg (has no administration in time range)   heparin (porcine) subcutaneous injection 5,000 Units (has no administration in time range)   ceftriaxone (ROCEPHIN) 1 g/50 mL in dextrose IVPB (has no administration in time range)   divalproex sodium (DEPAKOTE SPRINKLE) capsule 500 mg (has no administration in time range)   iohexol (OMNIPAQUE) 350 MG/ML injection (SINGLE-DOSE) 100 mL (100 mL Intravenous Given 9/15/22 1203)   furosemide (LASIX) injection 20 mg (20 mg Intravenous Given 9/15/22 1415)   cefepime (MAXIPIME) 2 g/50 mL dextrose IVPB (2,000 mg Intravenous New Bag 9/15/22 1415)       Diagnostic Studies  Results Reviewed     Procedure Component Value Units Date/Time    Sputum culture and Gram stain [[id number]]     Lab Status: No result Specimen: Sputum     Strep Pneumoniae, Urine [[id number]]     Lab Status: No result Specimen: Urine     Legionella antigen, Urine [[id number]]     Lab Status: No result Specimen: Urine     MRSA culture [[id number]]     Lab Status: No result Specimen: Nares     Platelet count [[id number]]     Lab Status: No result Specimen: Blood     FLU/RSV/COVID - if FLU/RSV clinically relevant [[id number]]  (Normal) Collected: 09/15/22 1128    Lab Status: Final result Specimen: Nares from Nose Updated: 09/15/22 1214     SARS-CoV-2 Negative     INFLUENZA A PCR Negative     INFLUENZA B PCR Negative     RSV PCR Negative    Narrative:      FOR PEDIATRIC PATIENTS - copy/paste COVID Guidelines URL to browser: [email or web address]  [employer]    SARS-CoV-2 assay is a Nucleic Acid Amplification assay intended for the  qualitative detection of nucleic acid from SARS-CoV-2 in nasopharyngeal  swabs  Results are for the presumptive identification of SARS-CoV-2 RNA  Positive results are indicative of infection with SARS-CoV-2, the virus  causing COVID-19, but do not rule out bacterial infection or co-infection  with other viruses  Laboratories within the [place] and its  territories are required to report all positive results to the appropriate  public health authorities  Negative results do not preclude SARS-CoV-2  infection and should not be used as the sole basis for treatment or other  patient management decisions  Negative results must be combined with  clinical observations, patient history, and epidemiological information  This test has not been FDA cleared or approved  This test has been authorized by FDA under an Emergency Use Authorization  (EUA)  This test is only authorized for the duration of time the  declaration that circumstances exist justifying the authorization of the  emergency use of an in vitro diagnostic tests for detection of SARS-CoV-2  virus and/or diagnosis of COVID-19 infection under section 564(b)(1) of  the Act, 21 U  S C  234PIE-4(D)(7), unless the authorization is terminated  or revoked sooner   The test has been validated but independent review by FDA  and CLIA is pending  Test performed using [employer] Gene[employer]pert: This RT-PCR assay targets N2,  a region unique to SARS-CoV-2  A conserved region in the E-gene was chosen  for pan-Sarbecovirus detection which includes SARS-CoV-2  HS Troponin I 4hr [[id number]]     Lab Status: No result Specimen: Blood     Procalcitonin [[id number]]  (Normal) Collected: 09/15/22 1128    Lab Status: Final result Specimen: Blood from Arm, Left Updated: 09/15/22 1204     Procalcitonin 0 07 ng/ml     HS Troponin 0hr (reflex protocol) [[id number]]  (Normal) Collected: 09/15/22 1116    Lab Status: Final result Specimen: Blood from Arm, Left Updated: 09/15/22 1150     hs TnI 0hr 13 ng/L     HS Troponin I 2hr [[id number]]     Lab Status: No result Specimen: Blood     NT-BNP PRO [[id number]]  (Abnormal) Collected: 09/15/22 1116    Lab Status: Final result Specimen: Blood from Arm, Left Updated: 09/15/22 1149     NT-proBNP 720 pg/mL     Protime-INR [[id number]]  (Abnormal) Collected: 09/15/22 1116    Lab Status: Final result Specimen: Blood from Arm, Left Updated: 09/15/22 1147     Protime 14 8 seconds      INR 1 18    APTT [[id number]]  (Normal) Collected: 09/15/22 1116    Lab Status: Final result Specimen: Blood from Arm, Left Updated: 09/15/22 1147     PTT 35 seconds     Lactic acid [[id number]]  (Normal) Collected: 09/15/22 1116    Lab Status: Final result Specimen: Blood from Arm, Left Updated: 09/15/22 1145     LACTIC ACID 1 5 mmol/L     Narrative:      Result may be elevated if tourniquet was used during collection      Comprehensive metabolic panel [[id number]]  (Abnormal) Collected: 09/15/22 1116    Lab Status: Final result Specimen: Blood from Arm, Left Updated: 09/15/22 1142     Sodium 139 mmol/L      Potassium 4 3 mmol/L      Chloride 106 mmol/L      CO2 30 mmol/L      ANION GAP 3 mmol/L      BUN 14 mg/dL      Creatinine 0 95 mg/dL      Glucose 81 mg/dL      Calcium 9 0 mg/dL      Corrected Calcium 10 0 mg/dL      AST 12 U/L      ALT 12 U/L      Alkaline Phosphatase 40 U/L      Total Protein 6 2 g/dL      Albumin 2 7 g/dL      Total Bilirubin 0 54 mg/dL      eGFR 71 ml/min/1 73sq m     Narrative:      [place] guidelines for Chronic Kidney Disease (CKD):     Stage 1 with normal or high GFR (GFR > 90 mL/min/1 73 square meters)    Stage 2 Mild CKD (GFR = 60-89 mL/min/1 73 square meters)    Stage 3A Moderate CKD (GFR = 45-59 mL/min/1 73 square meters)    Stage 3B Moderate CKD (GFR = 30-44 mL/min/1 73 square meters)    Stage 4 Severe CKD (GFR = 15-29 mL/min/1 73 square meters)    Stage 5 End Stage CKD (GFR <15 mL/min/1 73 square meters)  Note: GFR calculation is accurate only with a steady state creatinine    Blood gas, Venous [[id number]]  (Abnormal) Collected: 09/15/22 1128    Lab Status: Final result Specimen: Blood from Arm, Left Updated: 09/15/22 1136     pH, Nic 7 402     pCO2, Nic 48 7 mm Hg      pO2, Nic 23 5 mm Hg      HCO3, Nic 29 6 mmol/L      Base Excess, Nic 4 0 mmol/L      O2 Content, Nic 7 5 ml/dL      O2 HGB, VENOUS 41 4 %     Blood culture #2 [[id number]] Collected: 09/15/22 1128    Lab Status:  In process Specimen: Blood from Arm, Left Updated: 09/15/22 1132    CBC and differential [[id number]]  (Abnormal) Collected: 09/15/22 1116    Lab Status: Final result Specimen: Blood from Arm, Left Updated: 09/15/22 1128     WBC 8 72 Thousand/uL      RBC 4 05 Million/uL      Hemoglobin 12 7 g/dL      Hematocrit 38 4 %      MCV 95 fL      MCH 31 4 pg      MCHC 33 1 g/dL      RDW 14 3 %      MPV 10 4 fL      Platelets 435 Thousands/uL      nRBC 0 /100 WBCs      Neutrophils Relative 59 %      Immat GRANS % 0 %      Lymphocytes Relative 28 %      Monocytes Relative 13 %      Eosinophils Relative 0 %      Basophils Relative 0 %      Neutrophils Absolute 5 10 Thousands/µL      Immature Grans Absolute 0 02 Thousand/uL      Lymphocytes Absolute 2 43 Thousands/µL      Monocytes Absolute 1 11 Thousand/µL      Eosinophils Absolute 0 03 Thousand/µL      Basophils Absolute 0 03 Thousands/µL     Blood culture #1 [[id number]] Collected: 09/15/22 1116    Lab Status: In process Specimen: Blood from Arm, Left Updated: 09/15/22 1121    UA w Reflex to Microscopic w Reflex to Culture [[id number]]     Lab Status: No result Specimen: Urine                  XR chest pa & lateral   Final Result by Malathi Parra MD (09/15 1240)      Mild pulmonary vascular congestion  Workstation performed: [id number]         PE Study with CT Abdomen and Pelvis with contrast   Final Result by Mansi Bang DO (09/15 1315)      No pulmonary embolus seen within the pulmonary trunk, main, lobar, or proximal segmental branches  More peripheral branches are poorly assessed secondary to artifact   Dependent secretions in the trachea with extension along the bilateral mainstem bronchi and extending into the bilateral lower lobes where there are segments of bronchial occlusion  Consolidations along the lower lobe and lingula bronchial trees with    more focal consolidation in the left lower lobe most likely pneumonia , suspicious for aspiration  Small bilateral pleural effusions and interlobular septal thickening consistent with edema  Urinary bladder wall thickening and perivesicular inflammation may be secondary to cystitis and/or prostatomegaly  Recommend correlation with urinalysis  Increased height loss of T12 compared to prior  Additional findings as above  The study was marked in EPIC for significant notification           Workstation performed: [id number]                    Procedures  ECG 12 Lead Documentation Only    Date/Time: 9/15/2022 11:19 AM  Performed by: Spenser Umana DO  Authorized by: Spenser Umana DO     ECG reviewed by me, the ED Provider: yes    Patient location:  ED  Previous ECG:     Previous ECG:  Compared to current    Similarity:  No change  Interpretation:     Interpretation: normal    Rate:     ECG rate assessment: normal    Rhythm:     Rhythm: sinus rhythm    Ectopy:     Ectopy: none    QRS:     QRS axis:  Left    QRS intervals: Wide  Conduction:     Conduction: abnormal      Abnormal conduction: complete RBBB    ST segments:     ST segments:  Normal  T waves:     T waves: normal               ED Course  ED Course as of 09/15/22 1513   Thu Sep 15, 2022   1325 Patient with pulmonary edema as well as possible aspiration pneumonia, will give Lasix, antibiotics, admit  SBIRT 22yo+    Flowsheet Row Most Recent Value   SBIRT (25 yo +)    In order to provide better care to our patients, we are screening all of our patients for alcohol and drug use  Would it be okay to ask you these screening questions? Unable to answer at this time Filed at: 09/15/2022 1059                    MDM  Number of Diagnoses or Management Options  Diagnosis management comments: Patient is an 44-year-old male past medical history of hypertension, hyperlipidemia, bipolar disorder, peptic ulcer disease, dementia for hypoxia  Patient is cachectic appearing at bedside but with stable vitals, not currently hypoxic or hypotensive however with baseline dementia, appears to be at mental baseline per family  Patient is moving all extremities but does guard with deep palpation of the right upper quadrant and also notes right-sided chest tenderness with rales at the bases bilaterally worse on the right    Will obtain broad workup as patient cannot contribute to history and obtain cardiac workup, septic workup, CT chest abdomen pelvis with CTA of the chest       Disposition  Final diagnoses:   Aspiration pneumonia ([address] )     Time reflects when diagnosis was documented in both MDM as applicable and the Disposition within this note     Time User Action Codes Description Comment    9/15/2022  1:40 PM Eugenia Quinones Add [J69 [id number]] Aspiration pneumonia [facility])       ED Disposition     ED Disposition Admit    Condition   Stable    Date/Time   Thu Sep 15, 2022  1:40 PM    Comment   Case was discussed with Dr Debora Pulido and the patient's admission status was agreed to be Admission Status: inpatient status to the service of Dr Yahaira Stubbs   Follow-up Information    None         Patient's Medications   Discharge Prescriptions    No medications on file       No discharge procedures on file      PDMP Review       Value Time User    PDMP Reviewed  Yes 9/15/2022  2:13 PM Avinash Clay MD          ED Provider  Electronically Signed by           Kizzy Mtz DO  09/15/22 9046

## 2022-09-15 NOTE — RESPIRATORY THERAPY NOTE
RT Protocol Note  Ayden Flanagan 80 y o  male MRN: [id number]  Unit/Bed#: ED 27 Encounter: [id number]    Assessment    Principal Problem:    Aspiration pneumonia ([place] [address] )  Active Problems:    Bipolar 1 disorder ([place] [address] )    HTN (hypertension)    PVD (peripheral vascular disease) ([place] [address] )    Dementia without behavioral disturbance, unspecified dementia type ([place] [address] )    Acute CHF (congestive heart failure) ([place] [address] )    Cystitis      Home Pulmonary Medications:     09/15/22 1637   Respiratory Protocol   Protocol Initiated? Yes   Protocol Selection Respiratory   Language Barrier? Yes  (patient has history of dementia)   Medical & Social History Reviewed? Yes   Diagnostic Studies Reviewed? Yes   Physical Assessment Performed? Yes   Respiratory Plan No distress/Pulmonary history   Respiratory Assessment   Assessment Type Pre-treatment   General Appearance Awake   Respiratory Pattern Normal   Chest Assessment Chest expansion symmetrical   Bilateral Breath Sounds Scattered; Rhonchi   Resp Comments resp protocol completed, bbs scattered rhonchi respirations even non labored, spo2 93% 3L NC, patient is slightly confused, will reassess          Past Medical History:   Diagnosis Date    Arthritis     Chronic venous insufficiency     HTN (hypertension) 10/26/2019    Prostate cancer ([place] [address] )     PVD (peripheral vascular disease) ([place] [address] )      Social History     Socioeconomic History    Marital status:      Spouse name: None    Number of children: None    Years of education: None    Highest education level: None   Occupational History    None   Tobacco Use    Smoking status: Former Smoker     Years: 10 00     Types: Cigars    Smokeless tobacco: Never Used   Vaping Use    Vaping Use: Never used   Substance and Sexual Activity    Alcohol use:  Yes     Alcohol/week: 1 0 standard drink     Types: 1 Standard drinks or equivalent per week     Comment: rarely    Drug use: No    Sexual activity: Not Currently   Other Topics Concern    None   Social History Narrative    None     Social Determinants of Health     Financial Resource Strain: Not on file   Food Insecurity: Not on file   Transportation Needs: Not on file   Physical Activity: Not on file   Stress: Not on file   Social Connections: Not on file   Intimate Partner Violence: Not on file   Housing Stability: Not on file       Subjective         Objective    Physical Exam:   Assessment Type: Pre-treatment  General Appearance: Awake  Respiratory Pattern: Normal  Chest Assessment: Chest expansion symmetrical  Bilateral Breath Sounds: Scattered, Rhonchi    Vitals:  Blood pressure 131/70, pulse 83, temperature 98 9 °F (37 2 °C), temperature source Oral, resp  rate (!) 24, SpO2 93 %  Imaging and other studies: I have personally reviewed pertinent reports              Plan    Respiratory Plan: No distress/Pulmonary history        Resp Comments: resp protocol completed, bbs scattered rhonchi respirations even non labored, spo2 93% 3L NC, patient is slightly confused, will reassess

## 2022-09-15 NOTE — ASSESSMENT & PLAN NOTE
· Noted on CT abdomen/pelvis  · Obtain UA  · Patient unable to provide any history regarding urinary symptoms  · Continue coverage with ceftriaxone

## 2022-09-16 ENCOUNTER — APPOINTMENT (INPATIENT)
Dept: NON INVASIVE DIAGNOSTICS | Facility: HOSPITAL | Age: 87
DRG: 177 | End: 2022-09-16
Payer: COMMERCIAL

## 2022-09-16 PROBLEM — E43 SEVERE PROTEIN-CALORIE MALNUTRITION (HCC): Status: ACTIVE | Noted: 2022-09-16

## 2022-09-16 LAB
ANION GAP SERPL CALCULATED.3IONS-SCNC: 8 MMOL/L (ref 4–13)
AORTIC ROOT: 3.7 CM
APICAL FOUR CHAMBER EJECTION FRACTION: 72 %
BASOPHILS # BLD AUTO: 0.05 THOUSANDS/ΜL (ref 0–0.1)
BASOPHILS NFR BLD AUTO: 1 % (ref 0–1)
BUN SERPL-MCNC: 20 MG/DL (ref 5–25)
CALCIUM SERPL-MCNC: 9 MG/DL (ref 8.3–10.1)
CHLORIDE SERPL-SCNC: 103 MMOL/L (ref 96–108)
CO2 SERPL-SCNC: 29 MMOL/L (ref 21–32)
CREAT SERPL-MCNC: 1.03 MG/DL (ref 0.6–1.3)
E WAVE DECELERATION TIME: 359 MS
EOSINOPHIL # BLD AUTO: 0.05 THOUSAND/ΜL (ref 0–0.61)
EOSINOPHIL NFR BLD AUTO: 1 % (ref 0–6)
ERYTHROCYTE [DISTWIDTH] IN BLOOD BY AUTOMATED COUNT: 14.1 % (ref 11.6–15.1)
FRACTIONAL SHORTENING: 39 % (ref 28–44)
GFR SERPL CREATININE-BSD FRML MDRD: 65 ML/MIN/1.73SQ M
GLUCOSE SERPL-MCNC: 86 MG/DL (ref 65–140)
HCT VFR BLD AUTO: 39.1 % (ref 36.5–49.3)
HGB BLD-MCNC: 13.3 G/DL (ref 12–17)
IMM GRANULOCYTES # BLD AUTO: 0.03 THOUSAND/UL (ref 0–0.2)
IMM GRANULOCYTES NFR BLD AUTO: 0 % (ref 0–2)
INTERVENTRICULAR SEPTUM IN DIASTOLE (PARASTERNAL SHORT AXIS VIEW): 1.2 CM
INTERVENTRICULAR SEPTUM: 1.2 CM (ref 0.6–1.1)
LA/AORTA RATIO 2D: 0.95
LAAS-AP4: 7.4 CM2
LEFT ATRIUM SIZE: 3.5 CM
LEFT INTERNAL DIMENSION IN SYSTOLE: 2.5 CM (ref 2.1–4)
LEFT VENTRICULAR INTERNAL DIMENSION IN DIASTOLE: 4.1 CM (ref 3.5–6)
LEFT VENTRICULAR POSTERIOR WALL IN END DIASTOLE: 1 CM
LEFT VENTRICULAR STROKE VOLUME: 51 ML
LVSV (TEICH): 51 ML
LYMPHOCYTES # BLD AUTO: 1.96 THOUSANDS/ΜL (ref 0.6–4.47)
LYMPHOCYTES NFR BLD AUTO: 18 % (ref 14–44)
MCH RBC QN AUTO: 31.8 PG (ref 26.8–34.3)
MCHC RBC AUTO-ENTMCNC: 34 G/DL (ref 31.4–37.4)
MCV RBC AUTO: 94 FL (ref 82–98)
MONOCYTES # BLD AUTO: 1.44 THOUSAND/ΜL (ref 0.17–1.22)
MONOCYTES NFR BLD AUTO: 14 % (ref 4–12)
MV E'TISSUE VEL-SEP: 5 CM/S
MV PEAK A VEL: 0.72 M/S
MV PEAK E VEL: 72 CM/S
MV STENOSIS PRESSURE HALF TIME: 105 MS
MV VALVE AREA P 1/2 METHOD: 2.1 CM2
NEUTROPHILS # BLD AUTO: 7.12 THOUSANDS/ΜL (ref 1.85–7.62)
NEUTS SEG NFR BLD AUTO: 66 % (ref 43–75)
NRBC BLD AUTO-RTO: 0 /100 WBCS
PLATELET # BLD AUTO: 154 THOUSANDS/UL (ref 149–390)
PMV BLD AUTO: 10.1 FL (ref 8.9–12.7)
POTASSIUM SERPL-SCNC: 3.6 MMOL/L (ref 3.5–5.3)
PROCALCITONIN SERPL-MCNC: 0.46 NG/ML
RBC # BLD AUTO: 4.18 MILLION/UL (ref 3.88–5.62)
SL CV LV EF: 55
SL CV PED ECHO LEFT VENTRICLE DIASTOLIC VOLUME (MOD BIPLANE) 2D: 73 ML
SL CV PED ECHO LEFT VENTRICLE SYSTOLIC VOLUME (MOD BIPLANE) 2D: 22 ML
SODIUM SERPL-SCNC: 140 MMOL/L (ref 135–147)
TRICUSPID ANNULAR PLANE SYSTOLIC EXCURSION: 2.1 CM
WBC # BLD AUTO: 10.65 THOUSAND/UL (ref 4.31–10.16)

## 2022-09-16 PROCEDURE — 85025 COMPLETE CBC W/AUTO DIFF WBC: CPT | Performed by: INTERNAL MEDICINE

## 2022-09-16 PROCEDURE — 93306 TTE W/DOPPLER COMPLETE: CPT | Performed by: INTERNAL MEDICINE

## 2022-09-16 PROCEDURE — 97167 OT EVAL HIGH COMPLEX 60 MIN: CPT

## 2022-09-16 PROCEDURE — 84145 PROCALCITONIN (PCT): CPT | Performed by: INTERNAL MEDICINE

## 2022-09-16 PROCEDURE — C8929 TTE W OR WO FOL WCON,DOPPLER: HCPCS

## 2022-09-16 PROCEDURE — 97163 PT EVAL HIGH COMPLEX 45 MIN: CPT

## 2022-09-16 PROCEDURE — 92610 EVALUATE SWALLOWING FUNCTION: CPT

## 2022-09-16 PROCEDURE — 80048 BASIC METABOLIC PNL TOTAL CA: CPT | Performed by: INTERNAL MEDICINE

## 2022-09-16 PROCEDURE — 99233 SBSQ HOSP IP/OBS HIGH 50: CPT | Performed by: INTERNAL MEDICINE

## 2022-09-16 RX ADMIN — ATORVASTATIN CALCIUM 10 MG: 10 TABLET, FILM COATED ORAL at 15:48

## 2022-09-16 RX ADMIN — PERFLUTREN 0.6 ML/MIN: 6.52 INJECTION, SUSPENSION INTRAVENOUS at 14:14

## 2022-09-16 RX ADMIN — HEPARIN SODIUM 5000 UNITS: 5000 INJECTION INTRAVENOUS; SUBCUTANEOUS at 06:03

## 2022-09-16 RX ADMIN — HEPARIN SODIUM 5000 UNITS: 5000 INJECTION INTRAVENOUS; SUBCUTANEOUS at 22:29

## 2022-09-16 RX ADMIN — DIVALPROEX SODIUM 500 MG: 125 CAPSULE, COATED PELLETS ORAL at 22:28

## 2022-09-16 RX ADMIN — SERTRALINE HYDROCHLORIDE 100 MG: 50 TABLET ORAL at 09:05

## 2022-09-16 RX ADMIN — CEFTRIAXONE SODIUM 1000 MG: 10 INJECTION, POWDER, FOR SOLUTION INTRAVENOUS at 09:00

## 2022-09-16 RX ADMIN — HEPARIN SODIUM 5000 UNITS: 5000 INJECTION INTRAVENOUS; SUBCUTANEOUS at 15:48

## 2022-09-16 RX ADMIN — ASPIRIN 81 MG: 81 TABLET, CHEWABLE ORAL at 09:05

## 2022-09-16 RX ADMIN — DIVALPROEX SODIUM 500 MG: 125 CAPSULE, COATED PELLETS ORAL at 09:07

## 2022-09-16 NOTE — UTILIZATION REVIEW
Initial Clinical Review    Admission: Date/Time/Statement:   Admission Orders (From admission, onward)     Ordered        09/15/22 1340  INPATIENT ADMISSION  Once                      Orders Placed This Encounter   Procedures    INPATIENT ADMISSION     Standing Status:   Standing     Number of Occurrences:   1     Order Specific Question:   Level of Care     Answer:   Med Surg [16]     Order Specific Question:   Estimated length of stay     Answer:   More than 2 Midnights     Order Specific Question:   Certification     Answer:   I certify that inpatient services are medically necessary for this patient for a duration of greater than two midnights  See H&P and MD Progress Notes for additional information about the patient's course of treatment  ED Arrival Information     Expected   -    Arrival   9/15/2022 10:43    Acuity   Emergent            Means of arrival   Ambulance    Escorted by   [address]  [facility]    Service   Hospitalist    Admission type   Emergency            Arrival complaint   congestion           Chief Complaint   Patient presents with    Decreased Oxygen Level     Pt BIB[facility]S from [facility] w co low O2 levels  Per ems pt had hypotension on the truck  VS are stable here  Per [facility] pt is more tired than usual         Initial Presentation: 80 y o  male to ED from home via EMS w/ SOB and cough   Home care reported hypotension and hypoxia  BP and O2 stable on arrival   + dementia   In ED CT revealed aspiration pneumonia w/ mild vascular congestion  On CXR   PMHX CHF , dementia,  HTN , PVD  Admitted IP status w/ pneumonia plan for iv ceftriaxone , f/u BC , sputum cx, urine strep and legionella , aspiration precautions , NPO for speech eval   CHF, mild volume overload w/ elevated BNP ,  given iv lasix , cont as needed, check echo , monitor I&O , weights   HTN cont home amlodipine   PVD asa, statin   Date: 9/16   Day 2: cont iv abx for pneumonia and cystitis   F/u echo    Monitor I&O and weights , hold off on further diuretics  Cont infectious workup   09/16/22 71 7 kg (158 lb)   01/07/22 71 7 kg (158 lb)   07/16/21 68 2 kg (150 lb 4 8 oz)       ED Triage Vitals [09/15/22 1051]   Temperature Pulse Respirations Blood Pressure SpO2   98 9 °F (37 2 °C) 61 18 128/60 99 %      Temp Source Heart Rate Source Patient Position - Orthostatic VS BP Location FiO2 (%)   Oral Monitor Lying Right arm --      Pain Score       --          Wt Readings from Last 1 Encounters:   01/07/22 71 7 kg (158 lb)     Additional Vital Signs:   09/16/22 07:13:01 97 4 °F (36 3 °C) Abnormal  63 20 121/54 76 97 % -- -- -- --   09/15/22 2159 -- -- -- -- -- 93 % 30 2 5 L/min   Nasal cannula --   Nasal Cannula O2 Flow Rate (L/min): patient found on 2 5 lpm at 09/15/22 2159   09/15/22 2145 -- 90 16 -- -- 93 % -- -- -- --   09/15/22 18:12:31 97 9 °F (36 6 °C) 65 19 134/77 96 100 % -- -- -- --   09/15/22 1530 -- 83 24 Abnormal  131/70 92 93 % 32 3 L/min Nasal cannula Lying   09/15/22 1400 -- 74 20 126/59 84 95 % -- -- None (Room air) Lying   09/15/22 1330 -- 71 20 124/56 82 95 % -- -- None (Room air) Lying   09/15/22 1230 -- 61 20 108/53 -- 94 % -- -- None (Room air) Lying   09/15/22 1145 -- 60 20 101/54 73 93 % -- -- None (Room air) Lying   09/15/22 1130 -- 64 15 101/54 -- 93 % --          Pertinent Labs/Diagnostic Test Results:   9/16 Echo - pending     9/15 EKG   ECG rate assessment: normal     Rhythm:     Rhythm: sinus rhythm     Ectopy:     Ectopy: none     QRS:     QRS axis:  Left     QRS intervals:  Wide   Conduction:     Conduction: abnormal       Abnormal conduction: complete RBBB     ST segments:     ST segments:  Normal   T waves:     T waves: normal    XR chest pa & lateral   Final Result by Soraya Hill MD (09/15 9400)      Mild pulmonary vascular congestion                    Workstation performed: [id number]         PE Study with CT Abdomen and Pelvis with contrast   Final Result by Rodrigue Jaimes DO (09/15 3084)      No pulmonary embolus seen within the pulmonary trunk, main, lobar, or proximal segmental branches  More peripheral branches are poorly assessed secondary to artifact   Dependent secretions in the trachea with extension along the bilateral mainstem bronchi and extending into the bilateral lower lobes where there are segments of bronchial occlusion  Consolidations along the lower lobe and lingula bronchial trees with    more focal consolidation in the left lower lobe most likely pneumonia , suspicious for aspiration  Small bilateral pleural effusions and interlobular septal thickening consistent with edema  Urinary bladder wall thickening and perivesicular inflammation may be secondary to cystitis and/or prostatomegaly  Recommend correlation with urinalysis  Increased height loss of T12 compared to prior  Additional findings as above  The study was marked in EPIC for significant notification           Workstation performed: [id number]           Results from last 7 days   Lab Units 09/15/22  1128   SARS-COV-2  Negative     Results from last 7 days   Lab Units 09/16/22  0552 09/15/22  1116   WBC Thousand/uL 10 65* 8 72   HEMOGLOBIN g/dL 13 3 12 7   HEMATOCRIT % 39 1 38 4   PLATELETS Thousands/uL 154 162   NEUTROS ABS Thousands/µL 7 12 5 10     Results from last 7 days   Lab Units 09/16/22  0552 09/15/22  1116   SODIUM mmol/L 140 139   POTASSIUM mmol/L 3 6 4 3   CHLORIDE mmol/L 103 106   CO2 mmol/L 29 30   ANION GAP mmol/L 8 3*   BUN mg/dL 20 14   CREATININE mg/dL 1 03 0 95   EGFR ml/min/1 73sq m 65 71   CALCIUM mg/dL 9 0 9 0     Results from last 7 days   Lab Units 09/15/22  1116   AST U/L 12   ALT U/L 12   ALK PHOS U/L 40*   TOTAL PROTEIN g/dL 6 2*   ALBUMIN g/dL 2 7*   TOTAL BILIRUBIN mg/dL 0 54     Results from last 7 days   Lab Units 09/16/22  0552 09/15/22  1116   GLUCOSE RANDOM mg/dL 86 81      Results from last 7 days   Lab Units 09/15/22  1128   PH LEILA  7 402*   PCO2 LEILA mm Hg 48 7   PO2 LEILA mm Hg 23 5*   HCO3 LEILA mmol/L 29 6   BASE EXC LEILA mmol/L 4 0   O2 CONTENT LEILA ml/dL 7 5   O2 HGB, VENOUS % 41 4*     Results from last 7 days   Lab Units 09/15/22  1116   HS TNI 0HR ng/L 13     Results from last 7 days   Lab Units 09/15/22  1116   PROTIME seconds 14 8*   INR  1 18   PTT seconds 35     Results from last 7 days   Lab Units 09/16/22  0552 09/15/22  1128   PROCALCITONIN ng/ml 0 46* 0 07     Results from last 7 days   Lab Units 09/15/22  1116   LACTIC ACID mmol/L 1 5     Results from last 7 days   Lab Units 09/15/22  1116   NT-PRO BNP pg/mL 720*       Results from last 7 days   Lab Units 09/15/22  1128   INFLUENZA A PCR  Negative   INFLUENZA B PCR  Negative   RSV PCR  Negative     Results from last 7 days   Lab Units 09/15/22  1128 09/15/22  1116   BLOOD CULTURE  Received in Microbiology Lab  Culture in Progress  Received in Microbiology Lab  Culture in Progress         ED Treatment:   Medication Administration from 09/15/2022 1043 to 09/15/2022 1736       Date/Time Order Dose Route Action     09/15/2022 1415 furosemide (LASIX) injection 20 mg 20 mg Intravenous Given     09/15/2022 1415 cefepime (MAXIPIME) 2 g/50 mL dextrose IVPB 2,000 mg Intravenous New Bag     09/15/2022 1655 aspirin chewable tablet 81 mg 81 mg Oral Given     09/15/2022 1655 atorvastatin (LIPITOR) tablet 10 mg 10 mg Oral Given     09/15/2022 1655 sertraline (ZOLOFT) tablet 100 mg 100 mg Oral Given     09/15/2022 1655 heparin (porcine) subcutaneous injection 5,000 Units 5,000 Units Subcutaneous Given        Past Medical History:   Diagnosis Date    Arthritis     Chronic venous insufficiency     HTN (hypertension) 10/26/2019    Prostate cancer ([facility] )     PVD (peripheral vascular disease) ([place] [address] )      Present on Admission:   Aspiration pneumonia ([facility] )   Dementia without behavioral disturbance, unspecified dementia type ([place] [address] )   Bipolar 1 disorder ([place] [address] )   HTN (hypertension)   PVD (peripheral vascular disease) ([place] [address] )   Cystitis      Admitting Diagnosis: Aspiration pneumonia ([facility] ) [J69 0]  Low O2 saturation [R79 81]  Age/Sex: 80 y o  male  Admission Orders:  Scheduled Medications:  aspirin, 81 mg, Oral, Daily  atorvastatin, 10 mg, Oral, Daily With Dinner  cefTRIAXone, 1,000 mg, Intravenous, Q24H  divalproex sodium, 500 mg, Oral, Q12H AMITA  heparin (porcine), 5,000 Units, Subcutaneous, Q8H [address]  sertraline, 100 mg, Oral, Daily      Continuous IV Infusions:     PRN Meds:  acetaminophen, 650 mg, Oral, Q6H PRN    speech eval   PT OT eval   Weights   I&O   Aspiration precautions  Tele       Network Utilization Review Department  ATTENTION: Please call with any questions or concerns to [phone] and carefully listen to the prompts so that you are directed to the right person  All voicemails are confidential   Stefanie Eason all requests for admission clinical reviews, approved or denied determinations and any other requests to dedicated fax number below belonging to the campus where the patient is receiving treatment   List of dedicated fax numbers for the Facilities:  [address] DENIALS (Administrative/Medical Necessity) [phone]   [address] (Maternity/NICU/Pediatrics) [phone]   [address]  [address] [address] [place] [address] [address] [facility] [address] [facility] [phone]

## 2022-09-16 NOTE — SPEECH THERAPY NOTE
Speech-Language Pathology Bedside Swallow Evaluation        Patient Name: Marcy Jolley  TWBND'Y Date: 9/16/2022     Problem List  Principal Problem:    Aspiration pneumonia ([facility] )  Active Problems:    [facility]  )    HTN (hypertension)    PVD (peripheral vascular disease) ([place] [address] )    Dementia without behavioral disturbance, unspecified dementia type ([place] [address] )    Acute CHF (congestive heart failure) ([place] [address] )    Cystitis       Past Medical History  Past Medical History:   Diagnosis Date    Arthritis     Chronic venous insufficiency     HTN (hypertension) 10/26/2019    Prostate cancer ([place] [address] )     PVD (peripheral vascular disease) ([place] [address] )        Past Surgical History  Past Surgical History:   Procedure Laterality Date    HERNIA REPAIR      TONSILLECTOMY         Summary/Impressions:    Pt presents with mild oral and suspected pharyngeal dysphagia  Accepts PO trials fed by clinician; requires max assist when attempting to self feed  Adequate containment and manipulation for thicker viscosities  Suspect reduced control with thins  Mastication notably reduced with incomplete breakdown of moistened solid boluses  Efficiency improved with puree texture  Transfer is prolonged  Mild oral residue  Pt with audible gulp, grunt and delayed cough when taking small quantities of thin liquids, frequent throat clear with nectar thick by straw  No signs of aspiration or distress otherwise  Recommendations:   Diet: puree/level 1 diet and nectar thick liquids   Meds: whole with puree - cut/crush alrger if medically cleared     Feeding assistance: 1:1  Frequent Oral care: 2-4x/day  Aspiration precautions and compensatory swallowing strategies: upright posture, only feed when fully alert, slow rate of feeding, small bites/sips, no straws and alternating bites and sips  Other Recommendations/ considerations: SLP to follow up for diet tolerance and adjust as indicated 1-3x       Current Medical Status  Pt is a 80 y o  male who presented to [facility] with cough and shortness of breath  Pt from [facility] personal home care  There was reported hypotension and hypoxia prior to admission however blood pressure and oxygen saturations have been stable since admission  Patient has dementia and minimally interactive at baseline  Workup in ED showed findings concerning for aspiration pneumonia on CT chest with mild vascular congestion on chest x-ray  Pt failed nursing dysphagia assessment and NPO until SLP dysphagia evaluation  Past medical history:  Please see H&P for details    Special Studies:  CXR 9/15/22:  Mild pulmonary vascular congestion  CTA Chest 9/15/22:  No pulmonary embolus seen within the pulmonary trunk, main, lobar, or proximal segmental branches  More peripheral branches are poorly assessed secondary to artifact        Dependent secretions in the trachea with extension along the bilateral mainstem bronchi and extending into the bilateral lower lobes where there are segments of bronchial occlusion  Consolidations along the lower lobe and lingula bronchial trees with   more focal consolidation in the left lower lobe most likely pneumonia , suspicious for aspiration      Small bilateral pleural effusions and interlobular septal thickening consistent with edema      Urinary bladder wall thickening and perivesicular inflammation may be secondary to cystitis and/or prostatomegaly    Recommend correlation with urinalysis      Increased height loss of T12 compared to prior        Social/Education/Vocational Hx:  Pt lives in assisted living facility    Swallow Information   Current Risks for Dysphagia & Aspiration: AMS  Current Symptoms/Concerns: coughing with po and change in respiratory status  Current Diet: NPO   Baseline Diet: Unknown; presumed reg/thin     Baseline Assessment   Behavior/Cognition: decreased attention  Speech/Language Status: able to participate in basic conversation and able to follow commands inconsistently  Patient Positioning: upright in bed    Swallow Mechanism Exam   Facial: symmetrical  Labial: unable to test 2/2 limited command following  Lingual: unable to test 2/2 limited command following  Velum: symmetrical  Mandible: adequate ROM  Dentition: limited dentition  Vocal quality:clear/adequate   Volitional Cough: unable to initiate volitional cough   Respiratory: 3L NC    Consistencies Assessed and Performance   Consistencies Administered: thin liquids, nectar thick, honey thick, puree and mechanical soft solids    Oral Stage: Patient with adequate bolus acceptance, suspect reduced control of thinner liquids  Mastication is poor/incomplete  Transfer is prolonged yet functional for thicker liquid and puree texture  Mild oral residue  Pharyngeal Stage: Laryngeal rise noted upon palpation  Swallow initiation suspected to be delayed  Audible gulp, grunt and delayed cough when taking small quantities of thin liquids  Frequent throat clear with nectar thick by straw  No signs of aspiration or distress otherwise       Esophageal Concerns: none reported      Results Reviewed with: patient, RN and MD     Plan  Will continue to follow for 1-3x  Dysphagia Goals: pt will tolerate puree solids with thin liquids without s/s of aspiration x3-5  Discharge recommendation: SNF    Speech Therapy Prognosis   Prognosis: fair  Prognosis Considerations: medical status and cognitive status        Hughes [address], [address] Pathologist  [place] #[id number]  [place] #[id number]

## 2022-09-16 NOTE — PLAN OF CARE
Problem: PHYSICAL THERAPY ADULT  Goal: Performs mobility at highest level of function for planned discharge setting  See evaluation for individualized goals  Description: Treatment/Interventions: Functional transfer training, LE strengthening/ROM, Therapeutic exercise, Endurance training, Cognitive reorientation, Patient/family training, Bed mobility, Continued evaluation, Spoke to nursing, OT          See flowsheet documentation for full assessment, interventions and recommendations  Note: Prognosis: Fair  Problem List: Decreased strength, Decreased endurance, Impaired balance, Decreased mobility, Decreased cognition, Impaired hearing, Pain  Assessment: Pt is 80year old male seen for PT evaluation s/p admit to [facility] on 9/15/2022 with Aspiration pneumonia ([facility] )  PT consulted to assess pt's functional mobility and d/c needs  Order placed for PT eval and tx, with up with assist order  Comorbidities affecting pt's physical performance at time of assessment include bipolar 1 disorder, hypertension, peripheral vascular disease, dementia without behavioral disturbance, acute CHF, and cystitis  PTA, pt was requiring assist for mobility  Pt resides at [facility]  Personal factors affecting pt at time of IE include inability to ambulate household distances, inability to navigate level surfaces without external assistance, decreased cognition, limited home support, inability to perform IADLs, inability to perform ADLs, and inability to live alone  Please find objective findings from PT assessment regarding body systems outlined above with impairments and limitations including weakness, impaired balance, decreased endurance, pain, decreased activity tolerance, decreased functional mobility tolerance, fall risk, and decreased cognition   The following objective measures performed on IE also reveal limitations: Barthel Index: 25/100, Modified [place]: 4 (moderate/severe disability) and AM-PAC 6-Clicks: 0/58  Pt's clinical presentation is currently unstable/unpredictable seen in pt's presentation of need for ongoing medical management/monitoring, pt is a fall risk, and pt requires cues and assist of two for safety with functional mobility  Pt to benefit from continued PT tx to address deficits as defined above and maximize level of functional independent mobility and consistency  From PT/mobility standpoint, recommendation at time of d/c would be STR pending progress in order to facilitate return to PLOF  Barriers to Discharge: Decreased caregiver support, Other (Comment) (decline in functional status)     PT Discharge Recommendation: Post acute rehabilitation services    See flowsheet documentation for full assessment

## 2022-09-16 NOTE — PLAN OF CARE
Problem: OCCUPATIONAL THERAPY ADULT  Goal: Performs self-care activities at highest level of function for planned discharge setting  See evaluation for individualized goals  Description: Treatment Interventions: ADL retraining, UE strengthening/ROM, Endurance training, Cognitive reorientation, Patient/family training, Equipment evaluation/education, Compensatory technique education, Continued evaluation, Energy conservation          See flowsheet documentation for full assessment, interventions and recommendations  Note: Limitation: Decreased ADL status, Decreased UE ROM, Decreased UE strength, Decreased Safe judgement during ADL, Decreased endurance, Decreased high-level ADLs  Prognosis: Good  Assessment: Patient is a 80 y o  male seen for OT evaluation s/p admit to [id number] [place] [address] on 9/15/2022 w/Aspiration pneumonia ([facility] )  Commorbidities affecting patient's functional performance at time of assessment include:Cystitis, Acute CHF, Dementia without behavioral disturbance, PVD, HTN, Bipolar 1 Disorder, presented to ED with SOB  Orders placed for OT evaluation and treatment  Performed at least two patient identifiers during session including name and wristband  Prior to admission, Patient unable to provide history secondary to cognitive deficits  Chart indicates , patient resides at [address] [place] [address], requires assistance with ADLs and mobility, ambulates with a walker  Personal factors affecting patient at time of initial evaluation include: decreased initiation and engagement, difficulty performing ADLs and difficulty performing IADLs  Upon evaluation, patient requires moderate and maximal assist for UB ADLs, total assist for LB ADLs    Occupational performance is affected by the following deficits: orientation, attention span, decreased functional use of BUEs, limited active ROM, decreased muscle strength, degenerative arthritic joint changes, impaired gross motor coordination, dynamic sit/ stand balance deficit with poor standing tolerance time for self care and functional mobility, decreased activity tolerance, impaired memory, impaired problem solving, decreased safety awareness and postural control and postural alignment deficit, requiring external assistance to complete transitional movements  Patient to benefit from continued Occupational Therapy treatment while in the hospital to address deficits as defined above and maximize level of functional independence with ADLs and functional mobility  Occupational Performance areas to address include: eating, grooming , bathing/ shower, dressing, toilet hygiene, transfer to all surfaces, functional mobility, IADLs: safety procedures and Leisure Participation  From OT standpoint, recommendation at time of d/c would be Short Term Rehab       OT Discharge Recommendation: Post acute rehabilitation services

## 2022-09-16 NOTE — ASSESSMENT & PLAN NOTE
· Present on admission with reported coughing and shortness of breath  CT chest showed finding suggestive of aspiration pneumonia  · Patient did not meet SIRS criteria on admission  · Start ceftriaxone  · Initial procalcitonin negative, follow-up on repeat and if negative then consider discontinuing IV antibiotics as this could be pneumonitis  · Follow-up on blood cultures  · Follow-up on sputum cultures urine strep and Legionella  · Aspiration precautions  · Appreciate speech therapy recommendations, diet updated accordingly  · Discussed expectation and wishes with patient's nephew    Patient's nephew requested to think about making any decision in regards of feeding tubes if needed

## 2022-09-16 NOTE — PLAN OF CARE
Problem: Prexisting or High Potential for Compromised Skin Integrity  Goal: Skin integrity is maintained or improved  Description: INTERVENTIONS:  - Identify patients at risk for skin breakdown  - Assess and monitor skin integrity  - Assess and monitor nutrition and hydration status  - Monitor labs   - Assess for incontinence   - Turn and reposition patient  - Assist with mobility/ambulation  - Relieve pressure over bony prominences  - Avoid friction and shearing  - Provide appropriate hygiene as needed including keeping skin clean and dry  - Evaluate need for skin moisturizer/barrier cream  - Collaborate with interdisciplinary team   - Patient/family teaching  - Consider wound care consult   Outcome: Progressing     Problem: PAIN - ADULT  Goal: Verbalizes/displays adequate comfort level or baseline comfort level  Description: Interventions:  - Encourage patient to monitor pain and request assistance  - Assess pain using appropriate pain scale  - Administer analgesics based on type and severity of pain and evaluate response  - Implement non-pharmacological measures as appropriate and evaluate response  - Consider cultural and social influences on pain and pain management  - Notify physician/advanced practitioner if interventions unsuccessful or patient reports new pain  Outcome: Progressing     Problem: DISCHARGE PLANNING  Goal: Discharge to home or other facility with appropriate resources  Description: INTERVENTIONS:  - Identify barriers to discharge w/patient and caregiver  - Arrange for needed discharge resources and transportation as appropriate  - Identify discharge learning needs (meds, wound care, etc )  - Arrange for interpretive services to assist at discharge as needed  - Refer to Case Management Department for coordinating discharge planning if the patient needs post-hospital services based on physician/advanced practitioner order or complex needs related to functional status, cognitive ability, or social support system  Outcome: Progressing     Problem: Knowledge Deficit  Goal: Patient/family/caregiver demonstrates understanding of disease process, treatment plan, medications, and discharge instructions  Description: Complete learning assessment and assess knowledge base    Interventions:  - Provide teaching at level of understanding  - Provide teaching via preferred learning methods  Outcome: Progressing     Problem: Potential for Falls  Goal: Patient will remain free of falls  Description: INTERVENTIONS:  - Educate patient/family on patient safety including physical limitations  - Instruct patient to call for assistance with activity   - Consult OT/PT to assist with strengthening/mobility   - Keep Call bell within reach  - Keep bed low and locked with side rails adjusted as appropriate  - Keep care items and personal belongings within reach  - Initiate and maintain comfort rounds  - Make Fall Risk Sign visible to staff  - Offer Toileting every 2Hours, in advance of need  - Initiate/Maintain bed alarm  - Obtain necessary fall risk management equipment  - Apply yellow socks and bracelet for high fall risk patients  - Consider moving patient to room near nurses station  Outcome: Progressing

## 2022-09-16 NOTE — OCCUPATIONAL THERAPY NOTE
Occupational Therapy Evaluation        Patient Name: Renita Yun  RSOSV'L Date: 9/16/2022 09/16/22 1207   OT Last Visit   OT Visit Date 09/16/22   Note Type   Note type Evaluation   Restrictions/Precautions   Weight Bearing Precautions Per Order No   Braces or Orthoses Other (Comment)  (h/o of back brace- patient reported " I had it 2 weeks ago")   Other Precautions Cognitive; Chair Alarm; Bed Alarm; Fall Risk;Hard of hearing;O2   Pain Assessment   Pain Assessment Tool FLACC   Pain Score No Pain   Pain Rating: FLACC (Rest) - Face 0   Pain Rating: FLACC (Rest) - Legs 0   Pain Rating: FLACC (Rest) - Activity 0   Pain Rating: FLACC (Rest) - Cry 0   Pain Rating: FLACC (Rest) - Consolability 0   Score: FLACC (Rest) 0   Pain Rating: FLACC (Activity) - Face 1   Pain Rating: FLACC (Activity) - Legs 1   Pain Rating: FLACC (Activity) - Activity 0   Pain Rating: FLACC (Activity) - Cry 0   Pain Rating: FLACC (Activity) - Consolability 1   Score: FLACC (Activity) 3   Home Living   Type of Home Assisted living  [facility])   Home Layout One level   Bathroom Shower/Tub Walk-in shower   Bathroom Toilet Standard   Bathroom Equipment Grab bars in shower; Shower chair;Grab bars around toilet   [address]   Additional Comments patient reported he does "not walk much"   Prior Function   Level of [place] Needs assistance with IADLs; Needs assistance with ADLs and functional mobility   Lives With Facility staff   Receives Help From Personal care attendant  (Facility staff)   ADL Assistance Needs assistance   IADLs Needs assistance   Falls in the last 6 months   (Unknown)   Vocational Retired   Lifestyle   Autonomy Patient unable to provide history secondary to cognitive deficits  Chart indicates , patient resides at [address] [place] [address], requires assistance with ADLs and mobility, ambulates with a walker     Psychosocial   Psychosocial (WDL) X   Patient Behaviors/Mood Calm;Cooperative; Not interactive   Ability to Express Feelings Needs assistance   Ability to Express Needs Needs assistance   Ability to Express Thoughts Needs assistance   Ability to Understand Others Usually understands   ADL   Eating Assistance 4  Minimal Assistance   Grooming Assistance 3  Moderate Assistance   UB Bathing Assistance 2  Maximal Assistance   LB Bathing Assistance 1  Total Assistance   UB Dressing Assistance 2  Maximal Assistance   LB Dressing Assistance 1  Total Assistance   Toileting Assistance  2  Maximal Assistance   Functional Assistance 2  Maximal Assistance   Bed Mobility   Supine to Sit 2  Maximal assistance   Additional items Assist x 2; Increased time required;Verbal cues;LE management   Sit to Supine 2  Maximal assistance   Additional items Assist x 2; Increased time required;Verbal cues;LE management   Transfers   Sit to Stand 2  Maximal assistance   Additional items Assist x 2;Bedrails; Increased time required;Verbal cues   Stand to Sit 2  Maximal assistance   Additional items Assist x 2; Increased time required;Verbal cues   Balance   Static Sitting Fair -   Dynamic Sitting Poor +   Static Standing Poor   Activity Tolerance   Activity Tolerance Patient limited by fatigue;Patient limited by pain   Nurse Made Aware HENRI Martínez made aware of therapy outcome   RUE Assessment   RUE Assessment X  (limited functional use of uppers, increased rigidity)   RUE Overall AROM   R Shoulder Flexion AAROM to ~ 70 degrees, able to touch top of head and reach mouth with both hands   RUE Strength   RUE Overall Strength Deficits  (overall 3/5)   LUE Assessment   LUE Assessment X  (limited functional use of uppers, increased rigidity)   LUE Overall AROM   L Shoulder Flexion AAROM to ~ 70 degrees, able to touch top of head and reach mouth with both hands   LUE Strength   LUE Overall Strength Deficits  (overall 3/5)   Hand Function   Gross Motor Coordination Impaired   Fine Motor Coordination Impaired Sensation   Light Touch No apparent deficits  (BUEs)   Vision-Basic Assessment   Current Vision Does not wear glasses   Cognition   Overall Cognitive Status Impaired   Arousal/Participation Alert; Responsive; Cooperative   Attention Attends with cues to redirect   Orientation Level Oriented to person;Disoriented to place; Disoriented to time;Disoriented to situation   Memory Decreased recall of biographical information;Decreased short term memory;Decreased recall of recent events;Decreased long term memory   Following Commands Follows one step commands with increased time or repetition   Assessment   Limitation Decreased ADL status; Decreased UE ROM; Decreased UE strength;Decreased Safe judgement during ADL;Decreased endurance;Decreased high-level ADLs   Prognosis Good   Assessment Patient is a 80 y o  male seen for OT evaluation s/p admit to [address] [place] [address] on 9/15/2022 w/Aspiration pneumonia ([facility] [address] )  Commorbidities affecting patient's functional performance at time of assessment include:Cystitis, Acute CHF, Dementia without behavioral disturbance, PVD, HTN, Bipolar 1 Disorder, presented to ED with SOB  Orders placed for OT evaluation and treatment  Performed at least two patient identifiers during session including name and wristband  Prior to admission, Patient unable to provide history secondary to cognitive deficits  Chart indicates , patient resides at [address] [place] [address], requires assistance with ADLs and mobility, ambulates with a walker  Personal factors affecting patient at time of initial evaluation include: decreased initiation and engagement, difficulty performing ADLs and difficulty performing IADLs  Upon evaluation, patient requires moderate and maximal assist for UB ADLs, total assist for LB ADLs    Occupational performance is affected by the following deficits: orientation, attention span, decreased functional use of BUEs, limited active ROM, decreased muscle strength, degenerative arthritic joint changes, impaired gross motor coordination, dynamic sit/ stand balance deficit with poor standing tolerance time for self care and functional mobility, decreased activity tolerance, impaired memory, impaired problem solving, decreased safety awareness and postural control and postural alignment deficit, requiring external assistance to complete transitional movements  Patient to benefit from continued Occupational Therapy treatment while in the hospital to address deficits as defined above and maximize level of functional independence with ADLs and functional mobility  Occupational Performance areas to address include: eating, grooming , bathing/ shower, dressing, toilet hygiene, transfer to all surfaces, functional mobility, IADLs: safety procedures and Leisure Participation  From OT standpoint, recommendation at time of d/c would be Short Term Rehab  Plan   Treatment Interventions ADL retraining;UE strengthening/ROM; Endurance training;Cognitive reorientation;Patient/family training;Equipment evaluation/education; Compensatory technique education;Continued evaluation; Energy conservation   Goal Expiration Date 09/30/22   OT Frequency 3-5x/wk   Recommendation   OT Discharge Recommendation Post acute rehabilitation services   AM-PAC Daily Activity Inpatient   Lower Body Dressing 1   Bathing 2   Toileting 2   Upper Body Dressing 2   Grooming 2   Eating 3   Daily Activity Raw Score 12   Daily Activity Standardized Score (Calc for Raw Score >=11) 30 6   AM-PAC Applied Cognition Inpatient   Following a Speech/Presentation 2   Understanding Ordinary Conversation 2   Taking Medications 1   Remembering Where Things Are Placed or Put Away 1   Remembering List of 4-5 Errands 1   Taking Care of Complicated Tasks 1   Applied Cognition Raw Score 8   Applied Cognition Standardized Score 19 32   Barthel Index   Feeding 5   Bathing 0   Grooming Score 0   Dressing Score 0   Bladder Score 5   Bowels Score 5   Toilet Use Score 0 Transfers (Bed/Chair) Score 5   Mobility (Level Surface) Score 0   Stairs Score 0   Barthel Index Score 20     Occupational Therapy goals: In 7-14 days:     1- Patient will verbalize and demonstrate use of energy conservation/ deep breathing technique and work simplification skills during functional activity with no verbal cues  2-Patient will verbalize and demonstrate good body mechanics and joint protection techniques during  ADLs/ IADLs with no verbal cues  3- Patient will increase OOB/ sitting tolerance to 2-4 hours per day for increased participation in self care and leisure tasks with no s/s of exertion  4-Patient will increase standing tolerance time to 5  minutes with unilateral UE support to complete sink level ADLs@ mod I level   5- Patient will increase sitting tolerance at edge of bed to 20 minutes to complete UB ADLs @ set up assist level  6- Patient will transfer bed to Chair / toilet at Set up assist level with AD as indicated  7- Patient will complete UB ADLs with set up assist  8- Patient will complete LB ADLs with min assist with the use of adaptive equipment  9- Patient will complete toileting hygiene with set up assist/ supervision for thoroughness    10-Patient/ Family  will demonstrate competency with UE Home Exercise Program

## 2022-09-16 NOTE — PHYSICAL THERAPY NOTE
Physical Therapy Evaluation     Patient's Name: Eric Jackson    Admitting Diagnosis  Aspiration pneumonia ([place] [address] ) [J69 0]  Low O2 saturation [R79 81]    Problem List  Patient Active Problem List   Diagnosis    Bipolar 1 disorder ([place] [address] )    Hyperlipidemia    History of prostate cancer    HTN (hypertension)    Ambulatory dysfunction    PVD (peripheral vascular disease) ([place] [address] )    Mood disorder ([place] [address] )    History of T12 vertebral fracture    Dementia without behavioral disturbance, unspecified dementia type ([place] [address] )    Personal history of COVID-19    Aspiration pneumonia ([place] [address] )    Acute CHF (congestive heart failure) ([place] [address] )    Cystitis     Past Medical History  Past Medical History:   Diagnosis Date    Arthritis     Chronic venous insufficiency     HTN (hypertension) 10/26/2019    Prostate cancer ([place] [address] )     PVD (peripheral vascular disease) ([place] [address] )      Past Surgical History  Past Surgical History:   Procedure Laterality Date    HERNIA REPAIR      TONSILLECTOMY        09/16/22 1204   PT Last Visit   PT Visit Date 09/16/22   Note Type   Note type Evaluation   Pain Assessment   Pain Assessment Tool FLACC   Pain Score No Pain   Pain Rating: FLACC (Rest) - Face 0   Pain Rating: FLACC (Rest) - Legs 0   Pain Rating: FLACC (Rest) - Activity 0   Pain Rating: FLACC (Rest) - Cry 0   Pain Rating: FLACC (Rest) - Consolability 0   Score: FLACC (Rest) 0   Pain Rating: FLACC (Activity) - Face 1   Pain Rating: FLACC (Activity) - Legs 1   Pain Rating: FLACC (Activity) - Activity 0   Pain Rating: FLACC (Activity) - Cry 0   Pain Rating: FLACC (Activity) - Consolability 1   Score: FLACC (Activity) 3   Restrictions/Precautions   Weight Bearing Precautions Per Order No   Braces or Orthoses Other (Comment)  (history of back brace; TLSO)   Other Precautions Cognitive; Chair Alarm; Bed Alarm; Fall Risk;O2;Pain  (back safety)   Home Living   Type of Home Assisted living  [facility])   Home Layout One level   Bathroom Shower/Tub Walk-in shower   Bathroom Toilet Standard   Bathroom Equipment Grab bars in shower; Shower chair;Grab bars around toilet   [address]   Additional Comments Pt ambulates with a walker  "I haven't walked much "   Prior Function   Level of [place] Needs assistance with ADLs and functional mobility; Needs assistance with IADLs   Lives With Facility staff   Receives Help From Personal care attendant  (facility staff)   ADL Assistance Needs assistance   IADLs Needs assistance   Falls in the last 6 months   (unknown)   Vocational Retired   General   Family/Caregiver Present No   Cognition   Overall Cognitive Status Impaired   Arousal/Participation Alert   Attention Attends with cues to redirect   Orientation Level Oriented to person;Disoriented to place; Disoriented to time;Disoriented to situation   Memory Decreased recall of precautions;Decreased recall of recent events;Decreased short term memory;Decreased recall of biographical information;Decreased long term memory   Following Commands Follows one step commands with increased time or repetition   Comments Pt agreeable to PT  Subjective   Subjective "I'm tired "   RUE Assessment   RUE Assessment   (defer to OT assessment)   LUE Assessment   LUE Assessment   (defer to OT assessment)   RLE Assessment   RLE Assessment X   Strength RLE   RLE Overall Strength 3/5   LLE Assessment   LLE Assessment X   Strength LLE   LLE Overall Strength 3/5   Light Touch   RLE Light Touch Grossly intact   LLE Light Touch Grossly intact   Bed Mobility   Supine to Sit 2  Maximal assistance   Additional items Assist x 2;HOB elevated; Increased time required;Verbal cues;LE management   Sit to Supine 2  Maximal assistance   Additional items Assist x 2; Increased time required;Verbal cues;LE management   Transfers   Sit to Stand 2  Maximal assistance   Additional items Assist x 2; Increased time required;Verbal cues   Stand to Sit 2  Maximal assistance   Additional items Assist x 2;Increased time required;Verbal cues   Additional Comments Pt retropulsive upon standing; able to shift weight anteriorly with assist   Ambulation/Elevation   Gait pattern Not tested   Balance   Static Sitting Fair -   Dynamic Sitting Poor +   Static Standing Poor -   Endurance Deficit   Endurance Deficit Yes   Endurance Deficit Description decreased activity tolerance   Activity Tolerance   Activity Tolerance Patient limited by fatigue   Medical Staff Made Aware OT Nuno Sotelo   Nurse Made Aware Discussed case with HENRI Amos   Assessment   Prognosis Fair   Problem List Decreased strength;Decreased endurance; Impaired balance;Decreased mobility; Decreased cognition; Impaired hearing;Pain   Assessment Pt is 80year old male seen for PT evaluation s/p admit to [facility] on 9/15/2022 with Aspiration pneumonia ([facility] [address] )  PT consulted to assess pt's functional mobility and d/c needs  Order placed for PT eval and tx, with up with assist order  Comorbidities affecting pt's physical performance at time of assessment include bipolar 1 disorder, hypertension, peripheral vascular disease, dementia without behavioral disturbance, acute CHF, and cystitis  PTA, pt was requiring assist for mobility  Pt resides at [facility]  Personal factors affecting pt at time of IE include inability to ambulate household distances, inability to navigate level surfaces without external assistance, decreased cognition, limited home support, inability to perform IADLs, inability to perform ADLs, and inability to live alone  Please find objective findings from PT assessment regarding body systems outlined above with impairments and limitations including weakness, impaired balance, decreased endurance, pain, decreased activity tolerance, decreased functional mobility tolerance, fall risk, and decreased cognition   The following objective measures performed on IE also reveal limitations: Barthel Index: 25/100, Modified Murray: 4 (moderate/severe disability) and AM-PAC 6-Clicks: 1/29  Pt's clinical presentation is currently unstable/unpredictable seen in pt's presentation of need for ongoing medical management/monitoring, pt is a fall risk, and pt requires cues and assist of two for safety with functional mobility  Pt to benefit from continued PT tx to address deficits as defined above and maximize level of functional independent mobility and consistency  From PT/mobility standpoint, recommendation at time of d/c would be STR pending progress in order to facilitate return to PLOF  Barriers to Discharge Decreased caregiver support; Other (Comment)  (decline in functional status)   Goals   STG Expiration Date 09/26/22   Short Term Goal #1 In 10 days: Increase bilateral LE strength 1/2 grade to facilitate independent mobility, Perform all bed mobility tasks with max A of 1 to decrease caregiver burden, Perform all transfers with max A of 1 to improve independence, Increase all balance 1/2 grade to decrease risk for falls, and PT to see and establish goals for gait when appropriate   Plan   Treatment/Interventions Functional transfer training;LE strengthening/ROM; Therapeutic exercise; Endurance training;Cognitive reorientation;Patient/family training;Bed mobility;Continued evaluation;Spoke to nursing;OT   PT Frequency 3-5x/wk   Recommendation   PT Discharge Recommendation Post acute rehabilitation services   AM-[facility] Basic Mobility Inpatient   Turning in Bed Without Bedrails 1   Lying on Back to Sitting on Edge of Flat Bed 1   Moving Bed to Chair 1   Standing Up From Chair 1   Walk in Room 1   Climb 3-5 Stairs 1   Basic Mobility Inpatient Raw Score 6   Turning Head Towards Sound 3   Follow Simple Instructions 3   Low Function Basic Mobility Raw Score 12   Low Function Basic Mobility Standardized Score 18 33   Highest Level Of Mobility   -HLM Goal 2: Bed activities/Dependent transfer   -HL Achieved 3: Sit at edge of bed   Modified Owen Scale Modified Ward Scale 4   Barthel Index   Feeding 5   Bathing 0   Grooming Score 0   Dressing Score 5   Bladder Score 0   Bowels Score 5   Toilet Use Score 5   Transfers (Bed/Chair) Score 5   Mobility (Level Surface) Score 0   Stairs Score 0   Barthel Index Score 25     PT Evaluation Time: [id number]  Rashi Velarde, PT, DPT

## 2022-09-16 NOTE — PROGRESS NOTES
[facility]  Progress Note Kari Martínez 1934, 80 y o  male MRN: [id number]  Unit/Bed#: -01 Encounter: [id number]  Primary Care Provider: Peyman De La Rosa DO   Date and time admitted to hospital: 9/15/2022 10:43 AM    * Aspiration pneumonia [facility])  Assessment & Plan  · Present on admission with reported coughing and shortness of breath  CT chest showed finding suggestive of aspiration pneumonia  · Patient did not meet SIRS criteria on admission  · Start ceftriaxone  · Initial procalcitonin negative, follow-up on repeat and if negative then consider discontinuing IV antibiotics as this could be pneumonitis  · Follow-up on blood cultures  · Follow-up on sputum cultures urine strep and Legionella  · Aspiration precautions  · Appreciate speech therapy recommendations, diet updated accordingly  · Discussed expectation and wishes with patient's nephew  Patient's nephew requested to think about making any decision in regards of feeding tubes if needed    Severe protein-calorie malnutrition ([address] )  Assessment & Plan  Malnutrition Findings:   Adult Malnutrition type: Chronic illness  Adult Degree of Malnutrition: Other severe protein calorie malnutrition  Malnutrition Characteristics: Fat loss, Muscle loss                  360 Statement: related to medical condition as evidenced by severe orbital hollowing and severe temporal depletion  Treated with diet and supplements  BMI Findings: Body mass index is 21 43 kg/m²     Nutrition consult    Cystitis  Assessment & Plan  · Noted on CT abdomen/pelvis  · Obtain UA  · Patient unable to provide any history regarding urinary symptoms  · Continue coverage with ceftriaxone    Acute CHF (congestive heart failure) (HCC)  Assessment & Plan  Wt Readings from Last 3 Encounters:   09/16/22 71 7 kg (158 lb)   01/07/22 71 7 kg (158 lb)   07/16/21 68 2 kg (150 lb 4 8 oz)     · No known history of CHF noted in chart review  · Mild volume overload with elevated proBNP and chest x-ray showing vascular congestion  · Received 1 dose of IV Lasix 20 mg  · Monitor I&Os and daily weights  · Diurese as needed, patient currently appears comfortable from the respiratory standpoint and saturating well on room air therefore will hold off on further diuretics  · Follow-up on echo    Dementia without behavioral disturbance, unspecified dementia type ([place] [address] )  Assessment & Plan  · Alert and oriented to self and Minimally interactive at baseline  · Appears to be at baseline currently  · Supportive care      PVD (peripheral vascular disease) ([place] [address] )  Assessment & Plan  Continue aspirin and statin    HTN (hypertension)  Assessment & Plan  · Blood pressure appears to be on the soft side  · Hold home amlodipine  · Monitor blood pressure    Bipolar 1 disorder (HCC)  Assessment & Plan  · Continue home medications  · Unfortunately there is shortage of IV Depacon therefore will switch it to sprinkles      VTE Pharmacologic Prophylaxis:   Pharmacologic: Heparin  Mechanical VTE Prophylaxis in Place: Yes    Review of Systems:    Review of Systems   Constitutional: Negative for chills and fever  HENT: Negative for ear pain and sore throat  Eyes: Negative for pain and visual disturbance  Respiratory: Negative for cough and shortness of breath  Cardiovascular: Negative for chest pain and palpitations  Gastrointestinal: Negative for abdominal pain and vomiting  Genitourinary: Negative for dysuria and hematuria  Musculoskeletal: Negative for arthralgias and back pain  Skin: Negative for color change and rash  Neurological: Negative for seizures and syncope  All other systems reviewed and are negative        Past Medical and Surgical History:     Past Medical History:   Diagnosis Date    Arthritis     Chronic venous insufficiency     HTN (hypertension) 10/26/2019    Prostate cancer ([place] [address] )     PVD (peripheral vascular disease) ([place] [address] )        Past Surgical History: Procedure Laterality Date    HERNIA REPAIR      TONSILLECTOMY         Patient Centered Rounds: I have performed bedside rounds with nursing staff today  Discussions with Specialists or Other Care Team Provider:  Nursing, case management    Education and Discussions with Family / Patient:  Patient    Time Spent for Care: 30 minutes  More than 50% of total time spent on counseling and coordination of care as described above  Current Length of Stay: 1 day(s)    Current Patient Status: Inpatient   Certification Statement: The patient will continue to require additional inpatient hospital stay due to Requiring IV antibiotics pending infectious workup results    Discharge Plan:  Likely in the next 48-72 hours    Code Status: Level 3 - DNAR and DNI      Subjective:   Patient laying comfortably in bed and denies any complaints    Objective:     Vitals:   Temp (24hrs), Av 5 °F (36 4 °C), Min:97 3 °F (36 3 °C), Max:97 9 °F (36 6 °C)    Temp:  [97 3 °F (36 3 °C)-97 9 °F (36 6 °C)] 97 4 °F (36 3 °C)  HR:  [63-90] 63  Resp:  [16-20] 16  BP: (116-134)/(53-77) 116/53  SpO2:  [93 %-100 %] 97 %  Body mass index is 21 43 kg/m²  Input and Output Summary (last 24 hours): Intake/Output Summary (Last 24 hours) at 2022 1538  Last data filed at 2022 1053  Gross per 24 hour   Intake 0 ml   Output 200 ml   Net -200 ml       Physical Exam:     Physical Exam  Vitals and nursing note reviewed  Constitutional:       General: He is not in acute distress  Appearance: He is well-developed  He is ill-appearing  He is not toxic-appearing or diaphoretic  HENT:      Head: Normocephalic and atraumatic  Mouth/Throat:      Mouth: Mucous membranes are moist       Pharynx: Oropharynx is clear  Eyes:      General: No scleral icterus  Conjunctiva/sclera: Conjunctivae normal    Cardiovascular:      Rate and Rhythm: Normal rate and regular rhythm  Heart sounds: No murmur heard    Pulmonary:      Effort: Pulmonary effort is normal  No respiratory distress  Breath sounds: Normal breath sounds  No wheezing or rales  Abdominal:      General: Bowel sounds are normal       Palpations: Abdomen is soft  Tenderness: There is no abdominal tenderness  Musculoskeletal:      Cervical back: Normal range of motion and neck supple  Right lower leg: No edema  Left lower leg: No edema  Skin:     General: Skin is warm and dry  Neurological:      General: No focal deficit present  Mental Status: He is alert  Mental status is at baseline  Psychiatric:         Mood and Affect: Mood normal          Behavior: Behavior normal            Additional Data:     Labs:    Results from last 7 days   Lab Units 09/16/22  0552   WBC Thousand/uL 10 65*   HEMOGLOBIN g/dL 13 3   HEMATOCRIT % 39 1   PLATELETS Thousands/uL 154   NEUTROS PCT % 66   LYMPHS PCT % 18   MONOS PCT % 14*   EOS PCT % 1     Results from last 7 days   Lab Units 09/16/22  0552 09/15/22  1116   SODIUM mmol/L 140 139   POTASSIUM mmol/L 3 6 4 3   CHLORIDE mmol/L 103 106   CO2 mmol/L 29 30   BUN mg/dL 20 14   CREATININE mg/dL 1 03 0 95   ANION GAP mmol/L 8 3*   CALCIUM mg/dL 9 0 9 0   ALBUMIN g/dL  --  2 7*   TOTAL BILIRUBIN mg/dL  --  0 54   ALK PHOS U/L  --  40*   ALT U/L  --  12   AST U/L  --  12   GLUCOSE RANDOM mg/dL 86 81     Results from last 7 days   Lab Units 09/15/22  1116   INR  1 18             Results from last 7 days   Lab Units 09/16/22  0552 09/15/22  1128 09/15/22  1116   LACTIC ACID mmol/L  --   --  1 5   PROCALCITONIN ng/ml 0 46* 0 07  --            * I Have Reviewed All Lab Data Listed Above  * Additional Pertinent Lab Tests Reviewed:  [address] Admission Reviewed    Imaging:    Imaging Reports Reviewed Today Include:  None  Imaging Personally Reviewed by Myself Includes:  None    Recent Cultures (last 7 days):     Results from last 7 days   Lab Units 09/15/22  1128 09/15/22  1116   BLOOD CULTURE  Received in Microbiology Lab  Culture in Progress  Received in Microbiology Lab  Culture in Progress  Last 24 Hours Medication List:   Current Facility-Administered Medications   Medication Dose Route Frequency Provider Last Rate    acetaminophen  650 mg Oral Q6H PRN Robyn Whyte MD      aspirin  81 mg Oral Daily Robyn Whyte MD      atorvastatin  10 mg Oral Daily With Modesta Watson MD      cefTRIAXone  1,000 mg Intravenous Q24H Robyn Whyte MD 1,000 mg (09/16/22 0900)    divalproex sodium  500 mg Oral Q12H [facility] Robyn Whyte MD      heparin (porcine)  5,000 Units Subcutaneous Q8H [facility] Robyn Whyte MD      sertraline  100 mg Oral Daily Robyn Whyte MD          Today, Patient Was Seen By: Stacy Joshi MD    ** Please Note: Dictation voice to text software may have been used in the creation of this document   **

## 2022-09-16 NOTE — ASSESSMENT & PLAN NOTE
· Continue home medications  · Unfortunately there is shortage of IV Depacon therefore will switch it to sprinkles

## 2022-09-16 NOTE — MALNUTRITION/BMI
This medical record reflects one or more clinical indicators suggestive of malnutrition  Malnutrition Findings:   Adult Malnutrition type: Chronic illness  Adult Degree of Malnutrition: Other severe protein calorie malnutrition  Malnutrition Characteristics: Fat loss, Muscle loss                  360 Statement: related to medical condition as evidenced by severe orbital hollowing and severe temporal depletion  Treated with diet and supplements  See Nutrition note dated 9/16/2022 for additional details  Completed nutrition assessment is viewable in the nutrition documentation

## 2022-09-16 NOTE — ASSESSMENT & PLAN NOTE
Wt Readings from Last 3 Encounters:   09/16/22 71 7 kg (158 lb)   01/07/22 71 7 kg (158 lb)   07/16/21 68 2 kg (150 lb 4 8 oz)     · No known history of CHF noted in chart review  · Mild volume overload with elevated proBNP and chest x-ray showing vascular congestion  · Received 1 dose of IV Lasix 20 mg  · Monitor I&Os and daily weights  · Diurese as needed, patient currently appears comfortable from the respiratory standpoint and saturating well on room air therefore will hold off on further diuretics  · Follow-up on echo

## 2022-09-16 NOTE — PLAN OF CARE
Recommendations:   Diet: puree/level 1 diet and nectar thick liquids   Meds: whole with puree - cut/crush alrger if medically cleared     Feeding assistance: 1:1  Frequent Oral care: 2-4x/day  Aspiration precautions and compensatory swallowing strategies: upright posture, only feed when fully alert, slow rate of feeding, small bites/sips, no straws and alternating bites and sips  Other Recommendations/ considerations: SLP to follow up for diet tolerance and adjust as indicated 1-3x

## 2022-09-16 NOTE — RESPIRATORY THERAPY NOTE
RT Protocol Note  Suzanna Levine 80 y o  male MRN: [id number]  Unit/Bed#: MS 36-1 Encounter: [id number]    Assessment    Principal Problem:    Aspiration pneumonia ([place] [address] )  Active Problems:    Bipolar 1 disorder ([place] [address] )    HTN (hypertension)    PVD (peripheral vascular disease) ([place] [address] )    Dementia without behavioral disturbance, unspecified dementia type ([place] [address] )    Acute CHF (congestive heart failure) ([place] [address] )    Cystitis      Home Pulmonary Medications:  n/a       Past Medical History:   Diagnosis Date    Arthritis     Chronic venous insufficiency     HTN (hypertension) 10/26/2019    Prostate cancer ([place] [address] )     PVD (peripheral vascular disease) ([place] [address] )      Social History     Socioeconomic History    Marital status:      Spouse name: None    Number of children: None    Years of education: None    Highest education level: None   Occupational History    None   Tobacco Use    Smoking status: Former Smoker     Years: 10 00     Types: Cigars    Smokeless tobacco: Never Used   Vaping Use    Vaping Use: Never used   Substance and Sexual Activity    Alcohol use:  Yes     Alcohol/week: 1 0 standard drink     Types: 1 Standard drinks or equivalent per week     Comment: rarely    Drug use: No    Sexual activity: Not Currently   Other Topics Concern    None   Social History Narrative    None     Social Determinants of Health     Financial Resource Strain: Not on file   Food Insecurity: Not on file   Transportation Needs: Not on file   Physical Activity: Not on file   Stress: Not on file   Social Connections: Not on file   Intimate Partner Violence: Not on file   Housing Stability: Not on file       Subjective    Subjective Data: drowsy    Objective    Physical Exam:   Assessment Type: Assess only  General Appearance: Drowsy, Eyes open/responds to voice  Respiratory Pattern: Shallow, Spontaneous  Chest Assessment: Chest expansion symmetrical  Bilateral Breath Sounds: Diminished, Scattered, Coarse  Cough: None  O2 Device: nasal cannula    Vitals:  Blood pressure 134/77, pulse 90, temperature 97 9 °F (36 6 °C), resp  rate 16, SpO2 93 %  Imaging and other studies: I have personally reviewed pertinent reports        O2 Device: nasal cannula     Plan    Respiratory Plan: No distress/Pulmonary history        Resp Comments: respiratory protocol completed patient admitted for aspiration pneumonia patient has no significant pulmonary PMH no use of medication at home no indication for aerosol therapy needed at this time nothing further from respiratory

## 2022-09-16 NOTE — ASSESSMENT & PLAN NOTE
Malnutrition Findings:   Adult Malnutrition type: Chronic illness  Adult Degree of Malnutrition: Other severe protein calorie malnutrition  Malnutrition Characteristics: Fat loss, Muscle loss                  360 Statement: related to medical condition as evidenced by severe orbital hollowing and severe temporal depletion  Treated with diet and supplements  BMI Findings: Body mass index is 21 43 kg/m²     Nutrition consult

## 2022-09-17 LAB
ANION GAP SERPL CALCULATED.3IONS-SCNC: 6 MMOL/L (ref 4–13)
ATRIAL RATE: 58 BPM
BASOPHILS # BLD AUTO: 0.02 THOUSANDS/ΜL (ref 0–0.1)
BASOPHILS NFR BLD AUTO: 0 % (ref 0–1)
BUN SERPL-MCNC: 21 MG/DL (ref 5–25)
CALCIUM SERPL-MCNC: 8.9 MG/DL (ref 8.3–10.1)
CHLORIDE SERPL-SCNC: 104 MMOL/L (ref 96–108)
CO2 SERPL-SCNC: 32 MMOL/L (ref 21–32)
CREAT SERPL-MCNC: 0.92 MG/DL (ref 0.6–1.3)
EOSINOPHIL # BLD AUTO: 0 THOUSAND/ΜL (ref 0–0.61)
EOSINOPHIL NFR BLD AUTO: 0 % (ref 0–6)
ERYTHROCYTE [DISTWIDTH] IN BLOOD BY AUTOMATED COUNT: 14.1 % (ref 11.6–15.1)
GFR SERPL CREATININE-BSD FRML MDRD: 74 ML/MIN/1.73SQ M
GLUCOSE SERPL-MCNC: 102 MG/DL (ref 65–140)
HCT VFR BLD AUTO: 38.5 % (ref 36.5–49.3)
HGB BLD-MCNC: 12.6 G/DL (ref 12–17)
IMM GRANULOCYTES # BLD AUTO: 0.04 THOUSAND/UL (ref 0–0.2)
IMM GRANULOCYTES NFR BLD AUTO: 0 % (ref 0–2)
LYMPHOCYTES # BLD AUTO: 1.96 THOUSANDS/ΜL (ref 0.6–4.47)
LYMPHOCYTES NFR BLD AUTO: 19 % (ref 14–44)
MCH RBC QN AUTO: 31.2 PG (ref 26.8–34.3)
MCHC RBC AUTO-ENTMCNC: 32.7 G/DL (ref 31.4–37.4)
MCV RBC AUTO: 95 FL (ref 82–98)
MONOCYTES # BLD AUTO: 1.08 THOUSAND/ΜL (ref 0.17–1.22)
MONOCYTES NFR BLD AUTO: 11 % (ref 4–12)
NEUTROPHILS # BLD AUTO: 7.23 THOUSANDS/ΜL (ref 1.85–7.62)
NEUTS SEG NFR BLD AUTO: 70 % (ref 43–75)
NRBC BLD AUTO-RTO: 0 /100 WBCS
P AXIS: 15 DEGREES
PLATELET # BLD AUTO: 163 THOUSANDS/UL (ref 149–390)
PMV BLD AUTO: 10.5 FL (ref 8.9–12.7)
POTASSIUM SERPL-SCNC: 3.5 MMOL/L (ref 3.5–5.3)
PR INTERVAL: 144 MS
PROCALCITONIN SERPL-MCNC: 0.37 NG/ML
QRS AXIS: -18 DEGREES
QRSD INTERVAL: 128 MS
QT INTERVAL: 436 MS
QTC INTERVAL: 428 MS
RBC # BLD AUTO: 4.04 MILLION/UL (ref 3.88–5.62)
SODIUM SERPL-SCNC: 142 MMOL/L (ref 135–147)
T WAVE AXIS: 25 DEGREES
VENTRICULAR RATE: 58 BPM
WBC # BLD AUTO: 10.33 THOUSAND/UL (ref 4.31–10.16)

## 2022-09-17 PROCEDURE — 85025 COMPLETE CBC W/AUTO DIFF WBC: CPT | Performed by: INTERNAL MEDICINE

## 2022-09-17 PROCEDURE — 80048 BASIC METABOLIC PNL TOTAL CA: CPT | Performed by: INTERNAL MEDICINE

## 2022-09-17 PROCEDURE — 99233 SBSQ HOSP IP/OBS HIGH 50: CPT | Performed by: INTERNAL MEDICINE

## 2022-09-17 PROCEDURE — 84145 PROCALCITONIN (PCT): CPT | Performed by: INTERNAL MEDICINE

## 2022-09-17 PROCEDURE — 93010 ELECTROCARDIOGRAM REPORT: CPT | Performed by: INTERNAL MEDICINE

## 2022-09-17 RX ADMIN — DIVALPROEX SODIUM 500 MG: 125 CAPSULE, COATED PELLETS ORAL at 21:32

## 2022-09-17 RX ADMIN — ASPIRIN 81 MG: 81 TABLET, CHEWABLE ORAL at 09:32

## 2022-09-17 RX ADMIN — CEFTRIAXONE SODIUM 1000 MG: 10 INJECTION, POWDER, FOR SOLUTION INTRAVENOUS at 09:33

## 2022-09-17 RX ADMIN — HEPARIN SODIUM 5000 UNITS: 5000 INJECTION INTRAVENOUS; SUBCUTANEOUS at 05:59

## 2022-09-17 RX ADMIN — DIVALPROEX SODIUM 500 MG: 125 CAPSULE, COATED PELLETS ORAL at 09:32

## 2022-09-17 RX ADMIN — ATORVASTATIN CALCIUM 10 MG: 10 TABLET, FILM COATED ORAL at 16:37

## 2022-09-17 RX ADMIN — HEPARIN SODIUM 5000 UNITS: 5000 INJECTION INTRAVENOUS; SUBCUTANEOUS at 15:21

## 2022-09-17 RX ADMIN — SERTRALINE HYDROCHLORIDE 100 MG: 50 TABLET ORAL at 09:33

## 2022-09-17 RX ADMIN — HEPARIN SODIUM 5000 UNITS: 5000 INJECTION INTRAVENOUS; SUBCUTANEOUS at 21:32

## 2022-09-17 NOTE — ASSESSMENT & PLAN NOTE
Wt Readings from Last 3 Encounters:   09/17/22 70 kg (154 lb 5 2 oz)   01/07/22 71 7 kg (158 lb)   07/16/21 68 2 kg (150 lb 4 8 oz)     · No known history of CHF noted in chart review  · Mild volume overload with elevated proBNP and chest x-ray showing vascular congestion on admission  · Received 1 dose of IV Lasix 20 mg  · Monitor I&Os and daily weights  · Diurese as needed, patient currently appears comfortable from the respiratory standpoint and saturating well on room air therefore will hold off on further diuretics  · Follow-up on echo  · Currently euvolemic on exam

## 2022-09-17 NOTE — PLAN OF CARE
Problem: INFECTION - ADULT  Goal: Absence or prevention of progression during hospitalization  Description: INTERVENTIONS:  - Assess and monitor for signs and symptoms of infection  - Monitor lab/diagnostic results  - Monitor all insertion sites, i e  indwelling lines, tubes, and drains  - Monitor endotracheal if appropriate and nasal secretions for changes in amount and color  - [place] appropriate cooling/warming therapies per order  - Administer medications as ordered  - Instruct and encourage patient and family to use good hand hygiene technique  - Identify and instruct in appropriate isolation precautions for identified infection/condition  Outcome: Progressing     Problem: INFECTION - ADULT  Goal: Absence of fever/infection during neutropenic period  Description: INTERVENTIONS:  - Monitor WBC    Outcome: Progressing

## 2022-09-17 NOTE — PROGRESS NOTES
[facility]  Progress Note Yuri Frankel 1934, 80 y o  male MRN: [id number]  Unit/Bed#: -Melvin Encounter: [id number]  Primary Care Provider: Fara Chopra DO   Date and time admitted to hospital: 9/15/2022 10:43 AM    * Aspiration pneumonia [facility])  Assessment & Plan  · Present on admission with reported coughing and shortness of breath  CT chest showed finding suggestive of aspiration pneumonia  · Patient did not meet SIRS criteria on admission  · Continue ceftriaxone  · Procalcitonin elevated, trended down, continue to monitor  · Follow-up on blood cultures  · Follow-up on sputum cultures urine strep and Legionella  · Aspiration precautions  · Appreciate speech therapy recommendations, diet updated accordingly  · Discussed expectation and wishes with patient's nephew  Patient's nephew requested to think about making any decision in regards of feeding tubes if needed    Severe protein-calorie malnutrition ([address] )  Assessment & Plan  Malnutrition Findings:   Adult Malnutrition type: Chronic illness  Adult Degree of Malnutrition: Other severe protein calorie malnutrition  Malnutrition Characteristics: Fat loss, Muscle loss                  360 Statement: related to medical condition as evidenced by severe orbital hollowing and severe temporal depletion  Treated with diet and supplements  BMI Findings: Body mass index is 20 93 kg/m²     Nutrition consult    Cystitis  Assessment & Plan  · Noted on CT abdomen/pelvis  · Follow-up on UA  · Patient unable to provide any history regarding urinary symptoms  · Continue coverage with ceftriaxone    Acute CHF (congestive heart failure) ([facility])  Assessment & Plan  Wt Readings from Last 3 Encounters:   09/17/22 70 kg (154 lb 5 2 oz)   01/07/22 71 7 kg (158 lb)   07/16/21 68 2 kg (150 lb 4 8 oz)     · No known history of CHF noted in chart review  · Mild volume overload with elevated proBNP and chest x-ray showing vascular congestion on admission  · Received 1 dose of IV Lasix 20 mg  · Monitor I&Os and daily weights  · Diurese as needed, patient currently appears comfortable from the respiratory standpoint and saturating well on room air therefore will hold off on further diuretics  · Follow-up on echo  · Currently euvolemic on exam    Dementia without behavioral disturbance, unspecified dementia type ([place] [address] )  Assessment & Plan  · Alert and oriented to self and Minimally interactive at baseline  · Appears to be at baseline currently  · Supportive care      PVD (peripheral vascular disease) ([place] [address] )  Assessment & Plan  Continue aspirin and statin    HTN (hypertension)  Assessment & Plan  · Blood pressure appears to be on the soft side  · Hold home amlodipine  · Monitor blood pressure    Bipolar 1 disorder (HCC)  Assessment & Plan  · Continue home medications  · Unfortunately there is shortage of IV Depacon therefore will switch it to sprinkles        VTE Pharmacologic Prophylaxis:   Pharmacologic: Heparin  Mechanical VTE Prophylaxis in Place: Yes    Review of Systems:    Review of Systems   Constitutional: Negative for chills and fever  HENT: Negative for ear pain and sore throat  Eyes: Negative for pain and visual disturbance  Respiratory: Negative for cough and shortness of breath  Cardiovascular: Negative for chest pain and palpitations  Gastrointestinal: Negative for abdominal pain and vomiting  Genitourinary: Negative for dysuria and hematuria  Musculoskeletal: Negative for arthralgias and back pain  Skin: Negative for color change and rash  Neurological: Negative for seizures and syncope  All other systems reviewed and are negative        Past Medical and Surgical History:     Past Medical History:   Diagnosis Date    Arthritis     Chronic venous insufficiency     HTN (hypertension) 10/26/2019    Prostate cancer ([place] [address] )     PVD (peripheral vascular disease) ([place] [address] )        Past Surgical History:   Procedure Laterality Date    HERNIA REPAIR      TONSILLECTOMY         Patient Centered Rounds: I have performed bedside rounds with nursing staff today  Discussions with Specialists or Other Care Team Provider:  Nursing    Education and Discussions with Family / Patient:  Patient and tried calling his nephew multiple time but was unsuccessful, left a voice message    Time Spent for Care: 30 minutes  More than 50% of total time spent on counseling and coordination of care as described above  Current Length of Stay: 2 day(s)    Current Patient Status: Inpatient   Certification Statement: The patient will continue to require additional inpatient hospital stay due to IV antibiotics     Discharge Plan:  Likely in the next 48 hours    Code Status: Level 3 - DNAR and DNI      Subjective:   Patient resting comfortably in bed and denies any complaints    Objective:     Vitals:   Temp (24hrs), Av 5 °F (36 9 °C), Min:97 6 °F (36 4 °C), Max:99 7 °F (37 6 °C)    Temp:  [97 6 °F (36 4 °C)-99 7 °F (37 6 °C)] 98 3 °F (36 8 °C)  HR:  [57-86] 57  BP: (104-121)/(48-80) 104/48  SpO2:  [92 %-95 %] 94 %  Body mass index is 20 93 kg/m²  Input and Output Summary (last 24 hours): Intake/Output Summary (Last 24 hours) at 2022 1736  Last data filed at 2022 1716  Gross per 24 hour   Intake 50 ml   Output 750 ml   Net -700 ml       Physical Exam:     Physical Exam  Vitals and nursing note reviewed  Constitutional:       Appearance: He is well-developed  HENT:      Head: Normocephalic and atraumatic  Mouth/Throat:      Mouth: Mucous membranes are moist       Pharynx: Oropharynx is clear  Eyes:      General: No scleral icterus  Conjunctiva/sclera: Conjunctivae normal    Cardiovascular:      Rate and Rhythm: Normal rate and regular rhythm  Heart sounds: No murmur heard  Pulmonary:      Effort: Pulmonary effort is normal  No respiratory distress  Breath sounds: Normal breath sounds  No wheezing or rales     Abdominal: General: Bowel sounds are normal       Palpations: Abdomen is soft  Tenderness: There is no abdominal tenderness  Musculoskeletal:      Cervical back: Normal range of motion and neck supple  Right lower leg: No edema  Left lower leg: No edema  Skin:     General: Skin is warm and dry  Neurological:      General: No focal deficit present  Mental Status: He is alert  Mental status is at baseline  Psychiatric:         Mood and Affect: Mood normal          Behavior: Behavior normal            Additional Data:     Labs:    Results from last 7 days   Lab Units 09/17/22  0444   WBC Thousand/uL 10 33*   HEMOGLOBIN g/dL 12 6   HEMATOCRIT % 38 5   PLATELETS Thousands/uL 163   NEUTROS PCT % 70   LYMPHS PCT % 19   MONOS PCT % 11   EOS PCT % 0     Results from last 7 days   Lab Units 09/17/22  0444 09/16/22  0552 09/15/22  1116   SODIUM mmol/L 142   < > 139   POTASSIUM mmol/L 3 5   < > 4 3   CHLORIDE mmol/L 104   < > 106   CO2 mmol/L 32   < > 30   BUN mg/dL 21   < > 14   CREATININE mg/dL 0 92   < > 0 95   ANION GAP mmol/L 6   < > 3*   CALCIUM mg/dL 8 9   < > 9 0   ALBUMIN g/dL  --   --  2 7*   TOTAL BILIRUBIN mg/dL  --   --  0 54   ALK PHOS U/L  --   --  40*   ALT U/L  --   --  12   AST U/L  --   --  12   GLUCOSE RANDOM mg/dL 102   < > 81    < > = values in this interval not displayed  Results from last 7 days   Lab Units 09/15/22  1116   INR  1 18             Results from last 7 days   Lab Units 09/17/22  0444 09/16/22  0552 09/15/22  1128 09/15/22  1116   LACTIC ACID mmol/L  --   --   --  1 5   PROCALCITONIN ng/ml 0 37* 0 46* 0 07  --            * I Have Reviewed All Lab Data Listed Above  * Additional Pertinent Lab Tests Reviewed:  [address] Admission Reviewed    Imaging:    Imaging Reports Reviewed Today Include:  None  Imaging Personally Reviewed by Myself Includes:  None    Recent Cultures (last 7 days):     Results from last 7 days   Lab Units 09/15/22  1128 09/15/22  1116   BLOOD CULTURE  No Growth at 24 hrs  No Growth at 24 hrs  Last 24 Hours Medication List:   Current Facility-Administered Medications   Medication Dose Route Frequency Provider Last Rate    acetaminophen  650 mg Oral Q6H PRN Salvatore Levy MD      aspirin  81 mg Oral Daily Salvatore Levy MD      atorvastatin  10 mg Oral Daily With Anjelica De Leon MD      cefTRIAXone  1,000 mg Intravenous Q24H Salvatore Levy MD 1,000 mg (09/17/22 0933)    divalproex sodium  500 mg Oral Q12H [address] Salvatore Levy MD      heparin (porcine)  5,000 Units Subcutaneous Q8H [address] Salvatore Levy MD      sertraline  100 mg Oral Daily Salvatore Levy MD          Today, Patient Was Seen By: Solomon Concepcion MD    ** Please Note: Dictation voice to text software may have been used in the creation of this document   **

## 2022-09-17 NOTE — UTILIZATION REVIEW
Inpatient Admission Authorization Request   NOTIFICATION OF INPATIENT ADMISSION/INPATIENT AUTHORIZATION REQUEST   SERVICING FACILITY:   [address] [place] [address]  Tax ID: [id number]  NPI: [id number]  Place of Service: Inpatient [facility]  60[address]  Place of Service Code: 24     ATTENDING PROVIDER:  Attending Name and NPI#: Neha He Md [[id number]]  Address: [address] [place] [address]  Phone: [phone]     UTILIZATION REVIEW CONTACT:  Hayes Armstrong Utilization   Network Utilization Review Department  Phone: [phone]  Fax [phone]  Email: [email or web address]     PHYSICIAN ADVISORY SERVICES:  FOR [id number] REVIEW - MEDICAL NECESSITY DENIAL  Phone: [phone]  Fax: [phone]  Email: [email or web address]     TYPE OF REQUEST:  Inpatient Status     ADMISSION INFORMATION:  ADMISSION DATE/TIME: 9/15/22  1:40 PM  PATIENT DIAGNOSIS CODE/DESCRIPTION:  Aspiration pneumonia ([facility] [address] ) [J69 0]  Low O2 saturation [R79 81]  DISCHARGE DATE/TIME: No discharge date for patient encounter  IMPORTANT INFORMATION:  Please contact Hayes Armstrong directly with any questions or concerns regarding this request  Department voicemails are confidential     Send requests for admission clinical reviews, concurrent reviews, approvals, and administrative denials due to lack of clinical to fax [phone]

## 2022-09-17 NOTE — ASSESSMENT & PLAN NOTE
Malnutrition Findings:   Adult Malnutrition type: Chronic illness  Adult Degree of Malnutrition: Other severe protein calorie malnutrition  Malnutrition Characteristics: Fat loss, Muscle loss                  360 Statement: related to medical condition as evidenced by severe orbital hollowing and severe temporal depletion  Treated with diet and supplements  BMI Findings: Body mass index is 20 93 kg/m²     Nutrition consult

## 2022-09-17 NOTE — ASSESSMENT & PLAN NOTE
· Present on admission with reported coughing and shortness of breath  CT chest showed finding suggestive of aspiration pneumonia  · Patient did not meet SIRS criteria on admission  · Continue ceftriaxone  · Procalcitonin elevated, trended down, continue to monitor  · Follow-up on blood cultures  · Follow-up on sputum cultures urine strep and Legionella  · Aspiration precautions  · Appreciate speech therapy recommendations, diet updated accordingly  · Discussed expectation and wishes with patient's nephew    Patient's nephew requested to think about making any decision in regards of feeding tubes if needed

## 2022-09-17 NOTE — ASSESSMENT & PLAN NOTE
· Noted on CT abdomen/pelvis  · Follow-up on UA  · Patient unable to provide any history regarding urinary symptoms  · Continue coverage with ceftriaxone

## 2022-09-18 LAB
ANION GAP SERPL CALCULATED.3IONS-SCNC: 4 MMOL/L (ref 4–13)
BASOPHILS # BLD AUTO: 0.02 THOUSANDS/ΜL (ref 0–0.1)
BASOPHILS NFR BLD AUTO: 0 % (ref 0–1)
BUN SERPL-MCNC: 18 MG/DL (ref 5–25)
CALCIUM SERPL-MCNC: 8.8 MG/DL (ref 8.3–10.1)
CHLORIDE SERPL-SCNC: 109 MMOL/L (ref 96–108)
CO2 SERPL-SCNC: 32 MMOL/L (ref 21–32)
CREAT SERPL-MCNC: 0.76 MG/DL (ref 0.6–1.3)
EOSINOPHIL # BLD AUTO: 0.01 THOUSAND/ΜL (ref 0–0.61)
EOSINOPHIL NFR BLD AUTO: 0 % (ref 0–6)
ERYTHROCYTE [DISTWIDTH] IN BLOOD BY AUTOMATED COUNT: 13.8 % (ref 11.6–15.1)
GFR SERPL CREATININE-BSD FRML MDRD: 82 ML/MIN/1.73SQ M
GLUCOSE SERPL-MCNC: 93 MG/DL (ref 65–140)
HCT VFR BLD AUTO: 37 % (ref 36.5–49.3)
HGB BLD-MCNC: 12.3 G/DL (ref 12–17)
IMM GRANULOCYTES # BLD AUTO: 0.04 THOUSAND/UL (ref 0–0.2)
IMM GRANULOCYTES NFR BLD AUTO: 1 % (ref 0–2)
LYMPHOCYTES # BLD AUTO: 1.93 THOUSANDS/ΜL (ref 0.6–4.47)
LYMPHOCYTES NFR BLD AUTO: 23 % (ref 14–44)
MCH RBC QN AUTO: 31.3 PG (ref 26.8–34.3)
MCHC RBC AUTO-ENTMCNC: 33.2 G/DL (ref 31.4–37.4)
MCV RBC AUTO: 94 FL (ref 82–98)
MONOCYTES # BLD AUTO: 0.8 THOUSAND/ΜL (ref 0.17–1.22)
MONOCYTES NFR BLD AUTO: 10 % (ref 4–12)
NEUTROPHILS # BLD AUTO: 5.59 THOUSANDS/ΜL (ref 1.85–7.62)
NEUTS SEG NFR BLD AUTO: 66 % (ref 43–75)
NRBC BLD AUTO-RTO: 0 /100 WBCS
PLATELET # BLD AUTO: 165 THOUSANDS/UL (ref 149–390)
PMV BLD AUTO: 10.6 FL (ref 8.9–12.7)
POTASSIUM SERPL-SCNC: 4.3 MMOL/L (ref 3.5–5.3)
PROCALCITONIN SERPL-MCNC: 0.22 NG/ML
RBC # BLD AUTO: 3.93 MILLION/UL (ref 3.88–5.62)
SODIUM SERPL-SCNC: 145 MMOL/L (ref 135–147)
WBC # BLD AUTO: 8.39 THOUSAND/UL (ref 4.31–10.16)

## 2022-09-18 PROCEDURE — 99233 SBSQ HOSP IP/OBS HIGH 50: CPT | Performed by: INTERNAL MEDICINE

## 2022-09-18 PROCEDURE — 84145 PROCALCITONIN (PCT): CPT | Performed by: INTERNAL MEDICINE

## 2022-09-18 PROCEDURE — 80048 BASIC METABOLIC PNL TOTAL CA: CPT | Performed by: INTERNAL MEDICINE

## 2022-09-18 PROCEDURE — 85025 COMPLETE CBC W/AUTO DIFF WBC: CPT | Performed by: INTERNAL MEDICINE

## 2022-09-18 RX ADMIN — ATORVASTATIN CALCIUM 10 MG: 10 TABLET, FILM COATED ORAL at 17:29

## 2022-09-18 RX ADMIN — ASPIRIN 81 MG: 81 TABLET, CHEWABLE ORAL at 09:02

## 2022-09-18 RX ADMIN — DIVALPROEX SODIUM 500 MG: 125 CAPSULE, COATED PELLETS ORAL at 22:32

## 2022-09-18 RX ADMIN — CEFTRIAXONE SODIUM 1000 MG: 10 INJECTION, POWDER, FOR SOLUTION INTRAVENOUS at 09:10

## 2022-09-18 RX ADMIN — HEPARIN SODIUM 5000 UNITS: 5000 INJECTION INTRAVENOUS; SUBCUTANEOUS at 15:06

## 2022-09-18 RX ADMIN — SERTRALINE HYDROCHLORIDE 100 MG: 50 TABLET ORAL at 09:02

## 2022-09-18 RX ADMIN — DIVALPROEX SODIUM 500 MG: 125 CAPSULE, COATED PELLETS ORAL at 08:56

## 2022-09-18 RX ADMIN — HEPARIN SODIUM 5000 UNITS: 5000 INJECTION INTRAVENOUS; SUBCUTANEOUS at 22:32

## 2022-09-18 RX ADMIN — HEPARIN SODIUM 5000 UNITS: 5000 INJECTION INTRAVENOUS; SUBCUTANEOUS at 06:08

## 2022-09-18 NOTE — CASE MANAGEMENT
Case Management Discharge Planning Note    Patient name Krystina Fisher  Location [address] 312/-83 MRN [id number]  : 1934 Date 2022       Current Admission Date: 9/15/2022  Current Admission Diagnosis:Aspiration pneumonia [facility])   Patient Active Problem List    Diagnosis Date Noted    Severe protein-calorie malnutrition ([place] [address] ) 2022    Aspiration pneumonia ([place] [address] ) 09/15/2022    Acute CHF (congestive heart failure) ([place] [address] ) 09/15/2022    Cystitis 09/15/2022    Dementia without behavioral disturbance, unspecified dementia type ([place] [address] ) 2022    Personal history of COVID-19 10/2021    Mood disorder ([place] [address] ) 2021    History of T12 vertebral fracture 2021    Ambulatory dysfunction 2020    PVD (peripheral vascular disease) ([place] [address] ) 2020    HTN (hypertension) 10/26/2019    History of prostate cancer 2019    Hyperlipidemia 2018    Bipolar 1 disorder ([place] [address] ) 2018      LOS (days): 3  Geometric Mean LOS (GMLOS) (days): 3 80  Days to GMLOS:0 9     OBJECTIVE:  Risk of Unplanned Readmission Score: 9 57         Current admission status: Inpatient   Preferred Pharmacy:   [address] [place] [address]  Phone: [phone] Fax: 1 [place] [address]  Phone: [phone] Fax: [phone]    Primary Care Provider: Cami Jones DO    Primary Insurance: [facility]  Secondary Insurance:     DISCHARGE DETAILS:    Discharge planning discussed with[de-identified] Dr Jaci Mesa spoke to nephvidhi Kumari Brooke and he was agreeable to STR  CM tried to reach Jorge to obtain info for open and to find out preferences, but had to leave message for him to call back    CM will place generic referrals and will let nephew know any acceptances when he calls back                                     Other Referral/Resources/Interventions Provided:  Interventions: Short Term Rehab  Referral Comments: PT's recommendation is STR    Generic referrals placed-will obtain preferences when nephew Susie Loge calls back    Would you like to participate in our [address] service program?  : No - Declined    Treatment Team Recommendation: Short Term Rehab  Discharge Destination Plan[de-identified] Short Term Rehab  Transport at Discharge : BLS Ambulance

## 2022-09-18 NOTE — CASE MANAGEMENT
Case Management Assessment & Discharge Planning Note    Patient name Steve Segura  Location [address] 312/-73 MRN [id number]  : 1934 Date 2022       Current Admission Date: 9/15/2022  Current Admission Diagnosis:Aspiration pneumonia [facility])   Patient Active Problem List    Diagnosis Date Noted    Severe protein-calorie malnutrition ([facility] ) 2022    Aspiration pneumonia ([place] [address] ) 09/15/2022    Acute CHF (congestive heart failure) ([place] [address] ) 09/15/2022    Cystitis 09/15/2022    Dementia without behavioral disturbance, unspecified dementia type ([place] [address] ) 2022    Personal history of COVID-19 10/2021    Mood disorder ([place] [address] ) 2021    History of T12 vertebral fracture 2021    Ambulatory dysfunction 2020    PVD (peripheral vascular disease) ([place] [address] ) 2020    HTN (hypertension) 10/26/2019    History of prostate cancer 2019    Hyperlipidemia 2018    Bipolar 1 disorder ([place] [address] ) 2018      LOS (days): 3  Geometric Mean LOS (GMLOS) (days): 3 80  Days to GMLOS:0 9     OBJECTIVE:    Risk of Unplanned Readmission Score: 9 57         Current admission status: Inpatient       Preferred Pharmacy:   [address] [place] [address]  Phone: [phone] Fax: 2 [place] [address]  Phone: [phone] Fax: [phone]    Primary Care Provider: Radha Brito DO    Primary Insurance: [facility]  Secondary Insurance:     ASSESSMENT:  [place] Proxies    There are no active Health Care Proxies on file         Advance Directives  Does patient have a Health Care POA?: Yes  Does patient have Advance Directives?: Yes  Advance Directives: Living will, Power of  for health care  Primary Contact: nephvidhi Peralta         Readmission Root Cause  30 Day Readmission: No    Patient Information  Admitted from[de-identified] Facility (Pt resides at [address])  Mental Status: Confused  During Assessment patient was accompanied by: Not accompanied during assessment  Assessment information provided by[de-identified] Other - please comment (bam and [address])  Primary Caregiver: Other (Comment)  Caregiver's Name[de-identified] staff at [facility] Relationship to Patient[de-identified] Family Member  Caregiver's Telephone Number[de-identified] [phone]  Support Systems: Family members, Other (Comment) (nephvidhi Yoon Roseannekoby is POA/staff at [facility])  [place] of Residence: [place] [address] do you live in?:  [employer] entry access options  Select all that apply : No steps to enter home  Type of Current Residence: Other (Comment) (Assisted Living)  In the last 12 months, was there a time when you were not able to pay the mortgage or rent on time?: No  In the last 12 months, how many places have you lived?: 1  In the last 12 months, was there a time when you did not have a steady place to sleep or slept in a shelter (including now)?: No  Homeless/housing insecurity resource given?: No  Living Arrangements: Other (Comment) (Lives at [address])  Is patient a ?: No    Activities of Daily Living Prior to Admission  Functional Status: Assistance  Completes ADLs independently?: No  Level of ADL dependence: Assistance  Ambulates independently?: No  Level of ambulatory dependence: Total Dependent (pt is w/c bound)  Does patient use assisted devices?: Yes  Assisted Devices (DME) used:  Wheelchair  Does patient currently own DME?: Yes  What DME does the patient currently own?: Wheelchair  Does patient have a history of Outpatient Therapy (PT/OT)?: No  Does the patient have a history of Short-Term Rehab?: Yes (Has been at [facility] numerous times in the past)  Does patient have a history of [place] ?: Yes (unsure of the name of the agency)         Patient Information Continued  Income Source: Pension/intermediate  Does patient have prescription coverage?: Yes  Within the past 12 months, you worried that your food would run out before you got the money to buy more : Never true  Within the past 12 months, the food you bought just didn't last and you didn't have money to get more : Never true  Food insecurity resource given?: No  Does patient receive dialysis treatments?: No  Does patient have a history of substance abuse?: No  Does patient have a history of Mental Health Diagnosis?: No    PHQ 2/9 Screening   Reviewed PHQ 2/9 Depression Screening Score?: No    Means of Transportation  Means of Transport to Appts[de-identified] Family transport  In the past 12 months, has lack of transportation kept you from medical appointments or from getting medications?: No  In the past 12 months, has lack of transportation kept you from meetings, work, or from getting things needed for daily living?: No  Was application for public transport provided?: No        DISCHARGE DETAILS:    Discharge planning discussed with[de-identified] Damon Alvarenga  Freedom of Choice: Yes  Comments - Freedom of Choice: Nephew/POA Damon Alvarenga is agreeable to PT's recommendation of STR before returning to [facility]  He has been in [place] [facility] [place] numerous times in the past, so Melyssa Heller is hoping he can be accepted back to this STR  CM contacted family/caregiver?: Yes  Were Treatment Team discharge recommendations reviewed with patient/caregiver?: Yes  Did patient/caregiver verbalize understanding of patient care needs?: Yes  Were patient/caregiver advised of the risks associated with not following Treatment Team discharge recommendations?: Yes    Contacts  Patient Contacts:  Jorge  Relationship to Patient[de-identified] Family  Contact Method: Phone  Phone Number: [phone]  Reason/Outcome: Continuity of Care, Discharge [address]         Is the patient interested in [facility] at discharge?: No    DME Referral Provided  Referral made for DME?: No    Other Referral/Resources/Interventions Provided:  Interventions: Short Term Rehab  Referral Comments: Ival Splinter at [facility] pended/CM also left message with Thiago Martinez from [place] to see if he would accept back without STR since pt was w/c bound prior to this admission      Would you like to participate in our [address] service program?  : No - Declined    Treatment Team Recommendation: Short Term Rehab  Discharge Destination Plan[de-identified] Short Term Rehab, Assisted Living  Transport at Discharge : BLS Ambulance

## 2022-09-18 NOTE — ASSESSMENT & PLAN NOTE
· Present on admission with reported coughing and shortness of breath    CT chest showed finding suggestive of aspiration pneumonia  · Patient did not meet SIRS criteria on admission  · Continue ceftriaxone  · Procalcitonin elevated on admission, trended down, continue to monitor  · Follow-up on blood cultures, negative thus far  · Aspiration precautions  · Appreciate speech therapy recommendations, diet updated accordingly

## 2022-09-18 NOTE — PLAN OF CARE
Problem: Prexisting or High Potential for Compromised Skin Integrity  Goal: Skin integrity is maintained or improved  Description: INTERVENTIONS:  - Identify patients at risk for skin breakdown  - Assess and monitor skin integrity  - Assess and monitor nutrition and hydration status  - Monitor labs   - Assess for incontinence   - Turn and reposition patient  - Assist with mobility/ambulation  - Relieve pressure over bony prominences  - Avoid friction and shearing  - Provide appropriate hygiene as needed including keeping skin clean and dry  - Evaluate need for skin moisturizer/barrier cream  - Collaborate with interdisciplinary team   - Patient/family teaching  - Consider wound care consult   Outcome: Progressing     Problem: PAIN - ADULT  Goal: Verbalizes/displays adequate comfort level or baseline comfort level  Description: Interventions:  - Encourage patient to monitor pain and request assistance  - Assess pain using appropriate pain scale  - Administer analgesics based on type and severity of pain and evaluate response  - Implement non-pharmacological measures as appropriate and evaluate response  - Consider cultural and social influences on pain and pain management  - Notify physician/advanced practitioner if interventions unsuccessful or patient reports new pain  Outcome: Progressing     Problem: INFECTION - ADULT  Goal: Absence or prevention of progression during hospitalization  Description: INTERVENTIONS:  - Assess and monitor for signs and symptoms of infection  - Monitor lab/diagnostic results  - Monitor all insertion sites, i e  indwelling lines, tubes, and drains  - Monitor endotracheal if appropriate and nasal secretions for changes in amount and color  - [place] appropriate cooling/warming therapies per order  - Administer medications as ordered  - Instruct and encourage patient and family to use good hand hygiene technique  - Identify and instruct in appropriate isolation precautions for identified infection/condition  Outcome: Progressing

## 2022-09-18 NOTE — CASE MANAGEMENT
Case Management Discharge Planning Note    Patient name Ayden Flanagan  Location [address] 312/-33 MRN [id number]  : 1934 Date 2022       Current Admission Date: 9/15/2022  Current Admission Diagnosis:Aspiration pneumonia [facility])   Patient Active Problem List    Diagnosis Date Noted    Severe protein-calorie malnutrition ([facility] ) 2022    Aspiration pneumonia ([place] [address] ) 09/15/2022    Acute CHF (congestive heart failure) ([place] [address] ) 09/15/2022    Cystitis 09/15/2022    Dementia without behavioral disturbance, unspecified dementia type ([place] [address] ) 2022    Personal history of COVID-19 10/2021    Mood disorder ([place] [address] ) 2021    History of T12 vertebral fracture 2021    Ambulatory dysfunction 2020    PVD (peripheral vascular disease) ([place] [address] ) 2020    HTN (hypertension) 10/26/2019    History of prostate cancer 2019    Hyperlipidemia 2018    Bipolar 1 disorder ([place] [address] ) 2018      LOS (days): 3  Geometric Mean LOS (GMLOS) (days): 3 80  Days to GMLOS:0 8     OBJECTIVE:  Risk of Unplanned Readmission Score: 9 59         Current admission status: Inpatient   Preferred Pharmacy:   [address] [place] [address], [address] [place] [address]  Phone: [phone] Fax: 8 [facility] [address] [address]  Phone: [phone] Fax: [phone]    Primary Care Provider: Tiago Alejo DO    Primary Insurance: [facility]  Secondary Insurance:     DISCHARGE DETAILS:    Discharge planning discussed with[de-identified] Anu Lacey from [facility] [address] of Choice: Yes  Comments - Freedom of Choice: Anu Lacey feels pt should go to a [facility] before returning to [facility]

## 2022-09-18 NOTE — PROGRESS NOTES
[address] [facility]  Progress Note Elizabeth Maddox 1934, 80 y o  male MRN: [id number]  Unit/Bed#: -01 Encounter: [id number]  Primary Care Provider: Rin Child DO   Date and time admitted to hospital: 9/15/2022 10:43 AM    * Aspiration pneumonia [facility])  Assessment & Plan  · Present on admission with reported coughing and shortness of breath  CT chest showed finding suggestive of aspiration pneumonia  · Patient did not meet SIRS criteria on admission  · Continue ceftriaxone  · Procalcitonin elevated on admission, trended down, continue to monitor  · Follow-up on blood cultures, negative thus far  · Aspiration precautions  · Appreciate speech therapy recommendations, diet updated accordingly    Severe protein-calorie malnutrition ([facility] [address] )  Assessment & Plan  Malnutrition Findings:   Adult Malnutrition type: Chronic illness  Adult Degree of Malnutrition: Other severe protein calorie malnutrition  Malnutrition Characteristics: Fat loss, Muscle loss                  360 Statement: related to medical condition as evidenced by severe orbital hollowing and severe temporal depletion  Treated with diet and supplements  BMI Findings: Body mass index is 20 93 kg/m²     Nutrition consult    Cystitis  Assessment & Plan  · Noted on CT abdomen/pelvis  · Follow-up on UA  · Patient unable to provide any history regarding urinary symptoms  · Continue coverage with ceftriaxone    Acute CHF (congestive heart failure) (HCC)  Assessment & Plan  Wt Readings from Last 3 Encounters:   09/18/22 70 kg (154 lb 5 2 oz)   01/07/22 71 7 kg (158 lb)   07/16/21 68 2 kg (150 lb 4 8 oz)     · No known history of CHF noted in chart review  · Mild volume overload with elevated proBNP and chest x-ray showing vascular congestion on admission  · Received 1 dose of IV Lasix 20 mg  · Monitor I&Os and daily weights  · Diurese as needed, patient currently appears comfortable from the respiratory standpoint and saturating well on room air therefore will hold off on further diuretics  · Follow-up on echo  · Currently euvolemic on exam    Dementia without behavioral disturbance, unspecified dementia type ([place] [address] )  Assessment & Plan  · Alert and oriented to self and Minimally interactive at baseline  · Appears to be at baseline currently  · Supportive care      PVD (peripheral vascular disease) ([facility] )  Assessment & Plan  Continue aspirin and statin    HTN (hypertension)  Assessment & Plan  · Blood pressure appears to be on the soft side  · Hold home amlodipine  · Monitor blood pressure    Bipolar 1 disorder (HCC)  Assessment & Plan  · Continue home medications        VTE Pharmacologic Prophylaxis:   Pharmacologic: Heparin  Mechanical VTE Prophylaxis in Place: Yes    Review of Systems:    Review of Systems   Constitutional: Negative for chills and fever  HENT: Negative for ear pain and sore throat  Eyes: Negative for pain and visual disturbance  Respiratory: Negative for cough and shortness of breath  Cardiovascular: Negative for chest pain and palpitations  Gastrointestinal: Negative for abdominal pain and vomiting  Genitourinary: Negative for dysuria and hematuria  Musculoskeletal: Negative for arthralgias and back pain  Skin: Negative for color change and rash  Neurological: Negative for seizures and syncope  All other systems reviewed and are negative  Past Medical and Surgical History:     Past Medical History:   Diagnosis Date    Arthritis     Chronic venous insufficiency     HTN (hypertension) 10/26/2019    Prostate cancer ([facility] )     PVD (peripheral vascular disease) ([place] [address] )        Past Surgical History:   Procedure Laterality Date    HERNIA REPAIR      TONSILLECTOMY         Patient Centered Rounds: I have performed bedside rounds with nursing staff today      Discussions with Specialists or Other Care Team Provider:  Case management, nursing    Education and Discussions with Family / Patient:  Patient and discussed rehab recommendations by PT with patient's nephew who is agreeable for referrals to rehab places    Time Spent for Care: 30 minutes  More than 50% of total time spent on counseling and coordination of care as described above  Current Length of Stay: 3 day(s)    Current Patient Status: Inpatient   Certification Statement: The patient will continue to require additional inpatient hospital stay due to IV antibiotics and rehab placement    Discharge Plan:  Stable for discharge tomorrow from medical standpoint pending rehab placement    Code Status: Level 3 - DNAR and DNI      Subjective:   Patient denies any complaints and was resting comfortably in bed    Objective:     Vitals:   Temp (24hrs), Av 5 °F (36 9 °C), Min:98 3 °F (36 8 °C), Max:98 9 °F (37 2 °C)    Temp:  [98 3 °F (36 8 °C)-98 9 °F (37 2 °C)] 98 4 °F (36 9 °C)  HR:  [55-66] 60  Resp:  [16-18] 16  BP: (104-144)/(48-79) 144/79  SpO2:  [93 %-95 %] 93 %  Body mass index is 20 93 kg/m²  Input and Output Summary (last 24 hours): Intake/Output Summary (Last 24 hours) at 2022 1241  Last data filed at 2022 2300  Gross per 24 hour   Intake 75 ml   Output 200 ml   Net -125 ml       Physical Exam:     Physical Exam  Vitals and nursing note reviewed  Constitutional:       General: He is not in acute distress  Appearance: He is well-developed  He is not ill-appearing or diaphoretic  HENT:      Head: Normocephalic and atraumatic  Mouth/Throat:      Mouth: Mucous membranes are moist       Pharynx: Oropharynx is clear  Eyes:      General: No scleral icterus  Extraocular Movements: Extraocular movements intact  Conjunctiva/sclera: Conjunctivae normal    Cardiovascular:      Rate and Rhythm: Normal rate and regular rhythm  Heart sounds: No murmur heard  Pulmonary:      Effort: Pulmonary effort is normal  No respiratory distress  Breath sounds: Normal breath sounds  No wheezing or rales     Abdominal: Palpations: Abdomen is soft  Tenderness: There is no abdominal tenderness  Musculoskeletal:      Cervical back: Normal range of motion and neck supple  Right lower leg: No edema  Left lower leg: No edema  Skin:     General: Skin is warm and dry  Neurological:      General: No focal deficit present  Mental Status: He is alert  Mental status is at baseline  Psychiatric:         Mood and Affect: Mood normal          Behavior: Behavior normal            Additional Data:     Labs:    Results from last 7 days   Lab Units 09/18/22  0531   WBC Thousand/uL 8 39   HEMOGLOBIN g/dL 12 3   HEMATOCRIT % 37 0   PLATELETS Thousands/uL 165   NEUTROS PCT % 66   LYMPHS PCT % 23   MONOS PCT % 10   EOS PCT % 0     Results from last 7 days   Lab Units 09/18/22  0531 09/16/22  0552 09/15/22  1116   SODIUM mmol/L 145   < > 139   POTASSIUM mmol/L 4 3   < > 4 3   CHLORIDE mmol/L 109*   < > 106   CO2 mmol/L 32   < > 30   BUN mg/dL 18   < > 14   CREATININE mg/dL 0 76   < > 0 95   ANION GAP mmol/L 4   < > 3*   CALCIUM mg/dL 8 8   < > 9 0   ALBUMIN g/dL  --   --  2 7*   TOTAL BILIRUBIN mg/dL  --   --  0 54   ALK PHOS U/L  --   --  40*   ALT U/L  --   --  12   AST U/L  --   --  12   GLUCOSE RANDOM mg/dL 93   < > 81    < > = values in this interval not displayed  Results from last 7 days   Lab Units 09/15/22  1116   INR  1 18             Results from last 7 days   Lab Units 09/18/22  0531 09/17/22  0444 09/16/22  0552 09/15/22  1128 09/15/22  1116   LACTIC ACID mmol/L  --   --   --   --  1 5   PROCALCITONIN ng/ml 0 22 0 37* 0 46* 0 07  --            * I Have Reviewed All Lab Data Listed Above  * Additional Pertinent Lab Tests Reviewed:  [facility] [address] Admission Reviewed    Imaging:    Imaging Reports Reviewed Today Include:  None  Imaging Personally Reviewed by Myself Includes:  None    Recent Cultures (last 7 days):     Results from last 7 days   Lab Units 09/15/22  1128 09/15/22  1116   BLOOD CULTURE  No Growth at 48 hrs  No Growth at 48 hrs  Last 24 Hours Medication List:   Current Facility-Administered Medications   Medication Dose Route Frequency Provider Last Rate    acetaminophen  650 mg Oral Q6H PRN Gay Ramirez MD      aspirin  81 mg Oral Daily Gay Ramirez MD      atorvastatin  10 mg Oral Daily With Jayshree Mast MD      cefTRIAXone  1,000 mg Intravenous Q24H Gay Ramirez MD 1,000 mg (09/18/22 0910)    divalproex sodium  500 mg Oral Q12H [facility] Gay Ramirez MD      heparin (porcine)  5,000 Units Subcutaneous Q8H [facility] Gay Ramirez MD      sertraline  100 mg Oral Daily Gay Ramirez MD          Today, Patient Was Seen By: Lazaro Garay MD    ** Please Note: Dictation voice to text software may have been used in the creation of this document   **

## 2022-09-19 PROBLEM — I50.31 ACUTE DIASTOLIC CHF (CONGESTIVE HEART FAILURE) (HCC): Status: ACTIVE | Noted: 2022-09-15

## 2022-09-19 LAB
ERYTHROCYTE [DISTWIDTH] IN BLOOD BY AUTOMATED COUNT: 14.1 % (ref 11.6–15.1)
HCT VFR BLD AUTO: 36.7 % (ref 36.5–49.3)
HGB BLD-MCNC: 12 G/DL (ref 12–17)
MCH RBC QN AUTO: 31.2 PG (ref 26.8–34.3)
MCHC RBC AUTO-ENTMCNC: 32.7 G/DL (ref 31.4–37.4)
MCV RBC AUTO: 95 FL (ref 82–98)
PLATELET # BLD AUTO: 175 THOUSANDS/UL (ref 149–390)
PMV BLD AUTO: 10.3 FL (ref 8.9–12.7)
RBC # BLD AUTO: 3.85 MILLION/UL (ref 3.88–5.62)
WBC # BLD AUTO: 5.21 THOUSAND/UL (ref 4.31–10.16)

## 2022-09-19 PROCEDURE — 85027 COMPLETE CBC AUTOMATED: CPT | Performed by: INTERNAL MEDICINE

## 2022-09-19 PROCEDURE — 99233 SBSQ HOSP IP/OBS HIGH 50: CPT | Performed by: INTERNAL MEDICINE

## 2022-09-19 RX ADMIN — HEPARIN SODIUM 5000 UNITS: 5000 INJECTION INTRAVENOUS; SUBCUTANEOUS at 15:00

## 2022-09-19 RX ADMIN — CEFTRIAXONE SODIUM 1000 MG: 10 INJECTION, POWDER, FOR SOLUTION INTRAVENOUS at 09:38

## 2022-09-19 RX ADMIN — ATORVASTATIN CALCIUM 10 MG: 10 TABLET, FILM COATED ORAL at 17:11

## 2022-09-19 RX ADMIN — ASPIRIN 81 MG: 81 TABLET, CHEWABLE ORAL at 09:38

## 2022-09-19 RX ADMIN — DIVALPROEX SODIUM 500 MG: 125 CAPSULE, COATED PELLETS ORAL at 09:38

## 2022-09-19 RX ADMIN — DIVALPROEX SODIUM 500 MG: 125 CAPSULE, COATED PELLETS ORAL at 22:00

## 2022-09-19 RX ADMIN — SERTRALINE HYDROCHLORIDE 100 MG: 50 TABLET ORAL at 09:38

## 2022-09-19 RX ADMIN — HEPARIN SODIUM 5000 UNITS: 5000 INJECTION INTRAVENOUS; SUBCUTANEOUS at 05:09

## 2022-09-19 RX ADMIN — HEPARIN SODIUM 5000 UNITS: 5000 INJECTION INTRAVENOUS; SUBCUTANEOUS at 22:00

## 2022-09-19 NOTE — ASSESSMENT & PLAN NOTE
· Present on admission with reported coughing and shortness of breath    CT chest showed finding suggestive of aspiration pneumonia  · Patient did not meet SIRS criteria on admission  · Continue ceftriaxone  · Procalcitonin elevated on admission, trended down  · Follow-up on blood cultures, negative thus far  · Aspiration precautions  · Appreciate speech therapy recommendations, diet updated accordingly

## 2022-09-19 NOTE — PROGRESS NOTES
[address] [facility]  Progress Note Yuri Frankel 1934, 80 y o  male MRN: [id number]  Unit/Bed#: -01 Encounter: [id number]  Primary Care Provider: Fara Chopra DO   Date and time admitted to hospital: 9/15/2022 10:43 AM    * Aspiration pneumonia [facility])  Assessment & Plan  · Present on admission with reported coughing and shortness of breath  CT chest showed finding suggestive of aspiration pneumonia  · Patient did not meet SIRS criteria on admission  · Continue ceftriaxone  · Procalcitonin elevated on admission, trended down  · Follow-up on blood cultures, negative thus far  · Aspiration precautions  · Appreciate speech therapy recommendations, diet updated accordingly    Severe protein-calorie malnutrition ([facility] )  Assessment & Plan  Malnutrition Findings:   Adult Malnutrition type: Chronic illness  Adult Degree of Malnutrition: Other severe protein calorie malnutrition  Malnutrition Characteristics: Fat loss, Muscle loss                  360 Statement: related to medical condition as evidenced by severe orbital hollowing and severe temporal depletion  Treated with diet and supplements  BMI Findings: Body mass index is 20 93 kg/m²     Nutrition consult    Cystitis  Assessment & Plan  · Noted on CT abdomen/pelvis  · UA was never collected therefore order discontinued as patient already on antibiotics  · Patient unable to provide any history regarding urinary symptoms  · Continue coverage with ceftriaxone    Acute diastolic CHF (congestive heart failure) (HCC)  Assessment & Plan  Wt Readings from Last 3 Encounters:   09/18/22 70 kg (154 lb 5 2 oz)   01/07/22 71 7 kg (158 lb)   07/16/21 68 2 kg (150 lb 4 8 oz)     · No known history of CHF noted in chart review  · Mild volume overload with elevated proBNP and chest x-ray showing vascular congestion on admission  · Received 1 dose of IV Lasix 20 mg  · Monitor I&Os and daily weights  · Diurese as needed, patient currently appears comfortable from the respiratory standpoint and saturating well on room air therefore will hold off on further diuretics  · Follow-up on echo  · Currently euvolemic on exam    Dementia without behavioral disturbance, unspecified dementia type [facility])  Assessment & Plan  · Alert and oriented to self and Minimally interactive at baseline  · Appears to be at baseline currently  · Supportive care      PVD (peripheral vascular disease) ([facility] )  Assessment & Plan  Continue aspirin and statin    HTN (hypertension)  Assessment & Plan  · Blood pressure appears to be on the soft side  · Hold home amlodipine  · Monitor blood pressure    Bipolar 1 disorder (HCC)  Assessment & Plan  · Continue home medications        VTE Pharmacologic Prophylaxis:   Pharmacologic: Heparin  Mechanical VTE Prophylaxis in Place: Yes    Review of Systems:    Review of Systems   Constitutional: Negative for chills and fever  HENT: Negative for ear pain and sore throat  Eyes: Negative for pain and visual disturbance  Respiratory: Negative for cough and shortness of breath  Cardiovascular: Negative for chest pain and palpitations  Gastrointestinal: Negative for abdominal pain and vomiting  Genitourinary: Negative for dysuria and hematuria  Musculoskeletal: Negative for arthralgias and back pain  Skin: Negative for color change and rash  Neurological: Negative for seizures and syncope  All other systems reviewed and are negative  Past Medical and Surgical History:     Past Medical History:   Diagnosis Date    Arthritis     Chronic venous insufficiency     HTN (hypertension) 10/26/2019    Prostate cancer ([place] [address] )     PVD (peripheral vascular disease) ([facility] )        Past Surgical History:   Procedure Laterality Date    HERNIA REPAIR      TONSILLECTOMY         Patient Centered Rounds: I have performed bedside rounds with nursing staff today      Discussions with Specialists or Other Care Team Provider:  Case management, nursing    Education and Discussions with Family / Patient:  Patient    Time Spent for Care: 30 minutes  More than 50% of total time spent on counseling and coordination of care as described above  Current Length of Stay: 4 day(s)    Current Patient Status: Inpatient   Certification Statement: The patient will continue to require additional inpatient hospital stay due to Pending placement to rehab    Discharge Plan:  Once rehab bed available, medically stable for discharge    Code Status: Level 3 - DNAR and DNI      Subjective:   Denies any complain, resting comfortably in bed    Objective:     Vitals:   Temp (24hrs), Av 5 °F (36 4 °C), Min:97 2 °F (36 2 °C), Max:97 7 °F (36 5 °C)    Temp:  [97 2 °F (36 2 °C)-97 7 °F (36 5 °C)] 97 7 °F (36 5 °C)  HR:  [51-60] 60  BP: (124-136)/(49-60) 136/60  SpO2:  [96 %-98 %] 96 %  Body mass index is 20 93 kg/m²  Input and Output Summary (last 24 hours): Intake/Output Summary (Last 24 hours) at 2022 1637  Last data filed at 2022 1226  Gross per 24 hour   Intake 720 ml   Output 350 ml   Net 370 ml       Physical Exam:     Physical Exam  Vitals and nursing note reviewed  Constitutional:       Appearance: He is well-developed  He is not ill-appearing, toxic-appearing or diaphoretic  HENT:      Head: Normocephalic and atraumatic  Mouth/Throat:      Mouth: Mucous membranes are moist       Pharynx: Oropharynx is clear  Eyes:      General: No scleral icterus  Conjunctiva/sclera: Conjunctivae normal    Cardiovascular:      Rate and Rhythm: Normal rate and regular rhythm  Heart sounds: No murmur heard  Pulmonary:      Effort: Pulmonary effort is normal  No respiratory distress  Breath sounds: Normal breath sounds  No wheezing or rales  Abdominal:      General: Bowel sounds are normal       Palpations: Abdomen is soft  Tenderness: There is no abdominal tenderness     Musculoskeletal:      Cervical back: Normal range of motion and neck supple  Right lower leg: No edema  Left lower leg: No edema  Skin:     General: Skin is warm and dry  Neurological:      General: No focal deficit present  Mental Status: He is alert  Mental status is at baseline  Psychiatric:         Mood and Affect: Mood normal          Behavior: Behavior normal            Additional Data:     Labs:    Results from last 7 days   Lab Units 09/19/22  0520 09/18/22  0531   WBC Thousand/uL 5 21 8 39   HEMOGLOBIN g/dL 12 0 12 3   HEMATOCRIT % 36 7 37 0   PLATELETS Thousands/uL 175 165   NEUTROS PCT %  --  66   LYMPHS PCT %  --  23   MONOS PCT %  --  10   EOS PCT %  --  0     Results from last 7 days   Lab Units 09/18/22  0531 09/16/22  0552 09/15/22  1116   SODIUM mmol/L 145   < > 139   POTASSIUM mmol/L 4 3   < > 4 3   CHLORIDE mmol/L 109*   < > 106   CO2 mmol/L 32   < > 30   BUN mg/dL 18   < > 14   CREATININE mg/dL 0 76   < > 0 95   ANION GAP mmol/L 4   < > 3*   CALCIUM mg/dL 8 8   < > 9 0   ALBUMIN g/dL  --   --  2 7*   TOTAL BILIRUBIN mg/dL  --   --  0 54   ALK PHOS U/L  --   --  40*   ALT U/L  --   --  12   AST U/L  --   --  12   GLUCOSE RANDOM mg/dL 93   < > 81    < > = values in this interval not displayed  Results from last 7 days   Lab Units 09/15/22  1116   INR  1 18             Results from last 7 days   Lab Units 09/18/22  0531 09/17/22  0444 09/16/22  0552 09/15/22  1128 09/15/22  1116   LACTIC ACID mmol/L  --   --   --   --  1 5   PROCALCITONIN ng/ml 0 22 0 37* 0 46* 0 07  --            * I Have Reviewed All Lab Data Listed Above  * Additional Pertinent Lab Tests Reviewed: [facility] [address] Admission Reviewed    Imaging:    Imaging Reports Reviewed Today Include:  None  Imaging Personally Reviewed by Myself Includes:  Not    Recent Cultures (last 7 days):     Results from last 7 days   Lab Units 09/15/22  1128 09/15/22  1116   BLOOD CULTURE  No Growth at 72 hrs  No Growth at 72 hrs         Last 24 Hours Medication List:   Current Facility-Administered Medications   Medication Dose Route Frequency Provider Last Rate    acetaminophen  650 mg Oral Q6H PRN Avinash Clay MD      aspirin  81 mg Oral Daily Avinsah Clay MD      atorvastatin  10 mg Oral Daily With Jeremy Lancaster MD      cefTRIAXone  1,000 mg Intravenous Q24H Avinash Clay MD 1,000 mg (09/19/22 5376)    divalproex sodium  500 mg Oral Q12H [address] Avinash Clay MD      heparin (porcine)  5,000 Units Subcutaneous Q8H [address] Avinash Clay MD      sertraline  100 mg Oral Daily Avinash Clay MD          Today, Patient Was Seen By: Doretha Scheuermann, MD    ** Please Note: Dictation voice to text software may have been used in the creation of this document   **

## 2022-09-19 NOTE — ASSESSMENT & PLAN NOTE
Wt Readings from Last 3 Encounters:   09/18/22 70 kg (154 lb 5 2 oz)   01/07/22 71 7 kg (158 lb)   07/16/21 68 2 kg (150 lb 4 8 oz)     · No known history of CHF noted in chart review  · Mild volume overload with elevated proBNP and chest x-ray showing vascular congestion on admission  · Received 1 dose of IV Lasix 20 mg  · Monitor I&Os and daily weights  · Diurese as needed, patient currently appears comfortable from the respiratory standpoint and saturating well on room air therefore will hold off on further diuretics  · Follow-up on echo  · Currently euvolemic on exam

## 2022-09-19 NOTE — ASSESSMENT & PLAN NOTE
· Noted on CT abdomen/pelvis  · UA was never collected therefore order discontinued as patient already on antibiotics  · Patient unable to provide any history regarding urinary symptoms  · Continue coverage with ceftriaxone

## 2022-09-19 NOTE — UTILIZATION REVIEW
Continued Stay Review    Date: 9/19                          Current Patient Class: INpatient   Current Level of Care: Med/surg    HPI:87 y o  male initially admitted on 9/15     Assessment/Plan:   Blood cultures negative thus far  Procal elevated and now trending down  Case management working on STR placement  Vital Signs:    Temp Pulse Resp BP MAP (mmHg) SpO2 Calculated FIO2 (%) - Nasal Cannula Nasal Cannula O2 Flow Rate (L/min) O2 Device Patient Position - Orthostatic VS   09/19/22 14:12:38 97 7 °F (36 5 °C) 60 -- 136/60 85 96 % -- -- -- --   09/19/22 07:37:07 -- 51 Abnormal  -- 125/49 Abnormal  74 98 % -- -- -- --   09/19/22 00:06:38 97 2 °F (36 2 °C) Abnormal  58 -- 124/57 79 96 % -- --           Pertinent Labs/Diagnostic Results:   9/16 ECHO: Left Ventricle: Left ventricular cavity size is normal  Wall thickness is mildly increased  The left ventricular ejection fraction is 55%  Systolic function is normal  Wall motion is normal  Diastolic function is mildly abnormal, consistent with grade I (abnormal) relaxation    Aortic Valve: There is mild regurgitation    Pericardium: There is a small pericardial effusion  There is no echocardiographic evidence of tamponade    This is a technically difficult study   Interpretation is limited        Results from last 7 days   Lab Units 09/15/22  1128   SARS-COV-2  Negative     Results from last 7 days   Lab Units 09/19/22  0520 09/18/22  0531 09/17/22 0444 09/16/22  0552 09/15/22  1116   WBC Thousand/uL 5 21 8 39 10 33* 10 65* 8 72   HEMOGLOBIN g/dL 12 0 12 3 12 6 13 3 12 7   HEMATOCRIT % 36 7 37 0 38 5 39 1 38 4   PLATELETS Thousands/uL 175 165 163 154 162   NEUTROS ABS Thousands/µL  --  5 59 7 23 7 12 5 10         Results from last 7 days   Lab Units 09/18/22  0531 09/17/22  0444 09/16/22  0552 09/15/22  1116   SODIUM mmol/L 145 142 140 139   POTASSIUM mmol/L 4 3 3 5 3 6 4 3   CHLORIDE mmol/L 109* 104 103 106   CO2 mmol/L 32 32 29 30   ANION GAP mmol/L 4 6 8 3*   BUN mg/dL 18 21 20 14   CREATININE mg/dL 0 76 0 92 1 03 0 95   EGFR ml/min/1 73sq m 82 74 65 71   CALCIUM mg/dL 8 8 8 9 9 0 9 0     Results from last 7 days   Lab Units 09/15/22  1116   AST U/L 12   ALT U/L 12   ALK PHOS U/L 40*   TOTAL PROTEIN g/dL 6 2*   ALBUMIN g/dL 2 7*   TOTAL BILIRUBIN mg/dL 0 54         Results from last 7 days   Lab Units 09/18/22  0531 09/17/22  0444 09/16/22  0552 09/15/22  1116   GLUCOSE RANDOM mg/dL 93 102 86 81       Results from last 7 days   Lab Units 09/15/22  1128   PH LEILA  7 402*   PCO2 LEILA mm Hg 48 7   PO2 LEILA mm Hg 23 5*   HCO3 LEILA mmol/L 29 6   BASE EXC LEILA mmol/L 4 0   O2 CONTENT LEILA ml/dL 7 5   O2 HGB, VENOUS % 41 4*       Results from last 7 days   Lab Units 09/15/22  1116   HS TNI 0HR ng/L 13         Results from last 7 days   Lab Units 09/15/22  1116   PROTIME seconds 14 8*   INR  1 18   PTT seconds 35         Results from last 7 days   Lab Units 09/18/22  0531 09/17/22  0444 09/16/22  0552 09/15/22  1128   PROCALCITONIN ng/ml 0 22 0 37* 0 46* 0 07     Results from last 7 days   Lab Units 09/15/22  1116   LACTIC ACID mmol/L 1 5             Results from last 7 days   Lab Units 09/15/22  1116   NT-PRO BNP pg/mL 720*       Results from last 7 days   Lab Units 09/15/22  1128   INFLUENZA A PCR  Negative   INFLUENZA B PCR  Negative   RSV PCR  Negative       Results from last 7 days   Lab Units 09/15/22  1128 09/15/22  1116   BLOOD CULTURE  No Growth at 72 hrs  No Growth at 72 hrs         Medications:   Scheduled Medications:  aspirin, 81 mg, Oral, Daily  atorvastatin, 10 mg, Oral, Daily With Dinner  cefTRIAXone, 1,000 mg, Intravenous, Q24H  divalproex sodium, 500 mg, Oral, Q12H [facility]  heparin (porcine), 5,000 Units, Subcutaneous, Q8H [facility]  sertraline, 100 mg, Oral, Daily      Continuous IV Infusions:     PRN Meds:  acetaminophen, 650 mg, Oral, Q6H PRN        Discharge Plan: TBD    Network Utilization Review Department  ATTENTION: Please call with any questions or concerns to [phone] and carefully listen to the prompts so that you are directed to the right person  All voicemails are confidential   Stacie Nguyễn all requests for admission clinical reviews, approved or denied determinations and any other requests to dedicated fax number below belonging to the campus where the patient is receiving treatment   List of dedicated fax numbers for the Facilities:  [address] DENIALS (Administrative/Medical Necessity) [phone]   [address] (Maternity/NICU/Pediatrics) [phone]   [address] [place] [address]  [address] [place] [id number] [address] Jos[address] Geiger [address] [place] [address] [phone]

## 2022-09-20 LAB
ANION GAP SERPL CALCULATED.3IONS-SCNC: 4 MMOL/L (ref 4–13)
BACTERIA BLD CULT: NORMAL
BACTERIA BLD CULT: NORMAL
BUN SERPL-MCNC: 22 MG/DL (ref 5–25)
CALCIUM SERPL-MCNC: 9 MG/DL (ref 8.3–10.1)
CHLORIDE SERPL-SCNC: 110 MMOL/L (ref 96–108)
CO2 SERPL-SCNC: 32 MMOL/L (ref 21–32)
CREAT SERPL-MCNC: 0.73 MG/DL (ref 0.6–1.3)
ERYTHROCYTE [DISTWIDTH] IN BLOOD BY AUTOMATED COUNT: 14.1 % (ref 11.6–15.1)
FLUAV RNA RESP QL NAA+PROBE: NEGATIVE
FLUBV RNA RESP QL NAA+PROBE: NEGATIVE
GFR SERPL CREATININE-BSD FRML MDRD: 83 ML/MIN/1.73SQ M
GLUCOSE SERPL-MCNC: 92 MG/DL (ref 65–140)
HCT VFR BLD AUTO: 39.3 % (ref 36.5–49.3)
HGB BLD-MCNC: 12.6 G/DL (ref 12–17)
MCH RBC QN AUTO: 30.7 PG (ref 26.8–34.3)
MCHC RBC AUTO-ENTMCNC: 32.1 G/DL (ref 31.4–37.4)
MCV RBC AUTO: 96 FL (ref 82–98)
PLATELET # BLD AUTO: 195 THOUSANDS/UL (ref 149–390)
PMV BLD AUTO: 10.2 FL (ref 8.9–12.7)
POTASSIUM SERPL-SCNC: 4.4 MMOL/L (ref 3.5–5.3)
RBC # BLD AUTO: 4.11 MILLION/UL (ref 3.88–5.62)
RSV RNA RESP QL NAA+PROBE: NEGATIVE
SARS-COV-2 RNA RESP QL NAA+PROBE: NEGATIVE
SODIUM SERPL-SCNC: 146 MMOL/L (ref 135–147)
WBC # BLD AUTO: 5.03 THOUSAND/UL (ref 4.31–10.16)

## 2022-09-20 PROCEDURE — 97110 THERAPEUTIC EXERCISES: CPT

## 2022-09-20 PROCEDURE — 92526 ORAL FUNCTION THERAPY: CPT

## 2022-09-20 PROCEDURE — 0241U HB NFCT DS VIR RESP RNA 4 TRGT: CPT | Performed by: INTERNAL MEDICINE

## 2022-09-20 PROCEDURE — 97530 THERAPEUTIC ACTIVITIES: CPT

## 2022-09-20 PROCEDURE — 85027 COMPLETE CBC AUTOMATED: CPT | Performed by: INTERNAL MEDICINE

## 2022-09-20 PROCEDURE — 99232 SBSQ HOSP IP/OBS MODERATE 35: CPT | Performed by: INTERNAL MEDICINE

## 2022-09-20 PROCEDURE — 80048 BASIC METABOLIC PNL TOTAL CA: CPT | Performed by: INTERNAL MEDICINE

## 2022-09-20 RX ADMIN — ASPIRIN 81 MG: 81 TABLET, CHEWABLE ORAL at 09:21

## 2022-09-20 RX ADMIN — SERTRALINE HYDROCHLORIDE 100 MG: 50 TABLET ORAL at 09:21

## 2022-09-20 RX ADMIN — DIVALPROEX SODIUM 500 MG: 125 CAPSULE, COATED PELLETS ORAL at 09:19

## 2022-09-20 RX ADMIN — DIVALPROEX SODIUM 500 MG: 125 CAPSULE, COATED PELLETS ORAL at 22:00

## 2022-09-20 RX ADMIN — HEPARIN SODIUM 5000 UNITS: 5000 INJECTION INTRAVENOUS; SUBCUTANEOUS at 06:35

## 2022-09-20 RX ADMIN — HEPARIN SODIUM 5000 UNITS: 5000 INJECTION INTRAVENOUS; SUBCUTANEOUS at 22:52

## 2022-09-20 RX ADMIN — ATORVASTATIN CALCIUM 10 MG: 10 TABLET, FILM COATED ORAL at 15:56

## 2022-09-20 RX ADMIN — HEPARIN SODIUM 5000 UNITS: 5000 INJECTION INTRAVENOUS; SUBCUTANEOUS at 15:58

## 2022-09-20 RX ADMIN — CEFTRIAXONE SODIUM 1000 MG: 10 INJECTION, POWDER, FOR SOLUTION INTRAVENOUS at 09:08

## 2022-09-20 NOTE — PLAN OF CARE
Problem: PHYSICAL THERAPY ADULT  Goal: Performs mobility at highest level of function for planned discharge setting  See evaluation for individualized goals  Description: Treatment/Interventions: Functional transfer training, LE strengthening/ROM, Therapeutic exercise, Endurance training, Cognitive reorientation, Patient/family training, Bed mobility, Continued evaluation, Spoke to nursing, OT          See flowsheet documentation for full assessment, interventions and recommendations  Outcome: Progressing  Note: Prognosis: Fair  Problem List: Decreased strength, Decreased endurance, Impaired balance, Decreased mobility, Decreased cognition, Impaired hearing, Pain  Assessment: Pt is 80year old male seen for PT evaluation s/p admit to [facility] on 9/15/2022 with Aspiration pneumonia ([facility] [address] )  PT consulted to assess pt's functional mobility and d/c needs  Order placed for PT eval and tx, with up with assist order  Comorbidities affecting pt's physical performance at time of assessment include bipolar 1 disorder, hypertension, peripheral vascular disease, dementia without behavioral disturbance, acute CHF, and cystitis  PTA, pt was requiring assist for mobility  Pt resides at [facility]  Personal factors affecting pt at time of IE include inability to ambulate household distances, inability to navigate level surfaces without external assistance, decreased cognition, limited home support, inability to perform IADLs, inability to perform ADLs, and inability to live alone  Please find objective findings from PT assessment regarding body systems outlined above with impairments and limitations including weakness, impaired balance, decreased endurance, pain, decreased activity tolerance, decreased functional mobility tolerance, fall risk, and decreased cognition   The following objective measures performed on IE also reveal limitations: Barthel Index: 25/100, Modified Ward: 4 (moderate/severe disability) and AM-PAC 6-Clicks: 8/03  Pt's clinical presentation is currently unstable/unpredictable seen in pt's presentation of need for ongoing medical management/monitoring, pt is a fall risk, and pt requires cues and assist of two for safety with functional mobility  Pt to benefit from continued PT tx to address deficits as defined above and maximize level of functional independent mobility and consistency  From PT/mobility standpoint, recommendation at time of d/c would be STR pending progress in order to facilitate return to PLOF  Barriers to Discharge: Decreased caregiver support, Other (Comment) (decline in functional status)     PT Discharge Recommendation: Post acute rehabilitation services    See flowsheet documentation for full assessment

## 2022-09-20 NOTE — PHYSICAL THERAPY NOTE
Physical Therapy Treatment Note    Patient's Name: Suzanna Levine    Admitting Diagnosis  Aspiration pneumonia ([place] [address] ) [J69 0]  Low O2 saturation [R79 81]    Problem List  Patient Active Problem List   Diagnosis    Bipolar 1 disorder ([place] [address] )    Hyperlipidemia    History of prostate cancer    HTN (hypertension)    Ambulatory dysfunction    PVD (peripheral vascular disease) ([place] [address] )    Mood disorder ([place] [address] )    History of T12 vertebral fracture    Dementia without behavioral disturbance, unspecified dementia type ([place] [address] )    Personal history of COVID-19    Aspiration pneumonia ([place] [address] )    Acute diastolic CHF (congestive heart failure) ([place] [address] )    Cystitis    Severe protein-calorie malnutrition ([place] [address] )        09/20/22 0825   PT Last Visit   PT Visit Date 09/20/22   Note Type   Note Type Treatment   Pain Assessment   Pain Assessment Tool 0-10   Pain Score No Pain   Restrictions/Precautions   Weight Bearing Precautions Per Order No   Other Precautions Bed Alarm; Chair Alarm;Cognitive; Fall Risk;Hard of hearing   General   Chart Reviewed Yes   Response to Previous Treatment Patient with no complaints from previous session  Family/Caregiver Present No   Cognition   Overall Cognitive Status Impaired   Arousal/Participation Alert; Responsive; Cooperative   Attention Attends with cues to redirect   Orientation Level Oriented to person;Disoriented to place; Disoriented to time;Disoriented to situation   Memory Decreased recall of biographical information;Decreased short term memory;Decreased recall of recent events;Decreased long term memory   Following Commands Follows one step commands with increased time or repetition   Comments Pt agreeable to PT   Bed Mobility   Supine to Sit 2  Maximal assistance   Additional items Assist x 2;Bedrails; Increased time required;Verbal cues;LE management   Additional Comments Pt received at start of session supine in bed with HOB elevated   Following session, pt remained in bedside recliner with needs met, alarm activated and call bell within reach   Transfers   Sit to Stand 2  Maximal assistance   Additional items Assist x 2;Armrests; Increased time required;Verbal cues   Stand to Sit 2  Maximal assistance   Additional items Assist x 2;Armrests; Increased time required;Verbal cues   Additional Comments with use of RW   Ambulation/Elevation   Gait pattern Decreased foot clearance;Shuffling; Inconsistent sisi; Short stride;Decreased heel strike   Gait Assistance 3  Moderate assist   Additional items Assist x 2;Verbal cues   Assistive Device Rolling walker   Distance 3' to bedside chair   Stair Management Assistance Not tested   Balance   Static Sitting Fair -   Dynamic Sitting Poor +   Static Standing Poor   Endurance Deficit   Endurance Deficit Yes   Endurance Deficit Description decreased activity tolerance   Activity Tolerance   Activity Tolerance Patient limited by fatigue;Patient limited by pain   Nurse Made Aware RN Archie Left   Exercises   Heelslides Supine;10 reps;AROM; Bilateral   Hip Flexion Sitting;20 reps;AROM; Bilateral   Hip Abduction Sitting;20 reps;AROM; Bilateral   Knee AROM Long Arc Quad Sitting;20 reps;AROM; Bilateral   Ankle Pumps Sitting;20 reps;AROM; Bilateral   Assessment   Prognosis Fair   Problem List Decreased strength;Decreased endurance; Impaired balance;Decreased mobility; Decreased cognition; Impaired hearing;Pain   Barriers to Discharge Decreased caregiver support; Other (Comment)  (decline in functional status)   Goals   STG Expiration Date 09/26/22   PT Treatment Day 1   Plan   Treatment/Interventions LE strengthening/ROM; Functional transfer training;ADL retraining; Therapeutic exercise; Endurance training;Patient/family training; Compensatory technique education;Gait training;Bed mobility;Spoke to nursing;OT   Progress Progressing toward goals   PT Frequency 3-5x/wk   Recommendation   PT Discharge Recommendation Post acute rehabilitation services   AM-PAC Basic Mobility Inpatient   Turning in Bed Without Bedrails 2   Lying on Back to Sitting on Edge of Flat Bed 2   Moving Bed to Chair 2   Standing Up From Chair 2   Walk in Room 1   Climb 3-5 Stairs 1   Basic Mobility Inpatient Raw Score 10   Turning Head Towards Sound 3   Follow Simple Instructions 3   Low Function Basic Mobility Raw Score 16   Low Function Basic Mobility Standardized Score 25 72   Highest Level Of Mobility   -M Goal 4: Move to chair/commode   JH-HLM Achieved 4: Move to chair/commode   Education   Education Provided Mobility training   Patient Reinforcement needed   End of Consult   Patient Position at End of Consult Bedside chair;Bed/Chair alarm activated; All needs within reach       Dayana Scott PT    Time In: 08:25  Time Out: 08:48  Total Time: 23 mins

## 2022-09-20 NOTE — ASSESSMENT & PLAN NOTE
· Blood pressure appeared to be on the soft side  · Amlodipine is currently on HOLD   · Monitor blood pressure--currently stable off amlodipine

## 2022-09-20 NOTE — PROGRESS NOTES
[address] [facility]  Progress Note Marquita Live 1934, 80 y o  male MRN: [id number]  Unit/Bed#: -01 Encounter: [id number]  Primary Care Provider: Mikayla Koch DO   Date and time admitted to hospital: 9/15/2022 10:43 AM    * Aspiration pneumonia [facility])  Assessment & Plan  · Present on admission with reported coughing and shortness of breath  CT chest showed finding suggestive of aspiration pneumonia  · Patient did not meet SIRS criteria on admission  · Continue ceftriaxone-- D6   · Procalcitonin elevated on admission, trended down  · Follow-up on blood cultures, negative thus far  · Aspiration precautions  · Speech therapy on board-advised to continue puree solids with nectar thick liquids by single cup sips until study completed  Advised to obtain video barium swallow-plan for tomorrow 9/21  · Also advised for 1-1 feeding assist and meds crushed in puree  Acute diastolic CHF (congestive heart failure) (HCC)  Assessment & Plan  Wt Readings from Last 3 Encounters:   09/18/22 70 kg (154 lb 5 2 oz)   01/07/22 71 7 kg (158 lb)   07/16/21 68 2 kg (150 lb 4 8 oz)     · No known history of CHF noted in chart review  · Mild volume overload with elevated proBNP and chest x-ray showing vascular congestion on admission  · Received 1 dose of IV Lasix 20 mg  · Monitor I&Os and daily weights  · Diurese as needed, patient currently appears comfortable from the respiratory standpoint and saturating well on room air therefore will hold off on further diuretics  · [id number]-27%, grade 1 diastolic dysfunction    · Currently euvolemic on exam    Severe protein-calorie malnutrition ([address] )  Assessment & Plan  Malnutrition Findings:   Adult Malnutrition type: Chronic illness  Adult Degree of Malnutrition: Other severe protein calorie malnutrition  Malnutrition Characteristics: Fat loss, Muscle loss                  360 Statement: related to medical condition as evidenced by severe orbital hollowing and severe temporal depletion  Treated with diet and supplements  BMI Findings: Body mass index is 20 93 kg/m²  Nutrition consult    Cystitis  Assessment & Plan  · Noted on CT abdomen/pelvis  · UA was never collected therefore order discontinued as patient already on antibiotics  · Patient unable to provide any history regarding urinary symptoms  · Continue coverage with ceftriaxone    Dementia without behavioral disturbance, unspecified dementia type ([place] [address] )  Assessment & Plan  · Alert and oriented to self and Minimally interactive at baseline  · Appears to be at baseline currently  · Supportive care      PVD (peripheral vascular disease) ([place] [address] )  Assessment & Plan  Continue aspirin and statin    HTN (hypertension)  Assessment & Plan  · Blood pressure appeared to be on the soft side  · Amlodipine is currently on HOLD   · Monitor blood pressure--currently stable off amlodipine     Bipolar 1 disorder [facility])  Assessment & Plan  · Continue home medications          INTERNAL MEDICINE RESIDENCY PROGRESS NOTE     Name: Brianna Carranza   Age & Sex: 80 y o  male   MRN: [id number]  Unit/Bed#: -01   Encounter: [id number]      PATIENT INFORMATION     Name: Brianna Carranza   Age & Sex: 80 y o  male   MRN: [id number] Stay Days: 5    ASSESSMENT/PLAN     Principal Problem:    Aspiration pneumonia ([place] [address] )  Active Problems:    Acute diastolic CHF (congestive heart failure) ([place] [address] )    Bipolar 1 disorder (HCC)    HTN (hypertension)    PVD (peripheral vascular disease) ([place] [address] )    Dementia without behavioral disturbance, unspecified dementia type ([place] [address] )    Cystitis    Severe protein-calorie malnutrition ([place] [address] )      * Aspiration pneumonia ([place] [address] )  Assessment & Plan  · Present on admission with reported coughing and shortness of breath    CT chest showed finding suggestive of aspiration pneumonia  · Patient did not meet SIRS criteria on admission  · Continue ceftriaxone-- D6   · Procalcitonin elevated on admission, trended down  · Follow-up on blood cultures, negative thus far  · Aspiration precautions  · Speech therapy on board-advised to continue puree solids with nectar thick liquids by single cup sips until study completed  Advised to obtain video barium swallow-plan for tomorrow 9/21  · Also advised for 1-1 feeding assist and meds crushed in puree  Acute diastolic CHF (congestive heart failure) ([facility])  Assessment & Plan  Wt Readings from Last 3 Encounters:   09/18/22 70 kg (154 lb 5 2 oz)   01/07/22 71 7 kg (158 lb)   07/16/21 68 2 kg (150 lb 4 8 oz)     · No known history of CHF noted in chart review  · Mild volume overload with elevated proBNP and chest x-ray showing vascular congestion on admission  · Received 1 dose of IV Lasix 20 mg  · Monitor I&Os and daily weights  · Diurese as needed, patient currently appears comfortable from the respiratory standpoint and saturating well on room air therefore will hold off on further diuretics  · [id number]-02%, grade 1 diastolic dysfunction  · Currently euvolemic on exam    Severe protein-calorie malnutrition ([facility] [address] )  Assessment & Plan  Malnutrition Findings:   Adult Malnutrition type: Chronic illness  Adult Degree of Malnutrition: Other severe protein calorie malnutrition  Malnutrition Characteristics: Fat loss, Muscle loss                  360 Statement: related to medical condition as evidenced by severe orbital hollowing and severe temporal depletion  Treated with diet and supplements  BMI Findings: Body mass index is 20 93 kg/m²     Nutrition consult    Cystitis  Assessment & Plan  · Noted on CT abdomen/pelvis  · UA was never collected therefore order discontinued as patient already on antibiotics  · Patient unable to provide any history regarding urinary symptoms  · Continue coverage with ceftriaxone    Dementia without behavioral disturbance, unspecified dementia type [facility])  Assessment & Plan  · Alert and oriented to self and Minimally interactive at baseline  · Appears to be at baseline currently  · Supportive care      PVD (peripheral vascular disease) ([address] )  Assessment & Plan  Continue aspirin and statin    HTN (hypertension)  Assessment & Plan  · Blood pressure appeared to be on the soft side  · Amlodipine is currently on HOLD   · Monitor blood pressure--currently stable off amlodipine     Bipolar 1 disorder (HCC)  Assessment & Plan  · Continue home medications        Disposition:  Pending placement  SUBJECTIVE     Patient seen and examined  No acute events overnight  Patient was seen examined at the bedside  Patient is currently at his baseline, does not offer any significant complaints  Patient does have cough and is productive of clear to yellow mucus  No chest pain  OBJECTIVE     Vitals:    22 0737 22 1412 22 2319 22 0729   BP: (!) 125/49 136/60 121/64 121/60   Pulse: (!) 51 60  (!) 51   Resp:   18 18   Temp:  97 7 °F (36 5 °C) 97 8 °F (36 6 °C) 98 4 °F (36 9 °C)   TempSrc:       SpO2: 98% 96%  95%   Weight:       Height:          Temperature:   Temp (24hrs), Av °F (36 7 °C), Min:97 7 °F (36 5 °C), Max:98 4 °F (36 9 °C)    Temperature: 98 4 °F (36 9 °C)  Intake & Output:  I/O        0701   0700  0701   0700  0701   0700    P  O  240 600 75    Total Intake(mL/kg) 240 (3 4) 600 (8 6) 75 (1 1)    Urine (mL/kg/hr) 350 (0 2) 550 (0 3)     Stool 0      Total Output 350 550     Net -110 +50 +75           Unmeasured Urine Occurrence 1 x      Unmeasured Stool Occurrence 1 x          Weights:   IBW (Ideal Body Weight): 77 6 kg    Body mass index is 20 93 kg/m²  Weight (last 2 days)     Date/Time Weight    22 0537 70 (154 32)     Weight: bed scale not working properly at 22 0537        Physical Exam  Vitals and nursing note reviewed  Constitutional:       General: He is not in acute distress  Appearance: He is well-developed  HENT:      Head: Normocephalic and atraumatic  Eyes:      General:         Right eye: No discharge  Left eye: No discharge  Conjunctiva/sclera: Conjunctivae normal    Cardiovascular:      Rate and Rhythm: Normal rate  Pulses: Normal pulses  Heart sounds: Normal heart sounds  No murmur heard  Pulmonary:      Effort: Pulmonary effort is normal  No respiratory distress  Abdominal:      Palpations: Abdomen is soft  Tenderness: There is no abdominal tenderness  Musculoskeletal:      Cervical back: Neck supple  Right lower leg: No edema  Left lower leg: No edema  Skin:     General: Skin is warm and dry  Capillary Refill: Capillary refill takes less than 2 seconds  Neurological:      Mental Status: He is alert  Mental status is at baseline  LABORATORY DATA     Labs: I have personally reviewed pertinent reports  Results from last 7 days   Lab Units 09/20/22  0500 09/19/22  0520 09/18/22  0531 09/17/22  0444 09/16/22  0552   WBC Thousand/uL 5 03 5 21 8 39 10 33* 10 65*   HEMOGLOBIN g/dL 12 6 12 0 12 3 12 6 13 3   HEMATOCRIT % 39 3 36 7 37 0 38 5 39 1   PLATELETS Thousands/uL 195 175 165 163 154   NEUTROS PCT %  --   --  66 70 66   MONOS PCT %  --   --  10 11 14*      Results from last 7 days   Lab Units 09/20/22  0500 09/18/22  0531 09/17/22  0444 09/16/22  0552 09/15/22  1116   POTASSIUM mmol/L 4 4 4 3 3 5   < > 4 3   CHLORIDE mmol/L 110* 109* 104   < > 106   CO2 mmol/L 32 32 32   < > 30   BUN mg/dL 22 18 21   < > 14   CREATININE mg/dL 0 73 0 76 0 92   < > 0 95   CALCIUM mg/dL 9 0 8 8 8 9   < > 9 0   ALK PHOS U/L  --   --   --   --  40*   ALT U/L  --   --   --   --  12   AST U/L  --   --   --   --  12    < > = values in this interval not displayed                Results from last 7 days   Lab Units 09/15/22  1116   INR  1 18   PTT seconds 35     Results from last 7 days   Lab Units 09/15/22  1116   LACTIC ACID mmol/L 1 5           IMAGING & DIAGNOSTIC TESTING     Radiology Results: I have personally reviewed pertinent reports  XR chest pa & lateral    Result Date: 9/15/2022  Impression: Mild pulmonary vascular congestion  Workstation performed: [id number]     PE Study with CT Abdomen and Pelvis with contrast    Result Date: 9/15/2022  Impression: No pulmonary embolus seen within the pulmonary trunk, main, lobar, or proximal segmental branches  More peripheral branches are poorly assessed secondary to artifact   Dependent secretions in the trachea with extension along the bilateral mainstem bronchi and extending into the bilateral lower lobes where there are segments of bronchial occlusion  Consolidations along the lower lobe and lingula bronchial trees with more focal consolidation in the left lower lobe most likely pneumonia , suspicious for aspiration  Small bilateral pleural effusions and interlobular septal thickening consistent with edema  Urinary bladder wall thickening and perivesicular inflammation may be secondary to cystitis and/or prostatomegaly  Recommend correlation with urinalysis  Increased height loss of T12 compared to prior  Additional findings as above  The study was marked in EPIC for significant notification  Workstation performed: [id number]     Other Diagnostic Testing: I have personally reviewed pertinent reports      ACTIVE MEDICATIONS     Current Facility-Administered Medications   Medication Dose Route Frequency    acetaminophen (TYLENOL) tablet 650 mg  650 mg Oral Q6H PRN    aspirin chewable tablet 81 mg  81 mg Oral Daily    atorvastatin (LIPITOR) tablet 10 mg  10 mg Oral Daily With Dinner    ceftriaxone (ROCEPHIN) 1 g/50 mL in dextrose IVPB  1,000 mg Intravenous Q24H    divalproex sodium (DEPAKOTE SPRINKLE) capsule 500 mg  500 mg Oral Q12H [facility]    heparin (porcine) subcutaneous injection 5,000 Units  5,000 Units Subcutaneous Q8H [facility]    sertraline (ZOLOFT) tablet 100 mg  100 mg Oral Daily       VTE Pharmacologic Prophylaxis:  Heparin  VTE Mechanical Prophylaxis: sequential compression device    Portions of the record may have been created with voice recognition software  Occasional wrong word or "sound a like" substitutions may have occurred due to the inherent limitations of voice recognition software    Read the chart carefully and recognize, using context, where substitutions have occurred   ==  Lb Spangler MD  [address]  Internal Medicine Residency PGY-3

## 2022-09-20 NOTE — PLAN OF CARE
Problem: Potential for Falls  Goal: Patient will remain free of falls  Description: INTERVENTIONS:  - Educate patient/family on patient safety including physical limitations  - Instruct patient to call for assistance with activity   - Consult OT/PT to assist with strengthening/mobility   - Keep Call bell within reach  - Keep bed low and locked with side rails adjusted as appropriate  - Keep care items and personal belongings within reach  - Initiate and maintain comfort rounds  - Make Fall Risk Sign visible to staff  - Offer Toileting every 2 Hours, in advance of need  - Initiate/Maintain bed alarm  - Obtain necessary fall risk management equipment: call bell within reach  - Apply yellow socks and bracelet for high fall risk patients  - Consider moving patient to room near nurses station  Outcome: Progressing

## 2022-09-20 NOTE — CASE MANAGEMENT
Case Management Discharge Planning Note    Patient name Jose Valladares  Location [address] [id number] MRN [id number]  : 1934 Date 2022       Current Admission Date: 9/15/2022  Current Admission Diagnosis:Aspiration pneumonia [facility])   Patient Active Problem List    Diagnosis Date Noted    Severe protein-calorie malnutrition ([facility] ) 2022    Aspiration pneumonia ([place] [address] ) 09/15/2022    Acute diastolic CHF (congestive heart failure) ([place] [address] ) 09/15/2022    Cystitis 09/15/2022    Dementia without behavioral disturbance, unspecified dementia type ([place] [address] ) 2022    Personal history of COVID-19 10/2021    Mood disorder ([place] [address] ) 2021    History of T12 vertebral fracture 2021    Ambulatory dysfunction 2020    PVD (peripheral vascular disease) ([place] [address] ) 2020    HTN (hypertension) 10/26/2019    History of prostate cancer 2019    Hyperlipidemia 2018    Bipolar 1 disorder ([place] [address] ) 2018      LOS (days): 5  Geometric Mean LOS (GMLOS) (days): 5 40  Days to GMLOS:0 2     OBJECTIVE:  Risk of Unplanned Readmission Score: 9 92         Current admission status: Inpatient   Preferred Pharmacy:   [address] [place] [address]  Phone: [phone] Fax: [address] [place] [address]  Phone: [phone] Fax: [phone]    Primary Care Provider: Baldomero Rocha DO    Primary Insurance: [employer] [facility]  Secondary Insurance:     DISCHARGE DETAILS:                                          Other Referral/Resources/Interventions Provided:  Referral Comments: Per Timothy Palacios at [facility] [address], pt accepted for placement;  needs PASSR, updated PT/OT notes, authorization  Request made to PT/OT to see pt in AM   Per AM rounds, d/c anticipated in 24-48 hrs           Treatment Team Recommendation: Short Term Rehab, SNF  Discharge Destination Plan[de-identified] Short Term Rehab, SNF  Transport at Discharge :  Other (Comment) (TBD)

## 2022-09-20 NOTE — ASSESSMENT & PLAN NOTE
Wt Readings from Last 3 Encounters:   09/18/22 70 kg (154 lb 5 2 oz)   01/07/22 71 7 kg (158 lb)   07/16/21 68 2 kg (150 lb 4 8 oz)     · No known history of CHF noted in chart review  · Mild volume overload with elevated proBNP and chest x-ray showing vascular congestion on admission  · Received 1 dose of IV Lasix 20 mg  · Monitor I&Os and daily weights  · Diurese as needed, patient currently appears comfortable from the respiratory standpoint and saturating well on room air therefore will hold off on further diuretics  · GLWM-OTCW-17%, grade 1 diastolic dysfunction    · Currently euvolemic on exam

## 2022-09-20 NOTE — SPEECH THERAPY NOTE
Speech Language/Pathology     Speech/Language Pathology Progress Note     Patient Name: Lucille Hoff    OOCMZ'H Date: 9/20/2022     Problem List  Principal Problem:    Asp[facility] ([facility] )  Active Problems:    Bipolar 1 disorder ([facility] )    HTN (hypertension)    PVD (peripheral vascular disease) ([facility] )    Dementia without behavioral disturbance, unspecified dementia type ([place] [address] )    Acute diastolic CHF (congestive heart failure) ([place] [address] )    Cystitis    Severe protein-calorie malnutrition (HCC)     Subjective:  Patient received awake, seated in bedside chair  Does not appear much of breakfast was finished  Previous/current diet: puree/nectar by cup sip     Objective: The following consistencies were tested nectar thick liquids, thin liquids  Patient agreeable to minimal trials until "I think its time for a nap" quick fatigue noted  Accepts clinician fed trials including nectar thick by single cup sip with no overt s/s of aspiration or distress  Delayed cough when self-feeding larger cup sips/consecutive (~2oz at once)  Natalie Smart gulp with frequent belching and delayed cough with clinician controlled trials of thin liquids by cup  Cannot r/o aspiration  Assessment:  Patient continues to present with subtle signs of difficulty when taking thin liquids, later with delayed wet coughing (thins and larger, consecutive sips of nectar)  Given this, along with concerns for aspiration pneumonia and tolerance to very limited intake orally; pt would benefit from instrumental measures to further assess pharyngeal swallow function  Plan:  Continue puree solids w/ nectar thick by single cup sip until study completed       VFSS/VBS         [address], [address] [facility], [place]  Speech-Language Pathologist  [place] #[id number]  [place] #[id number]

## 2022-09-20 NOTE — ASSESSMENT & PLAN NOTE
· Present on admission with reported coughing and shortness of breath  CT chest showed finding suggestive of aspiration pneumonia  · Patient did not meet SIRS criteria on admission  · Continue ceftriaxone-- D6   · Procalcitonin elevated on admission, trended down  · Follow-up on blood cultures, negative thus far  · Aspiration precautions  · Speech therapy on board-advised to continue puree solids with nectar thick liquids by single cup sips until study completed  Advised to obtain video barium swallow-plan for tomorrow 9/21  · Also advised for 1-1 feeding assist and meds crushed in puree

## 2022-09-20 NOTE — PLAN OF CARE
Continue puree solids w/ nectar thick by single cup sip until study completed       VFSS/VBS   1:1 feeding assist  Meds crushed in puree

## 2022-09-21 ENCOUNTER — APPOINTMENT (OUTPATIENT)
Dept: RADIOLOGY | Facility: HOSPITAL | Age: 87
DRG: 177 | End: 2022-09-21
Payer: COMMERCIAL

## 2022-09-21 LAB
ANION GAP SERPL CALCULATED.3IONS-SCNC: 4 MMOL/L (ref 4–13)
BASOPHILS # BLD AUTO: 0.04 THOUSANDS/ΜL (ref 0–0.1)
BASOPHILS NFR BLD AUTO: 1 % (ref 0–1)
BUN SERPL-MCNC: 20 MG/DL (ref 5–25)
CALCIUM SERPL-MCNC: 9 MG/DL (ref 8.3–10.1)
CHLORIDE SERPL-SCNC: 109 MMOL/L (ref 96–108)
CO2 SERPL-SCNC: 29 MMOL/L (ref 21–32)
CREAT SERPL-MCNC: 0.67 MG/DL (ref 0.6–1.3)
EOSINOPHIL # BLD AUTO: 0.06 THOUSAND/ΜL (ref 0–0.61)
EOSINOPHIL NFR BLD AUTO: 1 % (ref 0–6)
ERYTHROCYTE [DISTWIDTH] IN BLOOD BY AUTOMATED COUNT: 14 % (ref 11.6–15.1)
FLUAV RNA RESP QL NAA+PROBE: NEGATIVE
FLUBV RNA RESP QL NAA+PROBE: NEGATIVE
GFR SERPL CREATININE-BSD FRML MDRD: 86 ML/MIN/1.73SQ M
GLUCOSE SERPL-MCNC: 88 MG/DL (ref 65–140)
HCT VFR BLD AUTO: 39.1 % (ref 36.5–49.3)
HGB BLD-MCNC: 12.5 G/DL (ref 12–17)
IMM GRANULOCYTES # BLD AUTO: 0.05 THOUSAND/UL (ref 0–0.2)
IMM GRANULOCYTES NFR BLD AUTO: 1 % (ref 0–2)
LYMPHOCYTES # BLD AUTO: 1.94 THOUSANDS/ΜL (ref 0.6–4.47)
LYMPHOCYTES NFR BLD AUTO: 41 % (ref 14–44)
MCH RBC QN AUTO: 30.9 PG (ref 26.8–34.3)
MCHC RBC AUTO-ENTMCNC: 32 G/DL (ref 31.4–37.4)
MCV RBC AUTO: 97 FL (ref 82–98)
MONOCYTES # BLD AUTO: 0.69 THOUSAND/ΜL (ref 0.17–1.22)
MONOCYTES NFR BLD AUTO: 15 % (ref 4–12)
NEUTROPHILS # BLD AUTO: 1.92 THOUSANDS/ΜL (ref 1.85–7.62)
NEUTS SEG NFR BLD AUTO: 41 % (ref 43–75)
NRBC BLD AUTO-RTO: 0 /100 WBCS
PLATELET # BLD AUTO: 175 THOUSANDS/UL (ref 149–390)
PMV BLD AUTO: 10.4 FL (ref 8.9–12.7)
POTASSIUM SERPL-SCNC: 4.6 MMOL/L (ref 3.5–5.3)
RBC # BLD AUTO: 4.05 MILLION/UL (ref 3.88–5.62)
RSV RNA RESP QL NAA+PROBE: NEGATIVE
SARS-COV-2 RNA RESP QL NAA+PROBE: NEGATIVE
SODIUM SERPL-SCNC: 142 MMOL/L (ref 135–147)
WBC # BLD AUTO: 4.7 THOUSAND/UL (ref 4.31–10.16)

## 2022-09-21 PROCEDURE — 80048 BASIC METABOLIC PNL TOTAL CA: CPT | Performed by: INTERNAL MEDICINE

## 2022-09-21 PROCEDURE — 91300 COVID-19 PFIZER VAC BIVALENT TRIS-SUCROSE 30 MCG/0.3 ML SUSP: CPT | Performed by: FAMILY MEDICINE

## 2022-09-21 PROCEDURE — 92611 MOTION FLUOROSCOPY/SWALLOW: CPT

## 2022-09-21 PROCEDURE — 85025 COMPLETE CBC W/AUTO DIFF WBC: CPT | Performed by: INTERNAL MEDICINE

## 2022-09-21 PROCEDURE — 74230 X-RAY XM SWLNG FUNCJ C+: CPT

## 2022-09-21 PROCEDURE — 0241U HB NFCT DS VIR RESP RNA 4 TRGT: CPT | Performed by: FAMILY MEDICINE

## 2022-09-21 PROCEDURE — 99233 SBSQ HOSP IP/OBS HIGH 50: CPT | Performed by: FAMILY MEDICINE

## 2022-09-21 PROCEDURE — 0001A HB IMMUNIZATION ADMIN COVID-19 30MCG/0.3ML FIRST DOSE: CPT | Performed by: FAMILY MEDICINE

## 2022-09-21 RX ADMIN — HEPARIN SODIUM 5000 UNITS: 5000 INJECTION INTRAVENOUS; SUBCUTANEOUS at 06:09

## 2022-09-21 RX ADMIN — DIVALPROEX SODIUM 500 MG: 125 CAPSULE, COATED PELLETS ORAL at 09:02

## 2022-09-21 RX ADMIN — ASPIRIN 81 MG: 81 TABLET, CHEWABLE ORAL at 09:04

## 2022-09-21 RX ADMIN — BNT162B2 ORIGINAL AND OMICRON BA.4/BA.5 0.3 ML: .1125; .1125 INJECTION, SUSPENSION INTRAMUSCULAR at 13:09

## 2022-09-21 RX ADMIN — CEFTRIAXONE SODIUM 1000 MG: 10 INJECTION, POWDER, FOR SOLUTION INTRAVENOUS at 08:57

## 2022-09-21 RX ADMIN — HEPARIN SODIUM 5000 UNITS: 5000 INJECTION INTRAVENOUS; SUBCUTANEOUS at 13:12

## 2022-09-21 RX ADMIN — SERTRALINE HYDROCHLORIDE 100 MG: 50 TABLET ORAL at 09:04

## 2022-09-21 RX ADMIN — ATORVASTATIN CALCIUM 10 MG: 10 TABLET, FILM COATED ORAL at 17:02

## 2022-09-21 NOTE — PROCEDURES
Speech-Language Pathology Videofluoroscopic Swallow Study       Patient Name: Lucille Hoff    NZKZK'B Date: 9/21/2022     Problem List  Patient Active Problem List   Diagnosis    Bipolar 1 disorder ([place] [address] )    Hyperlipidemia    History of prostate cancer    HTN (hypertension)    Ambulatory dysfunction    PVD (peripheral vascular disease) ([place] [address] )    Mood disorder ([place] [address] )    History of T12 vertebral fracture    Dementia without behavioral disturbance, unspecified dementia type ([place] [address] )    Personal history of COVID-19    Aspiration pneumonia ([place] [address] )    Acute diastolic CHF (congestive heart failure) ([place] [address] )    Cystitis    Severe protein-calorie malnutrition ([place] [address] )       Past Medical History  Past Medical History:   Diagnosis Date    Arthritis     Chronic venous insufficiency     HTN (hypertension) 10/26/2019    Prostate cancer ([place] [address] )     PVD (peripheral vascular disease) ([place] [address] )        Past Surgical History  Past Surgical History:   Procedure Laterality Date    HERNIA REPAIR      TONSILLECTOMY       General Information;  Pt is a 80 y o  male who presented to [facility] with aspiration pneumonia  Pt was seen for a BSE, with recommendations for puree/nectar and strict aspiration precautions  VBS was recommended due to intermittent coughing w/ thin liquid trials; unable to advance at [facility]  Pt was viewed in lateral position and was given trials of puree solids, moistened solids, honey thick, nectar thick, thin liquids by spoon and cup (large and small/single sips)  All trials were clinician fed  Previous VBS: none on file    Results are as follows:   Oral stage;   Pt presents with mild oral dysphagia characterized by the following:  Lip closure: adequate  Mastication: prolonged, anterior munching pattern noted  Bolus formation: reduced  Bolus control: reduced   Transfer: prolonged, occasional pocketing  Residue: mild-mod (solids< liquids) along tongue base     Pharyngeal stage; Pt presents with moderate pharyngeal dysphagia characterized by delayed swallow initiation, delayed/incomplete epiglottic inversion and airway closure, reduced hyolaryngeal excursion  Progressive penetration w/ coating of anterior laryngeal wall when taking thin liquids  Cannot r/o aspiration 2/2 pt movement/positioning in fluoro and delayed coughing  This resulted in the following:     Swallow promptness: delayed  Spill to valleculae: moderate (all textures)  Spill to pyriforms: mild-mod puree, honey, nectar, thin  Epiglottic inversion: sluggish/reduced  Laryngeal rise: reduced  Pharyngeal constriction: adequate  Vallecular retention: minimal-mild (mech soft solids, thin liquids)   Pyriform retention: minimal (thins)  PPW coating: none  Osteophytes: no significant   CP prominence: none   Retropulsion from prominence: NA  Transient penetration: ?Thins   Epiglottic undercoat: thin liquids  Penetration: laryngeal penetration of pooled thin liquid material from UES; progressive  No pt attempt to eject  Residue remains along underside of epiglottis and ant laryngeal wall  Aspiration: cannot r/o 2/2 evidence of penetration, no ejection, delayed coughing   Strategies: NA - unable to follow cues   Response to aspiration: delayed coughing; unable to visualize aspiration under fluoro     Esophageal stage;  Esophageal screening was not completed  Assessment Summary; Patient presents with moderate oropharyngeal dysphagia characterized by prolonged bolus transit, occasional bolus holding/pocketing, poor bolus cohesion, delayed swallow response and incomplete airway closure  This resulted in premature bolus loss over the base of tongue to the level of valleculae (all solids, honey thick), and pyriforms (nectar thick), with waterfall spillage to the upper esophagus on thin liquids  Progressive laryngeal penetration of pooled (thin liquid) material above the UES  No attempt to eject    Material noted to gradually collect along anterior laryngeal wall with delayed/intermittenet coughing  Aspiration unable to be viewed 2/2 pt postioning and frequent movement  Moderate residue of moistened solids along tongue base, able to be cleared with liquid wash  Mild residue of thin liquids throughout pharynx  Pt unable to clear or consistently follow cues to trial strategy use  No significant pharyngeal residue or obstruction in bolus pathway otherwise  Esophageal sweep unable to be completed 2/2 patient positioning in fluoro suite  *Images available for direct review in PACS     Diagnosis/Prognosis;  mild-moderate oropharyngeal dysphagia  fair prognosis for continued safe po intake given therapeutic potential   Prognosis Considerations: age, medical status, cognitive status and therapeutic potential    Recommendations; Recommend mechanically altered/level 2 diet and nectar thick liquids, with upright posture, only feed when fully alert, slow rate of feeding, small bites/sips and alternating bites and sips  Recommended Form of Meds: crushed with puree   Full Supervision  Aspiration precautions   Aspiration precautions posted  If a dedicated assessment of the esophagus is desired, consider esophagram/barium swallow  Results reviewed with patient, RN and MD       Goals:  Pt will tolerate least restrictive diet w/out s/s aspiration or oral/pharyngeal difficulties      Dysphagia Goals: pt will tolerate dysphagia 2 solids with nectar thick liquids without s/s of aspiration x1-3    Treatment Recommendations: Monitor safety of intake/strategy use      Discharge recommendation: [facility]        [address], [address] [facility]  Speech-Language Pathologist  [place] #[id number]  [place] #[id number]

## 2022-09-21 NOTE — ASSESSMENT & PLAN NOTE
Wt Readings from Last 3 Encounters:   09/18/22 70 kg (154 lb 5 2 oz)   01/07/22 71 7 kg (158 lb)   07/16/21 68 2 kg (150 lb 4 8 oz)     · No known history of CHF noted in chart review  · Mild volume overload with elevated proBNP and chest x-ray showing vascular congestion on admission  · Received 1 dose of IV Lasix 20 mg  No diuretics currently  · Monitor I&Os and daily weights  · Diurese as needed, patient currently appears comfortable from the respiratory standpoint and saturating well on room air therefore will hold off on further diuretics  · [id number]%, grade 1 diastolic dysfunction    · Currently euvolemic on exam

## 2022-09-21 NOTE — CASE MANAGEMENT
Case Management Discharge Planning Note    Patient name Evelyn Jordan  Location [address] 312/-79 MRN [id number]  : 1934 Date 2022       Current Admission Date: 9/15/2022  Current Admission Diagnosis:Aspiration pneumonia [facility])   Patient Active Problem List    Diagnosis Date Noted    Severe protein-calorie malnutrition ([place] [address] ) 2022    Aspiration pneumonia ([place] [address] ) 09/15/2022    Acute diastolic CHF (congestive heart failure) ([place] [address] ) 09/15/2022    Cystitis 09/15/2022    Dementia without behavioral disturbance, unspecified dementia type ([place] [address] ) 2022    Personal history of COVID-19 10/2021    Mood disorder ([place] [address] ) 2021    History of T12 vertebral fracture 2021    Ambulatory dysfunction 2020    PVD (peripheral vascular disease) ([place] [address] ) 2020    HTN (hypertension) 10/26/2019    History of prostate cancer 2019    Hyperlipidemia 2018    Bipolar 1 disorder ([place] [address] ) 2018      LOS (days): 6  Geometric Mean LOS (GMLOS) (days): 5 40  Days to GMLOS:-0 8     OBJECTIVE:  Risk of Unplanned Readmission Score: 10 08         Current admission status: Inpatient   Preferred Pharmacy:   [facility] [address] [address]  Phone: [phone] Fax: [address] [place] [address]  Phone: [phone] Fax: [phone]    Primary Care Provider: Suleman Fulton DO    Primary Insurance: [facility]  Secondary Insurance:     DISCHARGE DETAILS:                                          Other Referral/Resources/Interventions Provided:  Referral Comments: Per Mabel Ferrell at [facility], facility needs OT notes and results of video swallow in order to request auth  OT requested;  awaiting same  Also awaiting results of video swallow  Will send to [facility] when completed

## 2022-09-21 NOTE — ASSESSMENT & PLAN NOTE
· /62   Pulse (!) 54   Temp 98 2 °F (36 8 °C)   Resp 18   Ht 6' (1 829 m)   Wt 70 kg (154 lb 5 2 oz) Comment: bed scale not working properly  SpO2 96%   BMI 20 93 kg/m²   · bp stable   If continues to be stable, resume home dose of amlodipine

## 2022-09-21 NOTE — ASSESSMENT & PLAN NOTE
· Present on admission with reported coughing and shortness of breath  CT chest showed finding suggestive of aspiration pneumonia  · Patient did not meet SIRS criteria on admission  · Continue ceftriaxone-- D7  · Procalcitonin elevated on admission, trended down  · Follow-up on blood cultures, negative thus far  · Aspiration precautions  · Speech therapy on board- dysphagia 2, nectar thin, progressive penetration of thins with delayed coughing  · Also advised for 1-1 feeding assist and meds crushed in puree

## 2022-09-21 NOTE — PROGRESS NOTES
[facility]  Progress Note Jennifer Coronado 1934, 80 y o  male MRN: [id number]  Unit/Bed#: -01 Encounter: [id number]  Primary Care Provider: Shon Arenas DO   Date and time admitted to hospital: 9/15/2022 10:43 AM    * Aspiration pneumonia [facility])  Assessment & Plan  · Present on admission with reported coughing and shortness of breath  CT chest showed finding suggestive of aspiration pneumonia  · Patient did not meet SIRS criteria on admission  · Continue ceftriaxone-- D7  · Procalcitonin elevated on admission, trended down  · Follow-up on blood cultures, negative thus far  · Aspiration precautions  · Speech therapy on board- dysphagia 2, nectar thin, progressive penetration of thins with delayed coughing  · Also advised for 1-1 feeding assist and meds crushed in puree  Acute diastolic CHF (congestive heart failure) ([facility])  Assessment & Plan  Wt Readings from Last 3 Encounters:   09/18/22 70 kg (154 lb 5 2 oz)   01/07/22 71 7 kg (158 lb)   07/16/21 68 2 kg (150 lb 4 8 oz)     · No known history of CHF noted in chart review  · Mild volume overload with elevated proBNP and chest x-ray showing vascular congestion on admission  · Received 1 dose of IV Lasix 20 mg  No diuretics currently  · Monitor I&Os and daily weights  · Diurese as needed, patient currently appears comfortable from the respiratory standpoint and saturating well on room air therefore will hold off on further diuretics  · [id number]-37%, grade 1 diastolic dysfunction  · Currently euvolemic on exam    Severe protein-calorie malnutrition ([address] )  Assessment & Plan  Malnutrition Findings:   Adult Malnutrition type: Chronic illness  Adult Degree of Malnutrition: Other severe protein calorie malnutrition  Malnutrition Characteristics: Fat loss, Muscle loss                  360 Statement: related to medical condition as evidenced by severe orbital hollowing and severe temporal depletion   Treated with diet and supplements  BMI Findings: Body mass index is 20 93 kg/m²  Nutrition consult    Cystitis  Assessment & Plan  · Noted on CT abdomen/pelvis  · UA was never collected therefore order discontinued as patient already on antibiotics  · Patient unable to provide any history regarding urinary symptoms  · Continue coverage with ceftriaxone    Dementia without behavioral disturbance, unspecified dementia type ([place] [address] )  Assessment & Plan  · Alert and oriented to self and Minimally interactive at baseline  · Appears to be at baseline currently  · Supportive care      PVD (peripheral vascular disease) ([place] [address] )  Assessment & Plan  Continue aspirin and statin    HTN (hypertension)  Assessment & Plan  · /62   Pulse (!) 54   Temp 98 2 °F (36 8 °C)   Resp 18   Ht 6' (1 829 m)   Wt 70 kg (154 lb 5 2 oz) Comment: bed scale not working properly  SpO2 96%   BMI 20 93 kg/m²   · bp stable  If continues to be stable, resume home dose of amlodipine    Bipolar 1 disorder ([place] [address] )  Assessment & Plan  · Continue home medications          VTE Pharmacologic Prophylaxis: VTE Score: 3 Heparin    Patient Centered Rounds: I performed bedside rounds with nursing staff today  Discussions with Specialists or Other Care Team Provider:  Yes, speech therapy    Education and Discussions with Family / Patient: Updated  [phone]) via phone  Time Spent for Care: 20 minutes  More than 50% of total time spent on counseling and coordination of care as described above      Current Length of Stay: 6 day(s)  Current Patient Status: Inpatient   Certification Statement: The patient will continue to require additional inpatient hospital stay due to Pending placement  Discharge Plan: Pending placement    Code Status: Level 3 - DNAR and DNI    Subjective:   Seen and examined at bedside  Patient is hard of hearing  Denies any respiratory symptoms like cough or shortness of breath    Objective:     Vitals:   Temp (24hrs), Av 8 °F (36 6 °C), Min:97 3 °F (36 3 °C), Max:98 2 °F (36 8 °C)    Temp:  [97 3 °F (36 3 °C)-98 2 °F (36 8 °C)] 98 2 °F (36 8 °C)  HR:  [54-84] 54  Resp:  [17-18] 18  BP: (113-152)/(54-62) 152/62  SpO2:  [95 %-96 %] 96 %  Body mass index is 20 93 kg/m²  Input and Output Summary (last 24 hours): Intake/Output Summary (Last 24 hours) at 9/21/2022 1216  Last data filed at 9/21/2022 0401  Gross per 24 hour   Intake 360 ml   Output 150 ml   Net 210 ml       Physical Exam:   Physical Exam S1-S2 audible, regular, lungs diminished at bases bilaterally likely from poor effort, no crackles or rhonchi audible  No wheezing, no new focal neuro deficits, alert and oriented to time place and person at this moment, abdomen nontender nondistended bowel sounds audible all 4 quadrants    Additional Data:     Labs:  Results from last 7 days   Lab Units 09/21/22  0557   WBC Thousand/uL 4 70   HEMOGLOBIN g/dL 12 5   HEMATOCRIT % 39 1   PLATELETS Thousands/uL 175   NEUTROS PCT % 41*   LYMPHS PCT % 41   MONOS PCT % 15*   EOS PCT % 1     Results from last 7 days   Lab Units 09/21/22  0557 09/16/22  0552 09/15/22  1116   SODIUM mmol/L 142   < > 139   POTASSIUM mmol/L 4 6   < > 4 3   CHLORIDE mmol/L 109*   < > 106   CO2 mmol/L 29   < > 30   BUN mg/dL 20   < > 14   CREATININE mg/dL 0 67   < > 0 95   ANION GAP mmol/L 4   < > 3*   CALCIUM mg/dL 9 0   < > 9 0   ALBUMIN g/dL  --   --  2 7*   TOTAL BILIRUBIN mg/dL  --   --  0 54   ALK PHOS U/L  --   --  40*   ALT U/L  --   --  12   AST U/L  --   --  12   GLUCOSE RANDOM mg/dL 88   < > 81    < > = values in this interval not displayed       Results from last 7 days   Lab Units 09/15/22  1116   INR  1 18             Results from last 7 days   Lab Units 09/18/22  0531 09/17/22  0444 09/16/22  0552 09/15/22  1128 09/15/22  1116   LACTIC ACID mmol/L  --   --   --   --  1 5   PROCALCITONIN ng/ml 0 22 0 37* 0 46* 0 07  --        Lines/Drains:  Invasive Devices  Report    Peripheral Intravenous Line Duration           Peripheral IV 09/20/22 Left;Ventral (anterior) Forearm 1 day          Drain  Duration           External Urinary Catheter Small 5 days                      Imaging: No pertinent imaging reviewed  Recent Cultures (last 7 days):   Results from last 7 days   Lab Units 09/15/22  1128 09/15/22  1116   BLOOD CULTURE  No Growth After 5 Days  No Growth After 5 Days  Last 24 Hours Medication List:   Current Facility-Administered Medications   Medication Dose Route Frequency Provider Last Rate    acetaminophen  650 mg Oral Q6H PRN Lisa Lopez MD      aspirin  81 mg Oral Daily Lisa Lopez MD      atorvastatin  10 mg Oral Daily With Nathaniel Witt MD      cefTRIAXone  1,000 mg Intravenous Q24H Lisa Lopez MD 1,000 mg (09/21/22 0857)    COVID-19 Pfizer vac bivalent vickey-sucrose  0 3 mL Intramuscular Once Geoffrey Arroyo MD      divalproex sodium  500 mg Oral Q12H [facility] Lisa Lopez MD      heparin (porcine)  5,000 Units Subcutaneous Q8H 321 Kathy Lozano MD      sertraline  100 mg Oral Daily Lisa Lopez MD          Today, Patient Was Seen By: Geoffrey Arroyo MD    **Please Note: This note may have been constructed using a voice recognition system  **

## 2022-09-21 NOTE — CASE MANAGEMENT
Case Management Discharge Planning Note    Patient name Jose Valladares  Location [address] 312/-15 MRN [id number]  : 1934 Date 2022       Current Admission Date: 9/15/2022  Current Admission Diagnosis:Aspiration pneumonia [facility])   Patient Active Problem List    Diagnosis Date Noted    Severe protein-calorie malnutrition ([facility] ) 2022    Aspiration pneumonia ([place] [address] ) 09/15/2022    Acute diastolic CHF (congestive heart failure) ([place] [address] ) 09/15/2022    Cystitis 09/15/2022    Dementia without behavioral disturbance, unspecified dementia type ([place] [address] ) 2022    Personal history of COVID-19 10/2021    Mood disorder ([place] [address] ) 2021    History of T12 vertebral fracture 2021    Ambulatory dysfunction 2020    PVD (peripheral vascular disease) ([place] [address] ) 2020    HTN (hypertension) 10/26/2019    History of prostate cancer 2019    Hyperlipidemia 2018    Bipolar 1 disorder ([place] [address] ) 2018      LOS (days): 6  Geometric Mean LOS (GMLOS) (days): 5 40  Days to GMLOS:-0 5     OBJECTIVE:  Risk of Unplanned Readmission Score: 10 03         Current admission status: Inpatient   Preferred Pharmacy:   [address] [place] [address]  Phone: [phone] Fax:  [address] [address]  Phone: [phone] Fax: [phone]    Primary Care Provider: Baldomero Rocha DO    Primary Insurance: [facility]  Secondary Insurance:     DISCHARGE DETAILS:                                          Other Referral/Resources/Interventions Provided:  Referral Comments: Updated PT, OT and ST notes sent to [email or web address] via [address]  S/W [email or web address];  she reports facility will get their own authorization  S/W pt's nephew Alexander Brown to complete PASSR;  document also forwarded to [email or web address] via [address]  Awaiting insurance auth           Treatment Team Recommendation: Short Term Rehab, SNF  Discharge Destination Plan[de-identified] SNF, Short Term Rehab  Transport at Discharge :  Other (Comment) (TBD)

## 2022-09-22 PROCEDURE — 97530 THERAPEUTIC ACTIVITIES: CPT

## 2022-09-22 PROCEDURE — 97535 SELF CARE MNGMENT TRAINING: CPT

## 2022-09-22 PROCEDURE — 99232 SBSQ HOSP IP/OBS MODERATE 35: CPT | Performed by: FAMILY MEDICINE

## 2022-09-22 PROCEDURE — 97110 THERAPEUTIC EXERCISES: CPT

## 2022-09-22 PROCEDURE — 92526 ORAL FUNCTION THERAPY: CPT

## 2022-09-22 RX ADMIN — HEPARIN SODIUM 5000 UNITS: 5000 INJECTION INTRAVENOUS; SUBCUTANEOUS at 06:05

## 2022-09-22 RX ADMIN — ASPIRIN 81 MG: 81 TABLET, CHEWABLE ORAL at 09:40

## 2022-09-22 RX ADMIN — HEPARIN SODIUM 5000 UNITS: 5000 INJECTION INTRAVENOUS; SUBCUTANEOUS at 23:00

## 2022-09-22 RX ADMIN — DIVALPROEX SODIUM 500 MG: 125 CAPSULE, COATED PELLETS ORAL at 23:00

## 2022-09-22 RX ADMIN — ATORVASTATIN CALCIUM 10 MG: 10 TABLET, FILM COATED ORAL at 18:02

## 2022-09-22 RX ADMIN — SERTRALINE HYDROCHLORIDE 100 MG: 50 TABLET ORAL at 09:41

## 2022-09-22 RX ADMIN — DIVALPROEX SODIUM 500 MG: 125 CAPSULE, COATED PELLETS ORAL at 00:05

## 2022-09-22 RX ADMIN — HEPARIN SODIUM 5000 UNITS: 5000 INJECTION INTRAVENOUS; SUBCUTANEOUS at 15:37

## 2022-09-22 RX ADMIN — DIVALPROEX SODIUM 500 MG: 125 CAPSULE, COATED PELLETS ORAL at 09:41

## 2022-09-22 RX ADMIN — HEPARIN SODIUM 5000 UNITS: 5000 INJECTION INTRAVENOUS; SUBCUTANEOUS at 00:05

## 2022-09-22 NOTE — PLAN OF CARE
Problem: OCCUPATIONAL THERAPY ADULT  Goal: Performs self-care activities at highest level of function for planned discharge setting  See evaluation for individualized goals  Description: Treatment Interventions: ADL retraining, Functional transfer training, UE strengthening/ROM, Endurance training, Patient/family training, Equipment evaluation/education, Compensatory technique education, Continued evaluation, Energy conservation, Activityengagement          See flowsheet documentation for full assessment, interventions and recommendations  Note: Limitation: Decreased ADL status, Decreased UE ROM, Decreased UE strength, Decreased Safe judgement during ADL, Decreased endurance, Decreased high-level ADLs  Prognosis: Good  Assessment: Patient participated in Skilled OT session this date with interventions consisting of ADL re training with the use of correct body mechnaics, Energy Conservation techniques, Work simplification skills ,  therapeutic activities to: increase activity tolerance, increase standing tolerance time with unilateral UE support to complete sink level ADLs, increase cardiovascular endurance , increase dynamic sit/ stand balance during functional activity , increase postural control and increase OOB/ sitting tolerance   Patient agreeable to OT treatment session, upon arrival patient was found seated OOB to Chair, supine in bed, alert, responsive  and in no apparent distress  In comparison to previous session, patient with improvements in sitting tolerance at edge of bed, overall endurance and activity engagement, participated in self care tasks, BUE therapeutic exercises and OOB transfers    Patient requiring verbal cues for safety, verbal cues for correct technique and cognitive assistance to anticipate next step, presents with dynamic sitting/ standing  Balance deficit, poor standing tolerance time, postural control and alignment deficit requiring external assistance to complete transitional movements  Patient continues to be functioning below baseline level, occupational performance remains limited secondary to factors listed above and increased risk for falls and injury  From OT standpoint, recommendation at time of d/c would be Short Term Rehab  Patient to benefit from continued Occupational Therapy treatment while in the hospital to address deficits as defined above and maximize level of functional independence with ADLs and functional mobility       OT Discharge Recommendation: Post acute rehabilitation services

## 2022-09-22 NOTE — PHYSICAL THERAPY NOTE
Physical Therapy Treatment Note    Patient's Name: Marcy Jolley    Admitting Diagnosis  Aspiration pneumonia ([place] [address] ) [J69 0]  Low O2 saturation [R79 81]    Problem List  Patient Active Problem List   Diagnosis    Bipolar 1 disorder ([place] [address] )    Hyperlipidemia    History of prostate cancer    HTN (hypertension)    Ambulatory dysfunction    PVD (peripheral vascular disease) ([place] [address] )    Mood disorder ([place] [address] )    History of T12 vertebral fracture    Dementia without behavioral disturbance, unspecified dementia type ([place] [address] )    Personal history of COVID-19    Aspiration pneumonia ([place] [address] )    Acute diastolic CHF (congestive heart failure) ([place] [address] )    Cystitis    Severe protein-calorie malnutrition ([place] [address] )        09/22/22 0748   PT Last Visit   PT Visit Date 09/22/22   Note Type   Note Type Treatment   Pain Assessment   Pain Assessment Tool 0-10   Pain Score No Pain   Restrictions/Precautions   Weight Bearing Precautions Per Order No   Braces or Orthoses Other (Comment)   Other Precautions Chair Alarm; Bed Alarm; Fall Risk;Cognitive   General   Chart Reviewed Yes   Response to Previous Treatment Patient with no complaints from previous session  Family/Caregiver Present No   Cognition   Overall Cognitive Status Impaired   Arousal/Participation Alert; Responsive; Cooperative   Attention Attends with cues to redirect   Orientation Level Oriented to person;Disoriented to place; Disoriented to time;Disoriented to situation   Memory Decreased recall of biographical information;Decreased short term memory;Decreased recall of recent events;Decreased long term memory   Following Commands Follows one step commands with increased time or repetition   Comments Pt agreeable to PT   Bed Mobility   Rolling R 2  Maximal assistance   Additional items Assist x 2;Bedrails; Increased time required;Verbal cues   Rolling L 2  Maximal assistance   Additional items Assist x 2;Bedrails; Increased time required;Verbal cues   Supine to Sit 2  Maximal assistance   Additional items Assist x 2;Bedrails; Increased time required;Verbal cues   Sit to Supine 2  Maximal assistance   Additional items Assist x 2;Bedrails; Increased time required;Verbal cues   Additional Comments Pt received at start of session supine in bed with HOB elevated  Following session pt remained in bedside recliner with needs met, alarm activated and call bell within reach   Transfers   Sit to Stand 2  Maximal assistance   Additional items Assist x 2;Armrests; Increased time required;Verbal cues   Stand to Sit 2  Maximal assistance   Additional items Assist x 2;Armrests; Increased time required;Verbal cues   Additional Comments with use of RW   Ambulation/Elevation   Gait pattern Decreased foot clearance; Inconsistent sisi; Short stride; Foward flexed   Gait Assistance 2  Maximal assist   Additional items Assist x 2;Verbal cues   Assistive Device Rolling walker   Distance 4' to bedside chair   Stair Management Assistance Not tested   Balance   Static Sitting Fair -   Dynamic Sitting Poor +   Static Standing Poor   Dynamic Standing Poor -   Ambulatory Poor -   Endurance Deficit   Endurance Deficit Yes   Endurance Deficit Description decreased activity tolerance   Activity Tolerance   Activity Tolerance Patient limited by fatigue;Patient limited by pain   Medical Staff Made Aware CATRACHITO [facility]   Nurse Made Aware RN [facility]   Exercises   Heelslides Supine;10 reps;AAROM; Bilateral   Knee AROM Long Arc Quad Sitting;AROM;10 reps;Bilateral   Ankle Pumps Supine;10 reps;AROM; Bilateral   Assessment   Prognosis Fair   Problem List Decreased strength;Decreased endurance; Impaired balance;Decreased mobility; Decreased cognition; Impaired hearing;Pain   Assessment Pt seen for PT treatment session this date with interventions consisting of gait training w/ emphasis on improving pt's ability to ambulate level surfaces x 4' with max A of 2 provided by therapist with RW, Therapeutic exercise consisting of: LE exercises performed supine and seated and therapeutic activity consisting of training: bed mobility, supine<>sit transfers, sit<>stand transfers and vc and tactile cues for static standing posture faciliation  Pt agreeable to PT treatment session upon arrival, pt found supine in bed w/ HOB elevated, in no apparent distress and responsive  In comparison to previous session, pt with no improvements as evidenced by decreased command following this session  Post session: pt returned back to recliner, chair alarm engaged, all needs in reach and RN notified of session findings/recommendations  Continue to recommend post acute rehabilitation services at time of d/c in order to maximize pt's functional independence and safety w/ mobility  Pt continues to be functioning below baseline level, and remains limited 2* factors listed above and including continued need for medical management and monitoring, decreased activity tolerance and endurance, decreased cognition, impaired command following  PT will continue to see pt during current hospitalization in order to address the deficits listed above and provide interventions consistent w/ POC in effort to achieve STGs  Barriers to Discharge Decreased caregiver support; Other (Comment)   Goals   STG Expiration Date 09/26/22   PT Treatment Day 2   Plan   Treatment/Interventions LE strengthening/ROM; Functional transfer training;ADL retraining; Therapeutic exercise; Endurance training;Patient/family training;Bed mobility;Gait training; Compensatory technique education;Spoke to nursing;OT   Progress Progressing toward goals   PT Frequency 3-5x/wk   Recommendation   PT Discharge Recommendation Post acute rehabilitation services   AM-PAC Basic Mobility Inpatient   Turning in Bed Without Bedrails 2   Lying on Back to Sitting on Edge of Flat Bed 2   Moving Bed to Chair 2   Standing Up From Chair 2   Walk in Room 1   Climb 3-5 Stairs 1   Basic Mobility Inpatient Raw Score 10   Turning Head Towards Sound 3   Follow Simple Instructions 3   Low Function Basic Mobility Raw Score 16   Low Function Basic Mobility Standardized Score 25 72   Highest Level Of Mobility   -HLM Goal 4: Move to chair/commode   JH-HLM Achieved 4: Move to chair/commode   Education   Education Provided Mobility training   Patient Reinforcement needed   End of Consult   Patient Position at End of Consult Bedside chair;Bed/Chair alarm activated; All needs within reach       Pablo Frankel PT    Time In: 07:20  Time Out: 07:48  Total Time: 28 mins

## 2022-09-22 NOTE — CASE MANAGEMENT
Case Management Discharge Planning Note    Patient name Fatemeh Dye  Location [id number] MRN [id number]  : 1934 Date 2022       Current Admission Date: 9/15/2022  Current Admission Diagnosis:Aspiration pneumonia [facility])   Patient Active Problem List    Diagnosis Date Noted    Severe protein-calorie malnutrition ([facility] ) 2022    Aspiration pneumonia ([place] [address] ) 09/15/2022    Acute diastolic CHF (congestive heart failure) ([place] [address] ) 09/15/2022    Cystitis 09/15/2022    Dementia without behavioral disturbance, unspecified dementia type ([place] [address] ) 2022    Personal history of COVID-19 10/2021    Mood disorder ([place] [address] ) 2021    History of T12 vertebral fracture 2021    Ambulatory dysfunction 2020    PVD (peripheral vascular disease) ([place] [address] ) 2020    HTN (hypertension) 10/26/2019    History of prostate cancer 2019    Hyperlipidemia 2018    Bipolar 1 disorder ([place] [address] ) 2018      LOS (days): 7  Geometric Mean LOS (GMLOS) (days): 5 40  Days to GMLOS:-1 4     OBJECTIVE:  Risk of Unplanned Readmission Score: 10 19         Current admission status: Inpatient   Preferred Pharmacy:   [address] [place] [address] [facility]   [address] [place] [address]  Phone: [phone] Fax: 791 [address] [address]  Phone: [phone] Fax: [phone]    Primary Care Provider: Fay Scheuermann, DO    Primary Insurance: Myra Vazquez [facility]  Secondary Insurance:     DISCHARGE DETAILS:                                          Other Referral/Resources/Interventions Provided:  Referral Comments: Results of video swallow and OT notes sent to [facility] [place] via [address]  Awaiting auth for placement

## 2022-09-22 NOTE — OCCUPATIONAL THERAPY NOTE
Occupational Therapy Treatment Note        Patient Name: Naomi Han  NJWQA'O Date: 9/22/2022 09/22/22 0721   OT Last Visit   OT Visit Date 09/22/22   Note Type   Note Type Treatment   Restrictions/Precautions   Weight Bearing Precautions Per Order No   Other Precautions Cognitive; Chair Alarm; Bed Alarm; Fall Risk;Hard of hearing   Pain Assessment   Pain Assessment Tool 0-10   Pain Score No Pain   ADL   Where Assessed Supine, bed  (and sitting at edge of bed)   Eating Assistance 4  Minimal Assistance   Eating Deficit Setup;Verbal cueing;Supervision/safety; Increased time to complete  (supervised feeding with staff)   Grooming Assistance 3  Moderate Assistance   Grooming Deficit Increased time to complete   UB Bathing Assistance 2  Maximal Assistance   UB Bathing Deficit Increased time to complete;Supervision/safety   LB Bathing Assistance 2  Maximal Assistance   LB Bathing Deficit Right lower leg including foot; Left lower leg including foot  (dependnet for lower leg and foot)   UB Dressing Assistance 4  Minimal Assistance   UB Dressing Deficit Increased time to complete   LB Dressing Assistance 2  Maximal Assistance   LB Dressing Deficit   (dependnet for lower leg and foot)   Toileting Assistance  1  Total Assistance   Functional Standing Tolerance   Time 45 secs to 1 minute   Activity transfer training   Comments assist of 2 bed to Recliner with the use of RW   Bed Mobility   Rolling R 3  Moderate assistance   Additional items Assist x 2; Increased time required;Verbal cues;LE management   Rolling L 3  Moderate assistance   Additional items Assist x 2; Increased time required;Verbal cues;LE management   Supine to Sit 2  Maximal assistance   Additional items Assist x 2; Increased time required;Verbal cues;LE management   Transfers   Sit to Stand 2  Maximal assistance   Additional items Assist x 2; Increased time required;Verbal cues   Stand to Sit 2  Maximal assistance   Additional items Assist x 2;Increased time required;Verbal cues   Therapeutic Exercise - ROM   UE-ROM Yes   ROM- Right Upper Extremities   R Shoulder AAROM; Flexion;ABduction; Extension;Horizontal ABduction   R Weight/Reps/Sets 2 sets of 10 repetitions   ROM - Left Upper Extremities    L Shoulder AAROM; Flexion;ABduction; Extension;Horizontal ABduction   L Weight/Reps/Sets 2 sets of 10 reps   Cognition   Overall Cognitive Status Impaired   Arousal/Participation Alert; Responsive; Cooperative   Attention Attends with cues to redirect   Orientation Level Oriented to person;Disoriented to place; Disoriented to time;Disoriented to situation   Memory Decreased recall of biographical information;Decreased short term memory;Decreased recall of recent events;Decreased long term memory   Following Commands Follows one step commands with increased time or repetition   Activity Tolerance   Activity Tolerance Patient limited by fatigue   Assessment   Assessment Patient participated in Skilled OT session this date with interventions consisting of ADL re training with the use of correct body mechnaics, Energy Conservation techniques, Work simplification skills ,  therapeutic activities to: increase activity tolerance, increase standing tolerance time with unilateral UE support to complete sink level ADLs, increase cardiovascular endurance , increase dynamic sit/ stand balance during functional activity , increase postural control and increase OOB/ sitting tolerance   Patient agreeable to OT treatment session, upon arrival patient was found seated OOB to Chair, supine in bed, alert, responsive  and in no apparent distress  In comparison to previous session, patient with improvements in sitting tolerance at edge of bed, overall endurance and activity engagement, participated in self care tasks, BUE therapeutic exercises and OOB transfers    Patient requiring verbal cues for safety, verbal cues for correct technique and cognitive assistance to anticipate next step, presents with dynamic sitting/ standing  Balance deficit, poor standing tolerance time, postural control and alignment deficit requiring external assistance to complete transitional movements  Patient continues to be functioning below baseline level, occupational performance remains limited secondary to factors listed above and increased risk for falls and injury  From OT standpoint, recommendation at time of d/c would be Short Term Rehab  Patient to benefit from continued Occupational Therapy treatment while in the hospital to address deficits as defined above and maximize level of functional independence with ADLs and functional mobility  Plan   Treatment Interventions ADL retraining;Functional transfer training;UE strengthening/ROM; Endurance training;Patient/family training;Equipment evaluation/education; Compensatory technique education;Continued evaluation; Energy conservation; Activityengagement   Goal Expiration Date 09/30/22   OT Frequency 3-5x/wk   Recommendation   OT Discharge Recommendation Post acute rehabilitation services   AM-PAC Daily Activity Inpatient   Lower Body Dressing 1   Bathing 2   Toileting 2   Upper Body Dressing 2   Grooming 2   Eating 3   Daily Activity Raw Score 12   Daily Activity Standardized Score (Calc for Raw Score >=11) 30 6   AM-PAC Applied Cognition Inpatient   Following a Speech/Presentation 2   Understanding Ordinary Conversation 2   Taking Medications 1   Remembering Where Things Are Placed or Put Away 1   Remembering List of 4-5 Errands 1   Taking Care of Complicated Tasks 1   Applied Cognition Raw Score 8   Applied Cognition Standardized Score 19 32

## 2022-09-22 NOTE — PLAN OF CARE
Problem: PHYSICAL THERAPY ADULT  Goal: Performs mobility at highest level of function for planned discharge setting  See evaluation for individualized goals  Description: Treatment/Interventions: Functional transfer training, LE strengthening/ROM, Therapeutic exercise, Endurance training, Cognitive reorientation, Patient/family training, Bed mobility, Continued evaluation, Spoke to nursing, OT          See flowsheet documentation for full assessment, interventions and recommendations  Outcome: Progressing  Note: Prognosis: Fair  Problem List: Decreased strength, Decreased endurance, Impaired balance, Decreased mobility, Decreased cognition, Impaired hearing, Pain  Assessment: Pt seen for PT treatment session this date with interventions consisting of gait training w/ emphasis on improving pt's ability to ambulate level surfaces x 4' with max A of 2 provided by therapist with RW, Therapeutic exercise consisting of: LE exercises performed supine and seated and therapeutic activity consisting of training: bed mobility, supine<>sit transfers, sit<>stand transfers and vc and tactile cues for static standing posture faciliation  Pt agreeable to PT treatment session upon arrival, pt found supine in bed w/ HOB elevated, in no apparent distress and responsive  In comparison to previous session, pt with no improvements as evidenced by decreased command following this session  Post session: pt returned back to recliner, chair alarm engaged, all needs in reach and RN notified of session findings/recommendations  Continue to recommend post acute rehabilitation services at time of d/c in order to maximize pt's functional independence and safety w/ mobility  Pt continues to be functioning below baseline level, and remains limited 2* factors listed above and including continued need for medical management and monitoring, decreased activity tolerance and endurance, decreased cognition, impaired command following   PT will continue to see pt during current hospitalization in order to address the deficits listed above and provide interventions consistent w/ POC in effort to achieve STGs  Barriers to Discharge: Decreased caregiver support, Other (Comment)     PT Discharge Recommendation: Post acute rehabilitation services    See flowsheet documentation for full assessment

## 2022-09-22 NOTE — PLAN OF CARE
Problem: MOBILITY - ADULT  Goal: Maintain or return to baseline ADL function  Description: INTERVENTIONS:  -  Assess patient's ability to carry out ADLs; assess patient's baseline for ADL function and identify physical deficits which impact ability to perform ADLs (bathing, care of mouth/teeth, toileting, grooming, dressing, etc )  - Assess/evaluate cause of self-care deficits   - Assess range of motion  - Assess patient's mobility; develop plan if impaired  - Assess patient's need for assistive devices and provide as appropriate  - Encourage maximum independence but intervene and supervise when necessary  - Involve family in performance of ADLs  - Assess for home care needs following discharge   - Consider OT consult to assist with ADL evaluation and planning for discharge  - Provide patient education as appropriate  Outcome: Progressing     Problem: DISCHARGE PLANNING  Goal: Discharge to home or other facility with appropriate resources  Description: INTERVENTIONS:  - Identify barriers to discharge w/patient and caregiver  - Arrange for needed discharge resources and transportation as appropriate  - Identify discharge learning needs (meds, wound care, etc )  - Arrange for interpretive services to assist at discharge as needed  - Refer to Case Management Department for coordinating discharge planning if the patient needs post-hospital services based on physician/advanced practitioner order or complex needs related to functional status, cognitive ability, or social support system  Outcome: Progressing     Problem: Knowledge Deficit  Goal: Patient/family/caregiver demonstrates understanding of disease process, treatment plan, medications, and discharge instructions  Description: Complete learning assessment and assess knowledge base    Interventions:  - Provide teaching at level of understanding  - Provide teaching via preferred learning methods  Outcome: Progressing

## 2022-09-22 NOTE — NURSING NOTE
Patient complaining of discomfort while flushing IV access  IV access removed  Dr Ingrid Yap made aware that patient did not receive am dose of IV Rocephin  Per Dr Ingrid Yap, okay to leave patient without IV access

## 2022-09-22 NOTE — ASSESSMENT & PLAN NOTE
· Present on admission with reported coughing and shortness of breath  CT chest showed finding suggestive of aspiration pneumonia  · Procalcitonin elevated on admission at 0 46; has trended down to normal   · Continue on IV Ceftriaxone; today is day 7  · Blood cultures negative x 5 days  · Aspiration precautions  · Speech therapy on board- dysphagia 2, nectar thin, progressive penetration of thins with delayed coughing  · Also advised for 1-1 feeding assist and meds crushed in puree

## 2022-09-22 NOTE — SPEECH THERAPY NOTE
Speech Language/Pathology     Speech/Language Pathology Progress Note     Patient Name: Evelyn GONZALEZI Date: 9/22/2022     Problem List  Principal Problem:    Aspiration pneumonia ([facility] )  Active Problems:    Bipolar 1 disorder ([facility] )    HTN (hypertension)    PVD (peripheral vascular disease) ([facility] )    Dementia without behavioral disturbance, unspecified dementia type ([facility] )    Acute diastolic CHF (congestive heart failure) ([facility] )    Cystitis    Severe protein-calorie malnutrition ([facility] )       Subjective:  Patient received seated in bedside chair during breakfast     Previous/current diet: dys2/ntl though breakfast consists of mostly puree textures     Objective: The following consistencies were tested moistened solid (minimal- clinician provided as pt's diet not yet advanced to d2), puree, nectar thick by cup sip  All fed by clinician  Patient presents with adequate bolus containment, manipulation and control  Mastication judged to be minimal, mostly tongue-palate mashing  Functional for textures tested today  Fatigue noted  Wet coughing x1 following larger sip of nectar thick  No aspiration or distress otherwise  Assessment:  Oropharyngeal swallow function appears same from VBS completed yesterday  Though patient may be at risk of fatigue with chewable solid textures (again, most of today's meal was pureed)  Suspect aspiration with larger cup sips  No distress and intake appears adequate with minimal     Plan: Will follow closely to determine safety of dysphagia 2 vs  Puree  Suggest continuing with puree/ nectar as present diet is ordered           Ro [address], [address] [facility], [place], operated by [facility]  Speech-Language Pathologist  [place] #[id number]  [place] #[id number]

## 2022-09-22 NOTE — PROGRESS NOTES
[facility]  Progress Note Fabi Madrid 1934, 80 y o  male MRN: [id number]  Unit/Bed#: -Melvin Encounter: [id number]  Primary Care Provider: Gifty Means DO   Date and time admitted to hospital: 9/15/2022 10:43 AM    * Aspiration pneumonia [facility])  Assessment & Plan  · Present on admission with reported coughing and shortness of breath  CT chest showed finding suggestive of aspiration pneumonia  · Procalcitonin elevated on admission at 0 46; has trended down to normal   · Continue on IV Ceftriaxone; today is day 7  · Blood cultures negative x 5 days  · Aspiration precautions  · Speech therapy on board- dysphagia 2, nectar thin, progressive penetration of thins with delayed coughing  · Also advised for 1-1 feeding assist and meds crushed in puree  Acute diastolic CHF (congestive heart failure) ([facility])  Assessment & Plan  Wt Readings from Last 3 Encounters:   09/22/22 70 kg (154 lb 5 2 oz)   01/07/22 71 7 kg (158 lb)   07/16/21 68 2 kg (150 lb 4 8 oz)     · No known history of CHF noted in chart review  · Mild volume overload with elevated proBNP and chest x-ray showing vascular congestion on admission  · Received 1 dose of IV Lasix 20 mg  No diuretics currently  · Monitor I&Os and daily weights  · Diurese as needed, patient currently appears comfortable from the respiratory standpoint and saturating well on room air therefore will hold off on further diuretics  · [id number]-61%, grade 1 diastolic dysfunction  · Currently euvolemic on exam    Severe protein-calorie malnutrition ([address] )  Assessment & Plan  Malnutrition Findings:   Adult Malnutrition type: Chronic illness  Adult Degree of Malnutrition: Other severe protein calorie malnutrition  Malnutrition Characteristics: Fat loss, Muscle loss                  360 Statement: related to medical condition as evidenced by severe orbital hollowing and severe temporal depletion  Treated with diet and supplements      BMI Findings: Body mass index is 20 93 kg/m²  Nutrition consult    Cystitis  Assessment & Plan  · Noted on CT abdomen/pelvis  · UA was never collected therefore order discontinued as patient already on antibiotics  · Patient unable to provide any history regarding urinary symptoms  · Continue coverage with ceftriaxone    Dementia without behavioral disturbance, unspecified dementia type ([facility] )  Assessment & Plan  · Alert and oriented to self and Minimally interactive at baseline  · Appears to be at baseline currently  · Supportive care    PVD (peripheral vascular disease) ([facility] )  Assessment & Plan  · Continue aspirin and statin    HTN (hypertension)  Assessment & Plan  · Chronic; blood pressure controlled; 108-112/56-62  · Monitor    Bipolar 1 disorder (HCC)  Assessment & Plan  · Continue home medications      VTE Pharmacologic Prophylaxis: VTE Score: 3 Moderate Risk (Score 3-4) - Pharmacological DVT Prophylaxis Ordered: heparin  Patient Centered Rounds: I performed bedside rounds with nursing staff today  Discussions with Specialists or Other Care Team Provider: Case Management    Education and Discussions with Family / Patient: Patient's nephew has been kept updated  Time Spent for Care: 15 minutes  More than 50% of total time spent on counseling and coordination of care as described above  Current Length of Stay: 7 day(s)  Current Patient Status: Inpatient   Certification Statement: The patient will continue to require additional inpatient hospital stay due to awaiting auth for STR  Discharge Plan: TBD pending auth      Code Status: Level 3 - DNAR and DNI    Subjective:   Seen and examined at bedside  No new complaints  No acute overnight events    Objective:     Vitals:   Temp (24hrs), Av 6 °F (36 4 °C), Min:97 6 °F (36 4 °C), Max:97 7 °F (36 5 °C)    Temp:  [97 6 °F (36 4 °C)-97 7 °F (36 5 °C)] 97 6 °F (36 4 °C)  HR:  [48-52] 48  Resp:  [17-20] 20  BP: (108-146)/() 112/62  SpO2:  [96 %] 96 %  Body mass index is 20 93 kg/m²  Input and Output Summary (last 24 hours): Intake/Output Summary (Last 24 hours) at 9/22/2022 1226  Last data filed at 9/22/2022 0339  Gross per 24 hour   Intake 532 ml   Output --   Net 532 ml       Physical Exam:   Physical Exam S1-S2 audible, regular  Lungs clear to auscultation bilaterally  No new focal neuro deficit  Alert and oriented time place and person  Abdomen nontender nondistended bowel sounds audible all 4 quadrants    Additional Data:     Labs:  Results from last 7 days   Lab Units 09/21/22  0557   WBC Thousand/uL 4 70   HEMOGLOBIN g/dL 12 5   HEMATOCRIT % 39 1   PLATELETS Thousands/uL 175   NEUTROS PCT % 41*   LYMPHS PCT % 41   MONOS PCT % 15*   EOS PCT % 1     Results from last 7 days   Lab Units 09/21/22  0557   SODIUM mmol/L 142   POTASSIUM mmol/L 4 6   CHLORIDE mmol/L 109*   CO2 mmol/L 29   BUN mg/dL 20   CREATININE mg/dL 0 67   ANION GAP mmol/L 4   CALCIUM mg/dL 9 0   GLUCOSE RANDOM mg/dL 88                 Results from last 7 days   Lab Units 09/18/22  0531 09/17/22  0444 09/16/22  0552   PROCALCITONIN ng/ml 0 22 0 37* 0 46*       Lines/Drains:  Invasive Devices  Report    Drain  Duration           External Urinary Catheter Small 6 days                      Imaging: No pertinent imaging reviewed      Recent Cultures (last 7 days):         Last 24 Hours Medication List:   Current Facility-Administered Medications   Medication Dose Route Frequency Provider Last Rate    acetaminophen  650 mg Oral Q6H PRN Nima Moore MD      aspirin  81 mg Oral Daily Nima Moore MD      atorvastatin  10 mg Oral Daily With Mrala Palencia MD      cefTRIAXone  1,000 mg Intravenous Q24H Nima Mooer MD 1,000 mg (09/21/22 0857)    divalproex sodium  500 mg Oral Q12H [facility] Nima Moore MD      heparin (porcine)  5,000 Units Subcutaneous Q8H [facility] Nima Moore MD      sertraline  100 mg Oral Daily Nima Moore MD          Today, Patient Was Seen By: Mukund Slater ADOLPH Jacobson    **Please Note: This note may have been constructed using a voice recognition system  **

## 2022-09-22 NOTE — ASSESSMENT & PLAN NOTE
Wt Readings from Last 3 Encounters:   09/22/22 70 kg (154 lb 5 2 oz)   01/07/22 71 7 kg (158 lb)   07/16/21 68 2 kg (150 lb 4 8 oz)     · No known history of CHF noted in chart review  · Mild volume overload with elevated proBNP and chest x-ray showing vascular congestion on admission  · Received 1 dose of IV Lasix 20 mg  No diuretics currently  · Monitor I&Os and daily weights  · Diurese as needed, patient currently appears comfortable from the respiratory standpoint and saturating well on room air therefore will hold off on further diuretics  · [id number]-14%, grade 1 diastolic dysfunction    · Currently euvolemic on exam

## 2022-09-23 ENCOUNTER — TRANSITIONAL CARE MANAGEMENT (OUTPATIENT)
Dept: INTERNAL MEDICINE CLINIC | Facility: CLINIC | Age: 87
End: 2022-09-23

## 2022-09-23 VITALS
TEMPERATURE: 98.1 F | OXYGEN SATURATION: 92 % | SYSTOLIC BLOOD PRESSURE: 125 MMHG | BODY MASS INDEX: 21.2 KG/M2 | HEIGHT: 72 IN | DIASTOLIC BLOOD PRESSURE: 74 MMHG | RESPIRATION RATE: 18 BRPM | HEART RATE: 65 BPM | WEIGHT: 156.53 LBS

## 2022-09-23 PROCEDURE — 91300 COVID-19 PFIZER VAC BIVALENT TRIS-SUCROSE 30 MCG/0.3 ML SUSP: CPT | Performed by: FAMILY MEDICINE

## 2022-09-23 PROCEDURE — 99239 HOSP IP/OBS DSCHRG MGMT >30: CPT | Performed by: FAMILY MEDICINE

## 2022-09-23 PROCEDURE — 0001A HB IMMUNIZATION ADMIN COVID-19 30MCG/0.3ML FIRST DOSE: CPT | Performed by: FAMILY MEDICINE

## 2022-09-23 PROCEDURE — 92526 ORAL FUNCTION THERAPY: CPT

## 2022-09-23 RX ADMIN — CEFTRIAXONE SODIUM 1000 MG: 10 INJECTION, POWDER, FOR SOLUTION INTRAVENOUS at 08:26

## 2022-09-23 RX ADMIN — SERTRALINE HYDROCHLORIDE 100 MG: 50 TABLET ORAL at 08:26

## 2022-09-23 RX ADMIN — ASPIRIN 81 MG: 81 TABLET, CHEWABLE ORAL at 08:26

## 2022-09-23 RX ADMIN — HEPARIN SODIUM 5000 UNITS: 5000 INJECTION INTRAVENOUS; SUBCUTANEOUS at 06:21

## 2022-09-23 RX ADMIN — DIVALPROEX SODIUM 500 MG: 125 CAPSULE, COATED PELLETS ORAL at 08:27

## 2022-09-23 RX ADMIN — BNT162B2 ORIGINAL AND OMICRON BA.4/BA.5 0.3 ML: .1125; .1125 INJECTION, SUSPENSION INTRAMUSCULAR at 12:23

## 2022-09-23 NOTE — SPEECH THERAPY NOTE
Speech Language/Pathology     Speech/Language Pathology Progress Note     Patient Name: Eric Jackson    ETUMANReginaW Date: 9/23/2022     Problem List  Principal Problem:    Aspiration pneumonia ([facility] )  Active Problems:    Bipolar 1 disorder ([place] [address] )    HTN (hypertension)    PVD (peripheral vascular disease) ([place] [address] )    Dementia without behavioral disturbance, unspecified dementia type ([place] [address] )    Acute diastolic CHF (congestive heart failure) ([facility])    Cystitis    Severe protein-calorie malnutrition ([place] [address] )        Recommendations:   Diet: puree/level 1 diet and honey thick liquids   Meds: crushed with puree   Feeding Assistance: 1:1  Frequent Oral care: 2-4x/day  Aspiration precautions and compensatory swallowing strategies: upright posture, only feed when fully alert, slow rate of feeding, small bites/sips, no straws and alternating bites and sips  Other Recommendations/ considerations: SLP to follow-up to ensure tolerance and adjust diet as indicated       Subjective:  Patient received awake, PCA feeding breakfast       Previous/current diet: puree/nectar    Objective: The following consistencies were tested puree, nectar, honey/  Patient presents with adequate bolus containment, manipulation and control for puree solids and honey thick liquids  Cannot r/o premature loss of nectar as patient with overt coughing across multiple occasions  No overt s/s of aspiration when taking controlled sips of honey thick, minimal difficulties with puree  Assessment:  Oropharyngeal swallow function appears most efficient with puree solid texture; pt agreeable to same  Noted coughing x 2 encounters with nectar thick; minimal difficulties when texture thickened to honey consistency  Given present admission for aspiration pneumonia; suggest downgrade to honey thick liquids, continue puree solids as pre prev tx session  Plan:  Puree/honey  Will continue follow as indicated         Bora Mireles MS, CCC-SLP  Speech-Language Pathologist  [place] #[id number]  [place] #[id number]

## 2022-09-23 NOTE — CASE MANAGEMENT
Case Management Discharge Planning Note    Patient name Roman Portillo  Location [address] [id number] MRN [id number]  : 1934 Date 2022       Current Admission Date: 9/15/2022  Current Admission Diagnosis:Aspiration pneumonia [facility])   Patient Active Problem List    Diagnosis Date Noted    Severe protein-calorie malnutrition ([facility] ) 2022    Aspiration pneumonia ([place] [address] ) 09/15/2022    Acute diastolic CHF (congestive heart failure) ([place] [address] ) 09/15/2022    Cystitis 09/15/2022    Dementia without behavioral disturbance, unspecified dementia type ([place] [address] ) 2022    Personal history of COVID-19 10/2021    Mood disorder ([place] [address] ) 2021    History of T12 vertebral fracture 2021    Ambulatory dysfunction 2020    PVD (peripheral vascular disease) ([place] [address] ) 2020    HTN (hypertension) 10/26/2019    History of prostate cancer 2019    Hyperlipidemia 2018    Bipolar 1 disorder ([place] [address] ) 2018      LOS (days): 8  Geometric Mean LOS (GMLOS) (days): 5 40  Days to GMLOS:-2 4     OBJECTIVE:  Risk of Unplanned Readmission Score: 10 35         Current admission status: Inpatient   Preferred Pharmacy:   [facility] [address] [address]  Phone: [phone] Fax: [address] [place] [address]  Phone: [phone] Fax: [phone]    Primary Care Provider: Darryl Or, DO    Primary Insurance: [employer] [facility] REP  Secondary Insurance:     DISCHARGE DETAILS:                                                                                        IMM Given (Date):: 22  IMM Given to[de-identified] Family  Family notified[de-identified] reviewed IMM w nephew Remus Loge;  he refused copy

## 2022-09-23 NOTE — DISCHARGE INSTR - DIET
Recommendations:   Diet: puree/level 1 diet and honey thick liquids   Meds: crushed with puree   Feeding Assistance: 1:1  Frequent Oral care: 2-4x/day  Aspiration precautions and compensatory swallowing strategies: upright posture, only feed when fully alert, slow rate of feeding, small bites/sips, no straws and alternating bites and sips

## 2022-09-23 NOTE — NURSING NOTE
Pt discharged to [address]  Incontinent of both bowel and bladder  No complaints of pain  No new skin issues  IV removed prior to discharge  Instructions send with transport team to the facility  Report called in prior to pt leaving  No questions from receiving facility at this time  Ataxic gait

## 2022-09-23 NOTE — DISCHARGE SUMMARY
[facility]  Discharge- Tosin Ego 1934, 80 y o  male MRN: [id number]  Unit/Bed#: -01 Encounter: [id number]  Primary Care Provider: Nando Tang DO   Date and time admitted to hospital: 9/15/2022 10:43 AM    * Aspiration pneumonia [facility])  Assessment & Plan  · Present on admission with reported coughing and shortness of breath  CT chest showed finding suggestive of aspiration pneumonia  · Procalcitonin elevated on admission at 0 46; has trended down to normal   · Continue on IV Ceftriaxone; today is day 7  · Blood cultures negative x 5 days  · Aspiration precautions  · Speech therapy on board- dysphagia 2, nectar thin, progressive penetration of thins with delayed coughing  · Also advised for 1-1 feeding assist and meds crushed in puree  Acute diastolic CHF (congestive heart failure) (HCC)  Assessment & Plan  Wt Readings from Last 3 Encounters:   09/23/22 71 kg (156 lb 8 4 oz)   01/07/22 71 7 kg (158 lb)   07/16/21 68 2 kg (150 lb 4 8 oz)     · No known history of CHF noted in chart review  · Mild volume overload with elevated proBNP and chest x-ray showing vascular congestion on admission  · Received 1 dose of IV Lasix 20 mg  No diuretics currently  · Monitor I&Os and daily weights  · Diurese as needed, patient currently appears comfortable from the respiratory standpoint and saturating well on room air therefore will hold off on further diuretics  · [id number]-02%, grade 1 diastolic dysfunction  · Currently euvolemic on exam    Severe protein-calorie malnutrition ([address] )  Assessment & Plan  Malnutrition Findings:   Adult Malnutrition type: Chronic illness  Adult Degree of Malnutrition: Other severe protein calorie malnutrition  Malnutrition Characteristics: Fat loss, Muscle loss                  360 Statement: related to medical condition as evidenced by severe orbital hollowing and severe temporal depletion  Treated with diet and supplements      BMI Findings: Body mass index is 21 23 kg/m²  Nutrition consult    Cystitis  Assessment & Plan  · Noted on CT abdomen/pelvis  · UA was never collected therefore order discontinued as patient already on antibiotics  · Patient unable to provide any history regarding urinary symptoms  · Continue coverage with ceftriaxone    Dementia without behavioral disturbance, unspecified dementia type ([facility] )  Assessment & Plan  · Alert and oriented to self and Minimally interactive at baseline  · Appears to be at baseline currently  · Supportive care    PVD (peripheral vascular disease) ([facility] )  Assessment & Plan  · Continue aspirin and statin    HTN (hypertension)  Assessment & Plan  · Chronic; blood pressure controlled; 108-112/56-62  · Monitor    Bipolar 1 disorder [facility])  Assessment & Plan  · Continue home medications      Medical Problems             Resolved Problems  Date Reviewed: 9/23/2022   None               Discharging Physician / Practitioner: Itzel Butts MD  PCP: Ana Maria Ching DO  Admission Date:   Admission Orders (From admission, onward)     Ordered        09/15/22 [address]  Once                      Discharge Date: 09/23/22    Consultations During Hospital Stay:  · None  ·     Procedures Performed:   FL barium swallow video w speech   Final Result by RODRIGUE DYKES (09/21 1020)      XR chest pa & lateral   Final Result by Juanis Quiroga MD (09/15 1240)      Mild pulmonary vascular congestion  Workstation performed: [id number]         PE Study with CT Abdomen and Pelvis with contrast   Final Result by Holly Freeman DO (09/15 1315)      No pulmonary embolus seen within the pulmonary trunk, main, lobar, or proximal segmental branches  More peripheral branches are poorly assessed secondary to artifact          Dependent secretions in the trachea with extension along the bilateral mainstem bronchi and extending into the bilateral lower lobes where there are segments of bronchial occlusion  Consolidations along the lower lobe and lingula bronchial trees with    more focal consolidation in the left lower lobe most likely pneumonia , suspicious for aspiration  Small bilateral pleural effusions and interlobular septal thickening consistent with edema  Urinary bladder wall thickening and perivesicular inflammation may be secondary to cystitis and/or prostatomegaly  Recommend correlation with urinalysis  Increased height loss of T12 compared to prior  Additional findings as above  The study was marked in EPIC for significant notification  Workstation performed: [id number]         ·   ·     Significant Findings / Test Results:   · As above    Incidental Findings:   · As above     Test Results Pending at Discharge (will require follow up):   · none     Outpatient Tests Requested:  · none    Complications:  none    Reason for Admission: Marquez Souza is a 80 y o  male with past medical history significant for hypertension, hyperlipidemia, bipolar disorder, dementia and history of prostate cancer who presents with cough and shortness of breath noted today and brought from [facility] home care  There was reported hypotension and hypoxia prior to admission however blood pressure and oxygen saturations have been stable since admission  Patient has dementia and minimally interactive at baseline therefore was not able to obtain any history from him and most of the history was obtained from chart review and patient's nephew who is his POA  Workup in ED showed findings concerning for aspiration pneumonia on CT chest with mild vascular congestion on chest x-ray  Patient did not meet SIRS criteria on admission  Patient will be admitted for IV antibiotic and further workup including swallow eval and pneumonia workup      Had a brief discussion with patient's nephew who is his POA about expectations given patient's current status and comorbidities    He stated he definitely does not want patient to suffer and would like him to be DNR/DNI  He stated that he went through hospice care with his parents therefore he understands what it means and would like to have some time to think about his decision in regards to next steps including feeding tube if needed versus hospice in the next couple of days if no improvement noted with patient's condition  Hospital Course:   Arthur Cheng is a 80 y o  male patient who originally presented to the hospital on 9/15/2022 due to PNA and was treated with IV ceftriaxone and cultures were negative  Speech saw the pt and dietary recommendations were followed    Please see above list of diagnoses and related plan for additional information  Condition at Discharge: stable    Discharge Day Visit / Exam:   Subjective:  No new complaints  Vitals: Blood Pressure: 125/74 (09/23/22 0613)  Pulse: 65 (09/23/22 0613)  Temperature: 98 1 °F (36 7 °C) (09/23/22 0613)  Temp Source: Oral (09/22/22 0626)  Respirations: 18 (09/23/22 4056)  Height: 6' (182 9 cm) (09/16/22 1100)  Weight - Scale: 71 kg (156 lb 8 4 oz) (09/23/22 0548)  SpO2: 92 % (09/23/22 2820)  Exam:   Physical Exam General- Awake, alert and oriented x 3, looks comfortable  HEENT- Normocephalic, atraumatic, oral mucosa- moist  Neck- Supple, No carotid bruit, no JVD  CVS- Normal S1/ S2, Regular rate and rhythm, No murmur, No edema  Respiratory system- reduced air at the bases  Abdomen- Soft, Non distended, no tenderness, Bowel sound- present 4 quads  Genitourinary- No suprapubic tenderness, No CVA tenderness  Skin- No new bruise or rash  Musculoskeletal- No gross deformity  Psych- No acute psychosis  CNS- CN II- XII grossly intact, No acute focal neurologic deficit noted      Discussion with Family: Updated  (son) via phone  Discharge instructions/Information to patient and family:   See after visit summary for information provided to patient and family        Provisions for Follow-Up Care:  See after visit summary for information related to follow-up care and any pertinent home health orders  Disposition:   Other: SNF    Planned Readmission: no     Discharge Statement:  I spent 35 minutes discharging the patient  This time was spent on the day of discharge  I had direct contact with the patient on the day of discharge  Greater than 50% of the total time was spent examining patient, answering all patient questions, arranging and discussing plan of care with patient as well as directly providing post-discharge instructions  Additional time then spent on discharge activities  Discharge Medications:  See after visit summary for reconciled discharge medications provided to patient and/or family        **Please Note: This note may have been constructed using a voice recognition system**

## 2022-09-23 NOTE — ASSESSMENT & PLAN NOTE
Malnutrition Findings:   Adult Malnutrition type: Chronic illness  Adult Degree of Malnutrition: Other severe protein calorie malnutrition  Malnutrition Characteristics: Fat loss, Muscle loss                  360 Statement: related to medical condition as evidenced by severe orbital hollowing and severe temporal depletion  Treated with diet and supplements  BMI Findings: Body mass index is 21 23 kg/m²     Nutrition consult

## 2022-09-23 NOTE — ASSESSMENT & PLAN NOTE
Wt Readings from Last 3 Encounters:   09/23/22 71 kg (156 lb 8 4 oz)   01/07/22 71 7 kg (158 lb)   07/16/21 68 2 kg (150 lb 4 8 oz)     · No known history of CHF noted in chart review  · Mild volume overload with elevated proBNP and chest x-ray showing vascular congestion on admission  · Received 1 dose of IV Lasix 20 mg  No diuretics currently  · Monitor I&Os and daily weights  · Diurese as needed, patient currently appears comfortable from the respiratory standpoint and saturating well on room air therefore will hold off on further diuretics  · RIHG-AKHP-02%, grade 1 diastolic dysfunction    · Currently euvolemic on exam

## 2022-11-21 PROBLEM — J69.0 ASPIRATION PNEUMONIA (HCC): Status: RESOLVED | Noted: 2022-09-15 | Resolved: 2022-11-21

## 2023-08-04 ENCOUNTER — TELEPHONE (OUTPATIENT)
Age: 88
End: 2023-08-04

## 2024-06-13 NOTE — ASSESSMENT & PLAN NOTE
26-year-old male past medical history of ambulate dysfunction presented with having a fall few days ago at personal care home  Labs unremarkable  Trauma series images report reviewed and negative for any acute finding except age indeterminate T3 compression fracture, cervical spondylosis, degenerative changes  Currently afebrile, hemodynamically stable  Goal is pain management with lidocaine patch, Tylenol, muscle relaxant as needed, warm compression  Fall precaution  PT OT eval   Patient is agreeable with above  Discharge Instructions for Trauma       What to do after you leave the hospital:    Follow up with Neurosurgery in 4-6 weeks for a recheck of your neck pain.     Wear the soft cervical collar as needed, for comfort.      Please continue to use your Incentive Spirometer as directed.  You can practice 10 deep breaths/hour while awake.   Using the Incentive Spirometer will promote the health of your lungs by taking slow, deep breaths in.  It is also important in preventing pneumonia or a pneumothorax from developing.       For resources transitioning back to the community following your trauma, visit [email or web address]    General questions or concerns please call the [facility] at [phone].  If needed, the clinic fax number is [phone].    Trauma is a life-threatening condition. Your doctor will want to closely monitor you. Be sure to go to all of your appointments.
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
## 2024-07-19 ENCOUNTER — TELEPHONE (OUTPATIENT)
Age: 89
End: 2024-07-19

## 2024-10-30 ENCOUNTER — TELEPHONE (OUTPATIENT)
Age: 89
End: 2024-10-30